# Patient Record
Sex: MALE | Race: BLACK OR AFRICAN AMERICAN | NOT HISPANIC OR LATINO | URBAN - METROPOLITAN AREA
[De-identification: names, ages, dates, MRNs, and addresses within clinical notes are randomized per-mention and may not be internally consistent; named-entity substitution may affect disease eponyms.]

---

## 2018-09-21 ENCOUNTER — INPATIENT (INPATIENT)
Facility: HOSPITAL | Age: 83
LOS: 3 days | Discharge: HOME | DRG: 309 | End: 2018-09-25
Attending: STUDENT IN AN ORGANIZED HEALTH CARE EDUCATION/TRAINING PROGRAM | Admitting: STUDENT IN AN ORGANIZED HEALTH CARE EDUCATION/TRAINING PROGRAM
Payer: MEDICARE

## 2018-09-21 VITALS
WEIGHT: 139.99 LBS | SYSTOLIC BLOOD PRESSURE: 123 MMHG | HEART RATE: 120 BPM | RESPIRATION RATE: 16 BRPM | HEIGHT: 73 IN | OXYGEN SATURATION: 99 % | DIASTOLIC BLOOD PRESSURE: 91 MMHG

## 2018-09-21 LAB
ALBUMIN SERPL ELPH-MCNC: 3.5 G/DL — SIGNIFICANT CHANGE UP (ref 3.5–5)
ALP SERPL-CCNC: 82 U/L — SIGNIFICANT CHANGE UP (ref 40–120)
ALT FLD-CCNC: 25 U/L DA — SIGNIFICANT CHANGE UP (ref 10–60)
ANION GAP SERPL CALC-SCNC: 5 MMOL/L — SIGNIFICANT CHANGE UP (ref 5–17)
APTT BLD: 28.4 SEC — SIGNIFICANT CHANGE UP (ref 27.5–37.4)
AST SERPL-CCNC: 21 U/L — SIGNIFICANT CHANGE UP (ref 10–40)
BASOPHILS # BLD AUTO: 0.1 K/UL — SIGNIFICANT CHANGE UP (ref 0–0.2)
BASOPHILS NFR BLD AUTO: 1.4 % — SIGNIFICANT CHANGE UP (ref 0–2)
BILIRUB SERPL-MCNC: 0.5 MG/DL — SIGNIFICANT CHANGE UP (ref 0.2–1.2)
BUN SERPL-MCNC: 27 MG/DL — HIGH (ref 7–18)
CALCIUM SERPL-MCNC: 9 MG/DL — SIGNIFICANT CHANGE UP (ref 8.4–10.5)
CHLORIDE SERPL-SCNC: 109 MMOL/L — HIGH (ref 96–108)
CO2 SERPL-SCNC: 24 MMOL/L — SIGNIFICANT CHANGE UP (ref 22–31)
CREAT SERPL-MCNC: 1.64 MG/DL — HIGH (ref 0.5–1.3)
EOSINOPHIL # BLD AUTO: 0.2 K/UL — SIGNIFICANT CHANGE UP (ref 0–0.5)
EOSINOPHIL NFR BLD AUTO: 4.8 % — SIGNIFICANT CHANGE UP (ref 0–6)
GLUCOSE SERPL-MCNC: 102 MG/DL — HIGH (ref 70–99)
HCT VFR BLD CALC: 34.8 % — LOW (ref 39–50)
HGB BLD-MCNC: 11.3 G/DL — LOW (ref 13–17)
INR BLD: 1.28 RATIO — HIGH (ref 0.88–1.16)
LACTATE SERPL-SCNC: 2 MMOL/L — SIGNIFICANT CHANGE UP (ref 0.7–2)
LYMPHOCYTES # BLD AUTO: 1.2 K/UL — SIGNIFICANT CHANGE UP (ref 1–3.3)
LYMPHOCYTES # BLD AUTO: 24.3 % — SIGNIFICANT CHANGE UP (ref 13–44)
MAGNESIUM SERPL-MCNC: 2 MG/DL — SIGNIFICANT CHANGE UP (ref 1.6–2.6)
MCHC RBC-ENTMCNC: 29.7 PG — SIGNIFICANT CHANGE UP (ref 27–34)
MCHC RBC-ENTMCNC: 32.5 GM/DL — SIGNIFICANT CHANGE UP (ref 32–36)
MCV RBC AUTO: 91.3 FL — SIGNIFICANT CHANGE UP (ref 80–100)
MONOCYTES # BLD AUTO: 0.4 K/UL — SIGNIFICANT CHANGE UP (ref 0–0.9)
MONOCYTES NFR BLD AUTO: 7.7 % — SIGNIFICANT CHANGE UP (ref 2–14)
NEUTROPHILS # BLD AUTO: 3.1 K/UL — SIGNIFICANT CHANGE UP (ref 1.8–7.4)
NEUTROPHILS NFR BLD AUTO: 61.7 % — SIGNIFICANT CHANGE UP (ref 43–77)
PLATELET # BLD AUTO: 121 K/UL — LOW (ref 150–400)
POTASSIUM SERPL-MCNC: 4.9 MMOL/L — SIGNIFICANT CHANGE UP (ref 3.5–5.3)
POTASSIUM SERPL-SCNC: 4.9 MMOL/L — SIGNIFICANT CHANGE UP (ref 3.5–5.3)
PROT SERPL-MCNC: 7.2 G/DL — SIGNIFICANT CHANGE UP (ref 6–8.3)
PROTHROM AB SERPL-ACNC: 14 SEC — HIGH (ref 9.8–12.7)
RBC # BLD: 3.82 M/UL — LOW (ref 4.2–5.8)
RBC # FLD: 14.5 % — SIGNIFICANT CHANGE UP (ref 10.3–14.5)
SODIUM SERPL-SCNC: 138 MMOL/L — SIGNIFICANT CHANGE UP (ref 135–145)
T4 AB SER-ACNC: 9.5 UG/DL — SIGNIFICANT CHANGE UP (ref 4.6–12)
TROPONIN I SERPL-MCNC: <0.015 NG/ML — SIGNIFICANT CHANGE UP (ref 0–0.04)
TSH SERPL-MCNC: 3.73 UU/ML — SIGNIFICANT CHANGE UP (ref 0.34–4.82)
WBC # BLD: 5 K/UL — SIGNIFICANT CHANGE UP (ref 3.8–10.5)
WBC # FLD AUTO: 5 K/UL — SIGNIFICANT CHANGE UP (ref 3.8–10.5)

## 2018-09-21 NOTE — ED PROVIDER NOTE - MEDICAL DECISION MAKING DETAILS
87 yo M with generalized weakness and fall in the setting of recently diagnosed afib. Will check FSBG, EKG, labs, UA, orthostatics, CT head, CXR and pelvic xray, observe and reassess.

## 2018-09-21 NOTE — ED PROVIDER NOTE - PHYSICAL EXAMINATION
GENERAL: wells appearing, no acute distress   HEAD: atraumatic   EYES: EOMI, pink conjunctiva   ENT: moist oral mucosa   CARDIAC: irregularly irregular, no edema, distal pulses present   RESPIRATORY: lungs CTAB, no increased work of breathing   GASTROINTESTINAL: no abdominal tenderness, no rebound or guarding, bowel sounds presents  GENITOURINARY: no CVA tenderness   MUSCULOSKELETAL: no deformity   NEUROLOGICAL: AAOx3, spontaneous movement of extremities   SKIN: intact   PSYCHIATRIC: cooperative  HEME LYMPH: no lymphadenopathy

## 2018-09-21 NOTE — ED PROVIDER NOTE - OBJECTIVE STATEMENT
89 yo M pmh of HTN, HLD, and recent diagnosed a-fib presents with generalized weakness x 1 day. Reports "feeling funny" today when standing up and had a fall. Denies head trauma, LOC, neck or back pain, chest pain, other acute complaints. Took 1st dose of elquis and metropolol today. Denies other acute complaints. 89 yo M pmh of HTN, HLD, and recent diagnosed a-fib presents with generalized weakness x 1 day. Reports "feeling funny" today when standing up and had a fall. Denies head trauma, LOC, neck or back pain, chest pain, other acute complaints. Took 1st dose of Eliquis and metoprolol today. Denies other acute complaints.

## 2018-09-21 NOTE — ED PROVIDER NOTE - PROGRESS NOTE DETAILS
labs - anemia, MORGAN vs CKD; UA - negative   EKG - afib, rate 104  CT head - no infarct or hemorrhage   Will admit for weakness, likely secondary to new onset afib w/o rate control. Endorsed to MAR.

## 2018-09-21 NOTE — ED ADULT TRIAGE NOTE - CHIEF COMPLAINT QUOTE
He feels weak, unable to ambulate Problem: Respiratory Impairment - Respiratory Therapy 253  Intervention: Inhaled medication delivery  Intervention Status  Done  Intervention: Inhaled gas administration  Intervention Status  Done

## 2018-09-22 DIAGNOSIS — C61 MALIGNANT NEOPLASM OF PROSTATE: ICD-10-CM

## 2018-09-22 DIAGNOSIS — E78.5 HYPERLIPIDEMIA, UNSPECIFIED: ICD-10-CM

## 2018-09-22 DIAGNOSIS — N17.9 ACUTE KIDNEY FAILURE, UNSPECIFIED: ICD-10-CM

## 2018-09-22 DIAGNOSIS — K21.9 GASTRO-ESOPHAGEAL REFLUX DISEASE WITHOUT ESOPHAGITIS: ICD-10-CM

## 2018-09-22 DIAGNOSIS — Z85.46 PERSONAL HISTORY OF MALIGNANT NEOPLASM OF PROSTATE: Chronic | ICD-10-CM

## 2018-09-22 DIAGNOSIS — I48.91 UNSPECIFIED ATRIAL FIBRILLATION: ICD-10-CM

## 2018-09-22 DIAGNOSIS — I10 ESSENTIAL (PRIMARY) HYPERTENSION: ICD-10-CM

## 2018-09-22 DIAGNOSIS — Z29.9 ENCOUNTER FOR PROPHYLACTIC MEASURES, UNSPECIFIED: ICD-10-CM

## 2018-09-22 LAB
ANION GAP SERPL CALC-SCNC: 8 MMOL/L — SIGNIFICANT CHANGE UP (ref 5–17)
BUN SERPL-MCNC: 24 MG/DL — HIGH (ref 7–18)
CALCIUM SERPL-MCNC: 9.5 MG/DL — SIGNIFICANT CHANGE UP (ref 8.4–10.5)
CHLORIDE SERPL-SCNC: 107 MMOL/L — SIGNIFICANT CHANGE UP (ref 96–108)
CK MB BLD-MCNC: 1 % — SIGNIFICANT CHANGE UP (ref 0–3.5)
CK MB CFR SERPL CALC: 1.9 NG/ML — SIGNIFICANT CHANGE UP (ref 0–3.6)
CK SERPL-CCNC: 188 U/L — SIGNIFICANT CHANGE UP (ref 35–232)
CO2 SERPL-SCNC: 23 MMOL/L — SIGNIFICANT CHANGE UP (ref 22–31)
CREAT SERPL-MCNC: 1.62 MG/DL — HIGH (ref 0.5–1.3)
GLUCOSE SERPL-MCNC: 85 MG/DL — SIGNIFICANT CHANGE UP (ref 70–99)
HCT VFR BLD CALC: 42 % — SIGNIFICANT CHANGE UP (ref 39–50)
HGB BLD-MCNC: 13.3 G/DL — SIGNIFICANT CHANGE UP (ref 13–17)
MAGNESIUM SERPL-MCNC: 2.1 MG/DL — SIGNIFICANT CHANGE UP (ref 1.6–2.6)
MCHC RBC-ENTMCNC: 29.6 PG — SIGNIFICANT CHANGE UP (ref 27–34)
MCHC RBC-ENTMCNC: 31.6 GM/DL — LOW (ref 32–36)
MCV RBC AUTO: 93.8 FL — SIGNIFICANT CHANGE UP (ref 80–100)
NT-PROBNP SERPL-SCNC: 4379 PG/ML — HIGH (ref 0–450)
OB PNL STL: NEGATIVE — SIGNIFICANT CHANGE UP
OSMOLALITY UR: 252 MOS/KG — SIGNIFICANT CHANGE UP (ref 50–1200)
PHOSPHATE SERPL-MCNC: 2.8 MG/DL — SIGNIFICANT CHANGE UP (ref 2.5–4.5)
PLATELET # BLD AUTO: 130 K/UL — LOW (ref 150–400)
POTASSIUM SERPL-MCNC: 4.5 MMOL/L — SIGNIFICANT CHANGE UP (ref 3.5–5.3)
POTASSIUM SERPL-SCNC: 4.5 MMOL/L — SIGNIFICANT CHANGE UP (ref 3.5–5.3)
RBC # BLD: 4.48 M/UL — SIGNIFICANT CHANGE UP (ref 4.2–5.8)
RBC # FLD: 14.9 % — HIGH (ref 10.3–14.5)
SODIUM SERPL-SCNC: 138 MMOL/L — SIGNIFICANT CHANGE UP (ref 135–145)
TROPONIN I SERPL-MCNC: <0.015 NG/ML — SIGNIFICANT CHANGE UP (ref 0–0.04)
WBC # BLD: 4.7 K/UL — SIGNIFICANT CHANGE UP (ref 3.8–10.5)
WBC # FLD AUTO: 4.7 K/UL — SIGNIFICANT CHANGE UP (ref 3.8–10.5)

## 2018-09-22 PROCEDURE — 99223 1ST HOSP IP/OBS HIGH 75: CPT | Mod: GC

## 2018-09-22 PROCEDURE — 72170 X-RAY EXAM OF PELVIS: CPT | Mod: 26

## 2018-09-22 PROCEDURE — 70450 CT HEAD/BRAIN W/O DYE: CPT | Mod: 26

## 2018-09-22 PROCEDURE — 99285 EMERGENCY DEPT VISIT HI MDM: CPT

## 2018-09-22 PROCEDURE — 71045 X-RAY EXAM CHEST 1 VIEW: CPT | Mod: 26

## 2018-09-22 RX ORDER — POLYETHYLENE GLYCOL 3350 17 G/17G
17 POWDER, FOR SOLUTION ORAL DAILY
Qty: 0 | Refills: 0 | Status: DISCONTINUED | OUTPATIENT
Start: 2018-09-22 | End: 2018-09-25

## 2018-09-22 RX ORDER — ATORVASTATIN CALCIUM 80 MG/1
40 TABLET, FILM COATED ORAL AT BEDTIME
Qty: 0 | Refills: 0 | Status: DISCONTINUED | OUTPATIENT
Start: 2018-09-22 | End: 2018-09-25

## 2018-09-22 RX ORDER — SODIUM CHLORIDE 0.65 %
1 AEROSOL, SPRAY (ML) NASAL DAILY
Qty: 0 | Refills: 0 | Status: DISCONTINUED | OUTPATIENT
Start: 2018-09-22 | End: 2018-09-25

## 2018-09-22 RX ORDER — DOCUSATE SODIUM 100 MG
100 CAPSULE ORAL THREE TIMES A DAY
Qty: 0 | Refills: 0 | Status: DISCONTINUED | OUTPATIENT
Start: 2018-09-22 | End: 2018-09-25

## 2018-09-22 RX ORDER — SENNA PLUS 8.6 MG/1
2 TABLET ORAL AT BEDTIME
Qty: 0 | Refills: 0 | Status: DISCONTINUED | OUTPATIENT
Start: 2018-09-22 | End: 2018-09-25

## 2018-09-22 RX ORDER — METOPROLOL TARTRATE 50 MG
25 TABLET ORAL EVERY 12 HOURS
Qty: 0 | Refills: 0 | Status: DISCONTINUED | OUTPATIENT
Start: 2018-09-22 | End: 2018-09-25

## 2018-09-22 RX ORDER — PANTOPRAZOLE SODIUM 20 MG/1
40 TABLET, DELAYED RELEASE ORAL
Qty: 0 | Refills: 0 | Status: DISCONTINUED | OUTPATIENT
Start: 2018-09-22 | End: 2018-09-25

## 2018-09-22 RX ORDER — FUROSEMIDE 40 MG
20 TABLET ORAL ONCE
Qty: 0 | Refills: 0 | Status: COMPLETED | OUTPATIENT
Start: 2018-09-22 | End: 2018-09-22

## 2018-09-22 RX ORDER — LACTULOSE 10 G/15ML
10 SOLUTION ORAL ONCE
Qty: 0 | Refills: 0 | Status: COMPLETED | OUTPATIENT
Start: 2018-09-22 | End: 2018-09-22

## 2018-09-22 RX ORDER — APIXABAN 2.5 MG/1
2.5 TABLET, FILM COATED ORAL EVERY 12 HOURS
Qty: 0 | Refills: 0 | Status: DISCONTINUED | OUTPATIENT
Start: 2018-09-22 | End: 2018-09-25

## 2018-09-22 RX ADMIN — APIXABAN 2.5 MILLIGRAM(S): 2.5 TABLET, FILM COATED ORAL at 18:35

## 2018-09-22 RX ADMIN — PANTOPRAZOLE SODIUM 40 MILLIGRAM(S): 20 TABLET, DELAYED RELEASE ORAL at 05:15

## 2018-09-22 RX ADMIN — Medication 20 MILLIGRAM(S): at 08:38

## 2018-09-22 RX ADMIN — APIXABAN 2.5 MILLIGRAM(S): 2.5 TABLET, FILM COATED ORAL at 08:38

## 2018-09-22 RX ADMIN — Medication 25 MILLIGRAM(S): at 18:35

## 2018-09-22 RX ADMIN — POLYETHYLENE GLYCOL 3350 17 GRAM(S): 17 POWDER, FOR SOLUTION ORAL at 12:35

## 2018-09-22 RX ADMIN — Medication 100 MILLIGRAM(S): at 18:34

## 2018-09-22 RX ADMIN — LACTULOSE 10 GRAM(S): 10 SOLUTION ORAL at 05:15

## 2018-09-22 RX ADMIN — ATORVASTATIN CALCIUM 40 MILLIGRAM(S): 80 TABLET, FILM COATED ORAL at 21:44

## 2018-09-22 RX ADMIN — Medication 100 MILLIGRAM(S): at 21:44

## 2018-09-22 RX ADMIN — Medication 100 MILLIGRAM(S): at 05:14

## 2018-09-22 RX ADMIN — Medication 25 MILLIGRAM(S): at 04:42

## 2018-09-22 NOTE — H&P ADULT - HISTORY OF PRESENT ILLNESS
89 yo M pmh of HTN, HLD, and recent diagnosed Atrial fibrillation presents with generalized weakness x 1 day. Reports "feeling funny" today when standing up and had a fall. Denies head trauma, LOC, neck or back pain, chest pain, other acute complaints. Patient states he took his first dose of eliquis and metoprolol today, and thats what precipitated this feeling of something being 'not right'. Patient states he also felt nausea, some dizziness, and a feeling of suffocation, however after being in the ED with several hours, patient states he is feeling much better now. Patient denies fever, headache, shortness of breath, chest pain, abdominal pain, vomiting, diarrhea. Patient admits to feeling of palpitations, which on further questioning he admits to having palpitations for 2 weeks until investigating it further on Monday 9/17, when he was found to have atrial fibrillation. Patient also states he sometimes wakes up in the middle of the night feeling as if he cannot breathe, and has to stay upright for several hours until he feels better. Patient has been consistently sleeping on two pillows every night. Patient also mentions he has chronic history of constipation, and often has to digitally disimpact for bowel movement. 89 yo M pmh of HTN, HLD, and recent diagnosed Atrial fibrillation PSH of bowel surgery, presents with generalized weakness x 1 day. Reports "feeling funny" today when standing up and had a fall. Denies head trauma, LOC, neck or back pain, chest pain, other acute complaints. Patient states he took his first dose of eliquis and metoprolol today, and thats what precipitated this feeling of something being 'not right'. Patient states he also felt nausea, some dizziness, and a feeling of suffocation, however after being in the ED with several hours, patient states he is feeling much better now. Patient denies fever, headache, shortness of breath, chest pain, abdominal pain, vomiting, diarrhea. Patient admits to feeling of palpitations, which on further questioning he admits to having palpitations for 2 weeks until investigating it further on Monday 9/17, when he was found to have atrial fibrillation. Patient also states he sometimes wakes up in the middle of the night feeling as if he cannot breathe, and has to stay upright for several hours until he feels better. Patient has been consistently sleeping on two pillows every night. Patient also mentions he has chronic history of constipation, and often has to digitally disimpact for bowel movement.

## 2018-09-22 NOTE — H&P ADULT - PROBLEM SELECTOR PLAN 1
started on eliquis today, however did not feel good  f/u FOBT  will start on heparin drip for now, consider switching to Xarelto or other agent pending creatinine started on eliquis today, however did not feel good  f/u FOBT  will start on heparin drip for now, consider switching to Xarelto or other agent pending creatinine improving started on eliquis and metoprolol today, however did not feel good  f/u FOBT  will start on heparin drip for now, consider switching to Xarelto or other agent pending creatinine improving  c/w metoprolol 25mg BID for now for rate control  continue with telemetry monitoring, titrate dose upward as necessary  f/u echocardiogram  T1 negative, f/u T2, T3  f/u cardiology consult c/w eliquis for anticoagulation  c/w metoprolol 25mg BID for now for rate control  continue with telemetry monitoring, titrate dose upward as necessary  f/u echocardiogram  T1 negative, f/u T2, T3  f/u cardiology consult - Dr. Salazar

## 2018-09-22 NOTE — H&P ADULT - FAMILY HISTORY
Mother  Still living? No  Family history of cancer, Age at diagnosis: Age Unknown     Sibling  Still living? No  Family history of cancer, Age at diagnosis: Age Unknown

## 2018-09-22 NOTE — ED ADULT NURSE NOTE - NSIMPLEMENTINTERV_GEN_ALL_ED
Implemented All Fall Risk Interventions:  [place] to call system. Call bell, personal items and telephone within reach. Instruct patient to call for assistance. Room bathroom lighting operational. Non-slip footwear when patient is off stretcher. Physically safe environment: no spills, clutter or unnecessary equipment. Stretcher in lowest position, wheels locked, appropriate side rails in place. Provide visual cue, wrist band, yellow gown, etc. Monitor gait and stability. Monitor for mental status changes and reorient to person, place, and time. Review medications for side effects contributing to fall risk. Reinforce activity limits and safety measures with patient and family.

## 2018-09-22 NOTE — H&P ADULT - PMH
Afib    GERD (gastroesophageal reflux disease)    HLD (hyperlipidemia)    HTN (hypertension)    Prostate CA

## 2018-09-22 NOTE — H&P ADULT - ATTENDING COMMENTS
Agree with all the above   Patient seen and examined, he is a 87 yo M pmh of HTN, HLD, and recent diagnosed Atrial fibrillation PSH of bowel surgery, presents with generalized weakness x 1 day. Reports "feeling funny" today when standing up and had a fall. Denies head trauma, LOC, neck or back pain, chest pain, other acute complaints. Patient states he took his first dose of eliquis and metoprolol yesterday, and thats what precipitated this feeling of something being 'not right'. Patient states he also felt nausea, some dizziness, and a feeling of suffocation, however after being in the ED with several hours, patient states he is feeling much better now. Patient denies fever, headache, shortness of breath, chest pain, abdominal pain, vomiting, diarrhea. Patient admits to feeling of palpitations, which on further questioning he admits to having palpitations for 2 weeks until investigating it further on Monday 9/17, when he was found to have atrial fibrillation. Patient also states he sometimes wakes up in the middle of the night feeling as if he cannot breathe, and has to stay upright for several hours until he feels better. Patient has been consistently sleeping on two pillows every night. Patient also mentions he has chronic history of constipation, and often has to digitally disimpact for bowel movement.    ROS as above  PE:   General; Pleasant elderly man, NAD, laying in bed   NEck: Supple   Cardio: irregular rate and rhythm   Pulm: mild crackles at bases   GI: abdomen is soft, non tender   Extr: no edema, no rash   Neuro: Aox3, no focal weakness     Vital Signs Last 24 Hrs  T(C): 36.8 (22 Sep 2018 12:13), Max: 36.8 (22 Sep 2018 03:39)  T(F): 98.2 (22 Sep 2018 12:13), Max: 98.2 (22 Sep 2018 03:39)  HR: 68 (22 Sep 2018 12:13) (68 - 120)  BP: 111/63 (22 Sep 2018 12:13) (105/59 - 123/91)  BP(mean): --  RR: 18 (22 Sep 2018 12:13) (16 - 20)  SpO2: 98% (22 Sep 2018 12:13) (98% - 100%)    1. Newly diagnosed Afib   2. HTN  3. MORGAN vs CKD ???  4. GERD    Plan:   1. Telemetry monitoring: restart Metoprolol 25 mg po twice daily for rate control. Goal HR <110.. Eliquis 2.5 mg po twice daily for anticoagulation  Follow TSH level and TTE   Cardiology consult   2. BP on the lower side. Do not restart Amlodipine   3. Send Urine lytes    4. Start Pantoprazole 40 mg po daily     Plan of care discussed with patient

## 2018-09-22 NOTE — H&P ADULT - PROBLEM SELECTOR PLAN 2
patient on protonix at home  primary team to confirm dosing with patient's pharmacy ([facility] on [address] - creatinine 1.64, unknown kidney dysfunction  f/u creatinine in AM  f/u urine lytes creatinine 1.64, unknown kidney dysfunction  will give 20 IV lasix as patient has bilateral pitting edema and mild pulm vasc congestion on CXR  f/u creatinine in AM  f/u urine lytes

## 2018-09-22 NOTE — H&P ADULT - PROBLEM SELECTOR PLAN 3
patient on lipitor at home patient on protonix at home  primary team to confirm dosing with patient's pharmacy ([facility] on [address] - patient on protonix at home, does not remember dose  ****primary team to contact wife or patient's pharmacy to confirm dosing [facility]: [phone]

## 2018-09-22 NOTE — ED ADULT NURSE NOTE - ED STAT RN HANDOFF DETAILS
pt.remained  stable  denies  pain.on tele box N with  NSR. transfer   to holding  area .report  given to lia [email or web address].not   in  distress

## 2018-09-22 NOTE — H&P ADULT - PROBLEM SELECTOR PLAN 7
IMPROVE VTE Individual Risk Assessment          RISK                                                          Points  [  ] Previous VTE                                                3  [  ] Thrombophilia                                             2  [  ] Lower limb paralysis                                   2        (unable to hold up >15 seconds)    [  ] Current Cancer                                             2         (within 6 months)  [  ] Immobilization > 24 hrs                              1  [  ] ICU/CCU stay > 24 hours                             1  [  ] Age > 60                                                         1    IMPROVE VTE Score: IMPROVE VTE Individual Risk Assessment          RISK                                                          Points  [  ] Previous VTE                                                3  [  ] Thrombophilia                                             2  [  ] Lower limb paralysis                                   2        (unable to hold up >15 seconds)    [  ] Current Cancer                                             2         (within 6 months)  [ x ] Immobilization > 24 hrs                              1  [  ] ICU/CCU stay > 24 hours                             1  [ x ] Age > 60                                                         1    IMPROVE VTE Score: 2    full anticoagulation for new-onset Afib

## 2018-09-22 NOTE — H&P ADULT - NSHPLABSRESULTS_GEN_ALL_CORE
LABS:      CBC Full  -  ( 21 Sep 2018 22:59 )  WBC Count : 5.0 K/uL  Hemoglobin : 11.3 g/dL  Hematocrit : 34.8 %  Platelet Count - Automated : 121 K/uL  Mean Cell Volume : 91.3 fl  Mean Cell Hemoglobin : 29.7 pg  Mean Cell Hemoglobin Concentration : 32.5 gm/dL  Auto Neutrophil # : 3.1 K/uL  Auto Lymphocyte # : 1.2 K/uL  Auto Monocyte # : 0.4 K/uL  Auto Eosinophil # : 0.2 K/uL  Auto Basophil # : 0.1 K/uL  Auto Neutrophil % : 61.7 %  Auto Lymphocyte % : 24.3 %  Auto Monocyte % : 7.7 %  Auto Eosinophil % : 4.8 %  Auto Basophil % : 1.4 %        138  |  109<H>  |  27<H>  ----------------------------<  102<H>  4.9   |  24  |  1.64<H>    Ca    9.0      21 Sep 2018 22:59  Mg     2.0         TPro  7.2  /  Alb  3.5  /  TBili  0.5  /  DBili  x   /  AST  21  /  ALT  25  /  AlkPhos  82      PT/INR - ( 21 Sep 2018 22:59 )   PT: 14.0 sec;   INR: 1.28 ratio         PTT - ( 21 Sep 2018 22:59 )  PTT:28.4 sec      Urinalysis Basic - ( 22 Sep 2018 00:01 )    Color: Yellow / Appearance: Clear / S.015 / pH: x  Gluc: x / Ketone: Negative  / Bili: Negative / Urobili: 1   Blood: x / Protein: Negative / Nitrite: Negative   Leuk Esterase: Negative / RBC: x / WBC x   Sq Epi: x / Non Sq Epi: x / Bacteria: x                RADIOLOGY & ADDITIONAL STUDIES (The following images were personally reviewed):    CXR: no focal consolidation, no evidence of pleural effusion    Abd X-ray: moderate stool in rectal vault LABS:      CBC Full  -  ( 21 Sep 2018 22:59 )  WBC Count : 5.0 K/uL  Hemoglobin : 11.3 g/dL  Hematocrit : 34.8 %  Platelet Count - Automated : 121 K/uL  Mean Cell Volume : 91.3 fl  Mean Cell Hemoglobin : 29.7 pg  Mean Cell Hemoglobin Concentration : 32.5 gm/dL  Auto Neutrophil # : 3.1 K/uL  Auto Lymphocyte # : 1.2 K/uL  Auto Monocyte # : 0.4 K/uL  Auto Eosinophil # : 0.2 K/uL  Auto Basophil # : 0.1 K/uL  Auto Neutrophil % : 61.7 %  Auto Lymphocyte % : 24.3 %  Auto Monocyte % : 7.7 %  Auto Eosinophil % : 4.8 %  Auto Basophil % : 1.4 %        138  |  109<H>  |  27<H>  ----------------------------<  102<H>  4.9   |  24  |  1.64<H>    Ca    9.0      21 Sep 2018 22:59  Mg     2.0         TPro  7.2  /  Alb  3.5  /  TBili  0.5  /  DBili  x   /  AST  21  /  ALT  25  /  AlkPhos  82      PT/INR - ( 21 Sep 2018 22:59 )   PT: 14.0 sec;   INR: 1.28 ratio         PTT - ( 21 Sep 2018 22:59 )  PTT:28.4 sec      Urinalysis Basic - ( 22 Sep 2018 00:01 )    Color: Yellow / Appearance: Clear / S.015 / pH: x  Gluc: x / Ketone: Negative  / Bili: Negative / Urobili: 1   Blood: x / Protein: Negative / Nitrite: Negative   Leuk Esterase: Negative / RBC: x / WBC x   Sq Epi: x / Non Sq Epi: x / Bacteria: x                RADIOLOGY & ADDITIONAL STUDIES (The following images were personally reviewed):    CXR: no focal consolidation, no evidence of pleural effusion, mild pulmonary vascular congestion    Abd X-ray: moderate stool in rectal vault

## 2018-09-22 NOTE — H&P ADULT - ASSESSMENT
89 yo M pmh of HTN, HLD, and recent diagnosed Atrial fibrillation presents with generalized weakness x 1 day.Patient states he took his first dose of eliquis and metoprolol today, and that's what precipitated this feeling of something being 'not right'. Patient states he also felt nausea, some dizziness, and a feeling of suffocation, however after being in the ED with several hours, patient states he is feeling much better now Patient admits to feeling of palpitations, which on further questioning he admits to having palpitations for 2 weeks until investigating it further on Monday 9/17, when he was found to have atrial fibrillation. Patient also states he sometimes wakes up in the middle of the night feeling as if he cannot breathe, and has to stay upright for several hours until he feels better. Patient has been consistently sleeping on two pillows every night. Patient also mentions he has chronic history of constipation, and often has to digitally disimpact for bowel movement.    Patient admitted for further management of new-onset atrial fibrillation with RVR. 89 yo M pmh of HTN, HLD, and recent diagnosed Atrial fibrillation presents with generalized weakness x 1 day.Patient states he took his first dose of eliquis and metoprolol today, and that's what precipitated this feeling of something being 'not right'. Patient states he also felt nausea, some dizziness, and a feeling of suffocation, however after being in the ED with several hours, patient states he is feeling much better now Patient admits to feeling of palpitations, which on further questioning he admits to having palpitations for 2 weeks until investigating it further on Monday 9/17, when he was found to have atrial fibrillation. Patient also states he sometimes wakes up in the middle of the night feeling as if he cannot breathe, and has to stay upright for several hours until he feels better. Patient has been consistently sleeping on two pillows every night. Patient also mentions he has chronic history of constipation, and often has to digitally disimpact for bowel movement.    Patient admitted for further management of new-onset atrial fibrillation with RVR up to 140s 87 yo M pmh of HTN, HLD, and recent diagnosed Atrial fibrillation, PSH bowel surgery, presents with generalized weakness x 1 day.Patient states he took his first dose of eliquis and metoprolol today, and that's what precipitated this feeling of something being 'not right'. Patient states he also felt nausea, some dizziness, and a feeling of suffocation, however after being in the ED with several hours, patient states he is feeling much better now Patient admits to feeling of palpitations, which on further questioning he admits to having palpitations for 2 weeks until investigating it further on Monday 9/17, when he was found to have atrial fibrillation. Patient also states he sometimes wakes up in the middle of the night feeling as if he cannot breathe, and has to stay upright for several hours until he feels better. Patient has been consistently sleeping on two pillows every night. Patient also mentions he has chronic history of constipation, and often has to digitally disimpact for bowel movement.    Patient admitted for further management of new-onset atrial fibrillation with RVR up to 140s

## 2018-09-22 NOTE — H&P ADULT - PROBLEM SELECTOR PLAN 4
Patient on norvasc at home, however does not know the dose  c/w metoprolol for now  continue to monitor BP patient on lipitor at home patient on lipitor at home, however does not know dose  left message for wife for call-back  ****primary team to contact wife or patient's pharmacy to confirm dosing [facility]: [phone] patient on lipitor at home, however does not know dose  continue with atorvastatin 40mg for now  f/u lipid panel, B12, Vit D, TSH, HbA1c  left message for wife for call-back  ****primary team to contact wife or patient's pharmacy to confirm dosing [facility]: [phone]

## 2018-09-22 NOTE — H&P ADULT - NSHPPHYSICALEXAM_GEN_ALL_CORE
Vital Signs Last 24 Hrs  T(C): 36.8 (22 Sep 2018 03:39), Max: 36.8 (22 Sep 2018 03:39)  T(F): 98.2 (22 Sep 2018 03:39), Max: 98.2 (22 Sep 2018 03:39)  HR: 79 (22 Sep 2018 03:39) (79 - 120)  BP: 114/80 (22 Sep 2018 03:39) (113/68 - 123/91)  RR: 20 (22 Sep 2018 03:39) (16 - 20)  SpO2: 98% (22 Sep 2018 03:39) (98% - 99%)    ________________________________________________  PHYSICAL EXAM:  GENERAL: NAD  HEENT: Normocephalic;  conjunctivae and sclerae clear; moist mucous membranes;   NECK : supple  CHEST/LUNG: Clear to auscultation bilaterally with good air entry   HEART: S1 S2  regular; no murmurs, gallops or rubs  ABDOMEN: Soft, Nontender, Nondistended; Bowel sounds present; 1 midline surgical scar from umbilicus to suprapubic region as well as one right paraumbilical surgical scar approx 3 cm in length  EXTREMITIES: no cyanosis; trace pitting edema on bilateral lower extremities up to shins; no calf tenderness  NERVOUS SYSTEM:  Awake and alert; Oriented  to place, person and time ; no new deficits

## 2018-09-22 NOTE — H&P ADULT - PROBLEM SELECTOR PLAN 5
s/p radiation and surgical removal  currently in remission Patient on norvasc at home, however does not know the dose  c/w metoprolol for now  continue to monitor BP Patient on norvasc at home, however does not know the dose  ****primary team to contact wife or patient's pharmacy to confirm dosing [facility]: [phone]  c/w metoprolol for now  continue to monitor BP

## 2018-09-22 NOTE — H&P ADULT - PROBLEM SELECTOR PLAN 6
IMPROVE VTE Individual Risk Assessment          RISK                                                          Points  [  ] Previous VTE                                                3  [  ] Thrombophilia                                             2  [  ] Lower limb paralysis                                   2        (unable to hold up >15 seconds)    [  ] Current Cancer                                             2         (within 6 months)  [  ] Immobilization > 24 hrs                              1  [  ] ICU/CCU stay > 24 hours                             1  [  ] Age > 60                                                         1    IMPROVE VTE Score: s/p radiation and surgical removal  currently in remission

## 2018-09-22 NOTE — CONSULT NOTE ADULT - SUBJECTIVE AND OBJECTIVE BOX
CARDIOLOGY ATTENDING      HISTORY OF PRESENT ILLNESS: He is a pleasant 89 y/o male PMH persistent AF of unknown duration now admitted with palpitations secondary to rapid atrial fibrillation. Echo shows mod-severe MR with a non-dilated LV and LVEF 45%. He denies angina nor syncope.     PAST MEDICAL & SURGICAL HISTORY:  Prostate CA  GERD (gastroesophageal reflux disease)  Afib  HLD (hyperlipidemia)  HTN (hypertension)  mod-severe MR  History of prostate cancer    PShx: bowel surgery    MEDICATIONS  (STANDING):  apixaban 2.5 milliGRAM(s) Oral every 12 hours  atorvastatin 40 milliGRAM(s) Oral at bedtime  docusate sodium 100 milliGRAM(s) Oral three times a day  metoprolol tartrate 25 milliGRAM(s) Oral every 12 hours  pantoprazole    Tablet 40 milliGRAM(s) Oral before breakfast  polyethylene glycol 3350 17 Gram(s) Oral daily  senna 2 Tablet(s) Oral at bedtime  sodium chloride 0.65% Nasal 1 Spray(s) Both Nostrils daily    Allergies  penicillin (Hives)  shellfish (Hives)    FAMILY HISTORY:  Family history of cancer (Mother, Sibling)  Non-contributary for premature coronary disease or sudden cardiac death    SOCIAL HISTORY:    [x ] Non-smoker  [ ] Smoker  [ ] Alcohol      REVIEW OF SYSTEMS:  [ ]chest pain  [  ]shortness of breath  [ x]palpitations  [  ]syncope  [ ]near syncope [ ]upper extremity weakness   [ ] lower extremity weakness  [  ]diplopia  [  ]altered mental status   [  ]fevers  [ ]chills [ ]nausea  [ ]vomitting  [  ]dysphagia    [ ]abdominal pain  [ ]melena  [ ]BRBPR    [  ]epistaxis  [  ]rash    [ ]lower extremity edema        [x ] All others negative	  [ ] Unable to obtain    PHYSICAL EXAM:  T(C): 36.8 (09-22-18 @ 12:13), Max: 36.8 (09-22-18 @ 03:39)  HR: 68 (09-22-18 @ 12:13) (68 - 120)  BP: 111/63 (09-22-18 @ 12:13) (105/59 - 123/91)  RR: 18 (09-22-18 @ 12:13) (16 - 20)  SpO2: 98% (09-22-18 @ 12:13) (98% - 100%)  Wt(kg): --    no JVD  IRR, 2/6 systolic murmur  CTAB  soft nt/nd  no c/c/e      TELEMETRY: 	  AF  ECG:  	AF    Echo: mod-severe MR with LVEF 45%  NST: n/a  Cath: n/a  	  	  LABS:	 	                          13.3   4.7   )-----------( 130      ( 22 Sep 2018 10:04 )             42.0     09-22    138  |  107  |  24<H>  ----------------------------<  85  4.5   |  23  |  1.62<H>    Ca    9.5      22 Sep 2018 10:04  Phos  2.8     09-22  Mg     2.1     09-22    TPro  7.2  /  Alb  3.5  /  TBili  0.5  /  DBili  x   /  AST  21  /  ALT  25  /  AlkPhos  82  09-21    proBNP: Serum Pro-Brain Natriuretic Peptide: 4379 pg/mL (09-22 @ 10:04)    Lipid Profile:   HgA1c:   TSH: Thyroid Stimulating Hormone, Serum: 3.73 uU/mL (09-21 @ 22:59)      A/P) He is a pleasant 89 y/o male PMH persistent AF of unknown duration now admitted with palpitations secondary to rapid atrial fibrillation. Echo shows mod-severe MR with a non-dilated LV and LVEF 45%. He denies angina nor syncope.     -continue eliquis and metoprolol  -lifelong a/c  -will review TTE images, but given mod-severe MR with LVEF already declining he may require MVR or minimally invasive michelle-clip  -final cardiology reccs pending review of TTE with possible YENNY to follow to evaluate mitral valve      Guzman Mckenzie M.D., [facility] [[email or web address]]  Cardiac Electrophysiology  [facility]  [address] [facility], [address] [place] [address]  [email or web address]    office [phone]  pager [phone]

## 2018-09-23 ENCOUNTER — TRANSCRIPTION ENCOUNTER (OUTPATIENT)
Age: 83
End: 2018-09-23

## 2018-09-23 LAB
ANION GAP SERPL CALC-SCNC: 7 MMOL/L — SIGNIFICANT CHANGE UP (ref 5–17)
BUN SERPL-MCNC: 26 MG/DL — HIGH (ref 7–18)
CALCIUM SERPL-MCNC: 9.2 MG/DL — SIGNIFICANT CHANGE UP (ref 8.4–10.5)
CHLORIDE SERPL-SCNC: 106 MMOL/L — SIGNIFICANT CHANGE UP (ref 96–108)
CO2 SERPL-SCNC: 23 MMOL/L — SIGNIFICANT CHANGE UP (ref 22–31)
CREAT SERPL-MCNC: 1.5 MG/DL — HIGH (ref 0.5–1.3)
CULTURE RESULTS: SIGNIFICANT CHANGE UP
GLUCOSE SERPL-MCNC: 92 MG/DL — SIGNIFICANT CHANGE UP (ref 70–99)
HCT VFR BLD CALC: 38.7 % — LOW (ref 39–50)
HGB BLD-MCNC: 12.4 G/DL — LOW (ref 13–17)
MAGNESIUM SERPL-MCNC: 2.1 MG/DL — SIGNIFICANT CHANGE UP (ref 1.6–2.6)
MCHC RBC-ENTMCNC: 29.6 PG — SIGNIFICANT CHANGE UP (ref 27–34)
MCHC RBC-ENTMCNC: 32.1 GM/DL — SIGNIFICANT CHANGE UP (ref 32–36)
MCV RBC AUTO: 92.1 FL — SIGNIFICANT CHANGE UP (ref 80–100)
PHOSPHATE SERPL-MCNC: 3.3 MG/DL — SIGNIFICANT CHANGE UP (ref 2.5–4.5)
PLATELET # BLD AUTO: 116 K/UL — LOW (ref 150–400)
POTASSIUM SERPL-MCNC: 4.3 MMOL/L — SIGNIFICANT CHANGE UP (ref 3.5–5.3)
POTASSIUM SERPL-SCNC: 4.3 MMOL/L — SIGNIFICANT CHANGE UP (ref 3.5–5.3)
RBC # BLD: 4.2 M/UL — SIGNIFICANT CHANGE UP (ref 4.2–5.8)
RBC # FLD: 14.2 % — SIGNIFICANT CHANGE UP (ref 10.3–14.5)
SODIUM SERPL-SCNC: 136 MMOL/L — SIGNIFICANT CHANGE UP (ref 135–145)
SPECIMEN SOURCE: SIGNIFICANT CHANGE UP
WBC # BLD: 4.2 K/UL — SIGNIFICANT CHANGE UP (ref 3.8–10.5)
WBC # FLD AUTO: 4.2 K/UL — SIGNIFICANT CHANGE UP (ref 3.8–10.5)

## 2018-09-23 PROCEDURE — 99233 SBSQ HOSP IP/OBS HIGH 50: CPT | Mod: GC

## 2018-09-23 RX ORDER — LISINOPRIL 2.5 MG/1
2.5 TABLET ORAL DAILY
Qty: 0 | Refills: 0 | Status: DISCONTINUED | OUTPATIENT
Start: 2018-09-23 | End: 2018-09-25

## 2018-09-23 RX ADMIN — ATORVASTATIN CALCIUM 40 MILLIGRAM(S): 80 TABLET, FILM COATED ORAL at 22:25

## 2018-09-23 RX ADMIN — PANTOPRAZOLE SODIUM 40 MILLIGRAM(S): 20 TABLET, DELAYED RELEASE ORAL at 06:49

## 2018-09-23 RX ADMIN — APIXABAN 2.5 MILLIGRAM(S): 2.5 TABLET, FILM COATED ORAL at 17:37

## 2018-09-23 RX ADMIN — Medication 100 MILLIGRAM(S): at 17:37

## 2018-09-23 RX ADMIN — Medication 25 MILLIGRAM(S): at 06:49

## 2018-09-23 RX ADMIN — POLYETHYLENE GLYCOL 3350 17 GRAM(S): 17 POWDER, FOR SOLUTION ORAL at 12:00

## 2018-09-23 RX ADMIN — Medication 100 MILLIGRAM(S): at 06:49

## 2018-09-23 RX ADMIN — Medication 25 MILLIGRAM(S): at 17:38

## 2018-09-23 RX ADMIN — LISINOPRIL 2.5 MILLIGRAM(S): 2.5 TABLET ORAL at 17:39

## 2018-09-23 RX ADMIN — APIXABAN 2.5 MILLIGRAM(S): 2.5 TABLET, FILM COATED ORAL at 06:49

## 2018-09-23 RX ADMIN — Medication 1 SPRAY(S): at 12:00

## 2018-09-23 NOTE — DISCHARGE NOTE ADULT - HOME CARE AGENCY
[facility] (formerly [facility]) [phone]    agency  will call you home for visits

## 2018-09-23 NOTE — PROGRESS NOTE ADULT - SUBJECTIVE AND OBJECTIVE BOX
Subjective: pt seen and examined, no complaints, ROS - .     no chest pain , sob on exam     apixaban 2.5 milliGRAM(s) Oral every 12 hours  atorvastatin 40 milliGRAM(s) Oral at bedtime  docusate sodium 100 milliGRAM(s) Oral three times a day  metoprolol tartrate 25 milliGRAM(s) Oral every 12 hours  pantoprazole    Tablet 40 milliGRAM(s) Oral before breakfast  polyethylene glycol 3350 17 Gram(s) Oral daily  senna 2 Tablet(s) Oral at bedtime  sodium chloride 0.65% Nasal 1 Spray(s) Both Nostrils daily                            13.3   4.7   )-----------( 130      ( 22 Sep 2018 10:04 )             42.0       Hemoglobin: 13.3 g/dL (09-22 @ 10:04)  Hemoglobin: 11.3 g/dL (09-21 @ 22:59)      09-22    138  |  107  |  24<H>  ----------------------------<  85  4.5   |  23  |  1.62<H>    Ca    9.5      22 Sep 2018 10:04  Phos  2.8     09-22  Mg     2.1     09-22    TPro  7.2  /  Alb  3.5  /  TBili  0.5  /  DBili  x   /  AST  21  /  ALT  25  /  AlkPhos  82  09-21    Creatinine Trend: 1.62<--, 1.64<--    COAGS:     CARDIAC MARKERS ( 22 Sep 2018 10:04 )  <0.015 ng/mL / x     / 188 U/L / x     / 1.9 ng/mL  CARDIAC MARKERS ( 21 Sep 2018 22:59 )  <0.015 ng/mL / x     / x     / x     / x            T(C): 36.6 (09-23-18 @ 00:10), Max: 36.8 (09-22-18 @ 12:13)  HR: 93 (09-23-18 @ 00:10) (68 - 106)  BP: 121/83 (09-23-18 @ 00:10) (100/78 - 121/83)  RR: 16 (09-23-18 @ 00:10) (16 - 18)  SpO2: 98% (09-23-18 @ 00:10) (98% - 100%)  Wt(kg): --    I&O's Summary    no JVD  IRR, 2/6 systolic murmur  CTAB  soft nt/nd  no c/c/e      TELEMETRY:  afib       DIAGNOSTIC TESTING:  [ ] Echocardiogram: < from: Transthoracic Echocardiogram (09.22.18 @ 06:49) >  CONCLUSIONS:  1. Moderate to severe mitral regurgitation.  2. Mild global left ventricular systolic dysfunction.  3. Grade II diastolic dysfunction.  4. RV systolic pressure is mildly increased at  35 mm Hg.    < end of copied text >    [ ]  Catheterization:  [ ] Stress Test:    OTHER: 	        ASSESSMENT/PLAN: 	88y Male PMH persistent AF of unknown duration now admitted with palpitations secondary to rapid atrial fibrillation. Echo shows mod-severe MR with a non-dilated LV and LVEF 45%. He denies angina nor syncope.     Rate control with BB   GI / DVT prophylaxis.   keep K>4, mag >2.0   A/C with eliquis   TTE with severe MR, pt will need YENNY for evaluation of MR ,and to see if pt is suitable for mitral Clip.  D/W Dr Mckenzie

## 2018-09-23 NOTE — PROGRESS NOTE ADULT - PROBLEM SELECTOR PLAN 4
patient on lipitor at home, however does not know dose  continue with atorvastatin 40mg for now  f/u lipid panel, B12, Vit D, TSH, HbA1c  left message for wife for call-back  ****primary team to contact wife or patient's pharmacy to confirm dosing [facility]: [phone] patient on lipitor at home 10 mg   continue with atorvastatin 40mg for now  f/u lipid panel, B12, Vit D, TSH, HbA1c

## 2018-09-23 NOTE — DISCHARGE NOTE ADULT - PLAN OF CARE
Managmenet and rate control - You have a history of AFib  - You need to continue on eliquis for anticoagulation  - You need to continue on metoprolol 25mg BID for now for rate control  - ECHO showed Ejection Fraction 45 % , Grade 2 Diastolic Dysfunction , severe Mitral Regurge   - cardiology consulted Dr. Salazar   - YENNY   - You are medically stable to be discharged from the hospital.  - You need to follow up with your Primary Care Physician in 1 week.  - You need to continue your medications regularly as prescribed. - creatinine 1.64, unknown kidney dysfunction  - bilateral pitting edema and mild pulm vasc congestion on chest x-ray  - creatinine 1.5 upon discharge  - You need to follow up with your Primary Care Physician in 1 week. - You need to continue on protonix 40 mg qd. maintain normal lipid level. (LDL < 100) patient on Lipitor at home 10 mg   - You need to continue on with atorvastatin 40mg for now  Please take your medication Lipitor 40mg regularly   Please maintain healthy lifestyle by eating healthy as mentioned above, exercise regularly and maintain weight.   Please Follow up with your doctor in 1 week. Blood Pressure Control , Please continue current medication regimen, and follow up with your PCP Patient at home on Norvasc 5 mg qd  c/w metoprolol for now    - You have a history of Hypertension.   - Your Blood Pressure was adequately controlled with   - You should continue on the current antihypertensive regimen regularly.  - You blood pressure should be within 140-120/80-90.  - You should follow-up with your PCP within 1 week of your discharge.   - You should maintain healthy lifestyle by eating healthy low salt diet, avoid fatty food, weight loss, exercise regularly as tolerated 30 mins X 3 time per week. - You have a history of Prostate Cancer  - You got radiation and surgical removal  - currently in remission.  - You need to follow up with your Primary Care Physician in 1 week. Management and rate control - You have a history of AFib  - You need to continue on eliquis for anticoagulation  - You need to continue on metoprolol 25mg BID for now for rate control  - ECHO showed Ejection Fraction 45 % , Grade 2 Diastolic Dysfunction , severe Mitral Regurge   - cardiology consulted Dr. Salazar   - YENNY showed severe mitral regurgitation, you underwent stress test that was negative for inducible ischemia, you are advised to please follow up with cardiologist, you need heart surgery but you refused to undergo surgery. You are advised to be complaint with your medications and follow up with your cardiologist.  - You need to follow up with your Primary Care Physician in 1 week.  - You need to continue your medications regularly as prescribed. YOu were found to have MORGAN on admission  - creatinine 1.44 upon discharge  - You need to follow up with your Primary Care Physician in 1 week.  You are advised to please avoid all nsaid and nephrotixic substances We change your blood pressure medication, you were seen by cardiologist and are advised to continue with toprol xl 25 mg daily. We held your losartan for your MORGAN and you are advised not to continue it

## 2018-09-23 NOTE — PROGRESS NOTE ADULT - PROBLEM SELECTOR PLAN 3
patient on protonix at home, does not remember dose  ****primary team to contact wife or patient's pharmacy to confirm dosing [facility]: [phone] c/w protonix 40 mg qd

## 2018-09-23 NOTE — PROGRESS NOTE ADULT - PROBLEM SELECTOR PLAN 5
Patient on norvasc at home, however does not know the dose  ****primary team to contact wife or patient's pharmacy to confirm dosing [facility]: [phone]  c/w metoprolol for now  continue to monitor BP Patient on norvasc at home  confirmed with the pharmacy Norvasc 5 mg qd  c/w metoprolol for now  continue to monitor BP

## 2018-09-23 NOTE — PROGRESS NOTE ADULT - SUBJECTIVE AND OBJECTIVE BOX
PGY1 Note discussed with Supervising Resident and Primary Attending.    Patient is a 88y old  Male who presents with a chief complaint of generalized weakness/malaise for one day (23 Sep 2018 04:36)      INTERVAL HPI/OVERNIGHT EVENTS :    MEDICATIONS  (STANDING):  apixaban 2.5 milliGRAM(s) Oral every 12 hours  atorvastatin 40 milliGRAM(s) Oral at bedtime  docusate sodium 100 milliGRAM(s) Oral three times a day  metoprolol tartrate 25 milliGRAM(s) Oral every 12 hours  pantoprazole    Tablet 40 milliGRAM(s) Oral before breakfast  polyethylene glycol 3350 17 Gram(s) Oral daily  senna 2 Tablet(s) Oral at bedtime  sodium chloride 0.65% Nasal 1 Spray(s) Both Nostrils daily    MEDICATIONS  (PRN):      Allergies    penicillin (Hives)  shellfish (Hives)    Intolerances        REVIEW OF SYSTEMS :  * CONSTITUTIONAL      : No Fever, Weight loss, or Fatigue  * EYES                             : No eye pain , Visual disturbances or Discharge  * RESPIRATORY             : No Cough, Wheezing, Chills or Hemoptysis; No shortness of breath  * CARDIOVASCULAR     : No Chest pain, Palpitations, Dizziness, or Leg swelling  * GASTROINTESTINAL  : No Abdominal or Epigastric pain. No Nausea, Vomiting or Hematemesis; No Diarrhea or Constipation. No Melena or Hematochezia.  * GENITOURINARY        : No Dysuria , Frequency , Haematuria   * NEUROLOGICAL          : No Headaches, Memory loss, Loss of trength, Numbness, or Tremors  * MUSCULOSKELETAL   : No Joint pain  * PSYCHIATRY                 : No Depression or Anxiety   * HEME/LYMPH              : No Easy Bruising or Bleeding gums  * SKIN                               : No Itching, Burning, Rashes, or Lesions     Vital Signs Last 24 Hrs  T(C): 36.5 (23 Sep 2018 05:35), Max: 36.8 (22 Sep 2018 12:13)  T(F): 97.7 (23 Sep 2018 05:35), Max: 98.2 (22 Sep 2018 12:13)  HR: 104 (23 Sep 2018 05:35) (68 - 104)  BP: 113/77 (23 Sep 2018 05:35) (100/78 - 121/83)  BP(mean): --  RR: 16 (23 Sep 2018 05:35) (16 - 18)  SpO2: 100% (23 Sep 2018 05:35) (98% - 100%)    PHYSICAL EXAM :  * GENERAL                 : NAD, Well-groomed, Well-developed  * HEAD                       :  Atraumatic, Normocephalic  * EYES                         : EOMI, PERRLA, Conjunctiva and Sclera clear  * ENT                           : Moist Mucous Membranes  * NECK                         : Supple, No JVD, Normal Thyroid  * CHEST/LUNG           : Clear to Auscultation bilaterally; No Rales, Rhonchi, Wheezing or Rubs  * HEART                       : Regular Rate and Rhythm; No murmurs, Rubs or gallops  * ABDOMEN                : Soft, Non-tender, Non-distended; Bowel Sounds present  * NERVOUS SYSTEM  :  Alert & Oriented X3, Good Concentration; Motor Strength 5/5 B/L UL LL ; DTRs 2+ Intact and Symmetric  * EXTREMITIES            :  2+ Peripheral Pulses, No clubbing, cyanosis, or edema  * SKIN                           : No Rashes or Lesions    LABS:                          13.3   4.7   )-----------( 130      ( 22 Sep 2018 10:04 )             42.0         138  |  107  |  24<H>  ----------------------------<  85  4.5   |  23  |  1.62<H>    Ca    9.5      22 Sep 2018 10:04  Phos  2.8       Mg     2.1         TPro  7.2  /  Alb  3.5  /  TBili  0.5  /  DBili  x   /  AST  21  /  ALT  25  /  AlkPhos  82  -    PT/INR - ( 21 Sep 2018 22:59 )   PT: 14.0 sec;   INR: 1.28 ratio         PTT - ( 21 Sep 2018 22:59 )  PTT:28.4 sec  Urinalysis Basic - ( 22 Sep 2018 00:01 )    Color: Yellow / Appearance: Clear / S.015 / pH: x  Gluc: x / Ketone: Negative  / Bili: Negative / Urobili: 1   Blood: x / Protein: Negative / Nitrite: Negative   Leuk Esterase: Negative / RBC: x / WBC x   Sq Epi: x / Non Sq Epi: x / Bacteria: x      CAPILLARY BLOOD GLUCOSE          RADIOLOGY & ADDITIONAL TESTS:   No radiological imaging was required    Imaging Personally Reviewed   :  [ ] YES  [ ] NO    Consultant(s) Notes Reviewed :  [ ] YES  [ ] NO PGY1 Note discussed with Supervising Resident and Primary Attending.    Patient is a 88y old  Male who presents with a chief complaint of generalized weakness/malaise for one day (23 Sep 2018 04:36)      INTERVAL HPI/OVERNIGHT EVENTS : No acute events reported overnight.    Pt is seen at the bedside. Pt reports cough , SOB .  Patient denies nausea, vomiting, diarrhea, chest pain, headache, dizziness.     MEDICATIONS  (STANDING):  apixaban 2.5 milliGRAM(s) Oral every 12 hours  atorvastatin 40 milliGRAM(s) Oral at bedtime  docusate sodium 100 milliGRAM(s) Oral three times a day  metoprolol tartrate 25 milliGRAM(s) Oral every 12 hours  pantoprazole    Tablet 40 milliGRAM(s) Oral before breakfast  polyethylene glycol 3350 17 Gram(s) Oral daily  senna 2 Tablet(s) Oral at bedtime  sodium chloride 0.65% Nasal 1 Spray(s) Both Nostrils daily    MEDICATIONS  (PRN):      Allergies    penicillin (Hives)  shellfish (Hives)    Intolerances        REVIEW OF SYSTEMS :  * CONSTITUTIONAL      : No Fever, Weight loss, or Fatigue  * EYES                             : No eye pain , Visual disturbances or Discharge  * RESPIRATORY             : Cough shortness of breath, No Wheezing, Chills or Hemoptysis;   * CARDIOVASCULAR     : No Chest pain, Palpitations, Dizziness, or Leg swelling  * GASTROINTESTINAL  : No Abdominal or Epigastric pain. No Nausea, Vomiting or Hematemesis; No Diarrhea or Constipation. No Melena or Hematochezia.  * GENITOURINARY        : No Dysuria , Frequency , Haematuria   * NEUROLOGICAL          : No Headaches, Memory loss, Loss of trength, Numbness, or Tremors  * MUSCULOSKELETAL   : No Joint pain  * PSYCHIATRY                 : No Depression or Anxiety   * HEME/LYMPH              : No Easy Bruising or Bleeding gums  * SKIN                               : No Itching, Burning, Rashes, or Lesions     Vital Signs Last 24 Hrs  T(C): 36.5 (23 Sep 2018 05:35), Max: 36.8 (22 Sep 2018 12:13)  T(F): 97.7 (23 Sep 2018 05:35), Max: 98.2 (22 Sep 2018 12:13)  HR: 104 (23 Sep 2018 05:35) (68 - 104)  BP: 113/77 (23 Sep 2018 05:35) (100/78 - 121/83)  BP(mean): --  RR: 16 (23 Sep 2018 05:35) (16 - 18)  SpO2: 100% (23 Sep 2018 05:35) (98% - 100%)    PHYSICAL EXAM :  * GENERAL                 : NAD, Well-groomed, Well-developed  * HEAD                       :  Atraumatic, Normocephalic  * EYES                         : EOMI, PERRLA, Conjunctiva and Sclera clear  * ENT                           : Moist Mucous Membranes  * NECK                         : Supple, No JVD, Normal Thyroid  * CHEST/LUNG           : Clear to Auscultation bilaterally; No Rales, Rhonchi, Wheezing or Rubs  * HEART                       : Regular Rate and Rhythm; No murmurs, Rubs or gallops  * ABDOMEN                : Soft, Non-tender, Non-distended; Bowel Sounds present  * NERVOUS SYSTEM  :  Alert & Oriented X3, Good Concentration; Motor Strength 5/5 B/L UL LL ; DTRs 2+ Intact and Symmetric  * EXTREMITIES            :  2+ Peripheral Pulses, No clubbing, cyanosis, or edema  * SKIN                           : No Rashes or Lesions    LABS:                          13.3   4.7   )-----------( 130      ( 22 Sep 2018 10:04 )             42.0     -    138  |  107  |  24<H>  ----------------------------<  85  4.5   |  23  |  1.62<H>    Ca    9.5      22 Sep 2018 10:04  Phos  2.8       Mg     2.1         TPro  7.2  /  Alb  3.5  /  TBili  0.5  /  DBili  x   /  AST  21  /  ALT  25  /  AlkPhos  82  -    PT/INR - ( 21 Sep 2018 22:59 )   PT: 14.0 sec;   INR: 1.28 ratio         PTT - ( 21 Sep 2018 22:59 )  PTT:28.4 sec  Urinalysis Basic - ( 22 Sep 2018 00:01 )    Color: Yellow / Appearance: Clear / S.015 / pH: x  Gluc: x / Ketone: Negative  / Bili: Negative / Urobili: 1   Blood: x / Protein: Negative / Nitrite: Negative   Leuk Esterase: Negative / RBC: x / WBC x   Sq Epi: x / Non Sq Epi: x / Bacteria: x      CAPILLARY BLOOD GLUCOSE          RADIOLOGY & ADDITIONAL TESTS:     Patient name: SHAREE SERNA  YOB: 1930   Age: 88 (M)   MR#: [id number]  Study Date: 2018  [facility]  Study quality: Technically good  Referring Physician:  VIKTORIYA ARELLANO MD  Blood Pressure: 105/59 mmHg  Height: 185 cm  Weight: 63 kg  BSA: 1.8 m2  ------------------------------------------------------------------------    PROCEDURE: Transthoracic echocardiogram with 2-D, M-Mode  and complete spectral and color flow Doppler.  INDICATION: Palpitations (R00.2)  HISTORY:  ------------------------------------------------------------------------  DIMENSIONS:  Dimensions:     Normal Values:  LA:     3.4 cm    2.0 - 4.0 cm  Ao:     3.3 cm    2.0 - 3.8 cm  SEPTUM: 1.2 cm    0.6 - 1.2 cm  PWT:    1.0 cm    0.6 - 1.1 cm  LVIDd:  4.1 cm    3.0 - 5.6 cm  LVIDs:  3.0 cm    1.8 - 4.0 cm      Derived Variables:  LVMI: 82 g/m2  RWT: 0.48  Ejection Fraction Visual Estimate: 45 %    ------------------------------------------------------------------------  OBSERVATIONS:  Mitral Valve: Mitral annular calcification, and calcified  mitral leaflets with normal diastolic opening. Moderate to  severe mitral regurgitation.  Aortic Root: Aortic Root: 3.3 cm.    Aortic Valve: Calcified trileaflet aortic valve with normal  opening.  Left Ventricle: Mild global left ventricular systolic  dysfunction. Normal left ventricular internal dimensions  and wall thicknesses. Grade II diastolic dysfunction.  Right Heart: Normal right atrium. Normal right ventricular  size and function. Normal tricuspid valve. Normal pulmonic  valve.  Pericardium/PleuraNormal pericardium with no pericardial  effusion.  Hemodynamic: RV systolic pressure is mildly increased at  35 mm Hg.  ------------------------------------------------------------------------  CONCLUSIONS:  1. Moderate to severe mitral regurgitation.  2. Mild global left ventricular systolic dysfunction.  3. Grade II diastolic dysfunction.  4. RV systolic pressure is mildly increased at  35 mm Hg.    Imaging Personally Reviewed   :  [ ] YES  [ ] NO    Consultant(s) Notes Reviewed :  [ ] YES  [ ] NO

## 2018-09-23 NOTE — DISCHARGE NOTE ADULT - PATIENT PORTAL LINK FT
You can access the [facility] Portal, offered by [facility], by registering with the following website: [email or web address]

## 2018-09-23 NOTE — DISCHARGE NOTE ADULT - NSTOBACCOHOTLINE_GEN_A_CS
[facility] Smokers Quitline ([phone]-BM-[phone]) [facility] Smokers Quitline (428-DW-KODKN) [facility] Smokers Quitline (549-VA-JQNAP)

## 2018-09-23 NOTE — DISCHARGE NOTE ADULT - HOSPITAL COURSE
87 yo M pmh of HTN, HLD, and recent diagnosed Atrial fibrillation, PSH bowel surgery, presents with generalized weakness x 1 day.Patient states he took his first dose of eliquis and metoprolol today, and that's what precipitated this feeling of something being 'not right'. Patient states he also felt nausea, some dizziness, and a feeling of suffocation, however after being in the ED with several hours, patient states he is feeling much better now Patient admits to feeling of palpitations, which on further questioning he admits to having palpitations for 2 weeks until investigating it further on Monday 9/17, when he was found to have atrial fibrillation. Patient also states he sometimes wakes up in the middle of the night feeling as if he cannot breathe, and has to stay upright for several hours until he feels better. Patient has been consistently sleeping on two pillows every night. Patient also mentions he has chronic history of constipation, and often has to digitally disimpact for bowel movement. Patient admitted for further management of new-onset atrial fibrillation with RVR up to 140s  . ECHO showed Ejection Fraction 45 % , Grade 2 Diastolic Dysfunction , severe Mitral Regurge . cardiology consulted Dr. Salazar   YENNY  Given patient's improved clinical status and current hemodynamic stability, decision was made to discharge the patient.  Patient is stable for discharge per attending and is advised to follow up with PCP as outpatient  Please refer to patient's complete medical chart with documents for a full hospital course, for this is only a brief summary. 87 yo M pmh of HTN, HLD, and recent diagnosed Atrial fibrillation, PSH bowel surgery, presents with generalized weakness x 1 day.Patient states he took his first dose of eliquis and metoprolol today, and that's what precipitated this feeling of something being 'not right'. Patient states he also felt nausea, some dizziness, and a feeling of suffocation, however after being in the ED with several hours, patient states he is feeling much better now Patient admits to feeling of palpitations, which on further questioning he admits to having palpitations for 2 weeks until investigating it further on Monday 9/17, when he was found to have atrial fibrillation. Patient also states he sometimes wakes up in the middle of the night feeling as if he cannot breathe, and has to stay upright for several hours until he feels better. Patient has been consistently sleeping on two pillows every night. Patient also mentions he has chronic history of constipation, and often has to digitally disimpact for bowel movement. Patient admitted for further management of new-onset atrial fibrillation with RVR up to 140s  . ECHO showed Ejection Fraction 45 % , Grade 2 Diastolic Dysfunction , severe Mitral Regurge . cardiology consulted Dr. Salazar   YENNY showed sever mitral regurgitation with multiple mitral jets. Pt was seen by cardiologist who readjusted his medications. Pt will need regular follow up with cardiologist as he refused to go for surgery at present and wants a trial of medical management  Given patient's improved clinical status and current hemodynamic stability, decision was made to discharge the patient.  Patient is stable for discharge per attending and is advised to follow up with PCP as outpatient  Please refer to patient's complete medical chart with documents for a full hospital course, for this is only a brief summary.

## 2018-09-23 NOTE — DISCHARGE NOTE ADULT - CARE PLAN
Principal Discharge DX:	Afib  Goal:	Managmenet and rate control  Assessment and plan of treatment:	- You have a history of AFib  - You need to continue on eliquis for anticoagulation  - You need to continue on metoprolol 25mg BID for now for rate control  - ECHO showed Ejection Fraction 45 % , Grade 2 Diastolic Dysfunction , severe Mitral Regurge   - cardiology consulted Dr. Salazar   - YENNY   - You are medically stable to be discharged from the hospital.  - You need to follow up with your Primary Care Physician in 1 week.  - You need to continue your medications regularly as prescribed.  Secondary Diagnosis:	MORGAN (acute kidney injury)  Assessment and plan of treatment:	- creatinine 1.64, unknown kidney dysfunction  - bilateral pitting edema and mild pulm vasc congestion on chest x-ray  - creatinine 1.5 upon discharge  - You need to follow up with your Primary Care Physician in 1 week.  Secondary Diagnosis:	GERD (gastroesophageal reflux disease)  Assessment and plan of treatment:	- You need to continue on protonix 40 mg qd.  Secondary Diagnosis:	HLD (hyperlipidemia)  Goal:	maintain normal lipid level. (LDL < 100)  Assessment and plan of treatment:	patient on Lipitor at home 10 mg   - You need to continue on with atorvastatin 40mg for now  Please take your medication Lipitor 40mg regularly   Please maintain healthy lifestyle by eating healthy as mentioned above, exercise regularly and maintain weight.   Please Follow up with your doctor in 1 week.  Secondary Diagnosis:	HTN (hypertension)  Goal:	Blood Pressure Control , Please continue current medication regimen, and follow up with your PCP  Assessment and plan of treatment:	Patient at home on Norvasc 5 mg qd  c/w metoprolol for now    - You have a history of Hypertension.   - Your Blood Pressure was adequately controlled with   - You should continue on the current antihypertensive regimen regularly.  - You blood pressure should be within 140-120/80-90.  - You should follow-up with your PCP within 1 week of your discharge.   - You should maintain healthy lifestyle by eating healthy low salt diet, avoid fatty food, weight loss, exercise regularly as tolerated 30 mins X 3 time per week.  Secondary Diagnosis:	Prostate CA  Assessment and plan of treatment:	- You have a history of Prostate Cancer  - You got radiation and surgical removal  - currently in remission.  - You need to follow up with your Primary Care Physician in 1 week. Principal Discharge DX:	Afib  Goal:	Management and rate control  Assessment and plan of treatment:	- You have a history of AFib  - You need to continue on eliquis for anticoagulation  - You need to continue on metoprolol 25mg BID for now for rate control  - ECHO showed Ejection Fraction 45 % , Grade 2 Diastolic Dysfunction , severe Mitral Regurge   - cardiology consulted Dr. Salazar   - YENNY showed severe mitral regurgitation, you underwent stress test that was negative for inducible ischemia, you are advised to please follow up with cardiologist, you need heart surgery but you refused to undergo surgery. You are advised to be complaint with your medications and follow up with your cardiologist.  - You need to follow up with your Primary Care Physician in 1 week.  - You need to continue your medications regularly as prescribed.  Secondary Diagnosis:	MORGAN (acute kidney injury)  Assessment and plan of treatment:	YOu were found to have MORGAN on admission  - creatinine 1.44 upon discharge  - You need to follow up with your Primary Care Physician in 1 week.  You are advised to please avoid all nsaid and nephrotixic substances  Secondary Diagnosis:	GERD (gastroesophageal reflux disease)  Assessment and plan of treatment:	- You need to continue on protonix 40 mg qd.  Secondary Diagnosis:	HLD (hyperlipidemia)  Goal:	maintain normal lipid level. (LDL < 100)  Assessment and plan of treatment:	patient on Lipitor at home 10 mg   - You need to continue on with atorvastatin 40mg for now  Please take your medication Lipitor 40mg regularly   Please maintain healthy lifestyle by eating healthy as mentioned above, exercise regularly and maintain weight.   Please Follow up with your doctor in 1 week.  Secondary Diagnosis:	HTN (hypertension)  Goal:	Blood Pressure Control , Please continue current medication regimen, and follow up with your PCP  Assessment and plan of treatment:	We change your blood pressure medication, you were seen by cardiologist and are advised to continue with toprol xl 25 mg daily. We held your losartan for your MORGAN and you are advised not to continue it  Secondary Diagnosis:	Prostate CA  Assessment and plan of treatment:	- You have a history of Prostate Cancer  - You got radiation and surgical removal  - currently in remission.  - You need to follow up with your Primary Care Physician in 1 week.

## 2018-09-23 NOTE — DISCHARGE NOTE ADULT - SECONDARY DIAGNOSIS.
MORGAN (acute kidney injury) GERD (gastroesophageal reflux disease) HLD (hyperlipidemia) HTN (hypertension) Prostate CA

## 2018-09-23 NOTE — DISCHARGE NOTE ADULT - CARE PROVIDER_API CALL
Nabor Salazar (MD), Cardiovascular Disease  [address] [address]  Phone: [phone]  Fax: [phone]

## 2018-09-23 NOTE — PROGRESS NOTE ADULT - PROBLEM SELECTOR PLAN 2
creatinine 1.64, unknown kidney dysfunction  will give 20 IV lasix as patient has bilateral pitting edema and mild pulm vasc congestion on CXR  f/u creatinine in AM  f/u urine lytes - creatinine 1.64, unknown kidney dysfunction  - bilateral pitting edema and mild pulm vasc congestion on CXR  - creatinine 1.5

## 2018-09-23 NOTE — PROGRESS NOTE ADULT - PROBLEM SELECTOR PLAN 1
c/w eliquis for anticoagulation  c/w metoprolol 25mg BID for now for rate control  continue with telemetry monitoring, titrate dose upward as necessary  f/u echocardiogram  T1 negative, f/u T2, T3  f/u cardiology consult - Dr. Salazar c/w eliquis for anticoagulation  c/w metoprolol 25mg BID for now for rate control  - ECHO showed Ejection Fraction 45 % , Grade 2 Diastolic Dysfunction , severe Mitral Regurge   - cardiology consult Dr. Salazar c/w eliquis for anticoagulation  c/w metoprolol 25mg BID for now for rate control  - ECHO showed Ejection Fraction 45 % , Grade 2 Diastolic Dysfunction , severe Mitral Regurge   - cardiology consult Dr. Marie ROBERTS after midnight  - YENNY AM as per Dr. Mckenzie

## 2018-09-23 NOTE — DISCHARGE NOTE ADULT - MEDICATION SUMMARY - MEDICATIONS TO TAKE
I will START or STAY ON the medications listed below when I get home from the hospital:    apixaban 2.5 mg oral tablet  -- 1 tab(s) by mouth every 12 hours  -- Indication: For Afib    atorvastatin 40 mg oral tablet  -- 1 tab(s) by mouth once a day (at bedtime)  -- Indication: For Hyperlipidemia    metoprolol succinate 25 mg oral tablet, extended release  -- 1 tab(s) by mouth once a day  -- Indication: For Hypertension    pantoprazole 40 mg oral delayed release tablet  -- 1 tab(s) by mouth once a day (before a meal)  -- Indication: For Prophylactic

## 2018-09-24 LAB
24R-OH-CALCIDIOL SERPL-MCNC: 29.7 NG/ML — LOW (ref 30–80)
ANION GAP SERPL CALC-SCNC: 8 MMOL/L — SIGNIFICANT CHANGE UP (ref 5–17)
BUN SERPL-MCNC: 23 MG/DL — HIGH (ref 7–18)
CALCIUM SERPL-MCNC: 9 MG/DL — SIGNIFICANT CHANGE UP (ref 8.4–10.5)
CHLORIDE SERPL-SCNC: 106 MMOL/L — SIGNIFICANT CHANGE UP (ref 96–108)
CHOLEST SERPL-MCNC: 138 MG/DL — SIGNIFICANT CHANGE UP (ref 10–199)
CO2 SERPL-SCNC: 24 MMOL/L — SIGNIFICANT CHANGE UP (ref 22–31)
CREAT SERPL-MCNC: 1.44 MG/DL — HIGH (ref 0.5–1.3)
GLUCOSE SERPL-MCNC: 79 MG/DL — SIGNIFICANT CHANGE UP (ref 70–99)
HBA1C BLD-MCNC: 5.8 % — HIGH (ref 4–5.6)
HCT VFR BLD CALC: 35 % — LOW (ref 39–50)
HDLC SERPL-MCNC: 52 MG/DL — SIGNIFICANT CHANGE UP
HGB BLD-MCNC: 11.2 G/DL — LOW (ref 13–17)
LIPID PNL WITH DIRECT LDL SERPL: 73 MG/DL — SIGNIFICANT CHANGE UP
MAGNESIUM SERPL-MCNC: 2 MG/DL — SIGNIFICANT CHANGE UP (ref 1.6–2.6)
MCHC RBC-ENTMCNC: 28.9 PG — SIGNIFICANT CHANGE UP (ref 27–34)
MCHC RBC-ENTMCNC: 31.9 GM/DL — LOW (ref 32–36)
MCV RBC AUTO: 90.7 FL — SIGNIFICANT CHANGE UP (ref 80–100)
PHOSPHATE SERPL-MCNC: 3.1 MG/DL — SIGNIFICANT CHANGE UP (ref 2.5–4.5)
PLATELET # BLD AUTO: 116 K/UL — LOW (ref 150–400)
POTASSIUM SERPL-MCNC: 4.3 MMOL/L — SIGNIFICANT CHANGE UP (ref 3.5–5.3)
POTASSIUM SERPL-SCNC: 4.3 MMOL/L — SIGNIFICANT CHANGE UP (ref 3.5–5.3)
RBC # BLD: 3.86 M/UL — LOW (ref 4.2–5.8)
RBC # FLD: 14.5 % — SIGNIFICANT CHANGE UP (ref 10.3–14.5)
SODIUM SERPL-SCNC: 138 MMOL/L — SIGNIFICANT CHANGE UP (ref 135–145)
TOTAL CHOLESTEROL/HDL RATIO MEASUREMENT: 2.7 RATIO — LOW (ref 3.4–9.6)
TRIGL SERPL-MCNC: 67 MG/DL — SIGNIFICANT CHANGE UP (ref 10–149)
VIT B12 SERPL-MCNC: 591 PG/ML — SIGNIFICANT CHANGE UP (ref 232–1245)
WBC # BLD: 4.1 K/UL — SIGNIFICANT CHANGE UP (ref 3.8–10.5)
WBC # FLD AUTO: 4.1 K/UL — SIGNIFICANT CHANGE UP (ref 3.8–10.5)

## 2018-09-24 PROCEDURE — 99233 SBSQ HOSP IP/OBS HIGH 50: CPT | Mod: GC

## 2018-09-24 RX ORDER — ACETAMINOPHEN 500 MG
650 TABLET ORAL ONCE
Qty: 0 | Refills: 0 | Status: COMPLETED | OUTPATIENT
Start: 2018-09-24 | End: 2018-09-24

## 2018-09-24 RX ADMIN — Medication 1 SPRAY(S): at 12:17

## 2018-09-24 RX ADMIN — Medication 100 MILLIGRAM(S): at 21:20

## 2018-09-24 RX ADMIN — APIXABAN 2.5 MILLIGRAM(S): 2.5 TABLET, FILM COATED ORAL at 17:27

## 2018-09-24 RX ADMIN — Medication 650 MILLIGRAM(S): at 17:29

## 2018-09-24 RX ADMIN — Medication 650 MILLIGRAM(S): at 22:19

## 2018-09-24 RX ADMIN — ATORVASTATIN CALCIUM 40 MILLIGRAM(S): 80 TABLET, FILM COATED ORAL at 21:20

## 2018-09-24 RX ADMIN — SENNA PLUS 2 TABLET(S): 8.6 TABLET ORAL at 21:20

## 2018-09-24 RX ADMIN — Medication 650 MILLIGRAM(S): at 16:14

## 2018-09-24 RX ADMIN — Medication 25 MILLIGRAM(S): at 06:18

## 2018-09-24 RX ADMIN — PANTOPRAZOLE SODIUM 40 MILLIGRAM(S): 20 TABLET, DELAYED RELEASE ORAL at 06:18

## 2018-09-24 RX ADMIN — POLYETHYLENE GLYCOL 3350 17 GRAM(S): 17 POWDER, FOR SOLUTION ORAL at 12:17

## 2018-09-24 RX ADMIN — LISINOPRIL 2.5 MILLIGRAM(S): 2.5 TABLET ORAL at 06:18

## 2018-09-24 RX ADMIN — APIXABAN 2.5 MILLIGRAM(S): 2.5 TABLET, FILM COATED ORAL at 06:18

## 2018-09-24 RX ADMIN — Medication 650 MILLIGRAM(S): at 21:41

## 2018-09-24 RX ADMIN — Medication 25 MILLIGRAM(S): at 17:27

## 2018-09-24 NOTE — PROGRESS NOTE ADULT - PROBLEM SELECTOR PLAN 5
Patient on norvasc at home  confirmed with the pharmacy Norvasc 5 mg qd  c/w metoprolol for now  continue to monitor BP

## 2018-09-24 NOTE — PROGRESS NOTE ADULT - PROBLEM SELECTOR PLAN 6
s/p radiation and surgical removal  currently in remission
s/p radiation and surgical removal  currently in remission

## 2018-09-24 NOTE — PROGRESS NOTE ADULT - SUBJECTIVE AND OBJECTIVE BOX
PGY 1 Note discussed with supervising resident and primary attending    Patient is a 88y old  Male who presents with a chief complaint of generalized weakness/malaise for one day (23 Sep 2018 06:45)      INTERVAL HPI/OVERNIGHT EVENTS: offers no new complaints; current symptoms resolving    MEDICATIONS  (STANDING):  apixaban 2.5 milliGRAM(s) Oral every 12 hours  atorvastatin 40 milliGRAM(s) Oral at bedtime  docusate sodium 100 milliGRAM(s) Oral three times a day  lisinopril 2.5 milliGRAM(s) Oral daily  metoprolol tartrate 25 milliGRAM(s) Oral every 12 hours  pantoprazole    Tablet 40 milliGRAM(s) Oral before breakfast  polyethylene glycol 3350 17 Gram(s) Oral daily  senna 2 Tablet(s) Oral at bedtime  sodium chloride 0.65% Nasal 1 Spray(s) Both Nostrils daily    MEDICATIONS  (PRN):  acetaminophen   Tablet .. 650 milliGRAM(s) Oral once PRN Mild Pain (1 - 3)      __________________________________________________  REVIEW OF SYSTEMS:    CONSTITUTIONAL: No fever,   EYES: no acute visual disturbances  NECK: No pain or stiffness  RESPIRATORY: No cough; No shortness of breath  CARDIOVASCULAR: No chest pain, no palpitations  GASTROINTESTINAL: No pain. No nausea or vomiting; No diarrhea   NEUROLOGICAL: No headache or numbness, no tremors  MUSCULOSKELETAL: No joint pain, no muscle pain  GENITOURINARY: no dysuria, no frequency, no hesitancy  PSYCHIATRY: no depression , no anxiety  ALL OTHER  ROS negative        Vital Signs Last 24 Hrs  T(C): 36.8 (24 Sep 2018 11:29), Max: 36.9 (23 Sep 2018 20:08)  T(F): 98.3 (24 Sep 2018 11:29), Max: 98.5 (23 Sep 2018 20:08)  HR: 90 (24 Sep 2018 11:29) (84 - 117)  BP: 108/68 (24 Sep 2018 11:29) (99/62 - 117/83)  BP(mean): --  RR: 16 (24 Sep 2018 11:29) (16 - 17)  SpO2: 99% (24 Sep 2018 11:29) (99% - 100%)    ________________________________________________  PHYSICAL EXAM:  GENERAL: NAD  HEENT: Normocephalic;  conjunctivae and sclerae clear; moist mucous membranes;   NECK : supple  CHEST/LUNG: Clear to auscultation bilaterally with good air entry   HEART: S1 S2  regular; no murmurs, gallops or rubs  ABDOMEN: Soft, Nontender, Nondistended; Bowel sounds present  EXTREMITIES: no cyanosis; no edema; no calf tenderness  SKIN: warm and dry; no rash  NERVOUS SYSTEM:  Awake and alert; Oriented  to place, person and time ; no new deficits    _________________________________________________  LABS:                        11.2   4.1   )-----------( 116      ( 24 Sep 2018 07:39 )             35.0     09-24    138  |  106  |  23<H>  ----------------------------<  79  4.3   |  24  |  1.44<H>    Ca    9.0      24 Sep 2018 07:39  Phos  3.1     09-24  Mg     2.0     09-24          CAPILLARY BLOOD GLUCOSE            RADIOLOGY & ADDITIONAL TESTS:    Imaging Personally Reviewed:  YES/NO    Consultant(s) Notes Reviewed:   YES/ No    Care Discussed with Consultants :     Plan of care was discussed with patient and /or primary care giver; all questions and concerns were addressed and care was aligned with patient's wishes.

## 2018-09-24 NOTE — PROGRESS NOTE ADULT - SUBJECTIVE AND OBJECTIVE BOX
Patient denies CP, SOB Review of systems otherwise (-)    acetaminophen   Tablet .. 650 milliGRAM(s) Oral once PRN  apixaban 2.5 milliGRAM(s) Oral every 12 hours  atorvastatin 40 milliGRAM(s) Oral at bedtime  docusate sodium 100 milliGRAM(s) Oral three times a day  lisinopril 2.5 milliGRAM(s) Oral daily  metoprolol tartrate 25 milliGRAM(s) Oral every 12 hours  pantoprazole    Tablet 40 milliGRAM(s) Oral before breakfast  polyethylene glycol 3350 17 Gram(s) Oral daily  senna 2 Tablet(s) Oral at bedtime  sodium chloride 0.65% Nasal 1 Spray(s) Both Nostrils daily                            11.2   4.1   )-----------( 116      ( 24 Sep 2018 07:39 )             35.0       Hemoglobin: 11.2 g/dL (09-24 @ 07:39)  Hemoglobin: 12.4 g/dL (09-23 @ 08:10)  Hemoglobin: 13.3 g/dL (09-22 @ 10:04)  Hemoglobin: 11.3 g/dL (09-21 @ 22:59)      09-24    138  |  106  |  23<H>  ----------------------------<  79  4.3   |  24  |  1.44<H>    Ca    9.0      24 Sep 2018 07:39  Phos  3.1     09-24  Mg     2.0     09-24      Creatinine Trend: 1.44<--, 1.50<--, 1.62<--, 1.64<--    COAGS:     CARDIAC MARKERS ( 22 Sep 2018 10:04 )  <0.015 ng/mL / x     / 188 U/L / x     / 1.9 ng/mL  CARDIAC MARKERS ( 21 Sep 2018 22:59 )  <0.015 ng/mL / x     / x     / x     / x            T(C): 36.8 (09-24-18 @ 11:29), Max: 36.9 (09-23-18 @ 20:08)  HR: 90 (09-24-18 @ 11:29) (84 - 96)  BP: 108/68 (09-24-18 @ 11:29) (99/62 - 108/72)  RR: 16 (09-24-18 @ 11:29) (16 - 17)  SpO2: 99% (09-24-18 @ 11:29) (99% - 100%)  Wt(kg): --    I&O's Summary    23 Sep 2018 07:01  -  24 Sep 2018 07:00  --------------------------------------------------------  IN: 200 mL / OUT: 700 mL / NET: -500 mL    Gen: Appears well in NAD  HEENT:  (-)icterus (-)pallor  CV: N S1 S2 1/6 CARL (+)2 Pulses B/l  Resp:  Clear to ausculatation B/L, normal effort  GI: (+) BS Soft, NT, ND  Lymph:  (-)Edema, (-)obvious lymphadenopathy  Skin: Warm to touch, Normal turgor  Psych: Appropriate mood and affect      TELEMETRY:  afib       DIAGNOSTIC TESTING:  [ ] Echocardiogram: < from: Transthoracic Echocardiogram (09.22.18 @ 06:49) >  CONCLUSIONS:  1. Moderate to severe mitral regurgitation.  2. Mild global left ventricular systolic dysfunction.  3. Grade II diastolic dysfunction.  4. RV systolic pressure is mildly increased at  35 mm Hg.    < end of copied text >    [ ]  Catheterization:  [ ] Stress Test:    OTHER: 	        ASSESSMENT/PLAN: 	88y Male PMH persistent AF of unknown duration now admitted with palpitations secondary to rapid atrial fibrillation. Echo shows mod-severe MR with a non-dilated LV and LVEF 45%. He denies angina nor syncope.     - YENNY reveals severe MR with multiple jets  - I have discussed with the patient options including a CST eval to see if he is an operative vs clip candidate.  The first step would require an angiogram  - He wishes to pursue a trial of medical management and BP control given his CKD and recent MORGAN  - Plan for Stress tomorrow if he would not benefit from revascularization will re-evaluate his MR and EF on optimal meds and BP control    Nabor Salazar MD, [facility]

## 2018-09-24 NOTE — PROGRESS NOTE ADULT - ATTENDING COMMENTS
CARDIOLOGY ATTENDING    Agree with above. He is a pleasant 89 y/o male PMH persistent AF of unknown duration now admitted with palpitations secondary to rapid atrial fibrillation. Echo shows mod-severe MR with a non-dilated LV and LVEF 45%. He denies angina nor syncope.     -continue eliquis and metoprolol  -lifelong a/c  -will review TTE images, but given mod-severe MR with LVEF already declining he may require MVR or minimally invasive michelle-clip  -final cardiology reccs pending review of TTE with possible YENNY to follow to evaluate mitral valve
Agree with all the above   Patient seen and examined at bedside. No overnight events. does not have any new complains. YENNY done this morning.     Spoke to patient in detail, discussed the YENNY findings and further plan, including possible surgery.   Given patients age he is not keen to have surgeries. Would like to be managed medically.   Spoke to Dr. Salazar: given patients new cardiomyopathy (new EF of 45%) will have stress test done in AM to rule out the ischemic cause.   The plan of care as above     Vitals and labs reviewed.     Vital Signs Last 24 Hrs  T(C): 36.8 (24 Sep 2018 11:29), Max: 36.9 (23 Sep 2018 20:08)  T(F): 98.3 (24 Sep 2018 11:29), Max: 98.5 (23 Sep 2018 20:08)  HR: 90 (24 Sep 2018 11:29) (84 - 96)  BP: 108/68 (24 Sep 2018 11:29) (99/62 - 108/72)  BP(mean): --  RR: 16 (24 Sep 2018 11:29) (16 - 17)  SpO2: 99% (24 Sep 2018 11:29) (99% - 100%)
Agree with above   Patient seen and examined at bedside. c/o mild SOB. No other events     ROS as above   PE:   General: laying in bed, slightly worried   Cardio: Irregular rate   Pulm: clear breath sounds   GI: abdomen is soft, non tender   Extr; no edema   Neuro: AOx3, no focal weakness     Vital Signs Last 24 Hrs  T(C): 36.7 (23 Sep 2018 11:45), Max: 36.8 (22 Sep 2018 19:46)  T(F): 98 (23 Sep 2018 11:45), Max: 98.2 (22 Sep 2018 19:46)  HR: 80 (23 Sep 2018 11:45) (80 - 106)  BP: 108/88 (23 Sep 2018 11:45) (100/78 - 121/83)  BP(mean): --  RR: 16 (23 Sep 2018 11:45) (16 - 17)  SpO2: 100% (23 Sep 2018 11:45) (98% - 100%)    1. New onset Afib   rate controlled on Metroprolol 25 mg PO twice daily   On Eliquis for anticoagulation    2. Moderate to severe mitral regurgitation.  Plan for YENNY in AM   Cardiology consult noted     3. 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 Explained to patient the TTE findings and the planned YENNY

## 2018-09-24 NOTE — PROGRESS NOTE ADULT - PROBLEM SELECTOR PLAN 2
- creatinine 1.64, unknown kidney dysfunction  - bilateral pitting edema and mild pulm vasc congestion on CXR  - creatinine trending down

## 2018-09-24 NOTE — PROGRESS NOTE ADULT - PROBLEM SELECTOR PLAN 1
c/w eliquis for anticoagulation  c/w metoprolol 25mg BID for now for rate control  - ECHO showed Ejection Fraction 45 % , Grade 2 Diastolic Dysfunction , severe Mitral Regurge   - cardiology consult Dr. Salazar   - YENNY showed severe MR needing caridac surgery but pt refused for surgery  -Stress test in am with out-patient cardiology follow up

## 2018-09-24 NOTE — PROGRESS NOTE ADULT - PROBLEM SELECTOR PLAN 7
IMPROVE VTE Individual Risk Assessment          RISK                                                          Points  [  ] Previous VTE                                                3  [  ] Thrombophilia                                             2  [  ] Lower limb paralysis                                   2        (unable to hold up >15 seconds)    [  ] Current Cancer                                             2         (within 6 months)  [ x ] Immobilization > 24 hrs                              1  [  ] ICU/CCU stay > 24 hours                             1  [ x ] Age > 60                                                         1    IMPROVE VTE Score: 2    full anticoagulation for new-onset Afib
IMPROVE VTE Individual Risk Assessment          RISK                                                          Points  [  ] Previous VTE                                                3  [  ] Thrombophilia                                             2  [  ] Lower limb paralysis                                   2        (unable to hold up >15 seconds)    [  ] Current Cancer                                             2         (within 6 months)  [ x ] Immobilization > 24 hrs                              1  [  ] ICU/CCU stay > 24 hours                             1  [ x ] Age > 60                                                         1    IMPROVE VTE Score: 2    full anticoagulation for new-onset Afib

## 2018-09-24 NOTE — PROGRESS NOTE ADULT - ASSESSMENT
89 yo M pmh of HTN, HLD, and recent diagnosed Atrial fibrillation, PSH bowel surgery, presents with generalized weakness x 1 day.Patient states he took his first dose of eliquis and metoprolol today, and that's what precipitated this feeling of something being 'not right'. Patient states he also felt nausea, some dizziness, and a feeling of suffocation, however after being in the ED with several hours, patient states he is feeling much better now Patient admits to feeling of palpitations, which on further questioning he admits to having palpitations for 2 weeks until investigating it further on Monday 9/17, when he was found to have atrial fibrillation. Patient also states he sometimes wakes up in the middle of the night feeling as if he cannot breathe, and has to stay upright for several hours until he feels better. Patient has been consistently sleeping on two pillows every night. Patient also mentions he has chronic history of constipation, and often has to digitally disimpact for bowel movement.    Patient admitted for further management of new-onset atrial fibrillation with RVR up to 140s
89 yo M pmh of HTN, HLD, and recent diagnosed Atrial fibrillation, PSH bowel surgery, presents with generalized weakness x 1 day.Patient states he took his first dose of eliquis and metoprolol today, and that's what precipitated this feeling of something being 'not right'. Patient states he also felt nausea, some dizziness, and a feeling of suffocation, however after being in the ED with several hours, patient states he is feeling much better now Patient admits to feeling of palpitations, which on further questioning he admits to having palpitations for 2 weeks until investigating it further on Monday 9/17, when he was found to have atrial fibrillation. Patient also states he sometimes wakes up in the middle of the night feeling as if he cannot breathe, and has to stay upright for several hours until he feels better. Patient has been consistently sleeping on two pillows every night. Patient also mentions he has chronic history of constipation, and often has to digitally disimpact for bowel movement.    Patient admitted for further management of new-onset atrial fibrillation with RVR up to 140s

## 2018-09-24 NOTE — PROGRESS NOTE ADULT - PROBLEM SELECTOR PLAN 4
patient on lipitor at home 10 mg   continue with atorvastatin 40mg for now  f/u lipid panel, B12, Vit D, TSH, HbA1c patient on lipitor at home 10 mg   continue with atorvastatin 40mg for now

## 2018-09-25 VITALS
RESPIRATION RATE: 17 BRPM | OXYGEN SATURATION: 100 % | HEART RATE: 94 BPM | DIASTOLIC BLOOD PRESSURE: 73 MMHG | TEMPERATURE: 98 F | SYSTOLIC BLOOD PRESSURE: 108 MMHG

## 2018-09-25 PROCEDURE — 99239 HOSP IP/OBS DSCHRG MGMT >30: CPT

## 2018-09-25 RX ORDER — APIXABAN 2.5 MG/1
1 TABLET, FILM COATED ORAL
Qty: 0 | Refills: 0 | COMMUNITY
Start: 2018-09-25

## 2018-09-25 RX ORDER — PANTOPRAZOLE SODIUM 20 MG/1
1 TABLET, DELAYED RELEASE ORAL
Qty: 0 | Refills: 0 | COMMUNITY
Start: 2018-09-25

## 2018-09-25 RX ORDER — METOPROLOL TARTRATE 50 MG
25 TABLET ORAL DAILY
Qty: 0 | Refills: 0 | Status: DISCONTINUED | OUTPATIENT
Start: 2018-09-25 | End: 2018-09-25

## 2018-09-25 RX ORDER — ATORVASTATIN CALCIUM 80 MG/1
1 TABLET, FILM COATED ORAL
Qty: 0 | Refills: 0 | COMMUNITY
Start: 2018-09-25

## 2018-09-25 RX ORDER — CHOLECALCIFEROL (VITAMIN D3) 125 MCG
1 CAPSULE ORAL
Qty: 30 | Refills: 0 | OUTPATIENT
Start: 2018-09-25 | End: 2018-10-24

## 2018-09-25 RX ORDER — METOPROLOL TARTRATE 50 MG
1 TABLET ORAL
Qty: 0 | Refills: 0 | COMMUNITY
Start: 2018-09-25

## 2018-09-25 RX ORDER — METOPROLOL TARTRATE 50 MG
1 TABLET ORAL
Qty: 30 | Refills: 0 | OUTPATIENT
Start: 2018-09-25 | End: 2018-10-24

## 2018-09-25 RX ORDER — PANTOPRAZOLE SODIUM 20 MG/1
1 TABLET, DELAYED RELEASE ORAL
Qty: 30 | Refills: 0 | OUTPATIENT
Start: 2018-09-25 | End: 2018-10-24

## 2018-09-25 RX ORDER — ATORVASTATIN CALCIUM 80 MG/1
1 TABLET, FILM COATED ORAL
Qty: 30 | Refills: 0 | OUTPATIENT
Start: 2018-09-25 | End: 2018-10-24

## 2018-09-25 RX ORDER — APIXABAN 2.5 MG/1
1 TABLET, FILM COATED ORAL
Qty: 60 | Refills: 0 | OUTPATIENT
Start: 2018-09-25 | End: 2018-10-24

## 2018-09-25 RX ADMIN — APIXABAN 2.5 MILLIGRAM(S): 2.5 TABLET, FILM COATED ORAL at 17:24

## 2018-09-25 RX ADMIN — Medication 100 MILLIGRAM(S): at 05:28

## 2018-09-25 RX ADMIN — APIXABAN 2.5 MILLIGRAM(S): 2.5 TABLET, FILM COATED ORAL at 05:28

## 2018-09-25 RX ADMIN — Medication 100 MILLIGRAM(S): at 13:23

## 2018-09-25 RX ADMIN — Medication 1 SPRAY(S): at 11:37

## 2018-09-25 RX ADMIN — Medication 25 MILLIGRAM(S): at 05:28

## 2018-09-25 RX ADMIN — PANTOPRAZOLE SODIUM 40 MILLIGRAM(S): 20 TABLET, DELAYED RELEASE ORAL at 05:28

## 2018-09-25 NOTE — CHART NOTE - NSCHARTNOTEFT_GEN_A_CORE
patient seen and examined at bedside. No new complains. Had stress test done earlier today.   Stress test: Negative ECG evidence of ischemia.  * Myocardial Perfusion SPECT results are normal. * No clear evidence of ischemia or infarct. * Post-stress resting myocardial perfusion gated SPECT  imaging was performed (LVEF = 38 %).  Left ventricular systolic function appears moderately reduced with no segmental wall motion abnormalities.    ROS negative  PE;   General: Thin, pleasant elderly men, sitting on the bed, NAD   Cardio: Irregular rate   Pulm: clear breath sounds   GI: abdomen is soft, non tender   Extr: no edema   Neuro: AOx3, no focal weakness     Vital Signs Last 24 Hrs  T(C): 36.8 (25 Sep 2018 12:38), Max: 37 (24 Sep 2018 16:05)  T(F): 98.2 (25 Sep 2018 12:38), Max: 98.6 (24 Sep 2018 16:05)  HR: 107 (25 Sep 2018 12:38) (46 - 107)  BP: 112/62 (25 Sep 2018 12:38) (83/52 - 112/62)  BP(mean): --  RR: 17 (25 Sep 2018 12:38) (16 - 17)  SpO2: 99% (25 Sep 2018 12:38) (98% - 100%)    1. New onset afib; rate controlled  Changed Metroprolol   Changed Metroprolol to Toprol XL 25 mg po daily   On Eliquis 2.5 mg po twice daily for anticoagulation    2. Severe mitral regurgitation   Patient will follow with cardiologist as outpatient, for now wanted to be managed medically only.        Medically stable for discharge. Detail explanation of all the test results and medications given to patient.   Wife will pick him up.

## 2018-09-25 NOTE — PROGRESS NOTE ADULT - REASON FOR ADMISSION
generalized weakness/malaise for one day

## 2018-09-25 NOTE — PROGRESS NOTE ADULT - SUBJECTIVE AND OBJECTIVE BOX
Patient denies CP, SOB Review of systems otherwise (-)    apixaban 2.5 milliGRAM(s) Oral every 12 hours  atorvastatin 40 milliGRAM(s) Oral at bedtime  docusate sodium 100 milliGRAM(s) Oral three times a day  metoprolol tartrate 25 milliGRAM(s) Oral every 12 hours  pantoprazole    Tablet 40 milliGRAM(s) Oral before breakfast  polyethylene glycol 3350 17 Gram(s) Oral daily  senna 2 Tablet(s) Oral at bedtime  sodium chloride 0.65% Nasal 1 Spray(s) Both Nostrils daily                            11.2   4.1   )-----------( 116      ( 24 Sep 2018 07:39 )             35.0       Hemoglobin: 11.2 g/dL (09-24 @ 07:39)  Hemoglobin: 12.4 g/dL (09-23 @ 08:10)  Hemoglobin: 13.3 g/dL (09-22 @ 10:04)  Hemoglobin: 11.3 g/dL (09-21 @ 22:59)      09-24    138  |  106  |  23<H>  ----------------------------<  79  4.3   |  24  |  1.44<H>    Ca    9.0      24 Sep 2018 07:39  Phos  3.1     09-24  Mg     2.0     09-24      Creatinine Trend: 1.44<--, 1.50<--, 1.62<--, 1.64<--    COAGS:           T(C): 36.9 (09-25-18 @ 08:33), Max: 37 (09-24-18 @ 16:05)  HR: 89 (09-25-18 @ 08:39) (46 - 106)  BP: 98/68 (09-25-18 @ 08:39) (83/52 - 105/62)  RR: 17 (09-25-18 @ 08:33) (16 - 17)  SpO2: 99% (09-25-18 @ 08:33) (98% - 100%)  Wt(kg): --    I&O's Summary    24 Sep 2018 07:01  -  25 Sep 2018 07:00  --------------------------------------------------------  IN: 200 mL / OUT: 800 mL / NET: -600 mL    25 Sep 2018 07:01  -  25 Sep 2018 12:08  --------------------------------------------------------  IN: 100 mL / OUT: 100 mL / NET: 0 mL      Gen: Appears well in NAD  HEENT:  (-)icterus (-)pallor  CV: N S1 S2 1/6 CARL (+)2 Pulses B/l  Resp:  Clear to ausculatation B/L, normal effort  GI: (+) BS Soft, NT, ND  Lymph:  (-)Edema, (-)obvious lymphadenopathy  Skin: Warm to touch, Normal turgor  Psych: Appropriate mood and affect      TELEMETRY:  afib       DIAGNOSTIC TESTING:  [ ] Echocardiogram: < from: Transthoracic Echocardiogram (09.22.18 @ 06:49) >  CONCLUSIONS:  1. Moderate to severe mitral regurgitation.  2. Mild global left ventricular systolic dysfunction.  3. Grade II diastolic dysfunction.  4. RV systolic pressure is mildly increased at  35 mm Hg.    < end of copied text >    [ ]  Catheterization:  [ ] Stress Test:    OTHER: 	        ASSESSMENT/PLAN: 	88y Male PMH persistent AF of unknown duration now admitted with palpitations secondary to rapid atrial fibrillation. Echo shows mod-severe MR with a non-dilated LV and LVEF 45%. He denies angina nor syncope.     - YENNY reveals severe MR with multiple jets  - I have discussed with the patient options including a CST eval to see if he is an operative vs clip candidate.    - Stress reveals a NICM, no need for urgent cath  - Patient wishes to optimize meds and re-evaluate MR in the office after meds  given admission with MORGAN  - D/C lopressor  - Start Toprol XL 25 mg PO daily  - Off ACE due to recent MORGAN and borderline BP    Nabor Salazar MD, FACC

## 2018-09-25 NOTE — DIETITIAN INITIAL EVALUATION ADULT. - OTHER INFO
nutrition assessment for low BMI ( 18.5 upon admission); lives home PTA; skin intact; no GI distress, chewing/swallowing problem reported; current vf=075.9 lb 9/22/18-->146.3 lb 9/25/18, revised BMI=19.3 nutrition assessment for low BMI ( 18.5 upon admission); lives home PTA; skin intact; appetite good, tolerating meals well, no GI distress, chewing/swallowing problem reported; current wv=524.9 lb 9/22/18-->146.3 lb 9/25/18, revised BMI=19.3

## 2018-09-26 RX ORDER — ATORVASTATIN CALCIUM 80 MG/1
1 TABLET, FILM COATED ORAL
Qty: 30 | Refills: 0 | OUTPATIENT
Start: 2018-09-26 | End: 2018-10-25

## 2018-09-26 RX ORDER — PANTOPRAZOLE SODIUM 20 MG/1
1 TABLET, DELAYED RELEASE ORAL
Qty: 30 | Refills: 0 | OUTPATIENT
Start: 2018-09-26 | End: 2018-10-25

## 2018-09-30 NOTE — DIETITIAN INITIAL EVALUATION ADULT. - SOURCE
chart review. Limited information from pt as declining RD interview at present, also noted pt forgetful at times/patient/other (specify) General (Pediatric)

## 2018-10-06 ENCOUNTER — INPATIENT (INPATIENT)
Facility: HOSPITAL | Age: 83
LOS: 4 days | Discharge: HOME | DRG: 291 | End: 2018-10-11
Attending: STUDENT IN AN ORGANIZED HEALTH CARE EDUCATION/TRAINING PROGRAM | Admitting: STUDENT IN AN ORGANIZED HEALTH CARE EDUCATION/TRAINING PROGRAM
Payer: MEDICARE

## 2018-10-06 VITALS
DIASTOLIC BLOOD PRESSURE: 93 MMHG | TEMPERATURE: 98 F | RESPIRATION RATE: 20 BRPM | HEART RATE: 132 BPM | OXYGEN SATURATION: 99 % | WEIGHT: 141.98 LBS | SYSTOLIC BLOOD PRESSURE: 149 MMHG

## 2018-10-06 DIAGNOSIS — I50.9 HEART FAILURE, UNSPECIFIED: ICD-10-CM

## 2018-10-06 DIAGNOSIS — Z85.46 PERSONAL HISTORY OF MALIGNANT NEOPLASM OF PROSTATE: Chronic | ICD-10-CM

## 2018-10-06 PROBLEM — K21.9 GASTRO-ESOPHAGEAL REFLUX DISEASE WITHOUT ESOPHAGITIS: Chronic | Status: ACTIVE | Noted: 2018-09-22

## 2018-10-06 LAB
ALBUMIN SERPL ELPH-MCNC: 3.6 G/DL — SIGNIFICANT CHANGE UP (ref 3.5–5)
ALP SERPL-CCNC: 95 U/L — SIGNIFICANT CHANGE UP (ref 40–120)
ALT FLD-CCNC: 50 U/L DA — SIGNIFICANT CHANGE UP (ref 10–60)
ANION GAP SERPL CALC-SCNC: 8 MMOL/L — SIGNIFICANT CHANGE UP (ref 5–17)
APTT BLD: 33.6 SEC — SIGNIFICANT CHANGE UP (ref 27.5–37.4)
AST SERPL-CCNC: 49 U/L — HIGH (ref 10–40)
BILIRUB SERPL-MCNC: 0.8 MG/DL — SIGNIFICANT CHANGE UP (ref 0.2–1.2)
BUN SERPL-MCNC: 19 MG/DL — HIGH (ref 7–18)
CALCIUM SERPL-MCNC: 9.1 MG/DL — SIGNIFICANT CHANGE UP (ref 8.4–10.5)
CHLORIDE SERPL-SCNC: 101 MMOL/L — SIGNIFICANT CHANGE UP (ref 96–108)
CO2 SERPL-SCNC: 22 MMOL/L — SIGNIFICANT CHANGE UP (ref 22–31)
CREAT SERPL-MCNC: 1.48 MG/DL — HIGH (ref 0.5–1.3)
GLUCOSE SERPL-MCNC: 102 MG/DL — HIGH (ref 70–99)
HCT VFR BLD CALC: 34.7 % — LOW (ref 39–50)
HGB BLD-MCNC: 11.5 G/DL — LOW (ref 13–17)
INR BLD: 1.46 RATIO — HIGH (ref 0.88–1.16)
MCHC RBC-ENTMCNC: 30.1 PG — SIGNIFICANT CHANGE UP (ref 27–34)
MCHC RBC-ENTMCNC: 33.2 GM/DL — SIGNIFICANT CHANGE UP (ref 32–36)
MCV RBC AUTO: 90.7 FL — SIGNIFICANT CHANGE UP (ref 80–100)
NT-PROBNP SERPL-SCNC: 6027 PG/ML — HIGH (ref 0–450)
PLATELET # BLD AUTO: 158 K/UL — SIGNIFICANT CHANGE UP (ref 150–400)
POTASSIUM SERPL-MCNC: 4.7 MMOL/L — SIGNIFICANT CHANGE UP (ref 3.5–5.3)
POTASSIUM SERPL-SCNC: 4.7 MMOL/L — SIGNIFICANT CHANGE UP (ref 3.5–5.3)
PROT SERPL-MCNC: 7.6 G/DL — SIGNIFICANT CHANGE UP (ref 6–8.3)
PROTHROM AB SERPL-ACNC: 16 SEC — HIGH (ref 9.8–12.7)
RBC # BLD: 3.83 M/UL — LOW (ref 4.2–5.8)
RBC # FLD: 14.5 % — SIGNIFICANT CHANGE UP (ref 10.3–14.5)
SODIUM SERPL-SCNC: 131 MMOL/L — LOW (ref 135–145)
TROPONIN I SERPL-MCNC: <0.015 NG/ML — SIGNIFICANT CHANGE UP (ref 0–0.04)
WBC # BLD: 6 K/UL — SIGNIFICANT CHANGE UP (ref 3.8–10.5)
WBC # FLD AUTO: 6 K/UL — SIGNIFICANT CHANGE UP (ref 3.8–10.5)

## 2018-10-06 PROCEDURE — 99285 EMERGENCY DEPT VISIT HI MDM: CPT

## 2018-10-06 PROCEDURE — 71045 X-RAY EXAM CHEST 1 VIEW: CPT | Mod: 26

## 2018-10-06 RX ORDER — METOPROLOL TARTRATE 50 MG
50 TABLET ORAL ONCE
Qty: 0 | Refills: 0 | Status: COMPLETED | OUTPATIENT
Start: 2018-10-06 | End: 2018-10-06

## 2018-10-06 RX ORDER — METOPROLOL TARTRATE 50 MG
25 TABLET ORAL DAILY
Qty: 0 | Refills: 0 | Status: DISCONTINUED | OUTPATIENT
Start: 2018-10-06 | End: 2018-10-07

## 2018-10-06 RX ORDER — PANTOPRAZOLE SODIUM 20 MG/1
40 TABLET, DELAYED RELEASE ORAL
Qty: 0 | Refills: 0 | Status: DISCONTINUED | OUTPATIENT
Start: 2018-10-06 | End: 2018-10-11

## 2018-10-06 RX ORDER — APIXABAN 2.5 MG/1
2.5 TABLET, FILM COATED ORAL EVERY 12 HOURS
Qty: 0 | Refills: 0 | Status: DISCONTINUED | OUTPATIENT
Start: 2018-10-06 | End: 2018-10-11

## 2018-10-06 RX ORDER — FUROSEMIDE 40 MG
40 TABLET ORAL DAILY
Qty: 0 | Refills: 0 | Status: DISCONTINUED | OUTPATIENT
Start: 2018-10-06 | End: 2018-10-07

## 2018-10-06 RX ORDER — METOPROLOL TARTRATE 50 MG
5 TABLET ORAL ONCE
Qty: 0 | Refills: 0 | Status: COMPLETED | OUTPATIENT
Start: 2018-10-06 | End: 2018-10-06

## 2018-10-06 RX ADMIN — Medication 5 MILLIGRAM(S): at 18:16

## 2018-10-06 RX ADMIN — Medication 50 MILLIGRAM(S): at 19:45

## 2018-10-06 NOTE — ED PROVIDER NOTE - OBJECTIVE STATEMENT
88 y.o w/ pmh of afib, chf, prostate ca in remission x 10 yr, hld presenting with sob x 5 hr associated with palpitation. denies cp, n, v, calf pain/swelling, recent travel.

## 2018-10-06 NOTE — ED ADULT NURSE NOTE - NSIMPLEMENTINTERV_GEN_ALL_ED
Implemented All Universal Safety Interventions:  [place] to call system. Call bell, personal items and telephone within reach. Instruct patient to call for assistance. Room bathroom lighting operational. Non-slip footwear when patient is off stretcher. Physically safe environment: no spills, clutter or unnecessary equipment. Stretcher in lowest position, wheels locked, appropriate side rails in place.

## 2018-10-06 NOTE — ED ADULT NURSE REASSESSMENT NOTE - NS ED NURSE REASSESS COMMENT FT1
Received pt from HOLLAND Jones, pt is observed laying in bed, breathing room air, in no distress at time of assessment. Pt is A&O x3, able to make needs known. Skin is intact, left AC #20Ga in place. Meds administered as ordered. Admitted to [facility], report given to HOLLAND Godwin for [address]. Awaiting transport, nursing monitoring continues.

## 2018-10-06 NOTE — ED ADULT NURSE NOTE - OBJECTIVE STATEMENT
Patient came to the ED a/o x 3 BIBA for shortness of breath since yesterday. patient lungs are clear, no respiratory distress noted.

## 2018-10-06 NOTE — ED PROVIDER NOTE - PROGRESS NOTE DETAILS
Patient on admission hr controlled with metoprolol. sob with elevated bnp likely chf. deferred ct scan given patient on eliquis, less likely pe

## 2018-10-06 NOTE — ED PROVIDER NOTE - MEDICAL DECISION MAKING DETAILS
88 y.o present with sob. ekg in rapid afib. will rate control. concern for acs vs chf vs infection. lab, trop, cxr.  will consider ct angio for r.o pe 88 y.o present with sob. ekg in rapid afib. will rate control. concern for acs vs chf vs infection. lab, trop, cxr.

## 2018-10-06 NOTE — ED PROVIDER NOTE - CARE PLAN
Principal Discharge DX:	Congestive heart failure, unspecified HF chronicity, unspecified heart failure type

## 2018-10-07 DIAGNOSIS — E78.5 HYPERLIPIDEMIA, UNSPECIFIED: ICD-10-CM

## 2018-10-07 DIAGNOSIS — Z29.9 ENCOUNTER FOR PROPHYLACTIC MEASURES, UNSPECIFIED: ICD-10-CM

## 2018-10-07 DIAGNOSIS — N18.9 CHRONIC KIDNEY DISEASE, UNSPECIFIED: ICD-10-CM

## 2018-10-07 DIAGNOSIS — Z90.49 ACQUIRED ABSENCE OF OTHER SPECIFIED PARTS OF DIGESTIVE TRACT: Chronic | ICD-10-CM

## 2018-10-07 DIAGNOSIS — I34.0 NONRHEUMATIC MITRAL (VALVE) INSUFFICIENCY: ICD-10-CM

## 2018-10-07 DIAGNOSIS — I48.91 UNSPECIFIED ATRIAL FIBRILLATION: ICD-10-CM

## 2018-10-07 DIAGNOSIS — I10 ESSENTIAL (PRIMARY) HYPERTENSION: ICD-10-CM

## 2018-10-07 DIAGNOSIS — I50.23 ACUTE ON CHRONIC SYSTOLIC (CONGESTIVE) HEART FAILURE: ICD-10-CM

## 2018-10-07 LAB
ANION GAP SERPL CALC-SCNC: 10 MMOL/L — SIGNIFICANT CHANGE UP (ref 5–17)
BUN SERPL-MCNC: 17 MG/DL — SIGNIFICANT CHANGE UP (ref 7–18)
CALCIUM SERPL-MCNC: 9.2 MG/DL — SIGNIFICANT CHANGE UP (ref 8.4–10.5)
CHLORIDE SERPL-SCNC: 101 MMOL/L — SIGNIFICANT CHANGE UP (ref 96–108)
CK MB BLD-MCNC: 1.7 % — SIGNIFICANT CHANGE UP (ref 0–3.5)
CK MB CFR SERPL CALC: 1.5 NG/ML — SIGNIFICANT CHANGE UP (ref 0–3.6)
CK SERPL-CCNC: 89 U/L — SIGNIFICANT CHANGE UP (ref 35–232)
CO2 SERPL-SCNC: 20 MMOL/L — LOW (ref 22–31)
CREAT SERPL-MCNC: 1.21 MG/DL — SIGNIFICANT CHANGE UP (ref 0.5–1.3)
GLUCOSE SERPL-MCNC: 85 MG/DL — SIGNIFICANT CHANGE UP (ref 70–99)
HCT VFR BLD CALC: 33.8 % — LOW (ref 39–50)
HGB BLD-MCNC: 11.1 G/DL — LOW (ref 13–17)
MAGNESIUM SERPL-MCNC: 2 MG/DL — SIGNIFICANT CHANGE UP (ref 1.6–2.6)
MCHC RBC-ENTMCNC: 29.5 PG — SIGNIFICANT CHANGE UP (ref 27–34)
MCHC RBC-ENTMCNC: 32.8 GM/DL — SIGNIFICANT CHANGE UP (ref 32–36)
MCV RBC AUTO: 90 FL — SIGNIFICANT CHANGE UP (ref 80–100)
PHOSPHATE SERPL-MCNC: 2.9 MG/DL — SIGNIFICANT CHANGE UP (ref 2.5–4.5)
PLATELET # BLD AUTO: 138 K/UL — LOW (ref 150–400)
POTASSIUM SERPL-MCNC: 4.3 MMOL/L — SIGNIFICANT CHANGE UP (ref 3.5–5.3)
POTASSIUM SERPL-SCNC: 4.3 MMOL/L — SIGNIFICANT CHANGE UP (ref 3.5–5.3)
RBC # BLD: 3.76 M/UL — LOW (ref 4.2–5.8)
RBC # FLD: 14 % — SIGNIFICANT CHANGE UP (ref 10.3–14.5)
SODIUM SERPL-SCNC: 131 MMOL/L — LOW (ref 135–145)
TROPONIN I SERPL-MCNC: <0.015 NG/ML — SIGNIFICANT CHANGE UP (ref 0–0.04)
TSH SERPL-MCNC: 3.55 UU/ML — SIGNIFICANT CHANGE UP (ref 0.34–4.82)
WBC # BLD: 5.5 K/UL — SIGNIFICANT CHANGE UP (ref 3.8–10.5)
WBC # FLD AUTO: 5.5 K/UL — SIGNIFICANT CHANGE UP (ref 3.8–10.5)

## 2018-10-07 PROCEDURE — 99223 1ST HOSP IP/OBS HIGH 75: CPT | Mod: GC

## 2018-10-07 RX ORDER — INFLUENZA VIRUS VACCINE 15; 15; 15; 15 UG/.5ML; UG/.5ML; UG/.5ML; UG/.5ML
0.5 SUSPENSION INTRAMUSCULAR ONCE
Qty: 0 | Refills: 0 | Status: COMPLETED | OUTPATIENT
Start: 2018-10-07 | End: 2018-10-07

## 2018-10-07 RX ORDER — FUROSEMIDE 40 MG
40 TABLET ORAL DAILY
Qty: 0 | Refills: 0 | Status: DISCONTINUED | OUTPATIENT
Start: 2018-10-07 | End: 2018-10-08

## 2018-10-07 RX ORDER — FUROSEMIDE 40 MG
40 TABLET ORAL DAILY
Qty: 0 | Refills: 0 | Status: DISCONTINUED | OUTPATIENT
Start: 2018-10-07 | End: 2018-10-07

## 2018-10-07 RX ORDER — METOPROLOL TARTRATE 50 MG
25 TABLET ORAL ONCE
Qty: 0 | Refills: 0 | Status: COMPLETED | OUTPATIENT
Start: 2018-10-07 | End: 2018-10-07

## 2018-10-07 RX ORDER — METOPROLOL TARTRATE 50 MG
50 TABLET ORAL DAILY
Qty: 0 | Refills: 0 | Status: DISCONTINUED | OUTPATIENT
Start: 2018-10-08 | End: 2018-10-08

## 2018-10-07 RX ORDER — ASPIRIN/CALCIUM CARB/MAGNESIUM 324 MG
81 TABLET ORAL DAILY
Qty: 0 | Refills: 0 | Status: DISCONTINUED | OUTPATIENT
Start: 2018-10-07 | End: 2018-10-11

## 2018-10-07 RX ORDER — ATORVASTATIN CALCIUM 80 MG/1
40 TABLET, FILM COATED ORAL AT BEDTIME
Qty: 0 | Refills: 0 | Status: DISCONTINUED | OUTPATIENT
Start: 2018-10-07 | End: 2018-10-11

## 2018-10-07 RX ADMIN — Medication 25 MILLIGRAM(S): at 12:40

## 2018-10-07 RX ADMIN — APIXABAN 2.5 MILLIGRAM(S): 2.5 TABLET, FILM COATED ORAL at 17:23

## 2018-10-07 RX ADMIN — Medication 25 MILLIGRAM(S): at 05:22

## 2018-10-07 RX ADMIN — Medication 40 MILLIGRAM(S): at 05:22

## 2018-10-07 RX ADMIN — PANTOPRAZOLE SODIUM 40 MILLIGRAM(S): 20 TABLET, DELAYED RELEASE ORAL at 05:22

## 2018-10-07 RX ADMIN — ATORVASTATIN CALCIUM 40 MILLIGRAM(S): 80 TABLET, FILM COATED ORAL at 21:53

## 2018-10-07 RX ADMIN — Medication 81 MILLIGRAM(S): at 12:41

## 2018-10-07 RX ADMIN — APIXABAN 2.5 MILLIGRAM(S): 2.5 TABLET, FILM COATED ORAL at 05:22

## 2018-10-07 NOTE — H&P ADULT - PROBLEM SELECTOR PLAN 2
H/o Afib   came with Hr 132   EKG showed Rapid afib with no st elevation   s/p metoprolol 50 mg and Lopressor IV push in ed   currently Rate controlled   f/u Troponin T2 and T3   continue with Toprol Xl  25 mg daily for rate control   Eliquis for AC

## 2018-10-07 NOTE — H&P ADULT - NEUROLOGICAL DETAILS
deep reflexes intact/cranial nerves intact/normal strength/sensation intact/superficial reflexes intact/alert and oriented x 3

## 2018-10-07 NOTE — H&P ADULT - ATTENDING COMMENTS
Patient seen and examined; Agree with PGY2 MAR A/P above with editing as needed. Discussed with ZHAO Rasheed      Patient is a 88 year old male form home with PHHx of Afib ( on Eliquis ), CKD, CHF  HFrEF 45%, HLD, Colon Ca S/p surgery and chemotherapy, Prostate Ca s/p radiation therapy ( in remission), presented to ED shortness of breath since 1 day. Patient complains SOB, exertional dyspnea, orthopnea, PND, B/l lower swelling, intermittent palpitation and dry cough. Pt reports  of SOB since 1 week, progressively worsening. pt is non complaint with diet, takes extra salt and drinks soda every day.  Patient was recently discharged from hospital for new onset Afib on eliquis. ECHO in sept 2018  shows mod-severe MR with a non-dilated LV and LVEF 45%. Nuclear stress test showed non ischemic cardiomyopathy.    ROS/FH/SH: As above    Vital Signs Last 24 Hrs  T(C): 36.7 (07 Oct 2018 07:54), Max: 36.7 (07 Oct 2018 07:54)  T(F): 98.1 (07 Oct 2018 07:54), Max: 98.1 (07 Oct 2018 07:54)  HR: 88 (07 Oct 2018 07:54) (88 - 132)  BP: 133/93 (07 Oct 2018 07:54) (126/89 - 149/93)  RR: 18 (07 Oct 2018 07:54) (17 - 20)  SpO2: 100% (07 Oct 2018 07:54) (99% - 100%)    P/E:    Labs:                        11.1   5.5   )-----------( 138      ( 07 Oct 2018 06:51 )             33.8   10-07    131<L>  |  101  |  17  ----------------------------<  85  4.3   |  20<L>  |  1.21    Ca    9.2      07 Oct 2018 06:51  Phos  2.9     10-07  Mg     2.0     10-07    TPro  7.6  /  Alb  3.6  /  TBili  0.8  /  DBili  x   /  AST  49<H>  /  ALT  50  /  AlkPhos  95  10-06    Xray Chest 1 View- PORTABLE-Urgent (09.22.18 @ 01:30)    The evaluation of the cardiomediastinal silhouette is limited on portable technique. There is arteriosclerotic calcification of the aorta.    The lungs are mildly hyperinflated. No lobar lung consolidation, significant pleural effusion or pneumothorax is noted.    Thereis a possible small nodular  opacity left midlung zone projecting between seventh and eighth ribs. Recommend PA and lateral view for further evaluation.    The evaluation for nondisplaced rib fractures is limited.    D/D;  Acute on Chronic combined heart failure due to likely Fluid overload due to severe Mitral regurgitation  A. Fib with RVR  MORGAN due to Fluid overload    Plan:  Tele; Serial cardiac enzymes; Had recent Echo  IV Lasix 40 mg daily; was not on diuretic at home  Continue Eliquis  Increase Toprol XL to 50 mg daily; 25 mg stat    Discussed with patient  Disussed with HOLLAND Padilla and ZHAO Rasheed and PGY1 floor Dr. Gasca Patient seen and examined; Agree with PGY2 MAR A/P above with editing as needed. Discussed with ZHAO Rasheed      Patient is a 88 year old male form home with PHHx of Afib ( on Eliquis ), CKD, CHF  HFrEF 45%, HLD, Colon Ca S/p surgery and chemotherapy, Prostate Ca s/p radiation therapy ( in remission), presented to ED shortness of breath since 1 day. Patient complains SOB, exertional dyspnea, orthopnea, PND, B/l lower swelling, intermittent palpitation and dry cough. Pt reports  of SOB since 1 week, progressively worsening. pt is non complaint with diet, takes extra salt and drinks soda every day.  Patient was recently discharged from hospital for new onset Afib on eliquis. ECHO in sept 2018  shows mod-severe MR with a non-dilated LV and LVEF 45%. Nuclear stress test showed non ischemic cardiomyopathy.    ROS/FH/SH: As above    Vital Signs Last 24 Hrs  T(C): 36.7 (07 Oct 2018 07:54), Max: 36.7 (07 Oct 2018 07:54)  T(F): 98.1 (07 Oct 2018 07:54), Max: 98.1 (07 Oct 2018 07:54)  HR: 88 (07 Oct 2018 07:54) (88 - 132)  BP: 133/93 (07 Oct 2018 07:54) (126/89 - 149/93)  RR: 18 (07 Oct 2018 07:54) (17 - 20)  SpO2: 100% (07 Oct 2018 07:54) (99% - 100%)    P/E:    Labs:                        11.1   5.5   )-----------( 138      ( 07 Oct 2018 06:51 )             33.8   10-07    131<L>  |  101  |  17  ----------------------------<  85  4.3   |  20<L>  |  1.21    Ca    9.2      07 Oct 2018 06:51  Phos  2.9     10-07  Mg     2.0     10-07    TPro  7.6  /  Alb  3.6  /  TBili  0.8  /  DBili  x   /  AST  49<H>  /  ALT  50  /  AlkPhos  95  10-06    Xray Chest 1 View- PORTABLE-Urgent (10.06.18 @ 20:01)     Radiographic examination shows the heart to be similar in size. The lungs show right lower lobe infiltrate. There is no evidence of pneumothorax or   pleural effusion.    IMPRESSION: Right lower lobe pneumonia.    D/D;  Acute on Chronic combined heart failure due to likely Fluid overload due to severe Mitral regurgitation  A. Fib with RVR  MORGAN due to Fluid overload    Plan:  Tele; Serial cardiac enzymes; Had recent Echo  IV Lasix 40 mg daily; was not on diuretic at home  Continue Eliquis  Increase Toprol XL to 50 mg daily; 25 mg stat    CXR showing RLL PNA seems to be likely atelectasis; clinically patient does not appear to have Pneumonia. Afebrile, no leucocytosis, no cough;  Will give Incentive spirometry and get a Xray Chest PA and lateral view tomorrow.     Discussed with patient  Disussed with HOLLAND Padilla and ZHAO Rasheed and PGY1 floor Dr. Gasca

## 2018-10-07 NOTE — H&P ADULT - PROBLEM SELECTOR PLAN 3
patient has mod- severe MR with EF 40- 45 %   Outpatient cardiology Dr Valentine    he refused any surgical in the past

## 2018-10-07 NOTE — H&P ADULT - NSHPPHYSICALEXAM_GEN_ALL_CORE
Vital Signs Last 24 Hrs  T(C): 36.3 (06 Oct 2018 23:09), Max: 36.6 (06 Oct 2018 16:59)  T(F): 97.4 (06 Oct 2018 23:09), Max: 97.8 (06 Oct 2018 16:59)  HR: 94 (06 Oct 2018 23:09) (94 - 132)  BP: 132/95 (06 Oct 2018 23:09) (132/95 - 149/93)  BP(mean): --  RR: 19 (06 Oct 2018 23:09) (19 - 20)  SpO2: 99% (06 Oct 2018 23:09) (99% - 99%)

## 2018-10-07 NOTE — H&P ADULT - PROBLEM SELECTOR PLAN 1
patient came with SOB, orthopnea, PND, B/l lower ext swelling   afebrile, HD stable,   EKG rapid afib, Trop t1 neg   BNP 6k <<<<3k   CXR shows congestion   patient was not on lasix or ACE/ARB at home   ECHO in Sept showed mod-severe MR with a non-dilated LV and LVEF 40-45%.  stress test NICM   Likely acute on chronic CHF likely due to rapid afib Vs non- compliance  with diet  f/u T2 and T3   s/p Lasix 40 mg IV in ED   monitor I/o's   continue with aspirin, statin, BB  c/w lasix 40 mg daily  f/u venous doppler to R/o DVT patient came with SOB, orthopnea, PND, B/l lower ext swelling   afebrile, HD stable,   EKG rapid afib, Trop t1 neg   BNP 6k <<<<3k   CXR shows congestion   patient was not on lasix or ACE/ARB at home   ECHO in Sept showed mod-severe MR with a non-dilated LV and LVEF 40-45%.  stress test NICM   Likely acute on chronic CHF likely due to rapid afib Vs non- compliance  with diet  f/u T2 and T3   s/p Lasix 40 mg IV in ED   monitor I/o's   continue with aspirin, statin, BB  c/w lasix 40 IV daily   f/u venous doppler to R/o DVT  cardiology consult Dr Salazar

## 2018-10-07 NOTE — H&P ADULT - ASSESSMENT
Patient is a 88 year old male form home with PHHx of Afib ( on Eliquis ), CKD, CHF  HFrEF 45%, HLD, Colon Ca S/p surgery and chemotherapy, Prostate Ca s/p radiation therapy ( in remission), presented to ED shortness of breath since 1 day. Patient complains SOB, exertional dyspnea, orthopnea, PND, B/l lower swelling, intermittent palpitation and dry cough. Pt reports  of SOB since 1 week, progressively worsening. pt is non complaint with diet, takes extra salt and drinks soda every day. Patient denies any fever, chills, nausea, vomiting, abdominal pain/ distension, dysuria or muscle/ Joint pains. Patient was recently discharged from hospital for new onset Afib on eliquis. pt said he is very complaint with medications. ECHO in sept 2018  shows mod-severe MR with a non-dilated LV and LVEF 45%. Nuclear stress test showed non ischemic cardiomyopathy.     On admission patient was afebrile HD stable, EKG showed rapid afib with no ST elevation, Troponin T1 neg, normal electrolytes, BNP 6027   CXR Shows congestion

## 2018-10-07 NOTE — H&P ADULT - NSHPLABSRESULTS_GEN_ALL_CORE
11.5   6.0   )-----------( 158      ( 06 Oct 2018 18:09 )             34.7       10-06    131<L>  |  101  |  19<H>  ----------------------------<  102<H>  4.7   |  22  |  1.48<H>    Ca    9.1      06 Oct 2018 18:09    TPro  7.6  /  Alb  3.6  /  TBili  0.8  /  DBili  x   /  AST  49<H>  /  ALT  50  /  AlkPhos  95  10-06

## 2018-10-07 NOTE — H&P ADULT - HISTORY OF PRESENT ILLNESS
Patient is a 88 year old male form home with PHHx of Afib ( on Eliquis ), CKD, CHF  HFrEF 45%, HLD, Colon Ca S/p surgery and chemotherapy, Prostate Ca s/p radiation therapy ( in remission), presented to ED shortness of breath since 1 day. Patient complains SOB, exertional dyspnea, orthopnea, PND, B/l lower swelling, intermittent palpitation and dry cough. Pt reports  of SOB since 1 week, progressively worsening. pt is non complaint with diet, takes extra salt and drinks soda every day. Patient denies any fever, chills, nausea, vomiting, abdominal pain/ distension, dysuria or muscle/ Joint pains. Patient was recently discharged from hospital for new onset Afib on eliquis. pt said he is very complaint with medications. ECHO in sept 2018  shows mod-severe MR with a non-dilated LV and LVEF 45%. Nuclear stress test showed non ischemic cardiomyopathy.     Allergy Penicillin and shell fish

## 2018-10-07 NOTE — H&P ADULT - PROBLEM SELECTOR PLAN 7
IMPROVE VTE Individual Risk Assessment          RISK                                                          Points  [  ] Previous VTE                                                3  [  ] Thrombophilia                                             2  [  ] Lower limb paralysis                                   2        (unable to hold up >15 seconds)    [  ] Current Cancer                                             2         (within 6 months)  [ x ] Immobilization > 24 hrs                              1  [  ] ICU/CCU stay > 24 hours                             1  [x  ] Age > 60                                                         1    IMPROVE VTE Score: VTE score 2   on Eliquis for DVT prophylaxis

## 2018-10-08 LAB
ANION GAP SERPL CALC-SCNC: 10 MMOL/L — SIGNIFICANT CHANGE UP (ref 5–17)
BUN SERPL-MCNC: 23 MG/DL — HIGH (ref 7–18)
CALCIUM SERPL-MCNC: 8.8 MG/DL — SIGNIFICANT CHANGE UP (ref 8.4–10.5)
CHLORIDE SERPL-SCNC: 97 MMOL/L — SIGNIFICANT CHANGE UP (ref 96–108)
CO2 SERPL-SCNC: 22 MMOL/L — SIGNIFICANT CHANGE UP (ref 22–31)
CREAT SERPL-MCNC: 1.64 MG/DL — HIGH (ref 0.5–1.3)
GLUCOSE SERPL-MCNC: 86 MG/DL — SIGNIFICANT CHANGE UP (ref 70–99)
POTASSIUM SERPL-MCNC: 4.7 MMOL/L — SIGNIFICANT CHANGE UP (ref 3.5–5.3)
POTASSIUM SERPL-SCNC: 4.7 MMOL/L — SIGNIFICANT CHANGE UP (ref 3.5–5.3)
SODIUM SERPL-SCNC: 129 MMOL/L — LOW (ref 135–145)

## 2018-10-08 PROCEDURE — 99233 SBSQ HOSP IP/OBS HIGH 50: CPT | Mod: GC

## 2018-10-08 PROCEDURE — 71046 X-RAY EXAM CHEST 2 VIEWS: CPT | Mod: 26

## 2018-10-08 PROCEDURE — 93970 EXTREMITY STUDY: CPT | Mod: 26

## 2018-10-08 RX ORDER — METOPROLOL TARTRATE 50 MG
50 TABLET ORAL
Qty: 0 | Refills: 0 | Status: DISCONTINUED | OUTPATIENT
Start: 2018-10-08 | End: 2018-10-11

## 2018-10-08 RX ORDER — FUROSEMIDE 40 MG
40 TABLET ORAL DAILY
Qty: 0 | Refills: 0 | Status: DISCONTINUED | OUTPATIENT
Start: 2018-10-09 | End: 2018-10-09

## 2018-10-08 RX ADMIN — ATORVASTATIN CALCIUM 40 MILLIGRAM(S): 80 TABLET, FILM COATED ORAL at 21:04

## 2018-10-08 RX ADMIN — Medication 81 MILLIGRAM(S): at 13:14

## 2018-10-08 RX ADMIN — APIXABAN 2.5 MILLIGRAM(S): 2.5 TABLET, FILM COATED ORAL at 06:12

## 2018-10-08 RX ADMIN — Medication 50 MILLIGRAM(S): at 17:48

## 2018-10-08 RX ADMIN — PANTOPRAZOLE SODIUM 40 MILLIGRAM(S): 20 TABLET, DELAYED RELEASE ORAL at 06:14

## 2018-10-08 RX ADMIN — Medication 50 MILLIGRAM(S): at 06:14

## 2018-10-08 RX ADMIN — APIXABAN 2.5 MILLIGRAM(S): 2.5 TABLET, FILM COATED ORAL at 17:48

## 2018-10-08 NOTE — PROGRESS NOTE ADULT - SUBJECTIVE AND OBJECTIVE BOX
Patient is a 88y old  Male who presents with a chief complaint of SOB (08 Oct 2018 11:29)      INTERVAL HPI/OVERNIGHT EVENTS: seen and examined at bedside. Wife was present during examination. SOb improved.   Slight increase in serum Cr.     MEDICATIONS  (STANDING):  apixaban 2.5 milliGRAM(s) Oral every 12 hours  aspirin  chewable 81 milliGRAM(s) Oral daily  atorvastatin 40 milliGRAM(s) Oral at bedtime  metoprolol succinate ER 50 milliGRAM(s) Oral two times a day  pantoprazole    Tablet 40 milliGRAM(s) Oral before breakfast    MEDICATIONS  (PRN):      Allergies    penicillin (Hives)  shellfish (Hives)    Intolerances        REVIEW OF SYSTEMS:  CONSTITUTIONAL: No fever, weight loss, or fatigue  RESPIRATORY: SOB, No cough, wheezing, chills or hemoptysis;  CARDIOVASCULAR: No chest pain, palpitations, dizziness, or leg swelling  GASTROINTESTINAL: No abdominal or epigastric pain. No nausea, vomiting, or hematemesis; No diarrhea or constipation. No melena or hematochezia.  NEUROLOGICAL: No headaches, memory loss, loss of strength, numbness, or tremors  MUSCULOSKELETAL: No joint pain or swelling; No muscle, back, or extremity pain      Vital Signs Last 24 Hrs  T(C): 36.6 (08 Oct 2018 11:50), Max: 37 (08 Oct 2018 05:35)  T(F): 97.8 (08 Oct 2018 11:50), Max: 98.6 (08 Oct 2018 05:35)  HR: 74 (08 Oct 2018 11:50) (51 - 105)  BP: 112/66 (08 Oct 2018 11:50) (103/67 - 128/99)  BP(mean): --  RR: 18 (08 Oct 2018 11:50) (18 - 19)  SpO2: 100% (08 Oct 2018 11:50) (97% - 100%)    PHYSICAL EXAM:  GENERAL: Pleasant elderly man, sitting on the bed, NAD, well-groomed, well-developed  HEAD:  Atraumatic, Normocephalic  EYES:  conjunctiva and sclera clear  NECK: Supple, No JVD,  NERVOUS SYSTEM:  Alert & Oriented X3, No gross focal deficits  CHEST/LUNG: mild crackles at bases,  Clear to percussion bilaterally; No rales, rhonchi, wheezing, or rubs  HEART: Irregular rate and rhythm; No murmurs, rubs, or gallops  ABDOMEN: Soft, Nontender, Nondistended; Bowel sounds present  EXTREMITIES:  No clubbing, cyanosis, or edema  SKIN: No rashes or lesions    LABS:                        11.1   5.5   )-----------( 138      ( 07 Oct 2018 06:51 )             33.8     10-08    129<L>  |  97  |  23<H>  ----------------------------<  86  4.7   |  22  |  1.64<H>    Ca    8.8      08 Oct 2018 07:41  Phos  2.9     10-07  Mg     2.0     10-07    TPro  7.6  /  Alb  3.6  /  TBili  0.8  /  DBili  x   /  AST  49<H>  /  ALT  50  /  AlkPhos  95  10-06    PT/INR - ( 06 Oct 2018 18:09 )   PT: 16.0 sec;   INR: 1.46 ratio         PTT - ( 06 Oct 2018 18:09 )  PTT:33.6 sec    CAPILLARY BLOOD GLUCOSE          RADIOLOGY & ADDITIONAL TESTS:    Imaging Personally Reviewed:  [ ] YES  [ ] NO    Consultant(s) Notes Reviewed:  [x] YES  [ ] NO    Care Discussed with Consultants/Other Providers [ ] YES  [ ] NO    Plan of Care discussed with Housestaff [x ]YES [ ] NO

## 2018-10-08 NOTE — PROGRESS NOTE ADULT - SUBJECTIVE AND OBJECTIVE BOX
PGY1 Note discussed with supervising resident and primary attending.    Patient is a 88y old  Male who presents with a chief complaint of SOB (08 Oct 2018 15:12)      INTERVAL HPI/OVERNIGHT EVENTS: patient assessed bedside. Reports significant improvement in symptoms.    MEDICATIONS  (STANDING):  apixaban 2.5 milliGRAM(s) Oral every 12 hours  aspirin  chewable 81 milliGRAM(s) Oral daily  atorvastatin 40 milliGRAM(s) Oral at bedtime  metoprolol succinate ER 50 milliGRAM(s) Oral two times a day  pantoprazole    Tablet 40 milliGRAM(s) Oral before breakfast    MEDICATIONS  (PRN):      Allergies    penicillin (Hives)  shellfish (Hives)    Intolerances        REVIEW OF SYSTEMS:  CONSTITUTIONAL: No fever, weight loss, or fatigue  RESPIRATORY: No cough, wheezing, chills or hemoptysis; mild shortness of breath  CARDIOVASCULAR: No chest pain, palpitations, dizziness, or leg swelling  GASTROINTESTINAL: No abdominal or epigastric pain. No nausea, vomiting, or hematemesis; No diarrhea or constipation. No melena or hematochezia.  NEUROLOGICAL: No headaches, memory loss, loss of strength, numbness, or tremors  SKIN: No itching, burning, rashes, or lesions     Vital Signs Last 24 Hrs  T(C): 36.6 (08 Oct 2018 16:08), Max: 37 (08 Oct 2018 05:35)  T(F): 97.9 (08 Oct 2018 16:08), Max: 98.6 (08 Oct 2018 05:35)  HR: 108 (08 Oct 2018 16:08) (51 - 108)  BP: 100/81 (08 Oct 2018 16:08) (100/81 - 128/99)  BP(mean): --  RR: 16 (08 Oct 2018 16:08) (16 - 19)  SpO2: 98% (08 Oct 2018 16:08) (98% - 100%)    PHYSICAL EXAM:  GENERAL: NAD, well-groomed, well-developed  HEAD:  Atraumatic, Normocephalic  EYES: EOMI, PERRLA,  CHEST/LUNG: Bibasilar crackles  HEART: Regular rate and rhythm; No murmurs, rubs, or gallops  ABDOMEN: Soft, Nontender, Nondistended; Bowel sounds present  NERVOUS SYSTEM:  Alert & Oriented X3, Motor Strength 5/5 B/L   EXTREMITIES:No clubbing, cyanosis, or edema    LABS:                        11.1   5.5   )-----------( 138      ( 07 Oct 2018 06:51 )             33.8     10-08    129<L>  |  97  |  23<H>  ----------------------------<  86  4.7   |  22  |  1.64<H>    Ca    8.8      08 Oct 2018 07:41  Phos  2.9     10-07  Mg     2.0     10-07          CAPILLARY BLOOD GLUCOSE          RADIOLOGY & ADDITIONAL TESTS:   EXAM:  US DPLX LWR EXT VEINS COMPL BI                            PROCEDURE DATE:  10/08/2018          INTERPRETATION:  HISTORY:  Bilateral lower extremity swelling.    COMPARISON:  None    FINDINGS: Real-time ultrasonography of the bilateral lower extremities   performed utilizing color Doppler technique. Multiple images are   obtained. There is no evidence for deep venous thrombosis within the   bilateral common femoral venous, superficial femoral venous or popliteal   venous segments. Normal compressibility is identified. Augmentation is   noted. The visualized segments of the tibioperoneal trunks appear patent.   No popliteal fluid collections are identified bilaterally.    Impression: No evidence for deep venous thrombosis within the bilateral   common femoral venous, superficial femoral venous or popliteal venous   segments.          Consultant(s) Notes Reviewed:  [ x ] YES  [ ] NO

## 2018-10-09 DIAGNOSIS — E87.1 HYPO-OSMOLALITY AND HYPONATREMIA: ICD-10-CM

## 2018-10-09 LAB
ANION GAP SERPL CALC-SCNC: 10 MMOL/L — SIGNIFICANT CHANGE UP (ref 5–17)
ANION GAP SERPL CALC-SCNC: 11 MMOL/L — SIGNIFICANT CHANGE UP (ref 5–17)
BUN SERPL-MCNC: 24 MG/DL — HIGH (ref 7–18)
BUN SERPL-MCNC: 25 MG/DL — HIGH (ref 7–18)
CALCIUM SERPL-MCNC: 8.7 MG/DL — SIGNIFICANT CHANGE UP (ref 8.4–10.5)
CALCIUM SERPL-MCNC: 8.8 MG/DL — SIGNIFICANT CHANGE UP (ref 8.4–10.5)
CHLORIDE SERPL-SCNC: 93 MMOL/L — LOW (ref 96–108)
CHLORIDE SERPL-SCNC: 98 MMOL/L — SIGNIFICANT CHANGE UP (ref 96–108)
CO2 SERPL-SCNC: 17 MMOL/L — LOW (ref 22–31)
CO2 SERPL-SCNC: 23 MMOL/L — SIGNIFICANT CHANGE UP (ref 22–31)
CREAT SERPL-MCNC: 1.34 MG/DL — HIGH (ref 0.5–1.3)
CREAT SERPL-MCNC: 1.68 MG/DL — HIGH (ref 0.5–1.3)
GLUCOSE SERPL-MCNC: 100 MG/DL — HIGH (ref 70–99)
GLUCOSE SERPL-MCNC: 97 MG/DL — SIGNIFICANT CHANGE UP (ref 70–99)
POTASSIUM SERPL-MCNC: 4.3 MMOL/L — SIGNIFICANT CHANGE UP (ref 3.5–5.3)
POTASSIUM SERPL-MCNC: 4.5 MMOL/L — SIGNIFICANT CHANGE UP (ref 3.5–5.3)
POTASSIUM SERPL-SCNC: 4.3 MMOL/L — SIGNIFICANT CHANGE UP (ref 3.5–5.3)
POTASSIUM SERPL-SCNC: 4.5 MMOL/L — SIGNIFICANT CHANGE UP (ref 3.5–5.3)
SODIUM SERPL-SCNC: 126 MMOL/L — LOW (ref 135–145)
SODIUM SERPL-SCNC: 126 MMOL/L — LOW (ref 135–145)

## 2018-10-09 PROCEDURE — 99233 SBSQ HOSP IP/OBS HIGH 50: CPT | Mod: GC

## 2018-10-09 RX ORDER — SODIUM CHLORIDE 9 MG/ML
500 INJECTION INTRAMUSCULAR; INTRAVENOUS; SUBCUTANEOUS ONCE
Qty: 0 | Refills: 0 | Status: COMPLETED | OUTPATIENT
Start: 2018-10-09 | End: 2018-10-09

## 2018-10-09 RX ORDER — FUROSEMIDE 40 MG
20 TABLET ORAL DAILY
Qty: 0 | Refills: 0 | Status: DISCONTINUED | OUTPATIENT
Start: 2018-10-10 | End: 2018-10-11

## 2018-10-09 RX ADMIN — APIXABAN 2.5 MILLIGRAM(S): 2.5 TABLET, FILM COATED ORAL at 06:13

## 2018-10-09 RX ADMIN — Medication 40 MILLIGRAM(S): at 06:13

## 2018-10-09 RX ADMIN — Medication 50 MILLIGRAM(S): at 18:06

## 2018-10-09 RX ADMIN — Medication 81 MILLIGRAM(S): at 12:17

## 2018-10-09 RX ADMIN — APIXABAN 2.5 MILLIGRAM(S): 2.5 TABLET, FILM COATED ORAL at 18:06

## 2018-10-09 RX ADMIN — ATORVASTATIN CALCIUM 40 MILLIGRAM(S): 80 TABLET, FILM COATED ORAL at 22:10

## 2018-10-09 RX ADMIN — PANTOPRAZOLE SODIUM 40 MILLIGRAM(S): 20 TABLET, DELAYED RELEASE ORAL at 06:13

## 2018-10-09 RX ADMIN — Medication 50 MILLIGRAM(S): at 06:13

## 2018-10-09 RX ADMIN — SODIUM CHLORIDE 1000 MILLILITER(S): 9 INJECTION INTRAMUSCULAR; INTRAVENOUS; SUBCUTANEOUS at 14:01

## 2018-10-09 NOTE — PROGRESS NOTE ADULT - SUBJECTIVE AND OBJECTIVE BOX
Patient is a 88y old  Male who presents with a chief complaint of SOB (09 Oct 2018 12:42)      INTERVAL HPI/OVERNIGHT EVENTS: seen and examined at bedside. No new complains. No overnight events. SOb improved.       MEDICATIONS  (STANDING):  apixaban 2.5 milliGRAM(s) Oral every 12 hours  aspirin  chewable 81 milliGRAM(s) Oral daily  atorvastatin 40 milliGRAM(s) Oral at bedtime  metoprolol succinate ER 50 milliGRAM(s) Oral two times a day  pantoprazole    Tablet 40 milliGRAM(s) Oral before breakfast    MEDICATIONS  (PRN):      Allergies    penicillin (Hives)  shellfish (Hives)    Intolerances        REVIEW OF SYSTEMS:  CONSTITUTIONAL: No fever, weight loss, or fatigue  RESPIRATORY: No cough, wheezing, chills or hemoptysis; No shortness of breath  CARDIOVASCULAR: No chest pain, palpitations, dizziness, or leg swelling  GASTROINTESTINAL: No abdominal or epigastric pain. No nausea, vomiting, or hematemesis; No diarrhea or constipation. No melena or hematochezia.  NEUROLOGICAL: No headaches, memory loss, loss of strength, numbness, or tremors  MUSCULOSKELETAL: No joint pain or swelling; No muscle, back, or extremity pain      Vital Signs Last 24 Hrs  T(C): 36.4 (09 Oct 2018 11:11), Max: 36.9 (08 Oct 2018 20:20)  T(F): 97.5 (09 Oct 2018 11:11), Max: 98.4 (08 Oct 2018 20:20)  HR: 76 (09 Oct 2018 11:11) (76 - 110)  BP: 109/83 (09 Oct 2018 11:11) (100/81 - 132/98)  BP(mean): --  RR: 18 (09 Oct 2018 11:11) (16 - 18)  SpO2: 100% (09 Oct 2018 11:11) (97% - 100%)    PHYSICAL EXAM:  GENERAL: NAD, well-groomed, well-developed  HEAD:  Atraumatic, Normocephalic, temporal wasting   EYES: conjunctiva and sclera clear  NECK: Supple, No JVD, Normal thyroid  NERVOUS SYSTEM:  Alert & Oriented X3, No gross focal deficits  CHEST/LUNG: Clear to percussion bilaterally; No rales, rhonchi, wheezing, or rubs  HEART: Irregular rate and rhythm; No murmurs, rubs, or gallops  ABDOMEN: Soft, Nontender, Nondistended; Bowel sounds present  EXTREMITIES:  No clubbing, cyanosis, or edema  SKIN: No rashes or lesions    LABS:    10-09    126<L>  |  98  |  24<H>  ----------------------------<  97  4.5   |  17<L>  |  1.34<H>    Ca    8.7      09 Oct 2018 06:43          CAPILLARY BLOOD GLUCOSE          RADIOLOGY & ADDITIONAL TESTS:    Imaging Personally Reviewed:  [ ] YES  [ ] NO    Consultant(s) Notes Reviewed:  [x ] YES  [ ] NO    Care Discussed with Consultants/Other Providers [ ] YES  [ ] NO    Plan of Care discussed with Housestaff [x ]YES [ ] NO

## 2018-10-09 NOTE — PROGRESS NOTE ADULT - SUBJECTIVE AND OBJECTIVE BOX
Patient denies chest pain or shortness of breath.   Review of systems otherwise (-)  	  MEDICATIONS:  MEDICATIONS  (STANDING):  apixaban 2.5 milliGRAM(s) Oral every 12 hours  aspirin  chewable 81 milliGRAM(s) Oral daily  atorvastatin 40 milliGRAM(s) Oral at bedtime  furosemide    Tablet 40 milliGRAM(s) Oral daily  metoprolol succinate ER 50 milliGRAM(s) Oral two times a day  pantoprazole    Tablet 40 milliGRAM(s) Oral before breakfast      LABS:	 	    CARDIAC MARKERS:  CARDIAC MARKERS ( 07 Oct 2018 06:51 )  <0.015 ng/mL / x     / 89 U/L / x     / 1.5 ng/mL  CARDIAC MARKERS ( 06 Oct 2018 18:09 )  <0.015 ng/mL / x     / x     / x     / x                Hemoglobin: 11.1 g/dL (10-07 @ 06:51)  Hemoglobin: 11.5 g/dL (10-06 @ 18:09)      10-09    126<L>  |  98  |  24<H>  ----------------------------<  97  4.5   |  17<L>  |  1.34<H>    Ca    8.7      09 Oct 2018 06:43      Creatinine Trend: 1.34<--, 1.64<--, 1.21<--, 1.48<--, 1.44<--, 1.50<--        PHYSICAL EXAM:  T(C): 36.4 (10-09-18 @ 11:11), Max: 36.9 (10-08-18 @ 20:20)  HR: 76 (10-09-18 @ 11:11) (76 - 110)  BP: 109/83 (10-09-18 @ 11:11) (100/81 - 132/98)  RR: 18 (10-09-18 @ 11:11) (16 - 18)  SpO2: 100% (10-09-18 @ 11:11) (97% - 100%)  Wt(kg): --  I&O's Summary    08 Oct 2018 07:01  -  09 Oct 2018 07:00  --------------------------------------------------------  IN: 450 mL / OUT: 450 mL / NET: 0 mL    09 Oct 2018 07:01  -  09 Oct 2018 12:42  --------------------------------------------------------  IN: 240 mL / OUT: 1625 mL / NET: -1385 mL          Gen: Appears well in NAD  HEENT:  (-)icterus (-)pallor  CV: N S1 S2 1/6 CARL (+)2 Pulses B/l  Resp:  Decreased at the bases  normal effort  GI: (+) BS Soft, NT, ND  Lymph:  (-)Edema, (-)obvious lymphadenopathy  Skin: Warm to touch, Normal turgor  Psych: Appropriate mood and affect      TELEMETRY: 	  afib 110's        ASSESSMENT/PLAN: 	88y  Male PHHx of Afib ( on Eliquis ), CKD, CHF  NICM HFrEF 45%, HLD, Colon Ca S/p surgery and chemotherapy, Prostate Ca s/p radiation therapy ( in remission), presented to ED shortness of breath since 1 day.      - CXR remains equivocal regarding presence of PNA low NA ? SIADH from PNA  - Change lasix to 40 IV BID given effusions on CXR  - Monitor HR on Toprol XL 50 BID    Nabor Salazar MD, FACC Patient denies chest pain or shortness of breath.   Review of systems otherwise (-)  	  MEDICATIONS:  MEDICATIONS  (STANDING):  apixaban 2.5 milliGRAM(s) Oral every 12 hours  aspirin  chewable 81 milliGRAM(s) Oral daily  atorvastatin 40 milliGRAM(s) Oral at bedtime  furosemide    Tablet 40 milliGRAM(s) Oral daily  metoprolol succinate ER 50 milliGRAM(s) Oral two times a day  pantoprazole    Tablet 40 milliGRAM(s) Oral before breakfast      LABS:	 	    CARDIAC MARKERS:  CARDIAC MARKERS ( 07 Oct 2018 06:51 )  <0.015 ng/mL / x     / 89 U/L / x     / 1.5 ng/mL  CARDIAC MARKERS ( 06 Oct 2018 18:09 )  <0.015 ng/mL / x     / x     / x     / x                Hemoglobin: 11.1 g/dL (10-07 @ 06:51)  Hemoglobin: 11.5 g/dL (10-06 @ 18:09)      10-09    126<L>  |  98  |  24<H>  ----------------------------<  97  4.5   |  17<L>  |  1.34<H>    Ca    8.7      09 Oct 2018 06:43      Creatinine Trend: 1.34<--, 1.64<--, 1.21<--, 1.48<--, 1.44<--, 1.50<--        PHYSICAL EXAM:  T(C): 36.4 (10-09-18 @ 11:11), Max: 36.9 (10-08-18 @ 20:20)  HR: 76 (10-09-18 @ 11:11) (76 - 110)  BP: 109/83 (10-09-18 @ 11:11) (100/81 - 132/98)  RR: 18 (10-09-18 @ 11:11) (16 - 18)  SpO2: 100% (10-09-18 @ 11:11) (97% - 100%)  Wt(kg): --  I&O's Summary    08 Oct 2018 07:01  -  09 Oct 2018 07:00  --------------------------------------------------------  IN: 450 mL / OUT: 450 mL / NET: 0 mL    09 Oct 2018 07:01  -  09 Oct 2018 12:42  --------------------------------------------------------  IN: 240 mL / OUT: 1625 mL / NET: -1385 mL          Gen: Appears well in NAD  HEENT:  (-)icterus (-)pallor  CV: N S1 S2 1/6 CARL (+)2 Pulses B/l  Resp:  Decreased at the bases  normal effort  GI: (+) BS Soft, NT, ND  Lymph:  (-)Edema, (-)obvious lymphadenopathy  Skin: Warm to touch, Normal turgor  Psych: Appropriate mood and affect      TELEMETRY: 	  afib 110's        ASSESSMENT/PLAN: 	88y  Male PHHx of Afib ( on Eliquis ), CKD, CHF  NICM HFrEF 45%, HLD, Colon Ca S/p surgery and chemotherapy, Prostate Ca s/p radiation therapy ( in remission), presented to ED shortness of breath since 1 day.      - CXR remains equivocal regarding presence of PNA low NA ? SIADH from PNA  -  Cont PO lasix for now  - Monitor HR on Toprol XL 50 BID    Nabor Salazar MD, FACC

## 2018-10-09 NOTE — PROGRESS NOTE ADULT - SUBJECTIVE AND OBJECTIVE BOX
PGY1 Note discussed with supervising resident and primary attending.    Patient is a 88y old  Male who presents with a chief complaint of SOB (09 Oct 2018 14:12)      INTERVAL HPI/OVERNIGHT EVENTS: no overnight events, patient reports significant improvement in shortness of breath     MEDICATIONS  (STANDING):  apixaban 2.5 milliGRAM(s) Oral every 12 hours  aspirin  chewable 81 milliGRAM(s) Oral daily  atorvastatin 40 milliGRAM(s) Oral at bedtime  metoprolol succinate ER 50 milliGRAM(s) Oral two times a day  pantoprazole    Tablet 40 milliGRAM(s) Oral before breakfast    MEDICATIONS  (PRN):      Allergies    penicillin (Hives)  shellfish (Hives)    Intolerances        REVIEW OF SYSTEMS:  CONSTITUTIONAL: No fever, weight loss, or fatigue  RESPIRATORY: No cough, wheezing, chills or hemoptysis; mild shortness of breath  CARDIOVASCULAR: No chest pain, palpitations, dizziness, or leg swelling  GASTROINTESTINAL: No abdominal or epigastric pain.  NEUROLOGICAL: No headaches, numbness, or tremors  SKIN: No itching, burning, rashes, or lesions     Vital Signs Last 24 Hrs  T(C): 36.3 (09 Oct 2018 15:34), Max: 36.9 (08 Oct 2018 20:20)  T(F): 97.4 (09 Oct 2018 15:34), Max: 98.4 (08 Oct 2018 20:20)  HR: 111 (09 Oct 2018 15:34) (76 - 111)  BP: 109/81 (09 Oct 2018 15:34) (103/70 - 132/98)  BP(mean): --  RR: 18 (09 Oct 2018 15:34) (17 - 18)  SpO2: 100% (09 Oct 2018 15:34) (97% - 100%)    PHYSICAL EXAM:  GENERAL: NAD, well-groomed, well-developed  HEAD:  Atraumatic, Normocephalic  EYES: EOMI, PERRLA, conjunctiva and sclera clear  NECK: Supple, No JVD, Normal thyroid  CHEST/LUNG: Clear to percussion bilaterally; No rales, rhonchi, wheezing, or rubs  HEART: Regular rate and rhythm; No murmurs, rubs, or gallops  ABDOMEN: Soft, Nontender, Nondistended; Bowel sounds present  NERVOUS SYSTEM:  Alert & Oriented X3; Motor Strength 5/5 B/L   EXTREMITIES:  2+ Peripheral Pulses, No clubbing, cyanosis, or edema      LABS:    10-09    126<L>  |  98  |  24<H>  ----------------------------<  97  4.5   |  17<L>  |  1.34<H>    Ca    8.7      09 Oct 2018 06:43          CAPILLARY BLOOD GLUCOSE

## 2018-10-10 LAB
ANION GAP SERPL CALC-SCNC: 10 MMOL/L — SIGNIFICANT CHANGE UP (ref 5–17)
APPEARANCE UR: CLEAR — SIGNIFICANT CHANGE UP
BILIRUB UR-MCNC: NEGATIVE — SIGNIFICANT CHANGE UP
BUN SERPL-MCNC: 24 MG/DL — HIGH (ref 7–18)
CALCIUM SERPL-MCNC: 8.8 MG/DL — SIGNIFICANT CHANGE UP (ref 8.4–10.5)
CHLORIDE SERPL-SCNC: 94 MMOL/L — LOW (ref 96–108)
CO2 SERPL-SCNC: 21 MMOL/L — LOW (ref 22–31)
COLOR SPEC: YELLOW — SIGNIFICANT CHANGE UP
CREAT SERPL-MCNC: 1.34 MG/DL — HIGH (ref 0.5–1.3)
DIFF PNL FLD: NEGATIVE — SIGNIFICANT CHANGE UP
GLUCOSE SERPL-MCNC: 88 MG/DL — SIGNIFICANT CHANGE UP (ref 70–99)
GLUCOSE UR QL: NEGATIVE — SIGNIFICANT CHANGE UP
KETONES UR-MCNC: NEGATIVE — SIGNIFICANT CHANGE UP
LEUKOCYTE ESTERASE UR-ACNC: NEGATIVE — SIGNIFICANT CHANGE UP
NITRITE UR-MCNC: NEGATIVE — SIGNIFICANT CHANGE UP
OSMOLALITY UR: 276 MOS/KG — SIGNIFICANT CHANGE UP (ref 50–1200)
PH UR: 6.5 — SIGNIFICANT CHANGE UP (ref 5–8)
POTASSIUM SERPL-MCNC: 4 MMOL/L — SIGNIFICANT CHANGE UP (ref 3.5–5.3)
POTASSIUM SERPL-SCNC: 4 MMOL/L — SIGNIFICANT CHANGE UP (ref 3.5–5.3)
PROT UR-MCNC: NEGATIVE — SIGNIFICANT CHANGE UP
SODIUM SERPL-SCNC: 125 MMOL/L — LOW (ref 135–145)
SODIUM UR-SCNC: 69 MMOL/L — SIGNIFICANT CHANGE UP (ref 40–220)
SP GR SPEC: 1 — LOW (ref 1.01–1.02)
UROBILINOGEN FLD QL: NEGATIVE — SIGNIFICANT CHANGE UP

## 2018-10-10 PROCEDURE — 71250 CT THORAX DX C-: CPT | Mod: 26

## 2018-10-10 PROCEDURE — 99233 SBSQ HOSP IP/OBS HIGH 50: CPT | Mod: GC

## 2018-10-10 RX ORDER — SODIUM CHLORIDE 9 MG/ML
1 INJECTION INTRAMUSCULAR; INTRAVENOUS; SUBCUTANEOUS
Qty: 0 | Refills: 0 | Status: COMPLETED | OUTPATIENT
Start: 2018-10-10 | End: 2018-10-10

## 2018-10-10 RX ADMIN — Medication 81 MILLIGRAM(S): at 11:35

## 2018-10-10 RX ADMIN — PANTOPRAZOLE SODIUM 40 MILLIGRAM(S): 20 TABLET, DELAYED RELEASE ORAL at 05:54

## 2018-10-10 RX ADMIN — SODIUM CHLORIDE 1 GRAM(S): 9 INJECTION INTRAMUSCULAR; INTRAVENOUS; SUBCUTANEOUS at 18:33

## 2018-10-10 RX ADMIN — ATORVASTATIN CALCIUM 40 MILLIGRAM(S): 80 TABLET, FILM COATED ORAL at 21:38

## 2018-10-10 RX ADMIN — APIXABAN 2.5 MILLIGRAM(S): 2.5 TABLET, FILM COATED ORAL at 05:54

## 2018-10-10 RX ADMIN — Medication 50 MILLIGRAM(S): at 18:33

## 2018-10-10 RX ADMIN — SODIUM CHLORIDE 1 GRAM(S): 9 INJECTION INTRAMUSCULAR; INTRAVENOUS; SUBCUTANEOUS at 13:28

## 2018-10-10 RX ADMIN — Medication 20 MILLIGRAM(S): at 05:54

## 2018-10-10 RX ADMIN — APIXABAN 2.5 MILLIGRAM(S): 2.5 TABLET, FILM COATED ORAL at 18:33

## 2018-10-10 RX ADMIN — Medication 50 MILLIGRAM(S): at 05:54

## 2018-10-10 NOTE — PROGRESS NOTE ADULT - PROBLEM SELECTOR PLAN 4
BP stable  c/w with Metroprolol Home dose
H/o CKD   Baseline cr 1. 4 , increase noted today  lasix held for one day  avoid nephrotoxic medications  monitor bmp
patient has mod- severe MR with EF 40- 45 %   Outpatient cardiology Dr Valentine    f/u with Dr Salazar, will see surgical cardiologist as outpatient
patient has mod- severe MR with EF 40- 45 %   Outpatient cardiology Dr Valentine    f/u with Dr Salazar, will see surgical cardiologist as outpatient
likely secondary to overdiuresis  Will give 500 cc of NS today   Furosemide decreased to 20 mg po daily

## 2018-10-10 NOTE — PROGRESS NOTE ADULT - PROBLEM SELECTOR PROBLEM 5
HTN (hypertension)
CKD (chronic kidney disease)
CKD (chronic kidney disease)
HLD (hyperlipidemia)
HTN (hypertension)

## 2018-10-10 NOTE — PROGRESS NOTE ADULT - PROBLEM SELECTOR PLAN 5
BP stable
BP stable  c/w with Metroprolol
H/o CKD   Baseline cr 1. 4   currently at baseline   avoid nephrotoxic medications
H/o CKD   Baseline cr 1. 4   currently at baseline   avoid nephrotoxic medications
c/w lipitor 40 mg daily

## 2018-10-10 NOTE — PROGRESS NOTE ADULT - PROBLEM SELECTOR PROBLEM 3
CKD (chronic kidney disease)
Hyponatremia
Hyponatremia
Mitral regurgitation
CKD (chronic kidney disease)

## 2018-10-10 NOTE — PROGRESS NOTE ADULT - PROBLEM SELECTOR PLAN 7
IMPROVE VTE Individual Risk Assessment          RISK                                                          Points  [  ] Previous VTE                                                3  [  ] Thrombophilia                                             2  [  ] Lower limb paralysis                                   2        (unable to hold up >15 seconds)    [  ] Current Cancer                                             2         (within 6 months)  [ x ] Immobilization > 24 hrs                              1  [  ] ICU/CCU stay > 24 hours                             1  [x  ] Age > 60                                                         1    IMPROVE VTE Score: VTE score 2   on Eliquis for DVT prophylaxis
IMPROVE VTE Individual Risk Assessment          RISK                                                          Points  [  ] Previous VTE                                                3  [  ] Thrombophilia                                             2  [  ] Lower limb paralysis                                   2        (unable to hold up >15 seconds)    [  ] Current Cancer                                             2         (within 6 months)  [ x ] Immobilization > 24 hrs                              1  [  ] ICU/CCU stay > 24 hours                             1  [x  ] Age > 60                                                         1    IMPROVE VTE Score: VTE score 2   on Eliquis for DVT prophylaxis
c/w lipitor 40 mg daily
c/w lipitor 40 mg daily

## 2018-10-10 NOTE — PROGRESS NOTE ADULT - SUBJECTIVE AND OBJECTIVE BOX
PGY1 Note discussed with supervising resident and primary attending.    Patient is a 88y old  Male who presents with a chief complaint of SOB (10 Oct 2018 13:05)      INTERVAL HPI/OVERNIGHT EVENTS: no overnight events    MEDICATIONS  (STANDING):  apixaban 2.5 milliGRAM(s) Oral every 12 hours  aspirin  chewable 81 milliGRAM(s) Oral daily  atorvastatin 40 milliGRAM(s) Oral at bedtime  furosemide    Tablet 20 milliGRAM(s) Oral daily  metoprolol succinate ER 50 milliGRAM(s) Oral two times a day  pantoprazole    Tablet 40 milliGRAM(s) Oral before breakfast  sodium chloride 1 Gram(s) Oral two times a day    MEDICATIONS  (PRN):      Allergies    penicillin (Hives)  shellfish (Hives)    Intolerances        REVIEW OF SYSTEMS:  CONSTITUTIONAL: No fever, weight loss, or fatigue  RESPIRATORY: shortness of breath improving, occasional cough; no wheezing, chills or hemoptysis  CARDIOVASCULAR: No chest pain, palpitations, dizziness, or leg swelling  GASTROINTESTINAL: No abdominal or epigastric pain. No nausea, vomiting, or hematemesis; No diarrhea or constipation. No melena or hematochezia.  NEUROLOGICAL: No headaches, memory loss, loss of strength, numbness, or tremors  SKIN: No itching, burning, rashes, or lesions     Vital Signs Last 24 Hrs  T(C): 36.3 (10 Oct 2018 15:28), Max: 36.5 (10 Oct 2018 07:47)  T(F): 97.3 (10 Oct 2018 15:28), Max: 97.7 (10 Oct 2018 07:47)  HR: 97 (10 Oct 2018 15:28) (62 - 100)  BP: 113/69 (10 Oct 2018 15:28) (105/78 - 117/73)  BP(mean): --  RR: 18 (10 Oct 2018 15:28) (17 - 18)  SpO2: 99% (10 Oct 2018 15:28) (98% - 100%)    PHYSICAL EXAM:  GENERAL: NAD, well-groomed, well-developed  HEAD:  Atraumatic, Normocephalic  EYES: EOMI, PERRLA, conjunctiva and sclera clear  NECK: Supple, No JVD, Normal thyroid  CHEST/LUNG: Clear to percussion bilaterally; No rales, rhonchi, wheezing, or rubs  HEART: Regular rate and rhythm; No murmurs, rubs, or gallops  ABDOMEN: Soft, Nontender, Nondistended; Bowel sounds present  NERVOUS SYSTEM:  Alert & Oriented X3, Good concentration; Motor Strength 5/5 B/L   EXTREMITIES:  2+ Peripheral Pulses, No clubbing, cyanosis, or edema  SKIN;    LABS:    10-10    125<L>  |  94<L>  |  24<H>  ----------------------------<  88  4.0   |  21<L>  |  1.34<H>    Ca    8.8      10 Oct 2018 08:25        Urinalysis Basic - ( 10 Oct 2018 11:41 )    Color: Yellow / Appearance: Clear / S.005 / pH: x  Gluc: x / Ketone: Negative  / Bili: Negative / Urobili: Negative   Blood: x / Protein: Negative / Nitrite: Negative   Leuk Esterase: Negative / RBC: x / WBC x   Sq Epi: x / Non Sq Epi: x / Bacteria: x      CAPILLARY BLOOD GLUCOSE          RADIOLOGY & ADDITIONAL TESTS:   EXAM:  CT CHEST                            PROCEDURE DATE:  10/10/2018          INTERPRETATION:  Clinical indications: Heart failure.    Axial CT images of the chest are obtained without intravenous   administration of contrast.    No prior chest CTs are available for comparison.    There are no pathologically enlarged bilateral axillary, mediastinal or   hilar lymph nodes. The heart size is enlarged. There is no pericardial   effusion. There is atherosclerotic disease of the aorta and the coronary   arteries. Aortic root calcifications are noted. There are small bilateral   pleural effusions, right greater than left. There is bilateral   gynecomastia. There is some subcutaneous edema.    Evaluation of the upper abdominal organs demonstrate a 3 mm hepatic   calcification likely related to a prior adenomatous infection.    Evaluation of the lungs demonstrate a 4 mm left upper lobe calcified   granuloma related to a prior granulomatous infection. There are bibasilar   areas of compressive atelectasis within the lower lobes. There is minimal   lower lung interlobular septal thickening suggestive of pulmonary edema   given the other findings. There is a 3 mm right apical nonspecific   pulmonary nodule. There is a 3 mm left upper lobe peripheral pulmonary   nodule on image 50 of series 2. There are 3 small patchy round glass   opacities within the upper lobes. There is suggestion of mild emphysema.   There are no central endobronchial lesions.    Evaluation of the bone demonstrate degenerative changes of the upper   lumbar spine and the shoulders.    IMPRESSION: Small bilateral pleural effusions, right greater than left   associated with bibasilar compressive atelectasis.     Nonspecific small patchy groundglass opacities may also be related to the   underlying fluid overload although differential includes infection. A   three-month follow-up chest CT is recommended to ensure resolution.    Tiny bilateral pulmonary nodules as above can be monitored on the   follow-up CT.

## 2018-10-10 NOTE — PROGRESS NOTE ADULT - ATTENDING COMMENTS
Agree with all the above   Patient seen and examined at bedside. Wife was present during examination. No new complains, except reports of periodic palpitations.   Serum Cr. this morning is unchanged 125   Patient was suppose to be discharged today, but because of low serum Na, the discharge postponed.   Nephrology consult noted and appreciated.   Will give sodium tablet x2 today. Recheck BMP in am. Fluid restriction to 1.2/day.   Plan of care discussed in detail with patient and his wife.     The rest as above Agree with all the above   Patient seen and examined at bedside. Wife was present during examination. No new complains, except reports of periodic palpitations.   Serum Cr. this morning is unchanged 125   CT chest done: Small bilateral pleural effusions, right greater than left associated with bibasilar compressive atelectasis.   Nonspecific small patchy groundglass opacities may also be related to the underlying fluid overload although differential includes infection. PNA ruled out.   Patient was suppose to be discharged today, but because of low serum Na, the discharge postponed.   Nephrology consult noted and appreciated.   Will give sodium tablet x2 today. Recheck BMP in am. Fluid restriction to 1.2/day.   Plan of care discussed in detail with patient and his wife.     The rest as above

## 2018-10-10 NOTE — PROGRESS NOTE ADULT - PROBLEM SELECTOR PLAN 3
Baseline cr 1. 4   Slight increase in serum Cr.   Hold Furosemide for today, will resume PO dose in AM
Nephro consulted. Recommend 1.2L fluid restriction at this time.   -NaCl 1g tabs x2 doses, and repeat BMP tomorrow.   -c/w low dose lasix 20mg  -f/u repeat BMP
patient has mod- severe MR with EF 40- 45 %   Outpatient cardiology Dr Valentine   He refused any surgical in the past, agreed to see surgical cardiologist as outpatient
sodium decreased to 129, likely from lasix  gave a bolus of 500ml NS  -f/u repeat BMP  -decrease lasix to 20mg QD
Baseline cr 1. 4   Serum Cr. back to baseline

## 2018-10-10 NOTE — PROGRESS NOTE ADULT - PROBLEM SELECTOR PROBLEM 6
HLD (hyperlipidemia)
HLD (hyperlipidemia)
HTN (hypertension)
HTN (hypertension)
Prophylactic measure

## 2018-10-10 NOTE — PROGRESS NOTE ADULT - PROBLEM SELECTOR PLAN 6
c/w lipitor 40 mg daily
BP stable  c/w with Metroprolol Home dose
BP stable  c/w with Metroprolol Home dose
IMPROVE VTE Individual Risk Assessment          RISK                                                          Points  [  ] Previous VTE                                                3  [  ] Thrombophilia                                             2  [  ] Lower limb paralysis                                   2        (unable to hold up >15 seconds)    [  ] Current Cancer                                             2         (within 6 months)  [ x ] Immobilization > 24 hrs                              1  [  ] ICU/CCU stay > 24 hours                             1  [x  ] Age > 60                                                         1    IMPROVE VTE Score: VTE score 2   on Eliquis for DVT prophylaxis
c/w lipitor 40 mg daily

## 2018-10-10 NOTE — CONSULT NOTE ADULT - ASSESSMENT
88y Male with Afib on AC, CKD III, CHF, h/o colon and prostate Ca, admitted for SOB on 10/6 2/2 decompensated heart failure, renal consult for hyponatremia.

## 2018-10-10 NOTE — PROGRESS NOTE ADULT - PROBLEM SELECTOR PROBLEM 4
CKD (chronic kidney disease)
HTN (hypertension)
Mitral regurgitation
Mitral regurgitation
Hyponatremia

## 2018-10-10 NOTE — PROGRESS NOTE ADULT - SUBJECTIVE AND OBJECTIVE BOX
Patient denies CP, SOB ,ros    apixaban 2.5 milliGRAM(s) Oral every 12 hours  aspirin  chewable 81 milliGRAM(s) Oral daily  atorvastatin 40 milliGRAM(s) Oral at bedtime  furosemide    Tablet 20 milliGRAM(s) Oral daily  metoprolol succinate ER 50 milliGRAM(s) Oral two times a day  pantoprazole    Tablet 40 milliGRAM(s) Oral before breakfast  sodium chloride 1 Gram(s) Oral two times a day          Hemoglobin: 11.1 g/dL (10-07 @ 06:51)  Hemoglobin: 11.5 g/dL (10-06 @ 18:09)      10-10    125<L>  |  94<L>  |  24<H>  ----------------------------<  88  4.0   |  21<L>  |  1.34<H>    Ca    8.8      10 Oct 2018 08:25      Creatinine Trend: 1.34<--, 1.68<--, 1.34<--, 1.64<--, 1.21<--, 1.48<--    COAGS:           T(C): 36.4 (10-10-18 @ 11:14), Max: 36.5 (10-10-18 @ 07:47)  HR: 89 (10-10-18 @ 11:14) (62 - 111)  BP: 117/73 (10-10-18 @ 11:14) (105/78 - 117/73)  RR: 18 (10-10-18 @ 11:14) (17 - 18)  SpO2: 98% (10-10-18 @ 11:14) (98% - 100%)  Wt(kg): --    I&O's Summary    09 Oct 2018 07:01  -  10 Oct 2018 07:00  --------------------------------------------------------  IN: 1078 mL / OUT: 2025 mL / NET: -947 mL    10 Oct 2018 07:01  -  10 Oct 2018 13:06  --------------------------------------------------------  IN: 0 mL / OUT: 325 mL / NET: -325 mL        Gen: Appears well in NAD  HEENT:  (-)icterus (-)pallor  CV: N S1 S2 1/6 CARL (+)2 Pulses B/l  Resp:  Decreased at the bases  normal effort  GI: (+) BS Soft, NT, ND  Lymph:  (-)Edema, (-)obvious lymphadenopathy  Skin: Warm to touch, Normal turgor  Psych: Appropriate mood and affect      TELEMETRY: 	  afib 110's        ASSESSMENT/PLAN: 	88y  Male PHHx of Afib ( on Eliquis ), CKD, CHF  NICM HFrEF 45%, HLD, Colon Ca S/p surgery and chemotherapy, Prostate Ca s/p radiation therapy ( in remission), presented to ED shortness of breath since 1 day.      - CT scan noted, small B/L pleural effusions   - Renal Eval noted, started on trial of Na  - I have provided the contact info for the structural heart team at [facility]    Nabor Salazar MD, FACC

## 2018-10-10 NOTE — CONSULT NOTE ADULT - PROBLEM SELECTOR RECOMMENDATION 9
Steadily downtrending Na.   Improved volume status, and not overtly overloaded at this time.  Urine studies noted, relatively dilute urine.   Recommend 1.2L fluid restriction at this time. Can give NaCl 1g tabs x2 doses, and repeat BMP tomorrow.   Stable renal function and other electrolytes within normal limits.   Encourage increase PO intake.

## 2018-10-10 NOTE — CONSULT NOTE ADULT - SUBJECTIVE AND OBJECTIVE BOX
HISTORY OF PRESENT ILLNESS: HPI:  Patient is a 88 year old male form home with PHHx of Afib ( on Eliquis ), CKD, CHF  HFrEF 45%, HLD, Colon Ca S/p surgery and chemotherapy, Prostate Ca s/p radiation therapy ( in remission), presented to ED shortness of breath since 1 day. Patient complains SOB, exertional dyspnea, orthopnea, PND, B/l lower swelling, intermittent palpitation and dry cough. Pt reports  of SOB since 1 week, progressively worsening. pt is non complaint with diet, takes extra salt and drinks soda every day. Patient denies any fever, chills, nausea, vomiting, abdominal pain/ distension, dysuria or muscle/ Joint pains. Patient was recently discharged from hospital for new onset Afib on eliquis. pt said he is very complaint with medications. ECHO in sept 2018  shows mod-severe MR with a non-dilated LV and LVEF 45%. Nuclear stress test showed non ischemic cardiomyopathy.   He denies CP    Allergy Penicillin and shell fish (07 Oct 2018 00:03)      PAST MEDICAL & SURGICAL HISTORY:  Prostate CA  GERD (gastroesophageal reflux disease)  Afib  HLD (hyperlipidemia)  HTN (hypertension)  History of appendectomy  H/O colectomy  History of prostate cancer          MEDICATIONS:  MEDICATIONS  (STANDING):  apixaban 2.5 milliGRAM(s) Oral every 12 hours  aspirin  chewable 81 milliGRAM(s) Oral daily  atorvastatin 40 milliGRAM(s) Oral at bedtime  influenza   Vaccine 0.5 milliLiter(s) IntraMuscular once  metoprolol succinate ER 50 milliGRAM(s) Oral daily  pantoprazole    Tablet 40 milliGRAM(s) Oral before breakfast      Allergies    penicillin (Hives)  shellfish (Hives)    Intolerances        FAMILY HISTORY:  Family history of cancer (Sibling)    Non-contributary for premature coronary disease or sudden cardiac death    SOCIAL HISTORY:    [X ] Non-smoker  [ ] Smoker  [ ] Alcohol      REVIEW OF SYSTEMS:  [ ]chest pain  [ X ]shortness of breath  [  ]palpitations  [  ]syncope  [ ]near syncope [ ]upper extremity weakness   [ ] lower extremity weakness  [  ]diplopia  [  ]altered mental status   [  ]fevers  [ ]chills [ ]nausea  [ ]vomitting  [  ]dysphagia    [ ]abdominal pain  [ ]melena  [ ]BRBPR    [  ]epistaxis  [  ]rash    [ ]lower extremity edema        [X] All others negative	  [ ] Unable to obtain      LABS:	 	    CARDIAC MARKERS:  CARDIAC MARKERS ( 07 Oct 2018 06:51 )  <0.015 ng/mL / x     / 89 U/L / x     / 1.5 ng/mL  CARDIAC MARKERS ( 06 Oct 2018 18:09 )  <0.015 ng/mL / x     / x     / x     / x                                  11.1   5.5   )-----------( 138      ( 07 Oct 2018 06:51 )             33.8     Hb Trend:     10-08    129<L>  |  97  |  23<H>  ----------------------------<  86  4.7   |  22  |  1.64<H>    Ca    8.8      08 Oct 2018 07:41  Phos  2.9     10-07  Mg     2.0     10-07    TPro  7.6  /  Alb  3.6  /  TBili  0.8  /  DBili  x   /  AST  49<H>  /  ALT  50  /  AlkPhos  95  10-06    Creatinine Trend: 1.64<--, 1.21<--, 1.48<--, 1.44<--, 1.50<--, 1.62<--        PHYSICAL EXAM:  T(C): 36.3 (10-08-18 @ 08:24), Max: 37 (10-08-18 @ 05:35)  HR: 105 (10-08-18 @ 08:24) (51 - 105)  BP: 128/99 (10-08-18 @ 08:24) (103/67 - 128/99)  RR: 19 (10-08-18 @ 08:24) (18 - 19)  SpO2: 100% (10-08-18 @ 08:24) (97% - 100%)  Wt(kg): --  I&O's Summary    07 Oct 2018 07:01  -  08 Oct 2018 07:00  --------------------------------------------------------  IN: 0 mL / OUT: 1600 mL / NET: -1600 mL        Gen: Appears well in NAD  HEENT:  (-)icterus (-)pallor  CV: N S1 S2 1/6 CARL (+)2 Pulses B/l  Resp:  Clear to ausculatation B/L, normal effort  GI: (+) BS Soft, NT, ND  Lymph:  (-)Edema, (-)obvious lymphadenopathy  Skin: Warm to touch, Normal turgor  Psych: Appropriate mood and affect        TELEMETRY: 	  afib 110-120    ECG:  	Afib 124 BPM, delayed R wave progression    RADIOLOGY:         CXR:  ?RLLL infiltrate     ASSESSMENT/PLAN: 	88y Male PHHx of Afib ( on Eliquis ), CKD, CHF  NICM HFrEF 45%, HLD, Colon Ca S/p surgery and chemotherapy, Prostate Ca s/p radiation therapy ( in remission), presented to ED shortness of breath since 1 day.      - Increase Toprol XL 50 mg PO to BID  - F/U PA LAteral  - D/W pt and wife preffer trial of medical management and will see structural heart eval as an outpatient to weigh options     Nabor Salazar MD, [facility] Consult Note Nephrology - CONSULTATION NOTE    Patient is a 88y Male with Afib on AC, CKD III, CHF, h/o colon and prostate Ca, admitted for SOB on 10/6. Dyspnea ongoing issue for approximately 1 week prior to admission. Pt was treated with IV lasix for presumed decompensated heart failure. Pts dyspnea overall improved, and now he is without complaints. CT chest showing small b/l effusion with atelectasis. Of note, pt recently had ECHO showing EF 45% with mod MR and nonischemic disease on stress test. Renal consult for hyponatremia. Cr ~1.4-1.6 during hospitalization. Serum cr steadily downtrending from normal levels to 125 today. Pt given trial of  mL yesterday, without improvement in serum Na.   Pt currently without complaints. Denies SOB. Appetite fair. No urinary complaints.        PAST MEDICAL & SURGICAL HISTORY:  Prostate CA  GERD (gastroesophageal reflux disease)  Afib  HLD (hyperlipidemia)  HTN (hypertension)  History of appendectomy  H/O colectomy  History of prostate cancer    Allergies  penicillin (Hives)  shellfish (Hives)    Home Medications Reviewed  Hospital Medications:   MEDICATIONS  (STANDING):  apixaban 2.5 milliGRAM(s) Oral every 12 hours  aspirin  chewable 81 milliGRAM(s) Oral daily  atorvastatin 40 milliGRAM(s) Oral at bedtime  furosemide    Tablet 20 milliGRAM(s) Oral daily  metoprolol succinate ER 50 milliGRAM(s) Oral two times a day  pantoprazole    Tablet 40 milliGRAM(s) Oral before breakfast    SOCIAL HISTORY:  Denies ETOh,Smoking,   FAMILY HISTORY:  Family history of cancer (Sibling)    REVIEW OF SYSTEMS:  CONSTITUTIONAL: No weakness, fevers or chills  EYES/ENT: No visual changes;  No vertigo or throat pain   NECK: No pain or stiffness  RESPIRATORY: No cough, wheezing, hemoptysis; No shortness of breath  CARDIOVASCULAR: No chest pain or palpitations.  GASTROINTESTINAL: No abdominal or epigastric pain. No nausea, vomiting, or hematemesis; No diarrhea or constipation. No melena or hematochezia.  GENITOURINARY: No dysuria, frequency, foamy urine, urinary urgency, incontinence or hematuria  NEUROLOGICAL: No numbness or weakness  SKIN: No itching, burning, rashes, or lesions   VASCULAR: No bilateral lower extremity edema.   All other review of systems is negative unless indicated above.    VITALS:  T(F): 97.6 (10-10-18 @ 11:14), Max: 97.7 (10-10-18 @ 07:47)  HR: 89 (10-10-18 @ 11:14)  BP: 117/73 (10-10-18 @ 11:14)  RR: 18 (10-10-18 @ 11:14)  SpO2: 98% (10-10-18 @ 11:14)  Wt(kg): --    10-09 @ 07:01  -  10-10 @ 07:00  --------------------------------------------------------  IN: 1078 mL / OUT: 2025 mL / NET: -947 mL    10-10 @ 07:01  -  10-10 @ 12:25  --------------------------------------------------------  IN: 0 mL / OUT: 325 mL / NET: -325 mL      PHYSICAL EXAM:  Constitutional: NAD  HEENT: anicteric sclera, oropharynx clear, MMM  Neck: No JVD  Respiratory: CTAB, no wheezes, rales or rhonchi  Cardiovascular: S1, S2, RRR  Gastrointestinal: BS+, soft, NT/ND  Extremities: No cyanosis or clubbing. No peripheral edema  Neurological: A/O x 3, no focal deficits  Psychiatric: Normal mood, normal affect  : No CVA tenderness. No flores.   Skin: No rashes    LABS:  10-10    125<L>  |  94<L>  |  24<H>  ----------------------------<  88  4.0   |  21<L>  |  1.34<H>    Ca    8.8      10 Oct 2018 08:25      Creatinine Trend: 1.34 <--, 1.68 <--, 1.34 <--, 1.64 <--, 1.21 <--, 1.48 <--    Urine Studies:  Urinalysis Basic - ( 10 Oct 2018 11:41 )    Color: Yellow / Appearance: Clear / S.005 / pH:   Gluc:  / Ketone: Negative  / Bili: Negative / Urobili: Negative   Blood:  / Protein: Negative / Nitrite: Negative   Leuk Esterase: Negative / RBC:  / WBC    Sq Epi:  / Non Sq Epi:  / Bacteria:       Osmolality, Random Urine: 276 mos/kg (10-10 @ 11:41)  Sodium, Random Urine: 69 mmol/L (10-10 @ 11:41)    RADIOLOGY & ADDITIONAL STUDIES:  < from: CT Chest No Cont (10.10.18 @ 10:19) >  IMPRESSION: Small bilateral pleural effusions, right greater than left   associated with bibasilar compressive atelectasis.     Nonspecific small patchy groundglass opacities may also be related to the   underlying fluid overload although differential includes infection. A   three-month follow-up chest CT is recommended to ensure resolution.    Tiny bilateral pulmonary nodules as above can be monitored on the   follow-up CT.    < end of copied text >    < from: Xray Chest 2 Views PA/Lat (10.08.18 @ 14:25) >  IMPRESSION: Bilateral pleural effusions are suggested and underlying   parenchymal infiltrate in the region of effusion cannot be excluded.     < end of copied text >

## 2018-10-11 VITALS
SYSTOLIC BLOOD PRESSURE: 102 MMHG | HEART RATE: 84 BPM | OXYGEN SATURATION: 100 % | TEMPERATURE: 97 F | DIASTOLIC BLOOD PRESSURE: 66 MMHG | RESPIRATION RATE: 18 BRPM

## 2018-10-11 LAB
ANION GAP SERPL CALC-SCNC: 8 MMOL/L — SIGNIFICANT CHANGE UP (ref 5–17)
BUN SERPL-MCNC: 25 MG/DL — HIGH (ref 7–18)
CALCIUM SERPL-MCNC: 8.9 MG/DL — SIGNIFICANT CHANGE UP (ref 8.4–10.5)
CHLORIDE SERPL-SCNC: 95 MMOL/L — LOW (ref 96–108)
CO2 SERPL-SCNC: 23 MMOL/L — SIGNIFICANT CHANGE UP (ref 22–31)
CREAT SERPL-MCNC: 1.41 MG/DL — HIGH (ref 0.5–1.3)
GLUCOSE SERPL-MCNC: 77 MG/DL — SIGNIFICANT CHANGE UP (ref 70–99)
POTASSIUM SERPL-MCNC: 4.5 MMOL/L — SIGNIFICANT CHANGE UP (ref 3.5–5.3)
POTASSIUM SERPL-SCNC: 4.5 MMOL/L — SIGNIFICANT CHANGE UP (ref 3.5–5.3)
SODIUM SERPL-SCNC: 126 MMOL/L — LOW (ref 135–145)

## 2018-10-11 PROCEDURE — 99239 HOSP IP/OBS DSCHRG MGMT >30: CPT

## 2018-10-11 RX ORDER — METOPROLOL TARTRATE 50 MG
1 TABLET ORAL
Qty: 60 | Refills: 0 | OUTPATIENT
Start: 2018-10-11 | End: 2018-11-09

## 2018-10-11 RX ORDER — ASPIRIN/CALCIUM CARB/MAGNESIUM 324 MG
1 TABLET ORAL
Qty: 30 | Refills: 0 | OUTPATIENT
Start: 2018-10-11 | End: 2018-11-09

## 2018-10-11 RX ORDER — FUROSEMIDE 40 MG
20 TABLET ORAL ONCE
Qty: 0 | Refills: 0 | Status: COMPLETED | OUTPATIENT
Start: 2018-10-11 | End: 2018-10-11

## 2018-10-11 RX ORDER — SODIUM CHLORIDE 9 MG/ML
1 INJECTION INTRAMUSCULAR; INTRAVENOUS; SUBCUTANEOUS
Qty: 14 | Refills: 0 | OUTPATIENT
Start: 2018-10-11 | End: 2018-10-17

## 2018-10-11 RX ORDER — FUROSEMIDE 40 MG
1 TABLET ORAL
Qty: 30 | Refills: 0 | OUTPATIENT
Start: 2018-10-11 | End: 2018-11-09

## 2018-10-11 RX ADMIN — APIXABAN 2.5 MILLIGRAM(S): 2.5 TABLET, FILM COATED ORAL at 05:49

## 2018-10-11 RX ADMIN — PANTOPRAZOLE SODIUM 40 MILLIGRAM(S): 20 TABLET, DELAYED RELEASE ORAL at 05:49

## 2018-10-11 RX ADMIN — Medication 50 MILLIGRAM(S): at 05:49

## 2018-10-11 RX ADMIN — Medication 20 MILLIGRAM(S): at 05:49

## 2018-10-11 RX ADMIN — Medication 20 MILLIGRAM(S): at 12:24

## 2018-10-11 RX ADMIN — Medication 81 MILLIGRAM(S): at 11:43

## 2018-10-11 NOTE — PROGRESS NOTE ADULT - PROBLEM SELECTOR PLAN 1
Acute on chronic CHF likely due to rapid afib vs MR  CT chest with small b/l pleural effusions, small opacitis s/o fluid overload  c/w lasix, decrease dose to 20mg daily due to hyponatremia  -D/c planning in AM
ECHO in Sept showed mod-severe MR with a non-dilated LV and LVEF 40-45%.  stress test NICM   Likely acute on chronic CHF likely due to rapid afib vs MR  c/w lasix, decrease dose to 20mg daily due to hyponatremia  -f/u chest ct as cxr s/o lung infiltrate
Improved volume status, and not overtly overloaded at this time.  Urine studies noted, relatively dilute urine.   Stable renal function and other electrolytes within normal limits.   Encourage increase PO intake, with fluid restriction.  Planning for dispo to home today, can discharge with NaCl tabs 1 g BID, and lasix PO.  He can f/u with me next week 10/17/18 for repeat BMP.  [id number] [facility] [id number] [phone]
Trops x2 negative  s/p iv lasix, held today for increase in sr. creatinine  c/w lasix po from tomorrow  ECHO in Sept showed mod-severe MR with a non-dilated LV and LVEF 40-45%.  stress test NICM   Likely acute on chronic CHF likely due to rapid afib Vs non- compliance  with diet  monitor I/o's   continue with aspirin, statin, BB  Venous doppler negative for DVT  cardiology consult Dr Salazar, increase metoprolol to 50mg bid,   f/u cxr PA and lateral views
secondary to severe mitral regurgitation   SOB improved s/p IV furosemide   Hold off Furosemide for now, will resume tomorrow with PO dose.  Spoke to patient's wife about  further plan of management; they will follow with surgical cardiologist as outpatient for consultation first.
secondary to severe mitral regurgitation   SOB improved   Will decrease Furosemide to 20 mg po daily starting tomorrow, received 40 mg po today

## 2018-10-11 NOTE — DISCHARGE NOTE ADULT - CARE PROVIDERS DIRECT ADDRESSES
,[email or web address]_Unknown,[email or web address]

## 2018-10-11 NOTE — PROGRESS NOTE ADULT - PROBLEM SELECTOR PROBLEM 1
Acute on chronic systolic congestive heart failure
Hyponatremia
Acute on chronic systolic congestive heart failure

## 2018-10-11 NOTE — DISCHARGE NOTE ADULT - HOSPITAL COURSE
Patient is a 88 year old male form home with PHHx of Afib ( on Eliquis ), CKD, CHF  HFrEF 45%, HLD, Colon Ca S/p surgery and chemotherapy, Prostate Ca s/p radiation therapy ( in remission), presented to ED shortness of breath since 1 day. Patient complains SOB, exertional dyspnea, orthopnea, PND, B/l lower swelling, intermittent palpitation and dry cough. Admitted for acute on chronic chf likely 2/2 MR. He wa recently discharged from hospital for new onset Afib on eliquis.  ECHO in sept 2018  shows mod-severe MR with a non-dilated LV and LVEF 45%. Nuclear stress test showed non ischemic cardiomyopathy.  Pt was started on iv lasix with significant symptomatic improvement, as well as decrease in pedal edema. Cardio dr Salazar consulted, increased metoprolol to 50mg bid.  CT chest significant for b/l pleura effusions and multiple groundglass opacities suggestive of fluid overload. Pt also developed hyponatremia to 125 for which he was started on salt tablets. To be sent home on salt tabs for 1 week. pt to see structural heart team at [facility] after d/c. Patient clinically stable for d/c as per attending.

## 2018-10-11 NOTE — PROGRESS NOTE ADULT - SUBJECTIVE AND OBJECTIVE BOX
pt seen and examined, no complaints, ROS - .     apixaban 2.5 milliGRAM(s) Oral every 12 hours  aspirin  chewable 81 milliGRAM(s) Oral daily  atorvastatin 40 milliGRAM(s) Oral at bedtime  furosemide    Tablet 20 milliGRAM(s) Oral daily  metoprolol succinate ER 50 milliGRAM(s) Oral two times a day  pantoprazole    Tablet 40 milliGRAM(s) Oral before breakfast          Hemoglobin: 11.1 g/dL (10-07 @ 06:51)  Hemoglobin: 11.5 g/dL (10-06 @ 18:09)      10-10    125<L>  |  94<L>  |  24<H>  ----------------------------<  88  4.0   |  21<L>  |  1.34<H>    Ca    8.8      10 Oct 2018 08:25      Creatinine Trend: 1.34<--, 1.68<--, 1.34<--, 1.64<--, 1.21<--, 1.48<--    COAGS:           T(C): 36.8 (10-11-18 @ 04:50), Max: 36.8 (10-11-18 @ 04:50)  HR: 95 (10-11-18 @ 04:50) (57 - 97)  BP: 113/71 (10-11-18 @ 04:50) (103/71 - 117/73)  RR: 18 (10-11-18 @ 04:50) (18 - 18)  SpO2: 98% (10-11-18 @ 04:50) (97% - 100%)  Wt(kg): --    I&O's Summary    09 Oct 2018 07:01  -  10 Oct 2018 07:00  --------------------------------------------------------  IN: 1078 mL / OUT: 2025 mL / NET: -947 mL    10 Oct 2018 07:01  -  11 Oct 2018 05:54  --------------------------------------------------------  IN: 620 mL / OUT: 1225 mL / NET: -605 mL          Gen: Appears well in NAD  HEENT:  (-)icterus (-)pallor  CV: N S1 S2 1/6 CARL (+)2 Pulses B/l  Resp:  Decreased at the bases  normal effort  GI: (+) BS Soft, NT, ND  Lymph:  (-)Edema, (-)obvious lymphadenopathy  Skin: Warm to touch, Normal turgor  Psych: Appropriate mood and affect      TELEMETRY: 	  afib 110's    echo: < from: YENNY w/Doppler (09.24.18 @ 13:09) >  ------------------------------------------------------------------------  CONCLUSIONS:  1. Mild posterior mitral annular calcification. Severe,  predominantly centrally-directed mitral regurgitation with  multiple regurgitant jets. PISA could not be performed for  quantification of MR due to multiple regurgitant jets.  2. Mildly calcified trileaflet aortic valve with decreased  opening. Mild aortic stenosis. Trace aortic regurgitation.  Linear strands on the left ventricular side of the aortic  leaflets consistent with Lambl's excresences.  3. Normal aortic root. Simple atheroma noted in aortic  arch/descending aorta.  4. No left atrial or left atrial appendagethrombus.  5. Mild to moderate global left ventricular systolic  dysfunction.  6. No clot seen in right atrium.  7. Normal right ventricular size and function.  8. Normal tricuspid valve. Trace tricuspid regurgitation.  9. Normal pulmonic valve. Trace pulmonic insufficiency is  noted.  10. Atrial septum appears intact on 2D echo and color  Doppler.  11. No pericardial effusion.    *** Compared with echocardiogram of 9/22/2018, a YENNY was  performed and additional information provided.    < end of copied text >      ASSESSMENT/PLAN: 	88y  Male PHHx of Afib ( on Eliquis ), CKD, CHF  NICM HFrEF 45%, HLD, Colon Ca S/p surgery and chemotherapy, Prostate Ca s/p radiation therapy ( in remission), presented to ED shortness of breath.    - Renal follow up   - ASA, statin   - cont Rate control with BB, increase as BP can tolerate   - cont Daily lasix ,  -  GI / DVT prophylaxis,  keep K>4, mag >2.0   - supp o2   - structural heart team at [facility] contact info given.   - NA improving at present   D/W Dr Salazar

## 2018-10-11 NOTE — PROGRESS NOTE ADULT - ATTENDING COMMENTS
Patient seen and examined, agree with above assessment and plan as transcribed above.    - well compensated from CHF prospective  - F/U with Dr. Valentine  - outpt structural heart eval    Nabor Salazar MD, FACC

## 2018-10-11 NOTE — PROGRESS NOTE ADULT - PROVIDER SPECIALTY LIST ADULT
Cardiology
Hospitalist
Internal Medicine
Nephrology
Hospitalist

## 2018-10-11 NOTE — PROGRESS NOTE ADULT - PROBLEM SELECTOR PROBLEM 2
CKD (chronic kidney disease)
Rapid atrial fibrillation

## 2018-10-11 NOTE — DISCHARGE NOTE ADULT - HOME CARE AGENCY
[facility] (formerly [facility]) [phone]

## 2018-10-11 NOTE — PROGRESS NOTE ADULT - PROBLEM SELECTOR PLAN 2
Rate controlled now: Metroprolol XL increased to 50 mg po twice daily   On Eliquis for anticoagulation
Stable renal function.  Near euvolemia, agree with continue small dose PO diuresis.
continue with metoprolol 50 mg bid for rate control   Eliquis for AC
continue with metoprolol 50 mg bid for rate control   Eliquis for AC
toprol increased to 50 mg po bid as per cardio   Eliquis for AC
Rate controlled now: Metroprolol XL increased to 50 mg po twice daily   On Eliquis for anticoagulation

## 2018-10-11 NOTE — DISCHARGE NOTE ADULT - CARE PROVIDER_API CALL
Jose D Hart  Phone: [phone]  Fax: (   )    -    Judy Valentine (MD), Cardiovascular Disease  [address] [place] [address]  Phone: [phone]  Fax: [phone]

## 2018-10-11 NOTE — DISCHARGE NOTE ADULT - PLAN OF CARE
Prevention of fluid overload, control of symptoms You were admitted for difficulty breathing from worsening of your congestive heart failure. You were started on lasix with significant improvement in symptoms. Continue with 20mg lasix daily. Restrict you fluid instruct to 1.2 litre/day. Followup with your primary medical doctor in 1 week. Sodium level between 135 and 145 Your sodium levels dropped since you were started on lasix for your CHF. You were evaluated by nephrologist who advised salt tablets. Continue with salt tablets 1 tab every 12 hour for 1 week. Followup with your primary medical doctor in 1 week to repeat BMP and monitor your sodium levels. Prevention of worsening of heart function You were seen to have severe mitral regurgitation on echocardiogram, which means backflow of blood from lower chamber of your heart to upper chamber on the left side. You were seen by cardiologist Dr Salazar who advised you to follow up as outpatient with Dr Valentine for structural heart specialist. Prevention of complications Continue with metoprolol which was increased to 50mg twice a day for control of your heart rate. Also continue with eliquis. Follow up with your primary doctor in 1 week. Blood pressure <130/80 mmHg Continue with metoprolol 50mg twice a day. Monitor your blood pressure on a regular basis. Followup with your PMD in 1 week. Your sodium levels dropped since you were started on lasix for your CHF. You were evaluated by nephrologist Dr Castano who advised salt tablets. Continue with salt tablets 1 tab every 12 hour for 1 week. Followup with Dr Castano in 1 week to monitor your kidney function.

## 2018-10-11 NOTE — PROGRESS NOTE ADULT - ASSESSMENT
88y Male with Afib on AC, CKD III, CHF, h/o colon and prostate Ca, admitted for SOB on 10/6 2/2 decompensated heart failure, renal consult for hyponatremia.
Patient is a 88 year old male form home with PHHx of Afib ( on Eliquis ), CKD, CHF  HFrEF 45%, HLD, Colon Ca S/p surgery and chemotherapy, Prostate Ca s/p radiation therapy ( in remission), presented to ED shortness of breath since 1 day. Patient complains SOB, exertional dyspnea, orthopnea, PND, B/l lower swelling, intermittent palpitation and dry cough. Pt reports  of SOB since 1 week, progressively worsening. pt is non complaint with diet, takes extra salt and drinks soda every day. Patient denies any fever, chills, nausea, vomiting, abdominal pain/ distension, dysuria or muscle/ Joint pains. Patient was recently discharged from hospital for new onset Afib on eliquis. pt said he is very complaint with medications. ECHO in sept 2018  shows mod-severe MR with a non-dilated LV and LVEF 45%. Nuclear stress test showed non ischemic cardiomyopathy.
Patient is a 88 year old male form home with PHHx of Afib ( on Eliquis ), CKD, CHF  HFrEF 45%, HLD, Colon Ca S/p surgery and chemotherapy, Prostate Ca s/p radiation therapy ( in remission), presented to ED shortness of breath since 1 day. Patient complains SOB, exertional dyspnea, orthopnea, PND, B/l lower swelling, intermittent palpitation and dry cough. Pt reports  of SOB since 1 week, progressively worsening. pt is non complaint with diet, takes extra salt and drinks soda every day. Patient denies any fever, chills, nausea, vomiting, abdominal pain/ distension, dysuria or muscle/ Joint pains. Patient was recently discharged from hospital for new onset Afib on eliquis. pt said he is very complaint with medications. ECHO in sept 2018  shows mod-severe MR with a non-dilated LV and LVEF 45%. Nuclear stress test showed non ischemic cardiomyopathy.     On admission patient was afebrile HD stable, EKG showed rapid afib with no ST elevation, Troponin T1 neg, normal electrolytes, BNP 6027   CXR Shows congestion
Patient is a 88 year old male form home with PHHx of Afib ( on Eliquis ), CKD, CHF  HFrEF 45%, HLD, Colon Ca S/p surgery and chemotherapy, Prostate Ca s/p radiation therapy ( in remission), presented to ED shortness of breath since 1 day. Patient complains SOB, exertional dyspnea, orthopnea, PND, B/l lower swelling, intermittent palpitation and dry cough. Pt reports  of SOB since 1 week, progressively worsening. pt is non complaint with diet, takes extra salt and drinks soda every day. Patient denies any fever, chills, nausea, vomiting, abdominal pain/ distension, dysuria or muscle/ Joint pains. Patient was recently discharged from hospital for new onset Afib on eliquis. pt said he is very complaint with medications. ECHO in sept 2018  shows mod-severe MR with a non-dilated LV and LVEF 45%. Nuclear stress test showed non ischemic cardiomyopathy.

## 2018-10-11 NOTE — DISCHARGE NOTE ADULT - MEDICATION SUMMARY - MEDICATIONS TO TAKE
I will START or STAY ON the medications listed below when I get home from the hospital:    aspirin 81 mg oral tablet, chewable  -- 1 tab(s) by mouth once a day   -- Indication: For Prophylactic measure    apixaban 2.5 mg oral tablet  -- 1 tab(s) by mouth every 12 hours  -- Indication: For Atrial fibrillation    Lipitor 40 mg oral tablet  -- 1 tab(s) by mouth once a day   -- Avoid grapefruit and grapefruit juice while taking this medication.  Do not take this drug if you are pregnant.  It is very important that you take or use this exactly as directed.  Do not skip doses or discontinue unless directed by your doctor.  Obtain medical advice before taking any non-prescription drugs as some may affect the action of this medication.  Take with food or milk.    -- Indication: For HLD (hyperlipidemia)    metoprolol succinate 50 mg oral tablet, extended release  -- 1 tab(s) by mouth 2 times a day  -- Indication: For HTN (hypertension)    furosemide 20 mg oral tablet  -- 1 tab(s) by mouth once a day  -- Indication: For HTN (hypertension)    Sodium Chloride 1 g oral tablet  -- 1 tab(s) by mouth 2 times a day   -- Indication: For Hyponatremia    cholecalciferol 1000 intl units oral tablet  -- 1 tab(s) by mouth once a day   -- Indication: For Prophylactic measure

## 2018-10-11 NOTE — DISCHARGE NOTE ADULT - CARE PLAN
Principal Discharge DX:	Acute on chronic systolic congestive heart failure  Goal:	Prevention of fluid overload, control of symptoms  Assessment and plan of treatment:	You were admitted for difficulty breathing from worsening of your congestive heart failure. You were started on lasix with significant improvement in symptoms. Continue with 20mg lasix daily. Restrict you fluid instruct to 1.2 litre/day. Followup with your primary medical doctor in 1 week.  Secondary Diagnosis:	Hyponatremia  Goal:	Sodium level between 135 and 145  Assessment and plan of treatment:	Your sodium levels dropped since you were started on lasix for your CHF. You were evaluated by nephrologist who advised salt tablets. Continue with salt tablets 1 tab every 12 hour for 1 week. Followup with your primary medical doctor in 1 week to repeat BMP and monitor your sodium levels.  Secondary Diagnosis:	Mitral regurgitation  Goal:	Prevention of worsening of heart function  Assessment and plan of treatment:	You were seen to have severe mitral regurgitation on echocardiogram, which means backflow of blood from lower chamber of your heart to upper chamber on the left side. You were seen by cardiologist Dr Salazar who advised you to follow up as outpatient with Dr Valentine for structural heart specialist.  Secondary Diagnosis:	Afib  Goal:	Prevention of complications  Assessment and plan of treatment:	Continue with metoprolol which was increased to 50mg twice a day for control of your heart rate. Also continue with eliquis. Follow up with your primary doctor in 1 week.  Secondary Diagnosis:	HTN (hypertension)  Goal:	Blood pressure <130/80 mmHg  Assessment and plan of treatment:	Continue with metoprolol 50mg twice a day. Monitor your blood pressure on a regular basis. Followup with your PMD in 1 week. Principal Discharge DX:	Acute on chronic systolic congestive heart failure  Goal:	Prevention of fluid overload, control of symptoms  Assessment and plan of treatment:	You were admitted for difficulty breathing from worsening of your congestive heart failure. You were started on lasix with significant improvement in symptoms. Continue with 20mg lasix daily. Restrict you fluid instruct to 1.2 litre/day. Followup with your primary medical doctor in 1 week.  Secondary Diagnosis:	Hyponatremia  Goal:	Sodium level between 135 and 145  Assessment and plan of treatment:	Your sodium levels dropped since you were started on lasix for your CHF. You were evaluated by nephrologist Dr Castano who advised salt tablets. Continue with salt tablets 1 tab every 12 hour for 1 week. Followup with Dr Castano in 1 week to monitor your kidney function.  Secondary Diagnosis:	Mitral regurgitation  Goal:	Prevention of worsening of heart function  Assessment and plan of treatment:	You were seen to have severe mitral regurgitation on echocardiogram, which means backflow of blood from lower chamber of your heart to upper chamber on the left side. You were seen by cardiologist Dr Salazar who advised you to follow up as outpatient with Dr Valentine for structural heart specialist.  Secondary Diagnosis:	Afib  Goal:	Prevention of complications  Assessment and plan of treatment:	Continue with metoprolol which was increased to 50mg twice a day for control of your heart rate. Also continue with eliquis. Follow up with your primary doctor in 1 week.  Secondary Diagnosis:	HTN (hypertension)  Goal:	Blood pressure <130/80 mmHg  Assessment and plan of treatment:	Continue with metoprolol 50mg twice a day. Monitor your blood pressure on a regular basis. Followup with your PMD in 1 week.

## 2018-10-11 NOTE — PROGRESS NOTE ADULT - SUBJECTIVE AND OBJECTIVE BOX
Pt seen and examined at bedside  No complaints. feeling well.     Allergies:  penicillin (Hives)  shellfish (Hives)    Hospital Medications:   MEDICATIONS  (STANDING):  apixaban 2.5 milliGRAM(s) Oral every 12 hours  aspirin  chewable 81 milliGRAM(s) Oral daily  atorvastatin 40 milliGRAM(s) Oral at bedtime  furosemide    Tablet 20 milliGRAM(s) Oral daily  metoprolol succinate ER 50 milliGRAM(s) Oral two times a day  pantoprazole    Tablet 40 milliGRAM(s) Oral before breakfast      VITALS:  T(F): 97.3 (10-11-18 @ 11:30), Max: 98.2 (10-11-18 @ 04:50)  HR: 84 (10-11-18 @ 11:30)  BP: 102/66 (10-11-18 @ 11:30)  RR: 18 (10-11-18 @ 11:30)  SpO2: 100% (10-11-18 @ 11:30)  Wt(kg): --    10-10 @ 07:01  -  10-11 @ 07:00  --------------------------------------------------------  IN: 620 mL / OUT: 1225 mL / NET: -605 mL    10-11 @ 07:01  -  10-11 @ 12:59  --------------------------------------------------------  IN: 180 mL / OUT: 450 mL / NET: -270 mL    PHYSICAL EXAM:  Constitutional: NAD  HEENT: anicteric sclera, oropharynx clear, MMM  Neck: No JVD  Respiratory: CTAB, no wheezes, rales or rhonchi  Cardiovascular: S1, S2, RRR  Gastrointestinal: BS+, soft, NT/ND  Extremities: No cyanosis or clubbing. No peripheral edema  Neurological: A/O x 2, no focal deficits  Psychiatric: Normal mood, normal affect  : No CVA tenderness. No flores.   Skin: No rashes    LABS:  10-11    126<L>  |  95<L>  |  25<H>  ----------------------------<  77  4.5   |  23  |  1.41<H>    Ca    8.9      11 Oct 2018 06:13      Creatinine Trend: 1.41 <--, 1.34 <--, 1.68 <--, 1.34 <--, 1.64 <--, 1.21 <--, 1.48 <--    Urine Studies:  Urinalysis Basic - ( 10 Oct 2018 11:41 )    Color: Yellow / Appearance: Clear / S.005 / pH:   Gluc:  / Ketone: Negative  / Bili: Negative / Urobili: Negative   Blood:  / Protein: Negative / Nitrite: Negative   Leuk Esterase: Negative / RBC:  / WBC    Sq Epi:  / Non Sq Epi:  / Bacteria:       Osmolality, Random Urine: 276 mos/kg (10-10 @ 11:41)  Sodium, Random Urine: 69 mmol/L (10-10 @ 11:41)    RADIOLOGY & ADDITIONAL STUDIES:

## 2018-10-11 NOTE — DISCHARGE NOTE ADULT - PATIENT PORTAL LINK FT
You can access the [facility] Patient Portal, offered by [facility], by registering with the following website: [email or web address]

## 2018-10-11 NOTE — DISCHARGE NOTE ADULT - NSTOBACCOHOTLINE_GEN_A_CS
[facility] Smokers Quitline ([phone]) [facility] Smokers Quitline ([phone])

## 2018-10-15 PROBLEM — Z86.79 HISTORY OF CONGESTIVE HEART FAILURE: Status: RESOLVED | Noted: 2018-10-15 | Resolved: 2018-10-15

## 2018-10-15 PROBLEM — I50.20 HFREF (HEART FAILURE WITH REDUCED EJECTION FRACTION): Status: RESOLVED | Noted: 2018-10-15 | Resolved: 2018-10-15

## 2018-10-15 PROBLEM — Z85.038 HISTORY OF MALIGNANT NEOPLASM OF COLON: Status: RESOLVED | Noted: 2018-10-15 | Resolved: 2018-10-15

## 2018-10-15 PROBLEM — Z87.891 FORMER SMOKER: Status: ACTIVE | Noted: 2018-10-15

## 2018-10-15 RX ORDER — ATORVASTATIN CALCIUM 40 MG/1
40 TABLET, FILM COATED ORAL
Refills: 0 | Status: ACTIVE | COMMUNITY

## 2018-10-16 ENCOUNTER — NON-APPOINTMENT (OUTPATIENT)
Age: 83
End: 2018-10-16

## 2018-10-16 ENCOUNTER — APPOINTMENT (OUTPATIENT)
Dept: CARDIOTHORACIC SURGERY | Facility: CLINIC | Age: 83
End: 2018-10-16
Payer: MEDICARE

## 2018-10-16 VITALS
SYSTOLIC BLOOD PRESSURE: 116 MMHG | BODY MASS INDEX: 18.55 KG/M2 | DIASTOLIC BLOOD PRESSURE: 76 MMHG | WEIGHT: 140 LBS | RESPIRATION RATE: 14 BRPM | OXYGEN SATURATION: 100 % | HEIGHT: 73 IN | HEART RATE: 85 BPM | TEMPERATURE: 98.4 F

## 2018-10-16 VITALS — SYSTOLIC BLOOD PRESSURE: 117 MMHG | DIASTOLIC BLOOD PRESSURE: 75 MMHG

## 2018-10-16 DIAGNOSIS — Z86.79 PERSONAL HISTORY OF OTHER DISEASES OF THE CIRCULATORY SYSTEM: ICD-10-CM

## 2018-10-16 DIAGNOSIS — Z85.038 PERSONAL HISTORY OF OTHER MALIGNANT NEOPLASM OF LARGE INTESTINE: ICD-10-CM

## 2018-10-16 DIAGNOSIS — Z87.891 PERSONAL HISTORY OF NICOTINE DEPENDENCE: ICD-10-CM

## 2018-10-16 DIAGNOSIS — I50.20 UNSPECIFIED SYSTOLIC (CONGESTIVE) HEART FAILURE: ICD-10-CM

## 2018-10-16 LAB
ALBUMIN SERPL ELPH-MCNC: 3.7 G/DL
ALP BLD-CCNC: 92 U/L
ALT SERPL-CCNC: 28 U/L
ANION GAP SERPL CALC-SCNC: 13 MMOL/L
AST SERPL-CCNC: 33 U/L
BASOPHILS # BLD AUTO: 0.07 K/UL
BASOPHILS NFR BLD AUTO: 1.8 %
BILIRUB SERPL-MCNC: 0.7 MG/DL
BUN SERPL-MCNC: 26 MG/DL
CALCIUM SERPL-MCNC: 9.4 MG/DL
CHLORIDE SERPL-SCNC: 104 MMOL/L
CO2 SERPL-SCNC: 17 MMOL/L
CREAT SERPL-MCNC: 1.46 MG/DL
EOSINOPHIL # BLD AUTO: 0.32 K/UL
EOSINOPHIL NFR BLD AUTO: 8.3 %
GLUCOSE SERPL-MCNC: 109 MG/DL
HCT VFR BLD CALC: 33.7 %
HGB BLD-MCNC: 11.8 G/DL
IMM GRANULOCYTES NFR BLD AUTO: 0.3 %
LYMPHOCYTES # BLD AUTO: 1.09 K/UL
LYMPHOCYTES NFR BLD AUTO: 28.3 %
MAN DIFF?: NORMAL
MCHC RBC-ENTMCNC: 30.1 PG
MCHC RBC-ENTMCNC: 35 GM/DL
MCV RBC AUTO: 86 FL
MONOCYTES # BLD AUTO: 0.31 K/UL
MONOCYTES NFR BLD AUTO: 8.1 %
NEUTROPHILS # BLD AUTO: 2.05 K/UL
NEUTROPHILS NFR BLD AUTO: 53.2 %
NT-PROBNP SERPL-MCNC: 5151 PG/ML
PLATELET # BLD AUTO: 159 K/UL
POTASSIUM SERPL-SCNC: 4.5 MMOL/L
PROT SERPL-MCNC: 7.2 G/DL
RBC # BLD: 3.92 M/UL
RBC # FLD: 16.6 %
SODIUM SERPL-SCNC: 134 MMOL/L
WBC # FLD AUTO: 3.85 K/UL

## 2018-10-16 PROCEDURE — 99205 OFFICE O/P NEW HI 60 MIN: CPT

## 2018-10-16 PROCEDURE — 99202 OFFICE O/P NEW SF 15 MIN: CPT

## 2018-10-16 PROCEDURE — 93000 ELECTROCARDIOGRAM COMPLETE: CPT

## 2018-10-16 PROCEDURE — 94010 BREATHING CAPACITY TEST: CPT

## 2018-10-25 PROCEDURE — 81003 URINALYSIS AUTO W/O SCOPE: CPT

## 2018-10-25 PROCEDURE — 86900 BLOOD TYPING SEROLOGIC ABO: CPT

## 2018-10-25 PROCEDURE — 83935 ASSAY OF URINE OSMOLALITY: CPT

## 2018-10-25 PROCEDURE — 80061 LIPID PANEL: CPT

## 2018-10-25 PROCEDURE — 85610 PROTHROMBIN TIME: CPT

## 2018-10-25 PROCEDURE — 82553 CREATINE MB FRACTION: CPT

## 2018-10-25 PROCEDURE — 93312 ECHO TRANSESOPHAGEAL: CPT

## 2018-10-25 PROCEDURE — 93017 CV STRESS TEST TRACING ONLY: CPT

## 2018-10-25 PROCEDURE — 83036 HEMOGLOBIN GLYCOSYLATED A1C: CPT

## 2018-10-25 PROCEDURE — 71045 X-RAY EXAM CHEST 1 VIEW: CPT

## 2018-10-25 PROCEDURE — 86901 BLOOD TYPING SEROLOGIC RH(D): CPT

## 2018-10-25 PROCEDURE — 80048 BASIC METABOLIC PNL TOTAL CA: CPT

## 2018-10-25 PROCEDURE — 93005 ELECTROCARDIOGRAM TRACING: CPT

## 2018-10-25 PROCEDURE — 84436 ASSAY OF TOTAL THYROXINE: CPT

## 2018-10-25 PROCEDURE — 78452 HT MUSCLE IMAGE SPECT MULT: CPT

## 2018-10-25 PROCEDURE — 93325 DOPPLER ECHO COLOR FLOW MAPG: CPT

## 2018-10-25 PROCEDURE — 82550 ASSAY OF CK (CPK): CPT

## 2018-10-25 PROCEDURE — 85730 THROMBOPLASTIN TIME PARTIAL: CPT

## 2018-10-25 PROCEDURE — 93320 DOPPLER ECHO COMPLETE: CPT

## 2018-10-25 PROCEDURE — 84100 ASSAY OF PHOSPHORUS: CPT

## 2018-10-25 PROCEDURE — 83605 ASSAY OF LACTIC ACID: CPT

## 2018-10-25 PROCEDURE — 70450 CT HEAD/BRAIN W/O DYE: CPT

## 2018-10-25 PROCEDURE — 93306 TTE W/DOPPLER COMPLETE: CPT

## 2018-10-25 PROCEDURE — A9502: CPT

## 2018-10-25 PROCEDURE — 84484 ASSAY OF TROPONIN QUANT: CPT

## 2018-10-25 PROCEDURE — 80053 COMPREHEN METABOLIC PANEL: CPT

## 2018-10-25 PROCEDURE — 99285 EMERGENCY DEPT VISIT HI MDM: CPT | Mod: 25

## 2018-10-25 PROCEDURE — 83880 ASSAY OF NATRIURETIC PEPTIDE: CPT

## 2018-10-25 PROCEDURE — 82306 VITAMIN D 25 HYDROXY: CPT

## 2018-10-25 PROCEDURE — 72170 X-RAY EXAM OF PELVIS: CPT

## 2018-10-25 PROCEDURE — 82607 VITAMIN B-12: CPT

## 2018-10-25 PROCEDURE — 85027 COMPLETE CBC AUTOMATED: CPT

## 2018-10-25 PROCEDURE — 87086 URINE CULTURE/COLONY COUNT: CPT

## 2018-10-25 PROCEDURE — 86850 RBC ANTIBODY SCREEN: CPT

## 2018-10-25 PROCEDURE — 82272 OCCULT BLD FECES 1-3 TESTS: CPT

## 2018-10-25 PROCEDURE — 84443 ASSAY THYROID STIM HORMONE: CPT

## 2018-10-25 PROCEDURE — 83735 ASSAY OF MAGNESIUM: CPT

## 2018-11-11 PROCEDURE — 85730 THROMBOPLASTIN TIME PARTIAL: CPT

## 2018-11-11 PROCEDURE — 84300 ASSAY OF URINE SODIUM: CPT

## 2018-11-11 PROCEDURE — 84100 ASSAY OF PHOSPHORUS: CPT

## 2018-11-11 PROCEDURE — 82553 CREATINE MB FRACTION: CPT

## 2018-11-11 PROCEDURE — 71250 CT THORAX DX C-: CPT

## 2018-11-11 PROCEDURE — 83880 ASSAY OF NATRIURETIC PEPTIDE: CPT

## 2018-11-11 PROCEDURE — 81003 URINALYSIS AUTO W/O SCOPE: CPT

## 2018-11-11 PROCEDURE — 96374 THER/PROPH/DIAG INJ IV PUSH: CPT

## 2018-11-11 PROCEDURE — 84484 ASSAY OF TROPONIN QUANT: CPT

## 2018-11-11 PROCEDURE — 80053 COMPREHEN METABOLIC PANEL: CPT

## 2018-11-11 PROCEDURE — 71045 X-RAY EXAM CHEST 1 VIEW: CPT

## 2018-11-11 PROCEDURE — 83935 ASSAY OF URINE OSMOLALITY: CPT

## 2018-11-11 PROCEDURE — 93970 EXTREMITY STUDY: CPT

## 2018-11-11 PROCEDURE — 71046 X-RAY EXAM CHEST 2 VIEWS: CPT

## 2018-11-11 PROCEDURE — 99285 EMERGENCY DEPT VISIT HI MDM: CPT | Mod: 25

## 2018-11-11 PROCEDURE — 85610 PROTHROMBIN TIME: CPT

## 2018-11-11 PROCEDURE — 84443 ASSAY THYROID STIM HORMONE: CPT

## 2018-11-11 PROCEDURE — 80048 BASIC METABOLIC PNL TOTAL CA: CPT

## 2018-11-11 PROCEDURE — 83735 ASSAY OF MAGNESIUM: CPT

## 2018-11-11 PROCEDURE — 85027 COMPLETE CBC AUTOMATED: CPT

## 2018-11-11 PROCEDURE — 93005 ELECTROCARDIOGRAM TRACING: CPT

## 2018-11-11 PROCEDURE — 82550 ASSAY OF CK (CPK): CPT

## 2018-11-12 ENCOUNTER — OUTPATIENT (OUTPATIENT)
Dept: OUTPATIENT SERVICES | Facility: HOSPITAL | Age: 83
LOS: 1 days | End: 2018-11-12
Payer: MEDICARE

## 2018-11-12 VITALS
HEIGHT: 73 IN | TEMPERATURE: 98 F | DIASTOLIC BLOOD PRESSURE: 88 MMHG | WEIGHT: 134.92 LBS | OXYGEN SATURATION: 97 % | SYSTOLIC BLOOD PRESSURE: 142 MMHG | HEART RATE: 102 BPM | RESPIRATION RATE: 14 BRPM

## 2018-11-12 DIAGNOSIS — Z90.49 ACQUIRED ABSENCE OF OTHER SPECIFIED PARTS OF DIGESTIVE TRACT: Chronic | ICD-10-CM

## 2018-11-12 DIAGNOSIS — Z85.46 PERSONAL HISTORY OF MALIGNANT NEOPLASM OF PROSTATE: Chronic | ICD-10-CM

## 2018-11-12 DIAGNOSIS — I25.10 ATHEROSCLEROTIC HEART DISEASE OF NATIVE CORONARY ARTERY WITHOUT ANGINA PECTORIS: ICD-10-CM

## 2018-11-12 LAB
ALBUMIN SERPL ELPH-MCNC: 4 G/DL — SIGNIFICANT CHANGE UP (ref 3.3–5)
ALP SERPL-CCNC: 82 U/L — SIGNIFICANT CHANGE UP (ref 40–120)
ALT FLD-CCNC: 29 U/L — SIGNIFICANT CHANGE UP (ref 10–45)
ANION GAP SERPL CALC-SCNC: 13 MMOL/L — SIGNIFICANT CHANGE UP (ref 5–17)
AST SERPL-CCNC: 27 U/L — SIGNIFICANT CHANGE UP (ref 10–40)
BILIRUB SERPL-MCNC: 0.7 MG/DL — SIGNIFICANT CHANGE UP (ref 0.2–1.2)
BUN SERPL-MCNC: 26 MG/DL — HIGH (ref 7–23)
CALCIUM SERPL-MCNC: 9.9 MG/DL — SIGNIFICANT CHANGE UP (ref 8.4–10.5)
CHLORIDE SERPL-SCNC: 103 MMOL/L — SIGNIFICANT CHANGE UP (ref 96–108)
CO2 SERPL-SCNC: 24 MMOL/L — SIGNIFICANT CHANGE UP (ref 22–31)
CREAT SERPL-MCNC: 1.57 MG/DL — HIGH (ref 0.5–1.3)
GLUCOSE SERPL-MCNC: 106 MG/DL — HIGH (ref 70–99)
HCT VFR BLD CALC: 39.7 % — SIGNIFICANT CHANGE UP (ref 39–50)
HGB BLD-MCNC: 12.9 G/DL — LOW (ref 13–17)
MCHC RBC-ENTMCNC: 29.8 PG — SIGNIFICANT CHANGE UP (ref 27–34)
MCHC RBC-ENTMCNC: 32.4 GM/DL — SIGNIFICANT CHANGE UP (ref 32–36)
MCV RBC AUTO: 91.9 FL — SIGNIFICANT CHANGE UP (ref 80–100)
PLATELET # BLD AUTO: 111 K/UL — LOW (ref 150–400)
POTASSIUM SERPL-MCNC: 4.5 MMOL/L — SIGNIFICANT CHANGE UP (ref 3.5–5.3)
POTASSIUM SERPL-SCNC: 4.5 MMOL/L — SIGNIFICANT CHANGE UP (ref 3.5–5.3)
PROT SERPL-MCNC: 7.3 G/DL — SIGNIFICANT CHANGE UP (ref 6–8.3)
RBC # BLD: 4.32 M/UL — SIGNIFICANT CHANGE UP (ref 4.2–5.8)
RBC # FLD: 15.7 % — HIGH (ref 10.3–14.5)
SODIUM SERPL-SCNC: 140 MMOL/L — SIGNIFICANT CHANGE UP (ref 135–145)
WBC # BLD: 4.2 K/UL — SIGNIFICANT CHANGE UP (ref 3.8–10.5)
WBC # FLD AUTO: 4.2 K/UL — SIGNIFICANT CHANGE UP (ref 3.8–10.5)

## 2018-11-12 PROCEDURE — 85027 COMPLETE CBC AUTOMATED: CPT

## 2018-11-12 PROCEDURE — 76937 US GUIDE VASCULAR ACCESS: CPT

## 2018-11-12 PROCEDURE — 93010 ELECTROCARDIOGRAM REPORT: CPT

## 2018-11-12 PROCEDURE — C1887: CPT

## 2018-11-12 PROCEDURE — 93456 R HRT CORONARY ARTERY ANGIO: CPT

## 2018-11-12 PROCEDURE — 93005 ELECTROCARDIOGRAM TRACING: CPT

## 2018-11-12 PROCEDURE — 80053 COMPREHEN METABOLIC PANEL: CPT

## 2018-11-12 PROCEDURE — 99203 OFFICE O/P NEW LOW 30 MIN: CPT

## 2018-11-12 PROCEDURE — C1769: CPT

## 2018-11-12 PROCEDURE — C1894: CPT

## 2018-11-12 RX ORDER — FUROSEMIDE 40 MG
1 TABLET ORAL
Qty: 30 | Refills: 1 | OUTPATIENT
Start: 2018-11-12 | End: 2019-01-10

## 2018-11-12 RX ORDER — SODIUM CHLORIDE 9 MG/ML
3 INJECTION INTRAMUSCULAR; INTRAVENOUS; SUBCUTANEOUS EVERY 8 HOURS
Qty: 0 | Refills: 0 | Status: DISCONTINUED | OUTPATIENT
Start: 2018-11-12 | End: 2018-11-27

## 2018-11-12 NOTE — H&P CARDIOLOGY - FAMILY HISTORY
No pertinent family history in first degree relatives Mother  Still living? Unknown  Family history of ovarian cancer, Age at diagnosis: Age Unknown     Sibling  Still living? Unknown  Family history of colon cancer, Age at diagnosis: Age Unknown

## 2018-11-12 NOTE — H&P CARDIOLOGY - HISTORY OF PRESENT ILLNESS
87 yo AA male PMH, HTN, HLD, hyponatremia and Afib (on Eliquis last 11/10) presents today for cardiac cath. Patient recently admitted to [facility] on 10/2018 for CHF, dx with severe MR. Seen and evaluated by Dr. Ortega & Dr. Floyd for possible mitral clip. 89 yo AA male PMH, HTN, HLD, hyponatremia and Afib (on Eliquis last 11/10) presents today for cardiac cath. Patient recently admitted to [facility] on 10/2018 for CHF, dx with severe MR. Seen and evaluated by Dr. Ortega & Dr. Floyd for possible mitral clip. Since discharge patient states SOB has improved with diuresis. Here today for cath to assess for CAD prior to MR clip 89 yo AA male PMH, HTN, HLD, hyponatremia and Afib (on Eliquis last 11/10), colon CA (2001 s/p chemo), prostate Ca (2006 tx with radiation), CKD presents today for cardiac cath. Patient recently admitted to [facility] on 10/2018 for CHF, dx with severe MR. Seen and evaluated by Dr. Ortega & Dr. Floyd for possible mitral clip. Since discharge patient states SOB has improved with diuresis. Here today for cath to assess for CAD prior to MR clip 89 yo AA male PMH, HTN, HLD, hyponatremia and Afib (on Eliquis last 11/10), colon CA (2001 s/p chemo), prostate Ca (2006 tx with radiation), CKD presents today for cardiac cath. Patient recently admitted to [facility] on 10/2018 for CHF, dx with severe MR. Seen and evaluated by Dr. Ortega & Dr. Floyd for possible mitral clip. Since discharge patient states SOB has improved with diuresis. Here today for cath to assess for CAD prior to MR clip    TTE 9/2018 EF 40-45% mod to sev MR with mild AS  baseline Cr 1.14 GFR 44

## 2018-11-12 NOTE — H&P CARDIOLOGY - PMH
Atrial fibrillation    HLD (hyperlipidemia)    HTN (hypertension)    Hyponatremia Atrial fibrillation    HLD (hyperlipidemia)    HTN (hypertension)    Hyponatremia    Mitral regurgitation Atrial fibrillation    CKD (chronic kidney disease)    Colon cancer    HLD (hyperlipidemia)    HTN (hypertension)    Hyponatremia    Mitral regurgitation    Prostate cancer

## 2018-11-13 RX ORDER — FUROSEMIDE 40 MG
1 TABLET ORAL
Qty: 0 | Refills: 0 | COMMUNITY

## 2018-11-14 PROBLEM — N18.9 CHRONIC KIDNEY DISEASE, UNSPECIFIED: Chronic | Status: ACTIVE | Noted: 2018-11-12

## 2018-11-14 PROBLEM — I34.0 NONRHEUMATIC MITRAL (VALVE) INSUFFICIENCY: Chronic | Status: ACTIVE | Noted: 2018-11-12

## 2018-11-14 PROBLEM — I10 ESSENTIAL (PRIMARY) HYPERTENSION: Chronic | Status: ACTIVE | Noted: 2018-11-12

## 2018-11-14 PROBLEM — E87.1 HYPO-OSMOLALITY AND HYPONATREMIA: Chronic | Status: ACTIVE | Noted: 2018-11-12

## 2018-11-14 PROBLEM — E78.5 HYPERLIPIDEMIA, UNSPECIFIED: Chronic | Status: ACTIVE | Noted: 2018-11-12

## 2018-11-19 ENCOUNTER — APPOINTMENT (OUTPATIENT)
Dept: ULTRASOUND IMAGING | Facility: HOSPITAL | Age: 83
End: 2018-11-19

## 2018-11-19 ENCOUNTER — OUTPATIENT (OUTPATIENT)
Dept: OUTPATIENT SERVICES | Facility: HOSPITAL | Age: 83
LOS: 1 days | End: 2018-11-19
Payer: MEDICARE

## 2018-11-19 VITALS
RESPIRATION RATE: 15 BRPM | WEIGHT: 136.03 LBS | DIASTOLIC BLOOD PRESSURE: 82 MMHG | SYSTOLIC BLOOD PRESSURE: 124 MMHG | OXYGEN SATURATION: 100 % | TEMPERATURE: 97 F | HEART RATE: 90 BPM | HEIGHT: 73 IN

## 2018-11-19 DIAGNOSIS — Z01.818 ENCOUNTER FOR OTHER PREPROCEDURAL EXAMINATION: ICD-10-CM

## 2018-11-19 DIAGNOSIS — Z90.49 ACQUIRED ABSENCE OF OTHER SPECIFIED PARTS OF DIGESTIVE TRACT: Chronic | ICD-10-CM

## 2018-11-19 DIAGNOSIS — I34.0 NONRHEUMATIC MITRAL (VALVE) INSUFFICIENCY: ICD-10-CM

## 2018-11-19 DIAGNOSIS — Z98.890 OTHER SPECIFIED POSTPROCEDURAL STATES: Chronic | ICD-10-CM

## 2018-11-19 DIAGNOSIS — Z85.46 PERSONAL HISTORY OF MALIGNANT NEOPLASM OF PROSTATE: Chronic | ICD-10-CM

## 2018-11-19 LAB
ANION GAP SERPL CALC-SCNC: 14 MMOL/L — SIGNIFICANT CHANGE UP (ref 5–17)
BLD GP AB SCN SERPL QL: NEGATIVE — SIGNIFICANT CHANGE UP
BUN SERPL-MCNC: 25 MG/DL — HIGH (ref 7–23)
CALCIUM SERPL-MCNC: 9.6 MG/DL — SIGNIFICANT CHANGE UP (ref 8.4–10.5)
CHLORIDE SERPL-SCNC: 103 MMOL/L — SIGNIFICANT CHANGE UP (ref 96–108)
CO2 SERPL-SCNC: 22 MMOL/L — SIGNIFICANT CHANGE UP (ref 22–31)
CREAT SERPL-MCNC: 1.5 MG/DL — HIGH (ref 0.5–1.3)
GLUCOSE SERPL-MCNC: 109 MG/DL — HIGH (ref 70–99)
HBA1C BLD-MCNC: 5.8 % — HIGH (ref 4–5.6)
HCT VFR BLD CALC: 35.8 % — LOW (ref 39–50)
HGB BLD-MCNC: 11.8 G/DL — LOW (ref 13–17)
MCHC RBC-ENTMCNC: 29.9 PG — SIGNIFICANT CHANGE UP (ref 27–34)
MCHC RBC-ENTMCNC: 33 GM/DL — SIGNIFICANT CHANGE UP (ref 32–36)
MCV RBC AUTO: 90.6 FL — SIGNIFICANT CHANGE UP (ref 80–100)
MRSA PCR RESULT.: SIGNIFICANT CHANGE UP
PLATELET # BLD AUTO: 100 K/UL — LOW (ref 150–400)
POTASSIUM SERPL-MCNC: 4.5 MMOL/L — SIGNIFICANT CHANGE UP (ref 3.5–5.3)
POTASSIUM SERPL-SCNC: 4.5 MMOL/L — SIGNIFICANT CHANGE UP (ref 3.5–5.3)
RBC # BLD: 3.95 M/UL — LOW (ref 4.2–5.8)
RBC # FLD: 17.5 % — HIGH (ref 10.3–14.5)
RH IG SCN BLD-IMP: POSITIVE — SIGNIFICANT CHANGE UP
S AUREUS DNA NOSE QL NAA+PROBE: SIGNIFICANT CHANGE UP
SODIUM SERPL-SCNC: 139 MMOL/L — SIGNIFICANT CHANGE UP (ref 135–145)
WBC # BLD: 3.91 K/UL — SIGNIFICANT CHANGE UP (ref 3.8–10.5)
WBC # FLD AUTO: 3.91 K/UL — SIGNIFICANT CHANGE UP (ref 3.8–10.5)

## 2018-11-19 PROCEDURE — 86900 BLOOD TYPING SEROLOGIC ABO: CPT

## 2018-11-19 PROCEDURE — 80048 BASIC METABOLIC PNL TOTAL CA: CPT

## 2018-11-19 PROCEDURE — 86850 RBC ANTIBODY SCREEN: CPT

## 2018-11-19 PROCEDURE — 87640 STAPH A DNA AMP PROBE: CPT

## 2018-11-19 PROCEDURE — 85027 COMPLETE CBC AUTOMATED: CPT

## 2018-11-19 PROCEDURE — 93880 EXTRACRANIAL BILAT STUDY: CPT

## 2018-11-19 PROCEDURE — 71046 X-RAY EXAM CHEST 2 VIEWS: CPT | Mod: 26

## 2018-11-19 PROCEDURE — G0463: CPT

## 2018-11-19 PROCEDURE — 93880 EXTRACRANIAL BILAT STUDY: CPT | Mod: 26

## 2018-11-19 PROCEDURE — 87641 MR-STAPH DNA AMP PROBE: CPT

## 2018-11-19 PROCEDURE — 71046 X-RAY EXAM CHEST 2 VIEWS: CPT

## 2018-11-19 PROCEDURE — 86901 BLOOD TYPING SEROLOGIC RH(D): CPT

## 2018-11-19 PROCEDURE — 83036 HEMOGLOBIN GLYCOSYLATED A1C: CPT

## 2018-11-19 RX ORDER — METOPROLOL TARTRATE 50 MG
1 TABLET ORAL
Qty: 0 | Refills: 0 | COMMUNITY

## 2018-11-19 RX ORDER — CHOLECALCIFEROL (VITAMIN D3) 125 MCG
1 CAPSULE ORAL
Qty: 0 | Refills: 0 | COMMUNITY

## 2018-11-19 RX ORDER — SODIUM CHLORIDE 9 MG/ML
0 INJECTION INTRAMUSCULAR; INTRAVENOUS; SUBCUTANEOUS
Qty: 0 | Refills: 0 | COMMUNITY

## 2018-11-19 RX ORDER — ATORVASTATIN CALCIUM 80 MG/1
1 TABLET, FILM COATED ORAL
Qty: 0 | Refills: 0 | COMMUNITY

## 2018-11-19 NOTE — H&P PST ADULT - MALLAMPATI CLASS
Class I (easy) - visualization of the soft palate, fauces, uvula, and both anterior and posterior pillars/Visualization of soft palate, fauces and uvula

## 2018-11-19 NOTE — H&P PST ADULT - PSH
H/O colectomy    History of appendectomy    History of prostate cancer    S/P coronary angiogram  11/12/18

## 2018-11-19 NOTE — H&P PST ADULT - PMH
Afib    Atrial fibrillation    CKD (chronic kidney disease)    Colon cancer    GERD (gastroesophageal reflux disease)    HLD (hyperlipidemia)    HLD (hyperlipidemia)    HTN (hypertension)    HTN (hypertension)    Hyponatremia    Mitral regurgitation    Pleural effusion, bilateral  as of latest chest xray  s/p latest hospitalization for CHF  Prostate CA    Prostate cancer

## 2018-11-19 NOTE — H&P PST ADULT - ASSESSMENT
CAPRINI SCORE [CLOT]    AGE RELATED RISK FACTORS                                                       MOBILITY RELATED FACTORS  [ ] Age 41-60 years                                            (1 Point)                  [ ] Bed rest                                                        (1 Point)  [ ] Age: 61-74 years                                           (2 Points)                 [ ] Plaster cast                                                   (2 Points)  [ x] Age= 75 years                                              (3 Points)                 [ ] Bed bound for more than 72 hours                 (2 Points)    DISEASE RELATED RISK FACTORS                                               GENDER SPECIFIC FACTORS  [ x] Edema in the lower extremities                       (1 Point)                  [ ] Pregnancy                                                     (1 Point)  [ ] Varicose veins                                               (1 Point)                  [ ] Post-partum < 6 weeks                                   (1 Point)             [ ] BMI > 25 Kg/m2                                            (1 Point)                  [ ] Hormonal therapy  or oral contraception          (1 Point)                 [ ] Sepsis (in the previous month)                        (1 Point)                  [ ] History of pregnancy complications                 (1 point)  [ ] Pneumonia or serious lung disease                                               [ ] Unexplained or recurrent                     (1 Point)           (in the previous month)                               (1 Point)  [ ] Abnormal pulmonary function test                     (1 Point)                 SURGERY RELATED RISK FACTORS  [ ] Acute myocardial infarction                              (1 Point)                 [ ]  Section                                             (1 Point)  [x ] Congestive heart failure (in the previous month)  (1 Point)               [ ] Minor surgery                                                  (1 Point)   [ ] Inflammatory bowel disease                             (1 Point)                 [ ] Arthroscopic surgery                                        (2 Points)  [ ] Central venous access                                      (2 Points)                [ x] General surgery lasting more than 45 minutes   (2 Points)       [ ] Stroke (in the previous month)                          (5 Points)               [ ] Elective arthroplasty                                         (5 Points)                                                                                                                                               HEMATOLOGY RELATED FACTORS                                                 TRAUMA RELATED RISK FACTORS  [ ] Prior episodes of VTE                                     (3 Points)                 [ ] Fracture of the hip, pelvis, or leg                       (5 Points)  [ ] Positive family history for VTE                         (3 Points)                 [ ] Acute spinal cord injury (in the previous month)  (5 Points)  [ ] Prothrombin 21389 A                                     (3 Points)                 [ ] Paralysis  (less than 1 month)                             (5 Points)  [ ] Factor V Leiden                                             (3 Points)                  [ ] Multiple Trauma within 1 month                        (5 Points)  [ ] Lupus anticoagulants                                     (3 Points)                                                           [ ] Anticardiolipin antibodies                               (3 Points)                                                       [ ] High homocysteine in the blood                      (3 Points)                                             [ ] Other congenital or acquired thrombophilia      (3 Points)                                                [ ] Heparin induced thrombocytopenia                  (3 Points)                                          Total Score [    7      ]

## 2018-11-19 NOTE — H&P PST ADULT - PROBLEM SELECTOR PLAN 1
Mitral clip  last dose of eliquis today AM(informed surgeon off(Ms Jamilah) Mitral clip  last dose of Eliquis today AM(informed surgeon off(Ms Askew)  radha ap # [phone]

## 2018-11-19 NOTE — H&P PST ADULT - MUSCULOSKELETAL
details… detailed exam no joint warmth/in legs/diminished strength/no joint swelling/no joint erythema/no calf tenderness

## 2018-11-19 NOTE — H&P PST ADULT - HISTORY OF PRESENT ILLNESS
87 yo AA male with progressive  shortness of breath & orthopnea "for few months its getting worse "PMH, HTN, HLD, hyponatremia and Afib (on Eliquis last 11/19 am), colon CA (2001 s/p chemo), prostate Ca (2006 tx with radiation), CKD, Patient recently admitted to [facility] on 10/2018 for CHF, cough dx with severe MR. Seen and evaluated by Dr. Ortega & Dr. Floyd, s/p  cardiac cath 11/12/18 reveal severe MVR , scheduled  for Mitral MR clip on 11/21/18.    TTE 9/2018 EF 40-45% mod to sev MR with mild AS  baseline Cr 1.14 GFR 44

## 2018-11-20 ENCOUNTER — MESSAGE (OUTPATIENT)
Age: 83
End: 2018-11-20

## 2018-11-20 PROBLEM — J98.11 ATELECTASIS: Chronic | Status: INACTIVE | Noted: 2018-11-19 | Resolved: 2018-11-19

## 2018-11-20 PROBLEM — J90 PLEURAL EFFUSION, NOT ELSEWHERE CLASSIFIED: Chronic | Status: ACTIVE | Noted: 2018-11-19

## 2018-11-21 ENCOUNTER — INPATIENT (INPATIENT)
Facility: HOSPITAL | Age: 83
LOS: 8 days | Discharge: HOME | DRG: 228 | End: 2018-11-30
Attending: THORACIC SURGERY (CARDIOTHORACIC VASCULAR SURGERY) | Admitting: THORACIC SURGERY (CARDIOTHORACIC VASCULAR SURGERY)
Payer: MEDICARE

## 2018-11-21 ENCOUNTER — APPOINTMENT (OUTPATIENT)
Dept: CARDIOTHORACIC SURGERY | Facility: HOSPITAL | Age: 83
End: 2018-11-21
Payer: MEDICARE

## 2018-11-21 VITALS
OXYGEN SATURATION: 97 % | HEART RATE: 68 BPM | DIASTOLIC BLOOD PRESSURE: 88 MMHG | HEIGHT: 72.5 IN | RESPIRATION RATE: 18 BRPM | TEMPERATURE: 98 F | WEIGHT: 138.45 LBS | SYSTOLIC BLOOD PRESSURE: 126 MMHG

## 2018-11-21 DIAGNOSIS — Z90.49 ACQUIRED ABSENCE OF OTHER SPECIFIED PARTS OF DIGESTIVE TRACT: Chronic | ICD-10-CM

## 2018-11-21 DIAGNOSIS — I34.0 NONRHEUMATIC MITRAL (VALVE) INSUFFICIENCY: ICD-10-CM

## 2018-11-21 DIAGNOSIS — Z98.890 OTHER SPECIFIED POSTPROCEDURAL STATES: Chronic | ICD-10-CM

## 2018-11-21 DIAGNOSIS — Z85.46 PERSONAL HISTORY OF MALIGNANT NEOPLASM OF PROSTATE: Chronic | ICD-10-CM

## 2018-11-21 LAB
ALBUMIN SERPL ELPH-MCNC: 3.4 G/DL — SIGNIFICANT CHANGE UP (ref 3.3–5)
ALP SERPL-CCNC: 65 U/L — SIGNIFICANT CHANGE UP (ref 40–120)
ALT FLD-CCNC: 19 U/L — SIGNIFICANT CHANGE UP (ref 10–45)
ANION GAP SERPL CALC-SCNC: 14 MMOL/L — SIGNIFICANT CHANGE UP (ref 5–17)
APTT BLD: 151.7 SEC — CRITICAL HIGH (ref 27.5–36.3)
APTT BLD: 30.3 SEC — SIGNIFICANT CHANGE UP (ref 27.5–36.3)
AST SERPL-CCNC: 16 U/L — SIGNIFICANT CHANGE UP (ref 10–40)
BASOPHILS # BLD AUTO: 0 K/UL — SIGNIFICANT CHANGE UP (ref 0–0.2)
BASOPHILS NFR BLD AUTO: 0.6 % — SIGNIFICANT CHANGE UP (ref 0–2)
BILIRUB SERPL-MCNC: 0.7 MG/DL — SIGNIFICANT CHANGE UP (ref 0.2–1.2)
BUN SERPL-MCNC: 22 MG/DL — SIGNIFICANT CHANGE UP (ref 7–23)
CALCIUM SERPL-MCNC: 9.4 MG/DL — SIGNIFICANT CHANGE UP (ref 8.4–10.5)
CHLORIDE SERPL-SCNC: 105 MMOL/L — SIGNIFICANT CHANGE UP (ref 96–108)
CK MB CFR SERPL CALC: 1.4 NG/ML — SIGNIFICANT CHANGE UP (ref 0–6.7)
CK SERPL-CCNC: 57 U/L — SIGNIFICANT CHANGE UP (ref 30–200)
CO2 SERPL-SCNC: 21 MMOL/L — LOW (ref 22–31)
CREAT SERPL-MCNC: 1.39 MG/DL — HIGH (ref 0.5–1.3)
EOSINOPHIL # BLD AUTO: 0.2 K/UL — SIGNIFICANT CHANGE UP (ref 0–0.5)
EOSINOPHIL NFR BLD AUTO: 5.5 % — SIGNIFICANT CHANGE UP (ref 0–6)
GAS PNL BLDA: SIGNIFICANT CHANGE UP
GLUCOSE BLDC GLUCOMTR-MCNC: 66 MG/DL — LOW (ref 70–99)
GLUCOSE SERPL-MCNC: 99 MG/DL — SIGNIFICANT CHANGE UP (ref 70–99)
HCT VFR BLD CALC: 33.7 % — LOW (ref 39–50)
HGB BLD-MCNC: 11.5 G/DL — LOW (ref 13–17)
INR BLD: 1.42 RATIO — HIGH (ref 0.88–1.16)
LYMPHOCYTES # BLD AUTO: 1.5 K/UL — SIGNIFICANT CHANGE UP (ref 1–3.3)
LYMPHOCYTES # BLD AUTO: 35.4 % — SIGNIFICANT CHANGE UP (ref 13–44)
MCHC RBC-ENTMCNC: 31.4 PG — SIGNIFICANT CHANGE UP (ref 27–34)
MCHC RBC-ENTMCNC: 34.1 GM/DL — SIGNIFICANT CHANGE UP (ref 32–36)
MCV RBC AUTO: 91.9 FL — SIGNIFICANT CHANGE UP (ref 80–100)
MONOCYTES # BLD AUTO: 0.2 K/UL — SIGNIFICANT CHANGE UP (ref 0–0.9)
MONOCYTES NFR BLD AUTO: 5.7 % — SIGNIFICANT CHANGE UP (ref 2–14)
NEUTROPHILS # BLD AUTO: 2.2 K/UL — SIGNIFICANT CHANGE UP (ref 1.8–7.4)
NEUTROPHILS NFR BLD AUTO: 52.8 % — SIGNIFICANT CHANGE UP (ref 43–77)
PLATELET # BLD AUTO: 90 K/UL — LOW (ref 150–400)
POTASSIUM SERPL-MCNC: 4.3 MMOL/L — SIGNIFICANT CHANGE UP (ref 3.5–5.3)
POTASSIUM SERPL-SCNC: 4.3 MMOL/L — SIGNIFICANT CHANGE UP (ref 3.5–5.3)
PROT SERPL-MCNC: 6.2 G/DL — SIGNIFICANT CHANGE UP (ref 6–8.3)
PROTHROM AB SERPL-ACNC: 16.4 SEC — HIGH (ref 10–12.9)
RBC # BLD: 3.67 M/UL — LOW (ref 4.2–5.8)
RBC # FLD: 15.5 % — HIGH (ref 10.3–14.5)
RH IG SCN BLD-IMP: POSITIVE — SIGNIFICANT CHANGE UP
SODIUM SERPL-SCNC: 140 MMOL/L — SIGNIFICANT CHANGE UP (ref 135–145)
TROPONIN T, HIGH SENSITIVITY RESULT: 23 NG/L — SIGNIFICANT CHANGE UP (ref 0–51)
WBC # BLD: 4.2 K/UL — SIGNIFICANT CHANGE UP (ref 3.8–10.5)
WBC # FLD AUTO: 4.2 K/UL — SIGNIFICANT CHANGE UP (ref 3.8–10.5)

## 2018-11-21 PROCEDURE — 99223 1ST HOSP IP/OBS HIGH 75: CPT

## 2018-11-21 PROCEDURE — 93355 ECHO TRANSESOPHAGEAL (TEE): CPT

## 2018-11-21 PROCEDURE — 33418 REPAIR TCAT MITRAL VALVE: CPT | Mod: 53

## 2018-11-21 PROCEDURE — 93970 EXTREMITY STUDY: CPT | Mod: 26

## 2018-11-21 RX ORDER — SODIUM CHLORIDE 9 MG/ML
3 INJECTION INTRAMUSCULAR; INTRAVENOUS; SUBCUTANEOUS EVERY 8 HOURS
Qty: 0 | Refills: 0 | Status: DISCONTINUED | OUTPATIENT
Start: 2018-11-21 | End: 2018-11-21

## 2018-11-21 RX ORDER — ONDANSETRON 8 MG/1
4 TABLET, FILM COATED ORAL ONCE
Qty: 0 | Refills: 0 | Status: DISCONTINUED | OUTPATIENT
Start: 2018-11-21 | End: 2018-11-21

## 2018-11-21 RX ORDER — ASPIRIN/CALCIUM CARB/MAGNESIUM 324 MG
81 TABLET ORAL DAILY
Qty: 0 | Refills: 0 | Status: DISCONTINUED | OUTPATIENT
Start: 2018-11-21 | End: 2018-11-30

## 2018-11-21 RX ORDER — PANTOPRAZOLE SODIUM 20 MG/1
40 TABLET, DELAYED RELEASE ORAL DAILY
Qty: 0 | Refills: 0 | Status: DISCONTINUED | OUTPATIENT
Start: 2018-11-21 | End: 2018-11-30

## 2018-11-21 RX ORDER — FUROSEMIDE 40 MG
20 TABLET ORAL
Qty: 0 | Refills: 0 | Status: DISCONTINUED | OUTPATIENT
Start: 2018-11-21 | End: 2018-11-23

## 2018-11-21 RX ORDER — METOPROLOL TARTRATE 50 MG
50 TABLET ORAL DAILY
Qty: 0 | Refills: 0 | Status: DISCONTINUED | OUTPATIENT
Start: 2018-11-21 | End: 2018-11-22

## 2018-11-21 RX ORDER — HYDROMORPHONE HYDROCHLORIDE 2 MG/ML
0.5 INJECTION INTRAMUSCULAR; INTRAVENOUS; SUBCUTANEOUS
Qty: 0 | Refills: 0 | Status: DISCONTINUED | OUTPATIENT
Start: 2018-11-21 | End: 2018-11-21

## 2018-11-21 RX ORDER — ATORVASTATIN CALCIUM 80 MG/1
40 TABLET, FILM COATED ORAL AT BEDTIME
Qty: 0 | Refills: 0 | Status: DISCONTINUED | OUTPATIENT
Start: 2018-11-21 | End: 2018-11-30

## 2018-11-21 RX ORDER — LIDOCAINE HCL 20 MG/ML
0.2 VIAL (ML) INJECTION ONCE
Qty: 0 | Refills: 0 | Status: DISCONTINUED | OUTPATIENT
Start: 2018-11-21 | End: 2018-11-21

## 2018-11-21 RX ORDER — DOCUSATE SODIUM 100 MG
100 CAPSULE ORAL THREE TIMES A DAY
Qty: 0 | Refills: 0 | Status: DISCONTINUED | OUTPATIENT
Start: 2018-11-21 | End: 2018-11-30

## 2018-11-21 RX ORDER — SODIUM CHLORIDE 9 MG/ML
1000 INJECTION INTRAMUSCULAR; INTRAVENOUS; SUBCUTANEOUS
Qty: 0 | Refills: 0 | Status: DISCONTINUED | OUTPATIENT
Start: 2018-11-21 | End: 2018-11-30

## 2018-11-21 RX ORDER — VANCOMYCIN HCL 1 G
1000 VIAL (EA) INTRAVENOUS ONCE
Qty: 0 | Refills: 0 | Status: COMPLETED | OUTPATIENT
Start: 2018-11-21 | End: 2018-11-21

## 2018-11-21 RX ORDER — HEPARIN SODIUM 5000 [USP'U]/ML
600 INJECTION INTRAVENOUS; SUBCUTANEOUS
Qty: 25000 | Refills: 0 | Status: DISCONTINUED | OUTPATIENT
Start: 2018-11-21 | End: 2018-11-22

## 2018-11-21 RX ORDER — METOPROLOL TARTRATE 50 MG
50 TABLET ORAL
Qty: 0 | Refills: 0 | Status: DISCONTINUED | OUTPATIENT
Start: 2018-11-21 | End: 2018-11-21

## 2018-11-21 RX ADMIN — Medication 50 MILLIGRAM(S): at 19:20

## 2018-11-21 RX ADMIN — HEPARIN SODIUM 6 UNIT(S)/HR: 5000 INJECTION INTRAVENOUS; SUBCUTANEOUS at 18:57

## 2018-11-21 RX ADMIN — ATORVASTATIN CALCIUM 40 MILLIGRAM(S): 80 TABLET, FILM COATED ORAL at 21:29

## 2018-11-21 RX ADMIN — Medication 100 MILLIGRAM(S): at 21:29

## 2018-11-21 NOTE — PROVIDER CONTACT NOTE (OTHER) - SITUATION
Unable to assess left radial pulse via doppler, and B/L posterior tibialis pulses via doppler. Pt denies numbness or tingling in all extremities. All 4 extremities cool to touch with capillary refil

## 2018-11-21 NOTE — PATIENT PROFILE ADULT - FALL HARM RISK
surgery/coagulation(Bleeding disorder R/T clinical cond/anti-coags)/bones(Osteoporosis,prev fx,steroid use,metastatic bone ca)/age(85 years old or older)

## 2018-11-21 NOTE — PROVIDER CONTACT NOTE (OTHER) - BACKGROUND
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 B/L sheaths in place at this time.

## 2018-11-21 NOTE — CONSULT NOTE ADULT - SUBJECTIVE AND OBJECTIVE BOX
Vascular Cardiology  Consult Nnote     SPECTRA [id number]              EMAIL [email or web address]   OFFICE [phone]    CC: RA thrombus     HPI:    88M with history of HFrEF, severe MR, afib on eliquis, CKD, prostate CA, colon CA who originally presented with SOB and orthopnea. PAtient was found to have severe MR and EF 40-45% on TTE. Cath was done on. Plan was for mitraclip today and during intraop YENNY, a large RA thrombus was found. It was aspirated and MitraClip was aborted. No evidence of RA clot was found after aspiration on YENNY. Patient denies CP or SOB currently. Denies missing eliquis doses however it was held on 11/19 prior to procedure. Denies LE edema or pain. Denies history of clots.         Allergies    penicillin (Hives)  shellfish (Hives)    Intolerances    	    MEDICATIONS:        HYDROmorphone  Injectable 0.5 milliGRAM(s) IV Push every 10 minutes PRN  ondansetron Injectable 4 milliGRAM(s) IV Push once PRN    docusate sodium 100 milliGRAM(s) Oral three times a day  pantoprazole  Injectable 40 milliGRAM(s) IV Push daily      sodium chloride 0.9%. 1000 milliLiter(s) IV Continuous <Continuous>      PAST MEDICAL & SURGICAL HISTORY:  Pleural effusion, bilateral: as of latest chest xray  s/p latest hospitalization for CHF  CKD (chronic kidney disease)  Colon cancer  Prostate cancer  Mitral regurgitation  Hyponatremia  HLD (hyperlipidemia)  HTN (hypertension)  Atrial fibrillation  Prostate CA  GERD (gastroesophageal reflux disease)  Afib  HLD (hyperlipidemia)  HTN (hypertension)  S/P coronary angiogram: 11/12/18  History of appendectomy  H/O colectomy  History of prostate cancer      FAMILY HISTORY:  Family history of colon cancer (Sibling)  Family history of ovarian cancer (Mother)  Family history of cancer (Sibling)      SOCIAL HISTORY:  unchanged    REVIEW OF SYSTEMS:  CONSTITUTIONAL: No fever, weight loss, or fatigue  EYES: No eye pain, visual disturbances, or discharge  ENMT:  No difficulty hearing, tinnitus, vertigo; No sinus or throat pain  NECK: No pain or stiffness  RESPIRATORY: No cough, wheezing, chills or hemoptysis; No Shortness of Breath    CARDIOVASCULAR: No chest pain, palpitations, passing out, dizziness, or leg swelling    GASTROINTESTINAL: No abdominal or epigastric pain. No nausea, vomiting, or hematemesis; No diarrhea or constipation. No melena or hematochezia.  GENITOURINARY: No dysuria, frequency, hematuria, or incontinence  NEUROLOGICAL: No headaches, memory loss, loss of strength, numbness, or tremors  SKIN: No itching, burning, rashes, or lesions   LYMPH Nodes: No enlarged glands  ENDOCRINE: No heat or cold intolerance; No hair loss  MUSCULOSKELETAL: No joint pain or swelling; No muscle, back, or extremity pain  PSYCHIATRIC: No depression, anxiety, mood swings, or difficulty sleeping  HEME/LYMPH: No easy bruising, or bleeding gums  ALLERY AND IMMUNOLOGIC: No hives or eczema	    [ x] All others negative	  [ ] Unable to obtain    PHYSICAL EXAM:  T(C): 36.1 (11-21-18 @ 14:15), Max: 36.6 (11-21-18 @ 09:19)  HR: 108 (11-21-18 @ 15:45) (68 - 108)  BP: 131/81 (11-21-18 @ 15:00) (126/88 - 131/81)  RR: 16 (11-21-18 @ 15:45) (16 - 18)  SpO2: 100% (11-21-18 @ 15:45) (96% - 100%)  Wt(kg): --  I&O's Summary      Appearance: Normal, NC in place	  HEENT:   Normal oral mucosa, PERRL, EOMI	  Carotid:   Right:  No Bruit      Left:  No bruit  Cardiovascular: Irregularly irregular, Normal S1 S2, No JVD, II/VI holosytolic murmur at apex  Respiratory: Lungs clear to auscultation	  Psychiatry: A & O x 3, Mood & affect appropriate  Gastrointestinal:  Soft, Non-tender, + BS	  Skin: No rashes, No ecchymoses, No cyanosis	  Neurologic: Non-focal  Extremities: Normal range of motion.    Vascular Pulse Exam:  Right Femoral:  Palpable       Left Femoral:  Palpable    Right DP:  Palpable                 Left DP:  Palpable  Right PT:  Palpable                 Left DP:  Palpable    Foot Exam:  Warm, no ulceration.        LABS:	 	    CBC Full  -  ( 21 Nov 2018 15:01 )  WBC Count : 4.2 K/uL  Hemoglobin : 11.5 g/dL  Hematocrit : 33.7 %  Platelet Count - Automated : 90 K/uL  Mean Cell Volume : 91.9 fl  Mean Cell Hemoglobin : 31.4 pg  Mean Cell Hemoglobin Concentration : 34.1 gm/dL  Auto Neutrophil # : 2.2 K/uL  Auto Lymphocyte # : 1.5 K/uL  Auto Monocyte # : 0.2 K/uL  Auto Eosinophil # : 0.2 K/uL  Auto Basophil # : 0.0 K/uL  Auto Neutrophil % : 52.8 %  Auto Lymphocyte % : 35.4 %  Auto Monocyte % : 5.7 %  Auto Eosinophil % : 5.5 %  Auto Basophil % : 0.6 %    11-21    140  |  105  |  22  ----------------------------<  99  4.3   |  21<L>  |  1.39<H>    Ca    9.4      21 Nov 2018 15:01    TPro  6.2  /  Alb  3.4  /  TBili  0.7  /  DBili  x   /  AST  16  /  ALT  19  /  AlkPhos  65  11-21          Assessment:  1. RA thrombus- found on intraop YENNY, unclear etiology, patient was on eliquis 2.5mg BID for atrial fibrillation and has not missed any doses except it was held on 11/19 prior to MitraClip procedure   2. Severe mitral regurgitation- MitraClip procedure aborted today  3. Atrial fibrillation- in afib currently, on eliquis at home  4. CKD  5. Colon CA (2001 s/p chemo)  6. Prostate Ca (2006 tx with radiation)      Plan:  1. Please start heparin gtt without bolus 2 hours after venous sheaths pulled  2. Likely transition to therapeutic lovenox (renally dosed) tomorrow  3. Check LE venous dopplers      Thank you    Rory Burrell MD  Cardiology Fellow PGY-5  [phone] after 5pm, otherwise see below      Vascular Cardiology Service    Please call with any questions:   SPECTRA - [phone]  Office [phone]  email:  [email or web address]  Consult Nnote     SPECTRA [id number]              EMAIL [email or web address]   OFFICE [phone]    CC: RA thrombus     HPI:    88M with history of HFrEF, severe MR, afib on eliquis, CKD, prostate CA, colon CA who originally presented with SOB and orthopnea. PAtient was found to have severe MR and EF 40-45% on TTE. Cath was done on. Plan was for mitraclip today and during intraop YENNY, a large RA thrombus was found. It was aspirated and MitraClip was aborted. No evidence of RA clot was found after aspiration on YENNY. Patient denies CP or SOB currently. Denies missing eliquis doses however it was held on 11/19 prior to procedure. Denies LE edema or pain. Denies history of clots.         Allergies    penicillin (Hives)  shellfish (Hives)    Intolerances    	    MEDICATIONS:        HYDROmorphone  Injectable 0.5 milliGRAM(s) IV Push every 10 minutes PRN  ondansetron Injectable 4 milliGRAM(s) IV Push once PRN    docusate sodium 100 milliGRAM(s) Oral three times a day  pantoprazole  Injectable 40 milliGRAM(s) IV Push daily      sodium chloride 0.9%. 1000 milliLiter(s) IV Continuous <Continuous>      PAST MEDICAL & SURGICAL HISTORY:  Pleural effusion, bilateral: as of latest chest xray  s/p latest hospitalization for CHF  CKD (chronic kidney disease)  Colon cancer  Prostate cancer  Mitral regurgitation  Hyponatremia  HLD (hyperlipidemia)  HTN (hypertension)  Atrial fibrillation  Prostate CA  GERD (gastroesophageal reflux disease)  Afib  HLD (hyperlipidemia)  HTN (hypertension)  S/P coronary angiogram: 11/12/18  History of appendectomy  H/O colectomy  History of prostate cancer      FAMILY HISTORY:  Family history of colon cancer (Sibling)  Family history of ovarian cancer (Mother)  Family history of cancer (Sibling)      SOCIAL HISTORY:  unchanged    REVIEW OF SYSTEMS:  CONSTITUTIONAL: No fever, weight loss, or fatigue  EYES: No eye pain, visual disturbances, or discharge  ENMT:  No difficulty hearing, tinnitus, vertigo; No sinus or throat pain  NECK: No pain or stiffness  RESPIRATORY: No cough, wheezing, chills or hemoptysis; No Shortness of Breath    CARDIOVASCULAR: No chest pain, palpitations, passing out, dizziness, or leg swelling    GASTROINTESTINAL: No abdominal or epigastric pain. No nausea, vomiting, or hematemesis; No diarrhea or constipation. No melena or hematochezia.  GENITOURINARY: No dysuria, frequency, hematuria, or incontinence  NEUROLOGICAL: No headaches, memory loss, loss of strength, numbness, or tremors  SKIN: No itching, burning, rashes, or lesions   LYMPH Nodes: No enlarged glands  ENDOCRINE: No heat or cold intolerance; No hair loss  MUSCULOSKELETAL: No joint pain or swelling; No muscle, back, or extremity pain  PSYCHIATRIC: No depression, anxiety, mood swings, or difficulty sleeping  HEME/LYMPH: No easy bruising, or bleeding gums  ALLERY AND IMMUNOLOGIC: No hives or eczema	    [ x] All others negative	  [ ] Unable to obtain    PHYSICAL EXAM:  T(C): 36.1 (11-21-18 @ 14:15), Max: 36.6 (11-21-18 @ 09:19)  HR: 108 (11-21-18 @ 15:45) (68 - 108)  BP: 131/81 (11-21-18 @ 15:00) (126/88 - 131/81)  RR: 16 (11-21-18 @ 15:45) (16 - 18)  SpO2: 100% (11-21-18 @ 15:45) (96% - 100%)  Wt(kg): --  I&O's Summary      Appearance: Normal, NC in place	  HEENT:   Normal oral mucosa, PERRL, EOMI	  Carotid:   Right:  No Bruit      Left:  No bruit  Cardiovascular: Irregularly irregular, Normal S1 S2, No JVD, II/VI holosytolic murmur at apex  Respiratory: Lungs clear to auscultation	  Psychiatry: A & O x 3, Mood & affect appropriate  Gastrointestinal:  Soft, Non-tender, + BS	  Skin: No rashes, No ecchymoses, No cyanosis	  Neurologic: Non-focal  Extremities: Normal range of motion.    Vascular Pulse Exam:  Right Femoral:  Palpable       Left Femoral:  Palpable    Right DP:  Palpable                 Left DP:  Palpable  Right PT:  Palpable                 Left DP:  Palpable    Foot Exam:  Warm, no ulceration.        LABS:	 	    CBC Full  -  ( 21 Nov 2018 15:01 )  WBC Count : 4.2 K/uL  Hemoglobin : 11.5 g/dL  Hematocrit : 33.7 %  Platelet Count - Automated : 90 K/uL  Mean Cell Volume : 91.9 fl  Mean Cell Hemoglobin : 31.4 pg  Mean Cell Hemoglobin Concentration : 34.1 gm/dL  Auto Neutrophil # : 2.2 K/uL  Auto Lymphocyte # : 1.5 K/uL  Auto Monocyte # : 0.2 K/uL  Auto Eosinophil # : 0.2 K/uL  Auto Basophil # : 0.0 K/uL  Auto Neutrophil % : 52.8 %  Auto Lymphocyte % : 35.4 %  Auto Monocyte % : 5.7 %  Auto Eosinophil % : 5.5 %  Auto Basophil % : 0.6 %    11-21    140  |  105  |  22  ----------------------------<  99  4.3   |  21<L>  |  1.39<H>    Ca    9.4      21 Nov 2018 15:01    TPro  6.2  /  Alb  3.4  /  TBili  0.7  /  DBili  x   /  AST  16  /  ALT  19  /  AlkPhos  65  11-21          Assessment:  1. RA thrombus- found on intraop YENNY, unclear etiology, patient was on eliquis 2.5mg BID for atrial fibrillation and has not missed any doses except it was held on 11/19 prior to MitraClip procedure   2. Severe mitral regurgitation- MitraClip procedure aborted today  3. Atrial fibrillation- in afib currently, on eliquis at home  4. CKD  5. Colon CA (2001 s/p chemo)  6. Prostate Ca (2006 tx with radiation)      Plan:  1. Please start heparin gtt without bolus 2 hours after venous sheaths pulled  2. Likely transition to therapeutic lovenox (renally dosed) tomorrow  3. Check LE venous dopplers  4.  2d echo in am      Thank you    Rory Burrell MD  Cardiology Fellow PGY-5  [phone] after 5pm, otherwise see below      Vascular Cardiology Service    Please call with any questions:   SPECTRA - [phone]  Office [phone]  email:  [email or web address]

## 2018-11-21 NOTE — PROVIDER CONTACT NOTE (OTHER) - ASSESSMENT
Pt denies numbness or tingling in all extremities. All 4 extremities cool to touch with capillary refill < 2 seconds. Pt denies any pain or discomfort.

## 2018-11-22 DIAGNOSIS — I48.91 UNSPECIFIED ATRIAL FIBRILLATION: ICD-10-CM

## 2018-11-22 DIAGNOSIS — I82.90 ACUTE EMBOLISM AND THROMBOSIS OF UNSPECIFIED VEIN: ICD-10-CM

## 2018-11-22 DIAGNOSIS — I34.0 NONRHEUMATIC MITRAL (VALVE) INSUFFICIENCY: ICD-10-CM

## 2018-11-22 DIAGNOSIS — Z29.9 ENCOUNTER FOR PROPHYLACTIC MEASURES, UNSPECIFIED: ICD-10-CM

## 2018-11-22 DIAGNOSIS — N18.9 CHRONIC KIDNEY DISEASE, UNSPECIFIED: ICD-10-CM

## 2018-11-22 DIAGNOSIS — D69.6 THROMBOCYTOPENIA, UNSPECIFIED: ICD-10-CM

## 2018-11-22 LAB
ANION GAP SERPL CALC-SCNC: 14 MMOL/L — SIGNIFICANT CHANGE UP (ref 5–17)
APTT BLD: 40.5 SEC — HIGH (ref 27.5–36.3)
APTT BLD: 47 SEC — HIGH (ref 27.5–36.3)
APTT BLD: 85.5 SEC — HIGH (ref 27.5–36.3)
BUN SERPL-MCNC: 24 MG/DL — HIGH (ref 7–23)
CALCIUM SERPL-MCNC: 9.2 MG/DL — SIGNIFICANT CHANGE UP (ref 8.4–10.5)
CHLORIDE SERPL-SCNC: 107 MMOL/L — SIGNIFICANT CHANGE UP (ref 96–108)
CO2 SERPL-SCNC: 20 MMOL/L — LOW (ref 22–31)
CREAT SERPL-MCNC: 1.5 MG/DL — HIGH (ref 0.5–1.3)
GLUCOSE SERPL-MCNC: 90 MG/DL — SIGNIFICANT CHANGE UP (ref 70–99)
HCT VFR BLD CALC: 35.5 % — LOW (ref 39–50)
HCT VFR BLD CALC: 37.7 % — LOW (ref 39–50)
HGB BLD-MCNC: 12 G/DL — LOW (ref 13–17)
HGB BLD-MCNC: 12.7 G/DL — LOW (ref 13–17)
INR BLD: 1.29 RATIO — HIGH (ref 0.88–1.16)
MCHC RBC-ENTMCNC: 31.1 PG — SIGNIFICANT CHANGE UP (ref 27–34)
MCHC RBC-ENTMCNC: 31.2 PG — SIGNIFICANT CHANGE UP (ref 27–34)
MCHC RBC-ENTMCNC: 33.6 GM/DL — SIGNIFICANT CHANGE UP (ref 32–36)
MCHC RBC-ENTMCNC: 33.7 GM/DL — SIGNIFICANT CHANGE UP (ref 32–36)
MCV RBC AUTO: 92.6 FL — SIGNIFICANT CHANGE UP (ref 80–100)
MCV RBC AUTO: 92.7 FL — SIGNIFICANT CHANGE UP (ref 80–100)
PLATELET # BLD AUTO: 89 K/UL — LOW (ref 150–400)
PLATELET # BLD AUTO: 96 K/UL — LOW (ref 150–400)
POTASSIUM SERPL-MCNC: 4.4 MMOL/L — SIGNIFICANT CHANGE UP (ref 3.5–5.3)
POTASSIUM SERPL-SCNC: 4.4 MMOL/L — SIGNIFICANT CHANGE UP (ref 3.5–5.3)
PROTHROM AB SERPL-ACNC: 14.9 SEC — HIGH (ref 10–12.9)
RBC # BLD: 3.83 M/UL — LOW (ref 4.2–5.8)
RBC # BLD: 4.07 M/UL — LOW (ref 4.2–5.8)
RBC # FLD: 15.6 % — HIGH (ref 10.3–14.5)
RBC # FLD: 16.1 % — HIGH (ref 10.3–14.5)
SODIUM SERPL-SCNC: 141 MMOL/L — SIGNIFICANT CHANGE UP (ref 135–145)
WBC # BLD: 3.8 K/UL — SIGNIFICANT CHANGE UP (ref 3.8–10.5)
WBC # BLD: 5.2 K/UL — SIGNIFICANT CHANGE UP (ref 3.8–10.5)
WBC # FLD AUTO: 3.8 K/UL — SIGNIFICANT CHANGE UP (ref 3.8–10.5)
WBC # FLD AUTO: 5.2 K/UL — SIGNIFICANT CHANGE UP (ref 3.8–10.5)

## 2018-11-22 PROCEDURE — 99231 SBSQ HOSP IP/OBS SF/LOW 25: CPT

## 2018-11-22 PROCEDURE — 99233 SBSQ HOSP IP/OBS HIGH 50: CPT

## 2018-11-22 PROCEDURE — 93306 TTE W/DOPPLER COMPLETE: CPT | Mod: 26

## 2018-11-22 PROCEDURE — 71045 X-RAY EXAM CHEST 1 VIEW: CPT | Mod: 26

## 2018-11-22 RX ORDER — HEPARIN SODIUM 5000 [USP'U]/ML
5000 INJECTION INTRAVENOUS; SUBCUTANEOUS EVERY 6 HOURS
Qty: 0 | Refills: 0 | Status: DISCONTINUED | OUTPATIENT
Start: 2018-11-22 | End: 2018-11-22

## 2018-11-22 RX ORDER — ENOXAPARIN SODIUM 100 MG/ML
60 INJECTION SUBCUTANEOUS DAILY
Qty: 0 | Refills: 0 | Status: DISCONTINUED | OUTPATIENT
Start: 2018-11-22 | End: 2018-11-25

## 2018-11-22 RX ORDER — ENOXAPARIN SODIUM 100 MG/ML
60 INJECTION SUBCUTANEOUS
Qty: 0 | Refills: 0 | Status: DISCONTINUED | OUTPATIENT
Start: 2018-11-22 | End: 2018-11-22

## 2018-11-22 RX ORDER — HEPARIN SODIUM 5000 [USP'U]/ML
800 INJECTION INTRAVENOUS; SUBCUTANEOUS
Qty: 25000 | Refills: 0 | Status: DISCONTINUED | OUTPATIENT
Start: 2018-11-22 | End: 2018-11-22

## 2018-11-22 RX ORDER — METOPROLOL TARTRATE 50 MG
50 TABLET ORAL
Qty: 0 | Refills: 0 | Status: DISCONTINUED | OUTPATIENT
Start: 2018-11-22 | End: 2018-11-25

## 2018-11-22 RX ORDER — BENZOCAINE AND MENTHOL 5; 1 G/100ML; G/100ML
1 LIQUID ORAL EVERY 4 HOURS
Qty: 0 | Refills: 0 | Status: DISCONTINUED | OUTPATIENT
Start: 2018-11-22 | End: 2018-11-30

## 2018-11-22 RX ORDER — HEPARIN SODIUM 5000 [USP'U]/ML
2500 INJECTION INTRAVENOUS; SUBCUTANEOUS EVERY 6 HOURS
Qty: 0 | Refills: 0 | Status: DISCONTINUED | OUTPATIENT
Start: 2018-11-22 | End: 2018-11-22

## 2018-11-22 RX ADMIN — HEPARIN SODIUM 800 UNIT(S)/HR: 5000 INJECTION INTRAVENOUS; SUBCUTANEOUS at 02:08

## 2018-11-22 RX ADMIN — ATORVASTATIN CALCIUM 40 MILLIGRAM(S): 80 TABLET, FILM COATED ORAL at 21:40

## 2018-11-22 RX ADMIN — BENZOCAINE AND MENTHOL 1 LOZENGE: 5; 1 LIQUID ORAL at 11:09

## 2018-11-22 RX ADMIN — Medication 81 MILLIGRAM(S): at 11:02

## 2018-11-22 RX ADMIN — Medication 50 MILLIGRAM(S): at 05:57

## 2018-11-22 RX ADMIN — ENOXAPARIN SODIUM 60 MILLIGRAM(S): 100 INJECTION SUBCUTANEOUS at 11:12

## 2018-11-22 RX ADMIN — Medication 100 MILLIGRAM(S): at 12:56

## 2018-11-22 RX ADMIN — HEPARIN SODIUM 800 UNIT(S)/HR: 5000 INJECTION INTRAVENOUS; SUBCUTANEOUS at 09:32

## 2018-11-22 RX ADMIN — Medication 100 MILLIGRAM(S): at 21:40

## 2018-11-22 RX ADMIN — PANTOPRAZOLE SODIUM 40 MILLIGRAM(S): 20 TABLET, DELAYED RELEASE ORAL at 11:02

## 2018-11-22 RX ADMIN — Medication 100 MILLIGRAM(S): at 05:57

## 2018-11-22 RX ADMIN — Medication 20 MILLIGRAM(S): at 17:53

## 2018-11-22 RX ADMIN — Medication 50 MILLIGRAM(S): at 17:53

## 2018-11-22 RX ADMIN — Medication 20 MILLIGRAM(S): at 05:57

## 2018-11-22 NOTE — PROGRESS NOTE ADULT - SUBJECTIVE AND OBJECTIVE BOX
Subjective: Pt states "I'm doing ok" denies any CP, SOB, N/V and palpitations. No acute events overnight.     Telemetry:  Afib 90 - 110  Vital Signs Last 24 Hrs  T(C): 36.4 (18 @ 07:10), Max: 36.7 (18 @ 17:49)  T(F): 97.6 (18 @ 07:10), Max: 98.1 (18 @ 17:49)  HR: 103 (18 @ 07:10) (97 - 108)  BP: 132/83 (18 @ 07:10) (95/71 - 143/93)  RR: 17 (18 @ 07:10) (16 - 18)  SpO2: 94% (18 @ 07:10) (94% - 100%)              @ 07:01  -   @ 10:07  --------------------------------------------------------  IN: 384 mL / OUT: 150 mL / NET: 234 mL         Daily Weight in k.9 (2018 06:19)                        12.0   3.8   )-----------( 89       ( 2018 01:19 )             35.5     141  |  107  |  24<H>  ----------------------------<  90  4.4   |  20<L>  |  1.50<H>      PHYSICAL EXAM  Neurology: A&Ox3, nonfocal, no gross deficits  CV : RRR+S1S2  Lungs: Respirations non-labored, B/L BS CTA  Abdomen: Soft, NT/ND, +BSx4Q  : Voiding without difficulty  Extremities: B/L LE no edema, negative calf tenderness, +PP                     B/L groins CDI, no bleeding, no hematoma    MEDICATIONS  aspirin enteric coated 81 milliGRAM(s) Oral daily  atorvastatin 40 milliGRAM(s) Oral at bedtime  benzocaine 15 mG/menthol 3.6 mG (Sugar-Free) Lozenge 1 Lozenge Oral every 4 hours PRN  docusate sodium 100 milliGRAM(s) Oral three times a day  enoxaparin Injectable 60 milliGRAM(s) SubCutaneous two times a day  furosemide    Tablet 20 milliGRAM(s) Oral two times a day  metoprolol succinate ER 50 milliGRAM(s) Oral two times a day  pantoprazole  Injectable 40 milliGRAM(s) IV Push daily  sodium chloride 0.9%. 1000 milliLiter(s) IV Continuous <Continuous>      Discussed with Cardiothoracic Team at AM rounds.

## 2018-11-22 NOTE — PROGRESS NOTE ADULT - ASSESSMENT
89 yo AA M with PMH, HTN, HLD, hyponatremia and Afib (on Eliquis last 11/19 am), colon CA (2001 s/p chemo), prostate Ca (2006 tx with radiation), CKD, Patient recently admitted to [facility] on 10/2018 for CHF, cough with work up revealing  severe MR now transferred to step down secondary to aborted procedure due to RA thrombus.   11/22 Toprol increased to 50 BID for rate control. Transition from Heparin drip to therapeutic Lovenox as per Vascular. Platelets 89.

## 2018-11-22 NOTE — PROGRESS NOTE ADULT - PROBLEM SELECTOR PLAN 1
s/p R Atrial thrombus aspiration  Vascular consult noted  Plan to transition to therapeutic SQ Lovenox which pt will be d/c home on.  Repeat TTE today to evaluate thrombus

## 2018-11-22 NOTE — PROGRESS NOTE ADULT - ASSESSMENT
89 yo AA M with PMH, HTN, HLD, hyponatremia and Afib (on Eliquis last 11/19 am), colon CA (2001 s/p chemo), prostate Ca (2006 tx with radiation), CKD, Patient recently admitted to [facility] on 10/2018 for CHF, cough with work up revealing  severe MR now transferred to step down secondary to aborted procedure due to RA thrombus.

## 2018-11-22 NOTE — CONSULT NOTE ADULT - SUBJECTIVE AND OBJECTIVE BOX
CARDIOLOGY       HISTORY OF PRESENT ILLNESS: He is a pleasant 89 y/o male PMH persistent AF and severe MR who was supposed to have a Lucy Clip yesterday. The procedure was cancelled because he had a large RA thrombus on intra-op YENNY, which was then aspirated. He denies palpitations, syncope, nor angina.    PAST MEDICAL & SURGICAL HISTORY:  CKD (chronic kidney disease)  Colon cancer (2001) s/p colectomy and chemo  Prostate cancer (2006)  severe Mitral regurgitation  HLD (hyperlipidemia)  HTN (hypertension)  persistent Atrial fibrillation    S/P coronary angiogram: 11/12/18 (unremarkable cors)  History of appendectomy  H/O colectomy  History of prostate cancer        MEDICATIONS  (STANDING):  aspirin enteric coated 81 milliGRAM(s) Oral daily  atorvastatin 40 milliGRAM(s) Oral at bedtime  docusate sodium 100 milliGRAM(s) Oral three times a day  enoxaparin Injectable 60 milliGRAM(s) SubCutaneous daily  furosemide    Tablet 20 milliGRAM(s) Oral two times a day  metoprolol succinate ER 50 milliGRAM(s) Oral two times a day  pantoprazole  Injectable 40 milliGRAM(s) IV Push daily  sodium chloride 0.9%. 1000 milliLiter(s) (10 mL/Hr) IV Continuous <Continuous>      Allergies    penicillin (Hives)  shellfish (Hives)    Intolerances        FAMILY HISTORY:  Family history of colon cancer (Sibling)  Family history of ovarian cancer (Mother)  Family history of cancer (Sibling)    Non-contributary for premature coronary disease or sudden cardiac death    SOCIAL HISTORY:    [ x] Non-smoker  [ ] Smoker  [ ] Alcohol      REVIEW OF SYSTEMS:  [ ]chest pain  [ x ]shortness of breath  [  ]palpitations  [  ]syncope  [ ]near syncope [ ]upper extremity weakness   [ ] lower extremity weakness  [  ]diplopia  [  ]altered mental status   [  ]fevers  [ ]chills [ ]nausea  [ ]vomitting  [  ]dysphagia    [ ]abdominal pain  [ ]melena  [ ]BRBPR    [  ]epistaxis  [  ]rash    [ ]lower extremity edema        [ ] All others negative	  [ ] Unable to obtain    PHYSICAL EXAM:  T(C): 36.4 (11-22-18 @ 07:10), Max: 36.7 (11-21-18 @ 17:49)  HR: 103 (11-22-18 @ 07:10) (97 - 108)  BP: 132/83 (11-22-18 @ 07:10) (95/71 - 143/93)  RR: 17 (11-22-18 @ 07:10) (16 - 18)  SpO2: 94% (11-22-18 @ 07:10) (94% - 100%)  Wt(kg): --    no JVD  IRR, 2/6 systolic murmur  CTAB  soft nt/nd  no c/c/e      TELEMETRY: 	    ECG:  	    Echo:  NST:  Cath:  	  	  LABS:	 	                          12.0   3.8   )-----------( 89       ( 22 Nov 2018 01:19 )             35.5     11-22    141  |  107  |  24<H>  ----------------------------<  90  4.4   |  20<L>  |  1.50<H>    Ca    9.2      22 Nov 2018 01:19    TPro  6.2  /  Alb  3.4  /  TBili  0.7  /  DBili  x   /  AST  16  /  ALT  19  /  AlkPhos  65  11-21    proBNP:   Lipid Profile:   HgA1c:   TSH:     A/P) He is a pleasant 89 y/o male PMH persistent AF and severe MR who was supposed to have a Lucy Clip yesterday. The procedure was cancelled because he had a large RA thrombus on intra-op YENNY, which was then aspirated. He denies palpitations, syncope, nor angina.    -management of severe MR as per CT surgery  -anticoagulation as per vascular cardiology  -recommend lifelong a/c for persistent AF with elevated chads-vasc  -f/u with Dhama after discharge      Guzman Mckenzie M.D., RS  Cardiac Electrophysiology  [facility]  [address] [place] [address], [address] [place] [address]  [email or web address]    office [phone]  pager [phone]

## 2018-11-22 NOTE — PROGRESS NOTE ADULT - SUBJECTIVE AND OBJECTIVE BOX
2C accept note from PACU  CC: it's hard for them to draw blood    VITAL SIGNS  T(F): 98.1  HR: 99  TELE: atrial fibrillation  BP: 105/72  RR: 18  SpO2: 96%      LAB                        11.5   4.2   )-----------( 90       ( 21 Nov 2018 15:01 )             33.7   140  |  105  |  22  ----------------------------<  99  4.3   |  21<L>  |  1.39<H>    PT/INR - ( 21 Nov 2018 15:01 )   PT: 16.4 sec;   INR: 1.42 ratio    PTT - ( 21 Nov 2018 20:31 )  PTT:30.3 sec    DIAGNOSTICS   Intra-Operative Transesophageal Echo (11.21.18 @ 13:40)   Pre-Bypass Observations:  Mitral Valve: Mitral annular calcification. Tethered mitral  valve leaflets with normal opening. Abnormal posterior  mitral valve leaflet, possible cleft leaflet. Severe mitral  regurgitation. Large lateral jet with other small jets  along the commissural line. Mean transmitral valve gradient  equals 1 mm Hg. HR 83bpm.  Aortic Valve/Aorta: Calcified trileaflet aortic valve with  normal opening.  Normal aortic root, aortic arch and descending thoracic  aorta.  Left Atrium: Enlarged left atrium. No left atrial or left  atrial appendage thrombus. Decreased left atrial appendage  velocities noted.  Left Ventricle: Low normal left ventricular systolic  function.  Right Heart: Normal right atrium. Large approx 4-5cm long,  highly mobile thrombus is seen swirling around in the right  atrium.  Normal right ventricular size with decreased right  ventricular systolic function. Normal tricuspid valve. Mild  tricuspid regurgitation. Normal pulmonic valve.  Pericardium/Pleura: Normal pericardium with no pericardial  effusion.  Hemodynamic: Color Doppler demonstrates no evidence of a  patent foramen ovale.  ------------------------------------------------------------------------  Post-Bypass Observations:    MitraClip procedure was aborted due to RA thrombus.  Aspiration of the RA thrombus was successfully performed  (refer to op notes). No thrombus seen in RA post  aspiration.  ------------------------------------------------------------------------  Conclusions:  1. Mitral annular calcification. Tethered mitral valve  regurgitation. Large lateral jet with other small jets  along the commissural line. Mean transmitral valve gradient  equals 1 mm Hg. HR 83bpm.  2. Normal right atrium. Large approx 4-5cm long, highly  mobile thrombus is seen swirling around in the right  atrium.  3. Normal right ventricular size with decreased right  ventricular systolic function.    MEDICATIONS  aspirin enteric coated 81 milliGRAM(s) Oral daily  atorvastatin 40 milliGRAM(s) Oral at bedtime  docusate sodium 100 milliGRAM(s) Oral three times a day  furosemide    Tablet 20 milliGRAM(s) Oral two times a day  heparin  Infusion 600 Unit(s)/Hr IV Continuous <Continuous>  metoprolol succinate ER 50 milliGRAM(s) Oral daily  pantoprazole  Injectable 40 milliGRAM(s) IV Push daily    PHYSICAL EXAM  GEN: No apparent distress, examined in bed  PSYCH: Appropriate, communicative, A+Ox3  NEURO: Non-focal  CV: S1 S2 without rub or murmur  PULMONARY:  CTA, Decreased left base   INCENTIVE SPIROMETRY:  ABDOMEN:  Soft, non-tender, non-distended, bowel sounds active x 4  : voiding   VASCULAR: +pp +radial  SKIN: Warm, dry, intact   leg incision:  ACE [  ]  MUSCULOSKELETAL:  Moves all extremities equally

## 2018-11-22 NOTE — PROGRESS NOTE ADULT - SUBJECTIVE AND OBJECTIVE BOX
Vascular Cardiology  Consult Nnote     S[facility]RA [id number]              EMAIL [email or web address]   OFFICE [phone]    CC: RA thrombus    No events.  Feels well.  Sitting in chair comfortably.  No CP or SOB>      Allergies    penicillin (Hives)  shellfish (Hives)    Intolerances    	    MEDICATIONS:     MEDICATIONS  (STANDING):  aspirin enteric coated 81 milliGRAM(s) Oral daily  atorvastatin 40 milliGRAM(s) Oral at bedtime  docusate sodium 100 milliGRAM(s) Oral three times a day  enoxaparin Injectable 60 milliGRAM(s) SubCutaneous daily  furosemide    Tablet 20 milliGRAM(s) Oral two times a day  metoprolol succinate ER 50 milliGRAM(s) Oral two times a day  pantoprazole  Injectable 40 milliGRAM(s) IV Push daily  sodium chloride 0.9%. 1000 milliLiter(s) (10 mL/Hr) IV Continuous <Continuous>    MEDICATIONS  (PRN):  benzocaine 15 mG/menthol 3.6 mG (Sugar-Free) Lozenge 1 Lozenge Oral every 4 hours PRN Sore Throat      PAST MEDICAL & SURGICAL HISTORY:  Pleural effusion, bilateral: as of latest chest xray  s/p latest hospitalization for CHF  CKD (chronic kidney disease)  Colon cancer  Prostate cancer  Mitral regurgitation  Hyponatremia  HLD (hyperlipidemia)  HTN (hypertension)  Atrial fibrillation  Prostate CA  GERD (gastroesophageal reflux disease)  Afib  HLD (hyperlipidemia)  HTN (hypertension)  S/P coronary angiogram: 11/12/18  History of appendectomy  H/O colectomy  History of prostate cancer      FAMILY HISTORY:  Family history of colon cancer (Sibling)  Family history of ovarian cancer (Mother)  Family history of cancer (Sibling)      SOCIAL HISTORY:  unchanged    REVIEW OF SYSTEMS:  CONSTITUTIONAL: No fever, weight loss, or fatigue  EYES: No eye pain, visual disturbances, or discharge  ENMT:  No difficulty hearing, tinnitus, vertigo; No sinus or throat pain  NECK: No pain or stiffness  RESPIRATORY: No cough, wheezing, chills or hemoptysis; No Shortness of Breath    CARDIOVASCULAR: No chest pain, palpitations, passing out, dizziness, or leg swelling    GASTROINTESTINAL: No abdominal or epigastric pain. No nausea, vomiting, or hematemesis; No diarrhea or constipation. No melena or hematochezia.  GENITOURINARY: No dysuria, frequency, hematuria, or incontinence  NEUROLOGICAL: No headaches, memory loss, loss of strength, numbness, or tremors  SKIN: No itching, burning, rashes, or lesions   LYMPH Nodes: No enlarged glands  ENDOCRINE: No heat or cold intolerance; No hair loss  MUSCULOSKELETAL: No joint pain or swelling; No muscle, back, or extremity pain  PSYCHIATRIC: No depression, anxiety, mood swings, or difficulty sleeping  HEME/LYMPH: No easy bruising, or bleeding gums  ALLERY AND IMMUNOLOGIC: No hives or eczema	    [ x] All others negative	  [ ] Unable to obtain     ICU Vital Signs Last 24 Hrs  T(C): 36.4 (22 Nov 2018 07:10), Max: 36.7 (21 Nov 2018 17:49)  T(F): 97.6 (22 Nov 2018 07:10), Max: 98.1 (21 Nov 2018 17:49)  HR: 105 (22 Nov 2018 11:01) (97 - 108)  BP: 115/81 (22 Nov 2018 11:01) (95/71 - 143/93)  BP(mean): 96 (22 Nov 2018 11:01) (78 - 99)  ABP: 165/98 (21 Nov 2018 17:00) (126/74 - 165/98)  ABP(mean): 119 (21 Nov 2018 17:00) (94 - 119)  RR: 17 (22 Nov 2018 07:10) (16 - 18)  SpO2: 100% (22 Nov 2018 11:01) (94% - 100%)        Appearance: Normal, NC in place	  HEENT:   Normal oral mucosa, PERRL, EOMI	  Carotid:   Right:  No Bruit      Left:  No bruit  Cardiovascular: Irregularly irregular, Normal S1 S2, No JVD, II/VI holosytolic murmur at apex  Respiratory: Lungs clear to auscultation	  Psychiatry: A & O x 3, Mood & affect appropriate  Gastrointestinal:  Soft, Non-tender, + BS	  Skin: No rashes, No ecchymoses, No cyanosis	  Neurologic: Non-focal  Extremities: Normal range of motion.    Vascular Pulse Exam:  Right Femoral:  Palpable       Left Femoral:  Palpable    Right DP:  Palpable                 Left DP:  Palpable  Right PT:  Palpable                 Left DP:  Palpable    Foot Exam:  Warm, no ulceration.                             12.0   3.8   )-----------( 89       ( 22 Nov 2018 01:19 )             35.5   11-22    141  |  107  |  24<H>  ----------------------------<  90  4.4   |  20<L>  |  1.50<H>    Ca    9.2      22 Nov 2018 01:19    TPro  6.2  /  Alb  3.4  /  TBili  0.7  /  DBili  x   /  AST  16  /  ALT  19  /  AlkPhos  65  11-21          Assessment:  1. RA thrombus- found on intraop YENNY, unclear etiology, patient was on eliquis 2.5mg BID for atrial fibrillation and has not missed any doses except it was held on 11/19 prior to MitraClip procedure   2. Severe mitral regurgitation- MitraClip procedure aborted today  3. Atrial fibrillation- in afib currently, on eliquis at home  4. CKD  5. Colon CA (2001 s/p chemo)  6. Prostate Ca (2006 tx with radiation)      Plan:  1.  Weight based, renally dosed therapeutic lovenox :  1mg/kg daily  2.  Please check platelets again later today   3.  If lower, would ask hematology to evaluate for HIT  4.  2d echo    Lj Rodney   Vascular Cardiology Service    Please call with any questions:   SPECTRA - [phone]  Office [phone]  email:  [email or web address]

## 2018-11-22 NOTE — PROGRESS NOTE ADULT - PROBLEM SELECTOR PLAN 1
Admit to 2C step down  s/p R Atrial thrombus aspiration  Vascular consult noted  Sheaths discontinued at 1730   Hep gtt without bolus as per vascular 1930 with transition to lovenox if tolerates

## 2018-11-23 ENCOUNTER — TRANSCRIPTION ENCOUNTER (OUTPATIENT)
Age: 83
End: 2018-11-23

## 2018-11-23 LAB
ANION GAP SERPL CALC-SCNC: 12 MMOL/L — SIGNIFICANT CHANGE UP (ref 5–17)
APTT BLD: 29.2 SEC — SIGNIFICANT CHANGE UP (ref 27.5–36.3)
BUN SERPL-MCNC: 20 MG/DL — SIGNIFICANT CHANGE UP (ref 7–23)
CALCIUM SERPL-MCNC: 9.3 MG/DL — SIGNIFICANT CHANGE UP (ref 8.4–10.5)
CHLORIDE SERPL-SCNC: 103 MMOL/L — SIGNIFICANT CHANGE UP (ref 96–108)
CO2 SERPL-SCNC: 23 MMOL/L — SIGNIFICANT CHANGE UP (ref 22–31)
CREAT SERPL-MCNC: 1.58 MG/DL — HIGH (ref 0.5–1.3)
GLUCOSE SERPL-MCNC: 98 MG/DL — SIGNIFICANT CHANGE UP (ref 70–99)
HCT VFR BLD CALC: 35.2 % — LOW (ref 39–50)
HGB BLD-MCNC: 11.9 G/DL — LOW (ref 13–17)
MCHC RBC-ENTMCNC: 30.9 PG — SIGNIFICANT CHANGE UP (ref 27–34)
MCHC RBC-ENTMCNC: 33.6 GM/DL — SIGNIFICANT CHANGE UP (ref 32–36)
MCV RBC AUTO: 92 FL — SIGNIFICANT CHANGE UP (ref 80–100)
PLATELET # BLD AUTO: 88 K/UL — LOW (ref 150–400)
POTASSIUM SERPL-MCNC: 4.6 MMOL/L — SIGNIFICANT CHANGE UP (ref 3.5–5.3)
POTASSIUM SERPL-SCNC: 4.6 MMOL/L — SIGNIFICANT CHANGE UP (ref 3.5–5.3)
RBC # BLD: 3.83 M/UL — LOW (ref 4.2–5.8)
RBC # FLD: 16.1 % — HIGH (ref 10.3–14.5)
SODIUM SERPL-SCNC: 138 MMOL/L — SIGNIFICANT CHANGE UP (ref 135–145)
WBC # BLD: 4.9 K/UL — SIGNIFICANT CHANGE UP (ref 3.8–10.5)
WBC # FLD AUTO: 4.9 K/UL — SIGNIFICANT CHANGE UP (ref 3.8–10.5)

## 2018-11-23 PROCEDURE — 71045 X-RAY EXAM CHEST 1 VIEW: CPT | Mod: 26

## 2018-11-23 PROCEDURE — 99232 SBSQ HOSP IP/OBS MODERATE 35: CPT

## 2018-11-23 RX ORDER — DOCUSATE SODIUM 100 MG
1 CAPSULE ORAL
Qty: 90 | Refills: 0 | OUTPATIENT
Start: 2018-11-23 | End: 2018-12-22

## 2018-11-23 RX ORDER — FUROSEMIDE 40 MG
40 TABLET ORAL EVERY 12 HOURS
Qty: 0 | Refills: 0 | Status: DISCONTINUED | OUTPATIENT
Start: 2018-11-23 | End: 2018-11-30

## 2018-11-23 RX ORDER — PANTOPRAZOLE SODIUM 20 MG/1
1 TABLET, DELAYED RELEASE ORAL
Qty: 30 | Refills: 0 | OUTPATIENT
Start: 2018-11-23 | End: 2018-12-22

## 2018-11-23 RX ORDER — ENOXAPARIN SODIUM 100 MG/ML
60 INJECTION SUBCUTANEOUS
Qty: 30 | Refills: 0 | OUTPATIENT
Start: 2018-11-23 | End: 2018-12-22

## 2018-11-23 RX ORDER — APIXABAN 2.5 MG/1
1 TABLET, FILM COATED ORAL
Qty: 0 | Refills: 0 | COMMUNITY

## 2018-11-23 RX ORDER — FUROSEMIDE 40 MG
1 TABLET ORAL
Qty: 60 | Refills: 0 | OUTPATIENT
Start: 2018-11-23 | End: 2018-12-22

## 2018-11-23 RX ORDER — METOPROLOL TARTRATE 50 MG
1 TABLET ORAL
Qty: 60 | Refills: 0 | OUTPATIENT
Start: 2018-11-23 | End: 2018-12-22

## 2018-11-23 RX ADMIN — ENOXAPARIN SODIUM 60 MILLIGRAM(S): 100 INJECTION SUBCUTANEOUS at 12:04

## 2018-11-23 RX ADMIN — Medication 100 MILLIGRAM(S): at 05:56

## 2018-11-23 RX ADMIN — Medication 100 MILLIGRAM(S): at 22:33

## 2018-11-23 RX ADMIN — Medication 50 MILLIGRAM(S): at 05:56

## 2018-11-23 RX ADMIN — Medication 40 MILLIGRAM(S): at 18:09

## 2018-11-23 RX ADMIN — Medication 20 MILLIGRAM(S): at 05:57

## 2018-11-23 RX ADMIN — ATORVASTATIN CALCIUM 40 MILLIGRAM(S): 80 TABLET, FILM COATED ORAL at 22:33

## 2018-11-23 RX ADMIN — PANTOPRAZOLE SODIUM 40 MILLIGRAM(S): 20 TABLET, DELAYED RELEASE ORAL at 12:04

## 2018-11-23 RX ADMIN — Medication 50 MILLIGRAM(S): at 18:06

## 2018-11-23 RX ADMIN — Medication 81 MILLIGRAM(S): at 12:04

## 2018-11-23 RX ADMIN — BENZOCAINE AND MENTHOL 1 LOZENGE: 5; 1 LIQUID ORAL at 14:13

## 2018-11-23 RX ADMIN — Medication 100 MILLIGRAM(S): at 12:04

## 2018-11-23 NOTE — DISCHARGE NOTE ADULT - CARE PROVIDER_API CALL
Wendy Floyd), Surgery; Thoracic and Cardiac Surgery  [address] [place] [address]  Phone: [phone]  Fax: [phone]    Galdino Ortega), Cardiovascular Disease  [address] [place] [address]  Phone: [phone]  Fax: [phone] Galdino Ortega), Cardiovascular Disease  [address] [place] [address]  Phone: [phone]  Fax: [phone]    Lj Rodney (), Internal Medicine; Nuclear Cardiology  [address] [place] [address]  Phone: [phone]  Fax: [phone]    Judy Vasquez  [address] [place], [place], [address]  Phone: [phone]  Fax: (       -

## 2018-11-23 NOTE — DISCHARGE NOTE ADULT - CARE PROVIDERS DIRECT ADDRESSES
,[email or web address]_Unknown

## 2018-11-23 NOTE — DISCHARGE NOTE ADULT - OTHER SIGNIFICANT FINDINGS
VITAL SIGNS    Telemetry:    Vital Signs Last 24 Hrs  T(C): 36.8 (18 @ 06:50), Max: 37.3 (18 @ 19:55)  T(F): 98.2 (18 @ 06:50), Max: 99.1 (18 @ 19:55)  HR: 105 (18 @ 06:50) (102 - 117)  BP: 109/84 (18 @ 06:50) (100/63 - 126/85)  RR: 18 (18 @ 06:50) (18 - 18)  SpO2: 98% (18 @ 06:50) (95% - 98%)             @ 07:01  -   @ 07:00  --------------------------------------------------------  IN: 964 mL / OUT: 1175 mL / NET: -211 mL     @ 07:01  -   @ 13:27  --------------------------------------------------------  IN: 120 mL / OUT: 200 mL / NET: -80 mL       Daily     Daily Weight in k.3 (2018 12:32)  Admit Wt: Drug Dosing Weight  Height (cm): 184.15 (2018 09:19)  Weight (kg): 62.8 (2018 09:19)  BMI (kg/m2): 18.5 (2018 09:19)  BSA (m2): 1.83 (2018 09:19)      CAPILLARY BLOOD GLUCOSE              MEDICATIONS  aspirin enteric coated 81 milliGRAM(s) Oral daily  atorvastatin 40 milliGRAM(s) Oral at bedtime  benzocaine 15 mG/menthol 3.6 mG (Sugar-Free) Lozenge 1 Lozenge Oral every 4 hours PRN  docusate sodium 100 milliGRAM(s) Oral three times a day  enoxaparin Injectable 60 milliGRAM(s) SubCutaneous daily  furosemide    Tablet 20 milliGRAM(s) Oral two times a day  metoprolol succinate ER 50 milliGRAM(s) Oral two times a day  pantoprazole  Injectable 40 milliGRAM(s) IV Push daily  sodium chloride 0.9%. 1000 milliLiter(s) IV Continuous <Continuous>      PHYSICAL EXAM  Subjective: NAD  Neurology: alert and oriented x 3, nonfocal, no gross deficits  CV : S1S2  Lungs: CTA b/l  Abdomen: soft, NT,ND, (+ )BM  :  voiding  Extremities: -c/c/e      LABS      138  |  103  |  20  ----------------------------<  98  4.6   |  23  |  1.58<H>    Ca    9.3      2018 06:52    TPro  6.2  /  Alb  3.4  /  TBili  0.7  /  DBili  x   /  AST  16  /  ALT  19  /  AlkPhos  65  -                                 11.9   4.9   )-----------( 88       ( 2018 06:52 )             35.2          PT/INR - ( 2018 09:01 )   PT: 14.9 sec;   INR: 1.29 ratio         PTT - ( 2018 06:52 )  PTT:29.2 sec      < from: Transthoracic Echocardiogram (18 @ 09:42) >  1. Mitral annular calcification,tethered mitral valve  leaflets. Severe mitral regurgitation.  2. Mildly dilated left atrium.  LA volume index = 38 cc/m2.  3. Low normal left ventricular systolic function. No  segmental wall motion abnormalities.  4. A linear structure compatible with a Chiari-network is  identified in the right atrium (normal variant). No  thrombus seen.  5. Normal right ventricular size and function.  6. Normal tricuspid valve. Moderate tricuspid  regurgitation.  7. Estimated pulmonary artery systolic pressure equals 35  mm Hg, assuming right atrial pressure equals 8 mm Hg,  consistent with borderline pulmonary pressures.  8. Left pleural effusion.    < end of copied text > VITAL SIGNS 110/76  Afib   RA O2 sat 100    Telemetry: afib   Vital Signs Last 24 Hrs  T(C): 36.8 (18 @ 06:50), Max: 37.3 (18 @ 19:55)  T(F): 98.2 (18 @ 06:50), Max: 99.1 (18 @ 19:55)  HR: 105 (18 @ 06:50) (102 - 117)  BP: 109/84 (18 @ 06:50) (100/63 - 126/85)  RR: 18 (18 @ 06:50) (18 - 18)  SpO2: 98% (18 @ 06:50) (95% - 98%)             @ 07:01  -   @ 07:00  --------------------------------------------------------  IN: 964 mL / OUT: 1175 mL / NET: -211 mL     @ 07:01  -   @ 13:27  --------------------------------------------------------  IN: 120 mL / OUT: 200 mL / NET: -80 mL       Daily     Daily Weight in k.3 (2018 12:32)  Admit Wt: Drug Dosing Weight  Height (cm): 184.15 (2018 09:19)  Weight (kg): 62.8 (2018 09:19)  BMI (kg/m2): 18.5 (2018 09:19)  BSA (m2): 1.83 (2018 09:19)      CAPILLARY BLOOD GLUCOSE              MEDICATIONS  aspirin enteric coated 81 milliGRAM(s) Oral daily  atorvastatin 40 milliGRAM(s) Oral at bedtime  benzocaine 15 mG/menthol 3.6 mG (Sugar-Free) Lozenge 1 Lozenge Oral every 4 hours PRN  docusate sodium 100 milliGRAM(s) Oral three times a day  enoxaparin Injectable 60 milliGRAM(s) SubCutaneous daily  furosemide    Tablet 20 milliGRAM(s) Oral two times a day  metoprolol succinate ER 50 milliGRAM(s) Oral two times a day  pantoprazole  Injectable 40 milliGRAM(s) IV Push daily  sodium chloride 0.9%. 1000 milliLiter(s) IV Continuous <Continuous>      PHYSICAL EXAM  Subjective: NAD  Neurology: alert and oriented x 3, nonfocal, no gross deficits  CV : S1S2  Lungs: CTA b/l  Abdomen: soft, NT,ND, (+ )BM  :  voiding  Extremities: -c/c/e      LABS      138  |  103  |  20  ----------------------------<  98  4.6   |  23  |  1.58<H>    Ca    9.3      2018 06:52    TPro  6.2  /  Alb  3.4  /  TBili  0.7  /  DBili  x   /  AST  16  /  ALT  19  /  AlkPhos  65  -                                 11.9   4.9   )-----------( 88       ( 2018 06:52 )             35.2          PT/INR - ( 2018 09:01 )   PT: 14.9 sec;   INR: 1.29 ratio         PTT - ( 2018 06:52 )  PTT:29.2 sec      < from: Transthoracic Echocardiogram (18 @ 09:42) >  1. Mitral annular calcification,tethered mitral valve  leaflets. Severe mitral regurgitation.  2. Mildly dilated left atrium.  LA volume index = 38 cc/m2.  3. Low normal left ventricular systolic function. No  segmental wall motion abnormalities.  4. A linear structure compatible with a Chiari-network is  identified in the right atrium (normal variant). No  thrombus seen.  5. Normal right ventricular size and function.  6. Normal tricuspid valve. Moderate tricuspid  regurgitation.  7. Estimated pulmonary artery systolic pressure equals 35  mm Hg, assuming right atrial pressure equals 8 mm Hg,  consistent with borderline pulmonary pressures.  8. Left pleural effusion.    < end of copied text >

## 2018-11-23 NOTE — DISCHARGE NOTE ADULT - MEDICATION SUMMARY - MEDICATIONS TO TAKE
I will START or STAY ON the medications listed below when I get home from the hospital:    aspirin 81 mg oral tablet  -- 1 tab(s) by mouth once a day  -- Indication: For Antiplatelet    enoxaparin 60 mg/0.6 mL injectable solution  -- 60 milligram(s) subcutaneously once a day   -- It is very important that you take or use this exactly as directed.  Do not skip doses or discontinue unless directed by your doctor.    -- Indication: For Anticoagulant    Lipitor 40 mg oral tablet  -- 1 tab(s) by mouth once a day   -- Avoid grapefruit and grapefruit juice while taking this medication.  Do not take this drug if you are pregnant.  It is very important that you take or use this exactly as directed.  Do not skip doses or discontinue unless directed by your doctor.  Obtain medical advice before taking any non-prescription drugs as some may affect the action of this medication.  Take with food or milk.    -- Indication: For Antihyperlipidemic    metoprolol succinate 50 mg oral tablet, extended release  -- 1 tab(s) by mouth 2 times a day  -- Indication: For Cardiovascular agent    furosemide 20 mg oral tablet  -- 1 tab(s) by mouth 2 times a day -for allergy symptoms   -- Indication: For diuretic    docusate sodium 100 mg oral capsule  -- 1 cap(s) by mouth 3 times a day, As Needed   -- Indication: For Stool softener as needed    Senna  -- orally once a day (at bedtime)  -- Indication: For laxative as needed    pantoprazole 20 mg oral delayed release tablet  -- 1 tab(s) by mouth once a day   -- It is very important that you take or use this exactly as directed.  Do not skip doses or discontinue unless directed by your doctor.  Obtain medical advice before taking any non-prescription drugs as some may affect the action of this medication.  Swallow whole.  Do not crush.    -- Indication: For dyspepsia I will START or STAY ON the medications listed below when I get home from the hospital:    aspirin 81 mg oral tablet  -- 1 tab(s) by mouth once a day  -- Indication: For Antiplatelet    enoxaparin 60 mg/0.6 mL injectable solution  -- 60 milligram(s) subcutaneously once a day   -- It is very important that you take or use this exactly as directed.  Do not skip doses or discontinue unless directed by your doctor.    -- Indication: For Anticoagulant    Lipitor 40 mg oral tablet  -- 1 tab(s) by mouth once a day   -- Avoid grapefruit and grapefruit juice while taking this medication.  Do not take this drug if you are pregnant.  It is very important that you take or use this exactly as directed.  Do not skip doses or discontinue unless directed by your doctor.  Obtain medical advice before taking any non-prescription drugs as some may affect the action of this medication.  Take with food or milk.    -- Indication: For Antihyperlipidemic    Metoprolol Succinate ER 25 mg oral tablet, extended release  -- 3 tab(s) by mouth every 12 hours  -- Indication: For Cardiovascular agent    furosemide 20 mg oral tablet  -- 1 tab(s) by mouth 2 times a day -for allergy symptoms   -- Indication: For diuretic    docusate sodium 100 mg oral capsule  -- 1 cap(s) by mouth 3 times a day, As Needed   -- Indication: For Stool softener as needed    Senna  -- orally once a day (at bedtime)  -- Indication: For laxative as needed    pantoprazole 20 mg oral delayed release tablet  -- 1 tab(s) by mouth once a day   -- It is very important that you take or use this exactly as directed.  Do not skip doses or discontinue unless directed by your doctor.  Obtain medical advice before taking any non-prescription drugs as some may affect the action of this medication.  Swallow whole.  Do not crush.    -- Indication: For dyspepsia I will START or STAY ON the medications listed below when I get home from the hospital:    aspirin 81 mg oral tablet  -- 1 tab(s) by mouth once a day  -- Indication: For Antiplatelet    enoxaparin 60 mg/0.6 mL injectable solution  -- 60 milligram(s) subcutaneously once a day   -- It is very important that you take or use this exactly as directed.  Do not skip doses or discontinue unless directed by your doctor.    -- Indication: For Anticoagulant    Lipitor 40 mg oral tablet  -- 1 tab(s) by mouth once a day   -- Avoid grapefruit and grapefruit juice while taking this medication.  Do not take this drug if you are pregnant.  It is very important that you take or use this exactly as directed.  Do not skip doses or discontinue unless directed by your doctor.  Obtain medical advice before taking any non-prescription drugs as some may affect the action of this medication.  Take with food or milk.    -- Indication: For Antihyperlipidemic    Toprol- mg oral tablet, extended release  -- 1 tab(s) by mouth every 12 hours  -- Indication: For Heart rate and blood pressure    furosemide 20 mg oral tablet  -- 1 tab(s) by mouth 2 times a day -for allergy symptoms   -- Indication: For diuretic    docusate sodium 100 mg oral capsule  -- 1 cap(s) by mouth 3 times a day, As Needed   -- Indication: For Stool softener as needed    Senna  -- orally once a day (at bedtime)  -- Indication: For laxative as needed    pantoprazole 20 mg oral delayed release tablet  -- 1 tab(s) by mouth once a day   -- It is very important that you take or use this exactly as directed.  Do not skip doses or discontinue unless directed by your doctor.  Obtain medical advice before taking any non-prescription drugs as some may affect the action of this medication.  Swallow whole.  Do not crush.    -- Indication: For dyspepsia

## 2018-11-23 NOTE — DISCHARGE NOTE ADULT - PATIENT PORTAL LINK FT
You can access the [facility] Portal, offered by [facility], by registering with the following website: [email or web address]

## 2018-11-23 NOTE — DISCHARGE NOTE ADULT - HOSPITAL COURSE
87 yo AA M with PMH, HTN, HLD, hyponatremia and Afib (on Eliquis last 11/19 am), colon CA (2001 s/p chemo), prostate Ca (2006 tx with radiation), CKD, Patient recently admitted to [facility] on 10/2018 for CHF, cough with work up revealing  severe MR now transferred to step down secondary to aborted procedure due to RA thrombus.   11/22 Toprol increased to 50 BID for rate control. Transition from Heparin drip to therapeutic Lovenox as per Vascular. Platelets 89.  1.  Weight based, renally dosed therapeutic lovenox :  1mg/kg daily  2.  Echo without RA thrombus  3.  No further vascular testing 89 yo AA M with PMH, HTN, HLD, hyponatremia and Afib (on Eliquis last 11/19 am), colon CA (2001 s/p chemo), prostate Ca (2006 tx with radiation), CKD, Patient recently admitted to [facility] on 10/2018 for CHF, cough with work up revealing  severe MR now transferred to step down secondary to aborted procedure due to RA thrombus.   11/22 Toprol increased to 50 BID for rate control. Transition from Heparin drip to therapeutic Lovenox as per Vascular. Platelets 89.  1.  Weight based, renally dosed therapeutic lovenox :  1mg/kg daily  2.  Echo without RA thrombus  3.  No further vascular testing   11/30 HD stable.  AVIVA negative Platelets 118.  DC home on lovenox

## 2018-11-23 NOTE — DISCHARGE NOTE ADULT - MEDICATION SUMMARY - MEDICATIONS TO STOP TAKING
I will STOP taking the medications listed below when I get home from the hospital:    apixaban 2.5 mg oral tablet  -- 1 tab(s) by mouth 2 times a day, informed bernabe's office(Ms Askew)

## 2018-11-23 NOTE — DISCHARGE NOTE ADULT - PLAN OF CARE
Resolution Resume activity as tolerated  Resume low cholesterol low sodium diet  Shower daily and wash all incisions with soap and water  Ambulate at least four times daily  Use incentive spirometer every hour during the day  Record daily weight and report changes greater than 3lbs  Please follow up with your cardiologist in 7-10 days Resume activity as tolerated  Resume low cholesterol low sodium diet  Shower daily and wash all incisions with soap and water  Ambulate at least four times daily  Use incentive spirometer every hour during the day  Record daily weight and report changes greater than 3lbs  Please follow up with your cardiologist in 7-10 days  Please follow up with Dr Rodney in 1 week  Please follow up with Dr Ortega in 1 month

## 2018-11-23 NOTE — PROGRESS NOTE ADULT - SUBJECTIVE AND OBJECTIVE BOX
Vascular Cardiology  Consult Nnote     [facility] [id number]              EMAIL [email or web address]   OFFICE [phone]    CC: RA thrombus    No events.  Feels well.  Sitting in chair comfortably.  No CP or SOB>      Allergies    penicillin (Hives)  shellfish (Hives)    Intolerances     MEDICATIONS  (STANDING):  aspirin enteric coated 81 milliGRAM(s) Oral daily  atorvastatin 40 milliGRAM(s) Oral at bedtime  docusate sodium 100 milliGRAM(s) Oral three times a day  enoxaparin Injectable 60 milliGRAM(s) SubCutaneous daily  furosemide    Tablet 20 milliGRAM(s) Oral two times a day  metoprolol succinate ER 50 milliGRAM(s) Oral two times a day  pantoprazole  Injectable 40 milliGRAM(s) IV Push daily  sodium chloride 0.9%. 1000 milliLiter(s) (10 mL/Hr) IV Continuous <Continuous>    MEDICATIONS  (PRN):  benzocaine 15 mG/menthol 3.6 mG (Sugar-Free) Lozenge 1 Lozenge Oral every 4 hours PRN Sore Throat      PAST MEDICAL & SURGICAL HISTORY:  Pleural effusion, bilateral: as of latest chest xray  s/p latest hospitalization for CHF  CKD (chronic kidney disease)  Colon cancer  Prostate cancer  Mitral regurgitation  Hyponatremia  HLD (hyperlipidemia)  HTN (hypertension)  Atrial fibrillation  Prostate CA  GERD (gastroesophageal reflux disease)  Afib  HLD (hyperlipidemia)  HTN (hypertension)  S/P coronary angiogram: 11/12/18  History of appendectomy  H/O colectomy  History of prostate cancer      FAMILY HISTORY:  Family history of colon cancer (Sibling)  Family history of ovarian cancer (Mother)  Family history of cancer (Sibling)      SOCIAL HISTORY:  unchanged    REVIEW OF SYSTEMS:  CONSTITUTIONAL: No fever, weight loss, or fatigue  EYES: No eye pain, visual disturbances, or discharge  ENMT:  No difficulty hearing, tinnitus, vertigo; No sinus or throat pain  NECK: No pain or stiffness  RESPIRATORY: No cough, wheezing, chills or hemoptysis; No Shortness of Breath    CARDIOVASCULAR: No chest pain, palpitations, passing out, dizziness, or leg swelling    GASTROINTESTINAL: No abdominal or epigastric pain. No nausea, vomiting, or hematemesis; No diarrhea or constipation. No melena or hematochezia.  GENITOURINARY: No dysuria, frequency, hematuria, or incontinence  NEUROLOGICAL: No headaches, memory loss, loss of strength, numbness, or tremors  SKIN: No itching, burning, rashes, or lesions   LYMPH Nodes: No enlarged glands  ENDOCRINE: No heat or cold intolerance; No hair loss  MUSCULOSKELETAL: No joint pain or swelling; No muscle, back, or extremity pain  PSYCHIATRIC: No depression, anxiety, mood swings, or difficulty sleeping  HEME/LYMPH: No easy bruising, or bleeding gums  ALLERY AND IMMUNOLOGIC: No hives or eczema	    [ x] All others negative	  [ ] Unable to obtain     1ICU Vital Signs Last 24 Hrs  T(C): 36.8 (23 Nov 2018 06:50), Max: 37.3 (22 Nov 2018 19:55)  T(F): 98.2 (23 Nov 2018 06:50), Max: 99.1 (22 Nov 2018 19:55)  HR: 105 (23 Nov 2018 06:50) (102 - 117)  BP: 109/84 (23 Nov 2018 06:50) (100/63 - 126/85)  BP(mean): 96 (22 Nov 2018 11:01) (96 - 96)  ABP: --  ABP(mean): --  RR: 18 (23 Nov 2018 06:50) (18 - 18)  SpO2: 98% (23 Nov 2018 06:50) (95% - 100%)      Appearance: Normal, NC in place	  HEENT:   Normal oral mucosa, PERRL, EOMI	  Carotid:   Right:  No Bruit      Left:  No bruit  Cardiovascular: Irregularly irregular, Normal S1 S2, No JVD, II/VI holosytolic murmur at apex  Respiratory: Lungs clear to auscultation	  Psychiatry: A & O x 3, Mood & affect appropriate  Gastrointestinal:  Soft, Non-tender, + BS	  Skin: No rashes, No ecchymoses, No cyanosis	  Neurologic: Non-focal  Extremities: Normal range of motion.       Foot Exam:  Warm, no ulceration.                                         11.9   4.9   )-----------( 88       ( 23 Nov 2018 06:52 )             35.2   11-23    138  |  103  |  20  ----------------------------<  98  4.6   |  23  |  1.58<H>    Ca    9.3      23 Nov 2018 06:52    TPro  6.2  /  Alb  3.4  /  TBili  0.7  /  DBili  x   /  AST  16  /  ALT  19  /  AlkPhos  65  11-21        Assessment:  1. RA thrombus- found on intraop YENNY, unclear etiology, patient was on eliquis 2.5mg BID for atrial fibrillation and has not missed any doses except it was held on 11/19 prior to MitraClip procedure   - repeat echo without RA thrombus  2. Severe mitral regurgitation- MitraClip procedure aborted   3. Atrial fibrillation- in afib currently, on eliquis at home  4. CKD  5. Colon CA (2001 s/p chemo)  6. Prostate Ca (2006 tx with radiation)      Plan:  1.  Weight based, renally dosed therapeutic lovenox :  1mg/kg daily  2.  Echo without RA thrombus  3.  No further vascular testing or intervention.  D/c planning     Lj Rodney   Vascular Cardiology Service    Please call with any questions:   [facility] - [phone]  Office [phone]  email:  [email or web address]

## 2018-11-23 NOTE — DISCHARGE NOTE ADULT - PROVIDER TOKENS
TOKEN:'[id number]',TOKEN:'[id number]' TOKEN:'[id number]',TOKEN:'[id number]',FREE:[LAST:[Christina],FIRST:[Judy],PHONE:[phone]],FAX:[(   )    -],ADDRESS:[[address] [place] [address], [address]]]

## 2018-11-23 NOTE — DISCHARGE NOTE ADULT - CARE PLAN
Principal Discharge DX:	Thrombus  Goal:	Resolution  Assessment and plan of treatment:	Resume activity as tolerated  Resume low cholesterol low sodium diet  Shower daily and wash all incisions with soap and water  Ambulate at least four times daily  Use incentive spirometer every hour during the day  Record daily weight and report changes greater than 3lbs  Please follow up with your cardiologist in 7-10 days  Secondary Diagnosis:	Mitral regurgitation Principal Discharge DX:	Thrombus  Goal:	Resolution  Assessment and plan of treatment:	Resume activity as tolerated  Resume low cholesterol low sodium diet  Shower daily and wash all incisions with soap and water  Ambulate at least four times daily  Use incentive spirometer every hour during the day  Record daily weight and report changes greater than 3lbs  Please follow up with your cardiologist in 7-10 days  Please follow up with Dr Rodney in 1 week  Please follow up with Dr Ortega in 1 month  Secondary Diagnosis:	Mitral regurgitation

## 2018-11-23 NOTE — DISCHARGE NOTE ADULT - MEDICATION SUMMARY - MEDICATIONS TO CHANGE
I will SWITCH the dose or number of times a day I take the medications listed below when I get home from the hospital:  None I will SWITCH the dose or number of times a day I take the medications listed below when I get home from the hospital:    metoprolol succinate 50 mg oral tablet, extended release  -- 1 tab(s) by mouth 2 times a day

## 2018-11-23 NOTE — PROGRESS NOTE ADULT - SUBJECTIVE AND OBJECTIVE BOX
SUBJECTIVE: Patient with no anginal chest pain or shortness of breath or palpitations or dizziness  OOB in chair eating breakfast   ROS otherwise negative       MEDICATIONS  (STANDING):  aspirin enteric coated 81 milliGRAM(s) Oral daily  atorvastatin 40 milliGRAM(s) Oral at bedtime  docusate sodium 100 milliGRAM(s) Oral three times a day  enoxaparin Injectable 60 milliGRAM(s) SubCutaneous daily  furosemide    Tablet 20 milliGRAM(s) Oral two times a day  metoprolol succinate ER 50 milliGRAM(s) Oral two times a day  pantoprazole  Injectable 40 milliGRAM(s) IV Push daily  sodium chloride 0.9%. 1000 milliLiter(s) (10 mL/Hr) IV Continuous <Continuous>    MEDICATIONS  (PRN):  benzocaine 15 mG/menthol 3.6 mG (Sugar-Free) Lozenge 1 Lozenge Oral every 4 hours PRN Sore Throat      LABS:                        11.9   4.9   )-----------( 88       ( 23 Nov 2018 06:52 )             35.2       11-23    138  |  103  |  20  ----------------------------<  98  4.6   |  23  |  1.58<H>    Ca    9.3      23 Nov 2018 06:52    TPro  6.2  /  Alb  3.4  /  TBili  0.7  /  DBili  x   /  AST  16  /  ALT  19  /  AlkPhos  65  11-21      PTT - ( 23 Nov 2018 06:52 )  PTT:29.2 sec  CARDIAC MARKERS ( 21 Nov 2018 15:01 )  x     / x     / 57 U/L / x     / 1.4 ng/mL          PHYSICAL EXAM:  Vital Signs Last 24 Hrs  T(C): 36.8 (23 Nov 2018 06:50), Max: 37.3 (22 Nov 2018 19:55)  T(F): 98.2 (23 Nov 2018 06:50), Max: 99.1 (22 Nov 2018 19:55)  HR: 105 (23 Nov 2018 06:50) (102 - 117)  BP: 109/84 (23 Nov 2018 06:50) (100/63 - 126/85)  BP(mean): 96 (22 Nov 2018 11:01) (96 - 96)  RR: 18 (23 Nov 2018 06:50) (18 - 18)  SpO2: 98% (23 Nov 2018 06:50) (95% - 100%)    HEENT: Normal Oral mucosa, PERRL, EOMI  Lymphatic: No obvious lymphadenopathy, No edema  Cardiovascular: Normal S1S2, No JVD, 1/6 CARL, Peripheral pulses palpable 2+ B/L  Respiratory: Lungs clear to auscultation, normal effort  Gastrointestinal: Abdomen soft, ND, NT, +BS  Skin: Warm, dry, intact. No cyanosis, No rash.  Musculoskeletal: Normal ROM, normal strength  Psychiatric: Appropriate Mood and Affect      DIAGNOSTIC DATA  TELEMETRY: -130     RADIOLOGY:   < from: Xray Chest 1 View- PORTABLE-Routine (11.22.18 @ 05:59) >  Impression:    The heart is normal in size. Bilateral small pleural effusion. No acute   consolidation.        ASSESSMENT AND PLAN:   He is a pleasant 87 y/o male PMH persistent AF and severe MR who was supposed to have a Lucy Clip yesterday. The procedure was cancelled because he had a large RA thrombus on intra-op YENNY, which was then aspirated. He denies palpitations, syncope, nor angina.    -management of severe MR as per CT surgery  -anticoagulation as per vascular cardiology - currently on therapeutic Lovenox  -recommend lifelong a/c for persistent AF with elevated chads-vasc  -thrombocytopenia - >50,000 with no evidence of active bleeding   -f/u with Dr Scott after discharge

## 2018-11-24 LAB
ANION GAP SERPL CALC-SCNC: 14 MMOL/L — SIGNIFICANT CHANGE UP (ref 5–17)
BUN SERPL-MCNC: 23 MG/DL — SIGNIFICANT CHANGE UP (ref 7–23)
CALCIUM SERPL-MCNC: 9.9 MG/DL — SIGNIFICANT CHANGE UP (ref 8.4–10.5)
CHLORIDE SERPL-SCNC: 99 MMOL/L — SIGNIFICANT CHANGE UP (ref 96–108)
CO2 SERPL-SCNC: 26 MMOL/L — SIGNIFICANT CHANGE UP (ref 22–31)
CREAT SERPL-MCNC: 1.37 MG/DL — HIGH (ref 0.5–1.3)
GLUCOSE SERPL-MCNC: 124 MG/DL — HIGH (ref 70–99)
HCT VFR BLD CALC: 38.3 % — LOW (ref 39–50)
HGB BLD-MCNC: 12.6 G/DL — LOW (ref 13–17)
MCHC RBC-ENTMCNC: 29.9 PG — SIGNIFICANT CHANGE UP (ref 27–34)
MCHC RBC-ENTMCNC: 32.9 GM/DL — SIGNIFICANT CHANGE UP (ref 32–36)
MCV RBC AUTO: 90.8 FL — SIGNIFICANT CHANGE UP (ref 80–100)
PLATELET # BLD AUTO: 91 K/UL — LOW (ref 150–400)
POTASSIUM SERPL-MCNC: 4.2 MMOL/L — SIGNIFICANT CHANGE UP (ref 3.5–5.3)
POTASSIUM SERPL-SCNC: 4.2 MMOL/L — SIGNIFICANT CHANGE UP (ref 3.5–5.3)
RAPID RVP RESULT: SIGNIFICANT CHANGE UP
RBC # BLD: 4.22 M/UL — SIGNIFICANT CHANGE UP (ref 4.2–5.8)
RBC # FLD: 17.5 % — HIGH (ref 10.3–14.5)
SODIUM SERPL-SCNC: 139 MMOL/L — SIGNIFICANT CHANGE UP (ref 135–145)
WBC # BLD: 5.34 K/UL — SIGNIFICANT CHANGE UP (ref 3.8–10.5)
WBC # FLD AUTO: 5.34 K/UL — SIGNIFICANT CHANGE UP (ref 3.8–10.5)

## 2018-11-24 PROCEDURE — 99232 SBSQ HOSP IP/OBS MODERATE 35: CPT

## 2018-11-24 PROCEDURE — 71045 X-RAY EXAM CHEST 1 VIEW: CPT | Mod: 26

## 2018-11-24 RX ADMIN — Medication 50 MILLIGRAM(S): at 17:44

## 2018-11-24 RX ADMIN — Medication 100 MILLIGRAM(S): at 21:08

## 2018-11-24 RX ADMIN — Medication 100 MILLIGRAM(S): at 11:32

## 2018-11-24 RX ADMIN — Medication 40 MILLIGRAM(S): at 17:44

## 2018-11-24 RX ADMIN — PANTOPRAZOLE SODIUM 40 MILLIGRAM(S): 20 TABLET, DELAYED RELEASE ORAL at 11:31

## 2018-11-24 RX ADMIN — ENOXAPARIN SODIUM 60 MILLIGRAM(S): 100 INJECTION SUBCUTANEOUS at 11:32

## 2018-11-24 RX ADMIN — Medication 100 MILLIGRAM(S): at 06:29

## 2018-11-24 RX ADMIN — Medication 81 MILLIGRAM(S): at 11:32

## 2018-11-24 RX ADMIN — ATORVASTATIN CALCIUM 40 MILLIGRAM(S): 80 TABLET, FILM COATED ORAL at 21:08

## 2018-11-24 RX ADMIN — Medication 50 MILLIGRAM(S): at 06:29

## 2018-11-24 RX ADMIN — Medication 40 MILLIGRAM(S): at 06:28

## 2018-11-24 NOTE — PROGRESS NOTE ADULT - ASSESSMENT
87 yo AA M with PMH, HTN, HLD, hyponatremia and Afib (on Eliquis last 11/19 am), colon CA (2001 s/p chemo), prostate Ca (2006 tx with radiation), CKD, [facility], cough with work up revealing  severe MR now transferred to step down secondary to aborted procedure due to RA thrombus.   11/22 Toprol increased to 50 BID for rate control. Transition from Heparin drip to therapeutic Lovenox as per Vascular. Platelets 89.  11/23HIT sent PLTS 96 RVP sent   11/24 Hit pending RVP negative 87 yo AA M with PMH, HTN, HLD, hyponatremia and Afib (on Eliquis last 11/19 am), colon CA (2001 s/p chemo), prostate Ca (2006 tx with radiation), CKD, Patient recently admitted to [facility] on 10/2018 for CHF, cough with work up revealing  severe MR now transferred to step down secondary to aborted procedure due to RA thrombus.   11/22 Toprol increased to 50 BID for rate control. Transition from Heparin drip to therapeutic Lovenox as per Vascular. Platelets 89.  11/23 PLTS 88  11/24  PLTS 91 today  RVP negative pending discharge home in am

## 2018-11-24 NOTE — PROGRESS NOTE ADULT - SUBJECTIVE AND OBJECTIVE BOX
S: no chest pain or sob       aspirin enteric coated 81 milliGRAM(s) Oral daily  atorvastatin 40 milliGRAM(s) Oral at bedtime  benzocaine 15 mG/menthol 3.6 mG (Sugar-Free) Lozenge 1 Lozenge Oral every 4 hours PRN  docusate sodium 100 milliGRAM(s) Oral three times a day  enoxaparin Injectable 60 milliGRAM(s) SubCutaneous daily  furosemide   Injectable 40 milliGRAM(s) IV Push every 12 hours  metoprolol succinate ER 50 milliGRAM(s) Oral two times a day  pantoprazole  Injectable 40 milliGRAM(s) IV Push daily  sodium chloride 0.9%. 1000 milliLiter(s) IV Continuous <Continuous>                            12.6   5.34  )-----------( 91       ( 24 Nov 2018 10:55 )             38.3       11-24    139  |  99  |  23  ----------------------------<  124<H>  4.2   |  26  |  1.37<H>    Ca    9.9      24 Nov 2018 09:57              T(C): 36.6 (11-24-18 @ 13:00), Max: 36.7 (11-23-18 @ 20:34)  HR: 105 (11-24-18 @ 13:00) (105 - 125)  BP: 108/70 (11-24-18 @ 17:41) (105/68 - 128/89)  RR: 17 (11-24-18 @ 13:00) (17 - 18)  SpO2: 95% (11-24-18 @ 13:00) (95% - 98%)  Wt(kg): --    I&O's Summary    23 Nov 2018 07:01  -  24 Nov 2018 07:00  --------------------------------------------------------  IN: 220 mL / OUT: 2075 mL / NET: -1855 mL    24 Nov 2018 07:01  -  24 Nov 2018 17:51  --------------------------------------------------------  IN: 600 mL / OUT: 250 mL / NET: 350 mL      HEENT: Normal Oral mucosa, PERRL, EOMI  Lymphatic: No obvious lymphadenopathy, No edema  Cardiovascular: Normal S1S2, No JVD, 1/6 CARL, Peripheral pulses palpable 2+ B/L  Respiratory: Lungs clear to auscultation, normal effort  Gastrointestinal: Abdomen soft, ND, NT, +BS  Skin: Warm, dry, intact. No cyanosis, No rash.  Musculoskeletal: Normal ROM, normal strength  Psychiatric: Appropriate Mood and Affect      DIAGNOSTIC DATA  TELEMETRY: -140    RADIOLOGY:   < from: Xray Chest 1 View- PORTABLE-Routine (11.22.18 @ 05:59) >  Impression:    The heart is normal in size. Bilateral small pleural effusion. No acute   consolidation.    YENNY: < from: YENNY w/Doppler (09.24.18 @ 13:09) >  CONCLUSIONS:  1. Mild posterior mitral annular calcification. Severe,  predominantly centrally-directed mitral regurgitation with  multiple regurgitant jets. PISA could not be performed for  quantification of MR due to multiple regurgitant jets.  2. Mildly calcified trileaflet aortic valve with decreased  opening. Mild aortic stenosis. Trace aortic regurgitation.  Linear strands on the left ventricular side of the aortic  leaflets consistent with Lambl's excresences.  3. Normal aortic root. Simple atheroma noted in aortic  arch/descending aorta.  4. No left atrial or left atrial appendagethrombus.  5. Mild to moderate global left ventricular systolic  dysfunction.  6. No clot seen in right atrium.  7. Normal right ventricular size and function.  8. Normal tricuspid valve. Trace tricuspid regurgitation.  9. Normal pulmonic valve. Trace pulmonic insufficiency is  noted.  10. Atrial septum appears intact on 2D echo and color  Doppler.  11. No pericardial effusion.    *** Compared with echocardiogram of 9/22/2018, a YENNY was  performed and additional information provided.    < end of copied text >      TTE: < from: Intra-Operative Transesophageal Echo (11.21.18 @ 13:40) >  Conclusions:  1. Mitral annular calcification. Tethered mitral valve  leaflets with normal opening. Abnormal posterior mitral  valve leaflet, possible cleft leaflet. Severe mitral  regurgitation. Large lateral jet with other small jets  along the commissural line. Mean transmitral valve gradient  equals 1 mm Hg. HR 83bpm.  2. Normal right atrium. Large approx 4-5cm long, highly  mobile thrombus is seen swirling around in the right  atrium.  3. Normal right ventricular size with decreased right  ventricular systolic function.  Confirmedon  11/21/2018 - 14:37:52 by Manuel De León M.D.    < end of copied text >          ASSESSMENT AND PLAN:   He is a pleasant 87 y/o male PMH persistent AF and severe MR who was supposed to have a Lucy Clip yesterday. The procedure was cancelled because he had a large RA thrombus on intra-op YENNY, which was then aspirated. He denies palpitations, syncope, nor angina.    -management of severe MR as per CT surgery  -anticoagulation as per vascular cardiology   -recommend lifelong ac for cva prevention   -follow up repeat TTE  -follow up with Dr. Scott after discharge     Ethan Solorzano MD

## 2018-11-24 NOTE — PROGRESS NOTE ADULT - SUBJECTIVE AND OBJECTIVE BOX
Subjective Hello OOB in chair    VITAL SIGNS    Telemetry:  afib     Vital Signs Last 24 Hrs  T(C): 36.3 (18 @ 04:58), Max: 36.9 (18 @ 13:51)  T(F): 97.4 (18 @ 04:58), Max: 98.4 (18 @ 13:51)  HR: 105 (18 @ 04:58) (105 - 125)  BP: 128/89 (18 @ 04:58) (111/81 - 128/89)  RR: 18 (18 @ 04:58) (18 - 18)  SpO2: 95% (18 @ 04:58) (95% - 99%)            @ 07:01  -   @ 07:00  --------------------------------------------------------  IN: 220 mL / OUT: 2075 mL / NET: -1855 mL      Daily Weight in k.3 (2018 09:00)      PHYSICAL EXAM  Neurology: alert and oriented x 3, nonfocal, no gross deficits  CV :S1 S2 IRR 2/6b murmur  Lungs: B/l CTA  Abdomen: soft, nontender, nondistended, positive bowel sounds, l  :      Voids       Extremities:   B/ L lE warm well perfused + DP              Physical Therapy Rec:   Home  [x]    Discussed with Cardiothoracic Team at AM rounds.

## 2018-11-25 LAB
ANION GAP SERPL CALC-SCNC: 15 MMOL/L — SIGNIFICANT CHANGE UP (ref 5–17)
APTT BLD: 29.9 SEC — SIGNIFICANT CHANGE UP (ref 27.5–36.3)
BUN SERPL-MCNC: 28 MG/DL — HIGH (ref 7–23)
CALCIUM SERPL-MCNC: 9.8 MG/DL — SIGNIFICANT CHANGE UP (ref 8.4–10.5)
CHLORIDE SERPL-SCNC: 99 MMOL/L — SIGNIFICANT CHANGE UP (ref 96–108)
CO2 SERPL-SCNC: 23 MMOL/L — SIGNIFICANT CHANGE UP (ref 22–31)
CREAT SERPL-MCNC: 1.37 MG/DL — HIGH (ref 0.5–1.3)
GLUCOSE BLDC GLUCOMTR-MCNC: 83 MG/DL — SIGNIFICANT CHANGE UP (ref 70–99)
GLUCOSE SERPL-MCNC: 164 MG/DL — HIGH (ref 70–99)
HCT VFR BLD CALC: 38.8 % — LOW (ref 39–50)
HEPARIN INHIBITION TEG PNL BLD: 91 % — HIGH (ref 0–0)
HGB BLD-MCNC: 13.2 G/DL — SIGNIFICANT CHANGE UP (ref 13–17)
INR BLD: 1.23 RATIO — HIGH (ref 0.88–1.16)
MCHC RBC-ENTMCNC: 31 PG — SIGNIFICANT CHANGE UP (ref 27–34)
MCHC RBC-ENTMCNC: 33.9 GM/DL — SIGNIFICANT CHANGE UP (ref 32–36)
MCV RBC AUTO: 91.4 FL — SIGNIFICANT CHANGE UP (ref 80–100)
PF4 HEPARIN CMPLX AB SER-ACNC: POSITIVE
PF4 HEPARIN CMPLX AB SER-ACNC: SIGNIFICANT CHANGE UP
PF4 HEPARIN CMPLX AB SERPL QL IA: 0.78 ABS — HIGH
PLATELET # BLD AUTO: 104 K/UL — LOW (ref 150–400)
POTASSIUM SERPL-MCNC: 3.9 MMOL/L — SIGNIFICANT CHANGE UP (ref 3.5–5.3)
POTASSIUM SERPL-SCNC: 3.9 MMOL/L — SIGNIFICANT CHANGE UP (ref 3.5–5.3)
PROTHROM AB SERPL-ACNC: 14.1 SEC — HIGH (ref 10–12.9)
RBC # BLD: 4.25 M/UL — SIGNIFICANT CHANGE UP (ref 4.2–5.8)
RBC # FLD: 16.2 % — HIGH (ref 10.3–14.5)
SODIUM SERPL-SCNC: 137 MMOL/L — SIGNIFICANT CHANGE UP (ref 135–145)
WBC # BLD: 4.7 K/UL — SIGNIFICANT CHANGE UP (ref 3.8–10.5)
WBC # FLD AUTO: 4.7 K/UL — SIGNIFICANT CHANGE UP (ref 3.8–10.5)

## 2018-11-25 PROCEDURE — 99221 1ST HOSP IP/OBS SF/LOW 40: CPT | Mod: GC

## 2018-11-25 RX ORDER — METOPROLOL TARTRATE 50 MG
2.5 TABLET ORAL ONCE
Qty: 0 | Refills: 0 | Status: COMPLETED | OUTPATIENT
Start: 2018-11-25 | End: 2018-11-25

## 2018-11-25 RX ORDER — METOPROLOL TARTRATE 50 MG
1 TABLET ORAL
Qty: 60 | Refills: 0 | OUTPATIENT
Start: 2018-11-25 | End: 2018-12-24

## 2018-11-25 RX ORDER — METOPROLOL TARTRATE 50 MG
25 TABLET ORAL ONCE
Qty: 0 | Refills: 0 | Status: COMPLETED | OUTPATIENT
Start: 2018-11-25 | End: 2018-11-25

## 2018-11-25 RX ORDER — ARGATROBAN 50 MG/50ML
2 INJECTION, SOLUTION INTRAVENOUS
Qty: 250 | Refills: 0 | Status: DISCONTINUED | OUTPATIENT
Start: 2018-11-25 | End: 2018-11-30

## 2018-11-25 RX ORDER — METOPROLOL TARTRATE 50 MG
75 TABLET ORAL EVERY 12 HOURS
Qty: 0 | Refills: 0 | Status: DISCONTINUED | OUTPATIENT
Start: 2018-11-25 | End: 2018-11-27

## 2018-11-25 RX ORDER — METOPROLOL TARTRATE 50 MG
3 TABLET ORAL
Qty: 0 | Refills: 0 | COMMUNITY
Start: 2018-11-25

## 2018-11-25 RX ADMIN — Medication 40 MILLIGRAM(S): at 06:27

## 2018-11-25 RX ADMIN — ENOXAPARIN SODIUM 60 MILLIGRAM(S): 100 INJECTION SUBCUTANEOUS at 12:20

## 2018-11-25 RX ADMIN — Medication 50 MILLIGRAM(S): at 06:27

## 2018-11-25 RX ADMIN — Medication 25 MILLIGRAM(S): at 09:03

## 2018-11-25 RX ADMIN — Medication 2.5 MILLIGRAM(S): at 09:03

## 2018-11-25 RX ADMIN — ARGATROBAN 7.54 MICROGRAM(S)/KG/MIN: 50 INJECTION, SOLUTION INTRAVENOUS at 18:59

## 2018-11-25 RX ADMIN — Medication 81 MILLIGRAM(S): at 12:21

## 2018-11-25 RX ADMIN — Medication 100 MILLIGRAM(S): at 14:54

## 2018-11-25 RX ADMIN — Medication 40 MILLIGRAM(S): at 17:52

## 2018-11-25 RX ADMIN — ATORVASTATIN CALCIUM 40 MILLIGRAM(S): 80 TABLET, FILM COATED ORAL at 21:04

## 2018-11-25 RX ADMIN — Medication 100 MILLIGRAM(S): at 06:28

## 2018-11-25 RX ADMIN — Medication 2.5 MILLIGRAM(S): at 11:00

## 2018-11-25 RX ADMIN — Medication 75 MILLIGRAM(S): at 17:53

## 2018-11-25 RX ADMIN — PANTOPRAZOLE SODIUM 40 MILLIGRAM(S): 20 TABLET, DELAYED RELEASE ORAL at 12:21

## 2018-11-25 NOTE — PROGRESS NOTE ADULT - ASSESSMENT
87 yo AA M with PMH, HTN, HLD, hyponatremia and Afib (on Eliquis last 11/19 am), colon CA (2001 s/p chemo), prostate Ca (2006 tx with radiation), CKD, Patient recently admitted to [facility] on 10/2018 for CHF, cough with work up revealing  severe MR now transferred to step down secondary to aborted procedure due to RA thrombus.   11/22 Toprol increased to 50 BID for rate control. Transition from Heparin drip to therapeutic Lovenox as per Vascular. Platelets 89.  11/23 PLTS 88  11/24  PLTS 91 today  RVP negative pending discharge home in am 11/25 BBlocker increased for rate control. Pt cleared for discharge. d/w   Dr Floyd 87 yo AA M with PMH, HTN, HLD, hyponatremia and Afib (on Eliquis last 11/19 am), colon CA (2001 s/p chemo), prostate Ca (2006 tx with radiation), CKD, Patient recently admitted to [facility] on 10/2018 for CHF, cough with work up revealing  severe MR now transferred to step down secondary to aborted procedure due to RA thrombus.   11/22 Toprol increased to 50 BID for rate control. Transition from Heparin drip to therapeutic Lovenox as per Vascular. Platelets 89.  11/23 PLTS 88  11/24  PLTS 91 today  RVP negative pending discharge home in am 11/25 BBlocker increased for rate control. discharge cancelled, HIT+ d/w   Dr Floyd

## 2018-11-25 NOTE — CONSULT NOTE ADULT - ATTENDING COMMENTS
Agree with above  Heparin gtt without a bolus today  If he tolerates this, then transition to therapuetic lovenox tomorrow  LE venous duplex, 2d echo    Ronel [id number]
Patient interviewed/examined.  Agree with history, PE, A/P as above.  Patient has chronic thrombocytopenia with counts roughly in same range going back to at least Sept.    Anticoagulate with argatroban while awaiting confirmatory AVIVA.    Rivera Colorado MD (Weekend Coverage)  [phone]

## 2018-11-25 NOTE — CONSULT NOTE ADULT - ASSESSMENT
89 yo AA male with PMH, HTN, HLD, hyponatremia and Afib (on Eliquis ) colon CA (2001 s/p chemo), prostate Ca (2006 tx with radiation), CKD severe MR admitted for scheduled  Mitral MR clip on 11/21/18. 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    Thrombocytopenia- Heparin PF4 antibodies positive, check AVIVA. place on argatroban  found to have RA thrombus as well. appreciate cardiology input  peripheral smear reviewed: has numerous cindy and spur cells.  does have schistocytes  check haptoglobin, LDH  Hgb stable at 13.2,  not dropping    RA thrombus- given Heparin PF4 antibodies positive, place on argatroban drip. check AVIVA 89 yo AA male with PMH, HTN, HLD, hyponatremia and Afib (on Eliquis ) colon CA (2001 s/p chemo), prostate Ca (2006 tx with radiation), CKD severe MR admitted for scheduled  Mitral MR clip on 11/21/18. 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    Thrombocytopenia- Heparin PF4 antibodies positive, check AVIVA. place on argatroban  found to have RA thrombus as well. appreciate cardiology input  peripheral smear reviewed: has numerous cindy and spur cells.  he has rare schistocytes  check haptoglobin, LDH  Hgb stable at 13.2,  not dropping  platelets not dropping, they have stablized, which goes against HIT, but would keep on argatroban until AVIVA resulted    RA thrombus- given Heparin PF4 antibodies positive, place on argatroban drip. check AVIVA

## 2018-11-25 NOTE — CONSULT NOTE ADULT - SUBJECTIVE AND OBJECTIVE BOX
HPI:  89 yo AA male with PMH, HTN, HLD, hyponatremia and Afib (on Eliquis ) colon CA (2001 s/p chemo), prostate Ca (2006 tx with radiation), CKD severe MR admitted for scheduled  Mitral MR clip on 11/21/18. 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    Patient said he feels well. he was never told he had low platelets before.    Per documentation, "  during intraop YENNY, a large RA thrombus was found. It was aspirated and MitraClip was aborted. No evidence of RA clot was found after aspiration on YENNY. "    TTE 9/2018 EF 40-45% mod to sev MR with mild AS  baseline Cr 1.14 GFR 44 (19 Nov 2018 12:10)      PAST MEDICAL & SURGICAL HISTORY:  Pleural effusion, bilateral: as of latest chest xray  s/p latest hospitalization for CHF  CKD (chronic kidney disease)  Colon cancer  Prostate cancer  Mitral regurgitation  Hyponatremia  HLD (hyperlipidemia)  HTN (hypertension)  Atrial fibrillation  Prostate CA  GERD (gastroesophageal reflux disease)  Afib  HLD (hyperlipidemia)  HTN (hypertension)  S/P coronary angiogram: 11/12/18  History of appendectomy  H/O colectomy  History of prostate cancer      Allergies    penicillin (Hives)  shellfish (Hives)    Intolerances        MEDICATIONS  (STANDING):  argatroban Infusion 2 MICROgram(s)/kG/Min (7.536 mL/Hr) IV Continuous <Continuous>  aspirin enteric coated 81 milliGRAM(s) Oral daily  atorvastatin 40 milliGRAM(s) Oral at bedtime  docusate sodium 100 milliGRAM(s) Oral three times a day  furosemide   Injectable 40 milliGRAM(s) IV Push every 12 hours  metoprolol succinate ER 75 milliGRAM(s) Oral every 12 hours  pantoprazole  Injectable 40 milliGRAM(s) IV Push daily  sodium chloride 0.9%. 1000 milliLiter(s) (10 mL/Hr) IV Continuous <Continuous>    MEDICATIONS  (PRN):  benzocaine 15 mG/menthol 3.6 mG (Sugar-Free) Lozenge 1 Lozenge Oral every 4 hours PRN Sore Throat      FAMILY HISTORY:  Family history of colon cancer (Sibling)  Family history of ovarian cancer (Mother)  Family history of cancer (Sibling)      SOCIAL HISTORY: No EtOH, no tobacco    REVIEW OF SYSTEMS:    CONSTITUTIONAL: No weakness, fevers or chills  EYES/ENT: No visual changes;  No vertigo or throat pain   NECK: No pain or stiffness  RESPIRATORY: No cough, wheezing, hemoptysis; No shortness of breath  CARDIOVASCULAR: No chest pain or palpitations  GASTROINTESTINAL: No abdominal or epigastric pain. No nausea, vomiting, or hematemesis; No diarrhea or constipation. No melena or hematochezia.  GENITOURINARY: No dysuria, frequency or hematuria  NEUROLOGICAL: No numbness or weakness  SKIN: No itching, burning, rashes, or lesions   All other review of systems is negative unless indicated above.        T(F): 97.7 (11-25-18 @ 13:20), Max: 98.1 (11-24-18 @ 20:00)  HR: 105 (11-25-18 @ 17:48)  BP: 111/74 (11-25-18 @ 17:48)  RR: 19 (11-25-18 @ 13:20)  SpO2: 94% (11-25-18 @ 13:20)  Wt(kg): --    GENERAL: NAD, well-developed  HEAD:  Atraumatic, Normocephalic  EYES: EOMI, PERRLA, conjunctiva and sclera clear  NECK: Supple, No JVD  CHEST/LUNG: Clear to auscultation bilaterally; No wheeze  HEART: Regular rate and rhythm; No murmurs, rubs, or gallops  ABDOMEN: Soft, Nontender, Nondistended; Bowel sounds present  EXTREMITIES:  2+ Peripheral Pulses, No clubbing, cyanosis, or edema  NEUROLOGY: non-focal  SKIN: No rashes or lesions                          13.2   4.7   )-----------( 104      ( 25 Nov 2018 09:50 )             38.8       11-25    137  |  99  |  28<H>  ----------------------------<  164<H>  3.9   |  23  |  1.37<H>    Ca    9.8      25 Nov 2018 09:50            PT/INR - ( 25 Nov 2018 09:50 )   PT: 14.1 sec;   INR: 1.23 ratio         PTT - ( 25 Nov 2018 09:50 )  PTT:29.9 sec    .Urine Clean Catch (Midstream)  09-22 @ 09:23   Culture grew 3 or more types of organisms which indicate  collection contamination; consider recollection only if clinically  indicated.  --  --

## 2018-11-25 NOTE — PROGRESS NOTE ADULT - PROBLEM SELECTOR PLAN 1
s/p R Atrial thrombus aspiration  Vascular consult noted  Plan to transition to therapeutic SQ Lovenox which pt will be d/c home on.  Repeat TTE today to evaluate thrombus s/p R Atrial thrombus aspiration  Vascular consult noted    Repeat TTE today to evaluate thrombus

## 2018-11-25 NOTE — PROGRESS NOTE ADULT - SUBJECTIVE AND OBJECTIVE BOX
S: no chest pain or sob         aspirin enteric coated 81 milliGRAM(s) Oral daily  atorvastatin 40 milliGRAM(s) Oral at bedtime  benzocaine 15 mG/menthol 3.6 mG (Sugar-Free) Lozenge 1 Lozenge Oral every 4 hours PRN  docusate sodium 100 milliGRAM(s) Oral three times a day  enoxaparin Injectable 60 milliGRAM(s) SubCutaneous daily  furosemide   Injectable 40 milliGRAM(s) IV Push every 12 hours  metoprolol succinate ER 75 milliGRAM(s) Oral every 12 hours  pantoprazole  Injectable 40 milliGRAM(s) IV Push daily  sodium chloride 0.9%. 1000 milliLiter(s) IV Continuous <Continuous>                            13.2   4.7   )-----------( 104      ( 25 Nov 2018 09:50 )             38.8       11-25    137  |  99  |  28<H>  ----------------------------<  164<H>  3.9   |  23  |  1.37<H>    Ca    9.8      25 Nov 2018 09:50              T(C): 36.5 (11-25-18 @ 13:20), Max: 36.7 (11-24-18 @ 20:00)  HR: 110 (11-25-18 @ 13:20) (102 - 147)  BP: 104/73 (11-25-18 @ 13:20) (103/69 - 124/79)  RR: 19 (11-25-18 @ 13:20) (18 - 19)  SpO2: 94% (11-25-18 @ 13:20) (94% - 94%)  Wt(kg): --    I&O's Summary    24 Nov 2018 07:01  -  25 Nov 2018 07:00  --------------------------------------------------------  IN: 960 mL / OUT: 1700 mL / NET: -740 mL    25 Nov 2018 07:01  -  25 Nov 2018 15:04  --------------------------------------------------------  IN: 360 mL / OUT: 0 mL / NET: 360 mL          HEENT: Normal Oral mucosa, PERRL, EOMI  Lymphatic: No obvious lymphadenopathy, No edema  Cardiovascular: Normal S1S2, IRRR, 1/6 CARL, Peripheral pulses palpable 2+ B/L  Respiratory: Lungs clear to auscultation, normal effort  Gastrointestinal: Abdomen soft, ND, NT, +BS  Skin: Warm, dry, intact. No cyanosis, No rash.  Musculoskeletal: Normal ROM, normal strength  Psychiatric: Appropriate Mood and Affect      DIAGNOSTIC DATA  TELEMETRY: -160    RADIOLOGY:   < from: Xray Chest 1 View- PORTABLE-Routine (11.22.18 @ 05:59) >  Impression:    The heart is normal in size. Bilateral small pleural effusion. No acute   consolidation.    YENNY: < from: YENNY w/Doppler (09.24.18 @ 13:09) >  CONCLUSIONS:  1. Mild posterior mitral annular calcification. Severe,  predominantly centrally-directed mitral regurgitation with  multiple regurgitant jets. PISA could not be performed for  quantification of MR due to multiple regurgitant jets.  2. Mildly calcified trileaflet aortic valve with decreased  opening. Mild aortic stenosis. Trace aortic regurgitation.  Linear strands on the left ventricular side of the aortic  leaflets consistent with Lambl's excresences.  3. Normal aortic root. Simple atheroma noted in aortic  arch/descending aorta.  4. No left atrial or left atrial appendagethrombus.  5. Mild to moderate global left ventricular systolic  dysfunction.  6. No clot seen in right atrium.  7. Normal right ventricular size and function.  8. Normal tricuspid valve. Trace tricuspid regurgitation.  9. Normal pulmonic valve. Trace pulmonic insufficiency is  noted.  10. Atrial septum appears intact on 2D echo and color  Doppler.  11. No pericardial effusion.    *** Compared with echocardiogram of 9/22/2018, a YENNY was  performed and additional information provided.    < end of copied text >      TTE: < from: Intra-Operative Transesophageal Echo (11.21.18 @ 13:40) >  Conclusions:  1. Mitral annular calcification. Tethered mitral valve  leaflets with normal opening. Abnormal posterior mitral  valve leaflet, possible cleft leaflet. Severe mitral  regurgitation. Large lateral jet with other small jets  along the commissural line. Mean transmitral valve gradient  equals 1 mm Hg. HR 83bpm.  2. Normal right atrium. Large approx 4-5cm long, highly  mobile thrombus is seen swirling around in the right  atrium.  3. Normal right ventricular size with decreased right  ventricular systolic function.  Confirmedon  11/21/2018 - 14:37:52 by Manuel De León M.D.    < end of copied text >          ASSESSMENT AND PLAN:   He is a pleasant 89 y/o male PMH persistent AF and severe MR who was supposed to have a Lucy Clip yesterday. The procedure was cancelled because he had a large RA thrombus on intra-op YENNY, which was then aspirated. He denies palpitations, syncope, nor angina.    -management of severe MR as per CT surgery  -anticoagulation as per vascular cardiology   -recommend lifelong ac for cva prevention   -continue with rate control with beta blockers  -if being given twice daily, consider changing to metoprolol tartrate   -follow up CTS    Ethan Solorzano MD

## 2018-11-25 NOTE — PROGRESS NOTE ADULT - SUBJECTIVE AND OBJECTIVE BOX
VITAL SIGNS    Telemetry:  Afib 100-120  Vital Signs Last 24 Hrs  T(C): 36.7 (18 @ 04:46), Max: 36.7 (18 @ 20:00)  T(F): 98 (18 @ 04:46), Max: 98.1 (18 @ 20:00)  HR: 109 (18 @ 11:20) (102 - 147)  BP: 106/74 (18 @ 11:20) (103/69 - 124/79)  RR: 18 (18 @ 04:46) (17 - 18)  SpO2: 94% (18 @ 04:46) (94% - 95%)             @ 07:01  -   @ 07:00  --------------------------------------------------------  IN: 960 mL / OUT: 1700 mL / NET: -740 mL       Daily     Daily Weight in k (2018 07:12)  Admit Wt: Drug Dosing Weight  Height (cm): 184.15 (2018 09:19)  Weight (kg): 62.8 (2018 09:19)  BMI (kg/m2): 18.5 (2018 09:19)  BSA (m2): 1.83 (2018 09:19)      CAPILLARY BLOOD GLUCOSE      POCT Blood Glucose.: 83 mg/dL (2018 07:42)          MEDICATIONS  aspirin enteric coated 81 milliGRAM(s) Oral daily  atorvastatin 40 milliGRAM(s) Oral at bedtime  benzocaine 15 mG/menthol 3.6 mG (Sugar-Free) Lozenge 1 Lozenge Oral every 4 hours PRN  docusate sodium 100 milliGRAM(s) Oral three times a day  enoxaparin Injectable 60 milliGRAM(s) SubCutaneous daily  furosemide   Injectable 40 milliGRAM(s) IV Push every 12 hours  metoprolol succinate ER 75 milliGRAM(s) Oral every 12 hours  pantoprazole  Injectable 40 milliGRAM(s) IV Push daily  sodium chloride 0.9%. 1000 milliLiter(s) IV Continuous <Continuous>      PHYSICAL EXAM  Subjective: NAD  Neurology: alert and oriented x 3, nonfocal, no gross deficits  CV : S1S2  Lungs: CTA b/l  Abdomen: soft, NT,ND, (+ )BM  :  voiding  Extremities: -c/c/e       LABS      137  |  99  |  28<H>  ----------------------------<  164<H>  3.9   |  23  |  1.37<H>    Ca    9.8      2018 09:50                                   13.2   4.7   )-----------( 104      ( 2018 09:50 )             38.8          PT/INR - ( 2018 09:50 )   PT: 14.1 sec;   INR: 1.23 ratio         PTT - ( 2018 09:50 )  PTT:29.9 sec       PAST MEDICAL & SURGICAL HISTORY:  Pleural effusion, bilateral: as of latest chest xray  s/p latest hospitalization for CHF  CKD (chronic kidney disease)  Colon cancer  Prostate cancer  Mitral regurgitation  Hyponatremia  HLD (hyperlipidemia)  HTN (hypertension)  Atrial fibrillation  Prostate CA  GERD (gastroesophageal reflux disease)  Afib  HLD (hyperlipidemia)  HTN (hypertension)  S/P coronary angiogram: 18  History of appendectomy  H/O colectomy  History of prostate cancer

## 2018-11-26 LAB
ANION GAP SERPL CALC-SCNC: 14 MMOL/L — SIGNIFICANT CHANGE UP (ref 5–17)
ANION GAP SERPL CALC-SCNC: 18 MMOL/L — HIGH (ref 5–17)
APTT BLD: 63.4 SEC — HIGH (ref 27.5–36.3)
APTT BLD: 75.1 SEC — HIGH (ref 27.5–36.3)
BUN SERPL-MCNC: 32 MG/DL — HIGH (ref 7–23)
BUN SERPL-MCNC: 35 MG/DL — HIGH (ref 7–23)
CALCIUM SERPL-MCNC: 9.3 MG/DL — SIGNIFICANT CHANGE UP (ref 8.4–10.5)
CALCIUM SERPL-MCNC: 9.4 MG/DL — SIGNIFICANT CHANGE UP (ref 8.4–10.5)
CHLORIDE SERPL-SCNC: 97 MMOL/L — SIGNIFICANT CHANGE UP (ref 96–108)
CHLORIDE SERPL-SCNC: 99 MMOL/L — SIGNIFICANT CHANGE UP (ref 96–108)
CO2 SERPL-SCNC: 18 MMOL/L — LOW (ref 22–31)
CO2 SERPL-SCNC: 22 MMOL/L — SIGNIFICANT CHANGE UP (ref 22–31)
CREAT SERPL-MCNC: 1.36 MG/DL — HIGH (ref 0.5–1.3)
CREAT SERPL-MCNC: 1.42 MG/DL — HIGH (ref 0.5–1.3)
GLUCOSE SERPL-MCNC: 106 MG/DL — HIGH (ref 70–99)
GLUCOSE SERPL-MCNC: 175 MG/DL — HIGH (ref 70–99)
HCT VFR BLD CALC: 34.6 % — LOW (ref 39–50)
HGB BLD-MCNC: 12.2 G/DL — LOW (ref 13–17)
INR BLD: 2.03 RATIO — HIGH (ref 0.88–1.16)
MCHC RBC-ENTMCNC: 32.1 PG — SIGNIFICANT CHANGE UP (ref 27–34)
MCHC RBC-ENTMCNC: 35.2 GM/DL — SIGNIFICANT CHANGE UP (ref 32–36)
MCV RBC AUTO: 91 FL — SIGNIFICANT CHANGE UP (ref 80–100)
PLATELET # BLD AUTO: 102 K/UL — LOW (ref 150–400)
POTASSIUM SERPL-MCNC: 3.7 MMOL/L — SIGNIFICANT CHANGE UP (ref 3.5–5.3)
POTASSIUM SERPL-MCNC: 3.8 MMOL/L — SIGNIFICANT CHANGE UP (ref 3.5–5.3)
POTASSIUM SERPL-SCNC: 3.7 MMOL/L — SIGNIFICANT CHANGE UP (ref 3.5–5.3)
POTASSIUM SERPL-SCNC: 3.8 MMOL/L — SIGNIFICANT CHANGE UP (ref 3.5–5.3)
PROTHROM AB SERPL-ACNC: 23.7 SEC — HIGH (ref 10–12.9)
RBC # BLD: 3.81 M/UL — LOW (ref 4.2–5.8)
RBC # FLD: 15.5 % — HIGH (ref 10.3–14.5)
SODIUM SERPL-SCNC: 133 MMOL/L — LOW (ref 135–145)
SODIUM SERPL-SCNC: 135 MMOL/L — SIGNIFICANT CHANGE UP (ref 135–145)
WBC # BLD: 5.1 K/UL — SIGNIFICANT CHANGE UP (ref 3.8–10.5)
WBC # FLD AUTO: 5.1 K/UL — SIGNIFICANT CHANGE UP (ref 3.8–10.5)

## 2018-11-26 PROCEDURE — 99232 SBSQ HOSP IP/OBS MODERATE 35: CPT | Mod: GC

## 2018-11-26 RX ORDER — POTASSIUM BICARBONATE 978 MG/1
25 TABLET, EFFERVESCENT ORAL
Qty: 0 | Refills: 0 | Status: COMPLETED | OUTPATIENT
Start: 2018-11-26 | End: 2018-11-26

## 2018-11-26 RX ADMIN — Medication 75 MILLIGRAM(S): at 17:00

## 2018-11-26 RX ADMIN — POTASSIUM BICARBONATE 25 MILLIEQUIVALENT(S): 978 TABLET, EFFERVESCENT ORAL at 22:25

## 2018-11-26 RX ADMIN — PANTOPRAZOLE SODIUM 40 MILLIGRAM(S): 20 TABLET, DELAYED RELEASE ORAL at 12:21

## 2018-11-26 RX ADMIN — ATORVASTATIN CALCIUM 40 MILLIGRAM(S): 80 TABLET, FILM COATED ORAL at 22:04

## 2018-11-26 RX ADMIN — Medication 75 MILLIGRAM(S): at 06:47

## 2018-11-26 RX ADMIN — Medication 100 MILLIGRAM(S): at 22:04

## 2018-11-26 RX ADMIN — Medication 81 MILLIGRAM(S): at 12:22

## 2018-11-26 RX ADMIN — Medication 40 MILLIGRAM(S): at 17:00

## 2018-11-26 RX ADMIN — ARGATROBAN 7.54 MICROGRAM(S)/KG/MIN: 50 INJECTION, SOLUTION INTRAVENOUS at 01:45

## 2018-11-26 RX ADMIN — ARGATROBAN 7.54 MICROGRAM(S)/KG/MIN: 50 INJECTION, SOLUTION INTRAVENOUS at 10:23

## 2018-11-26 RX ADMIN — Medication 40 MILLIGRAM(S): at 06:46

## 2018-11-26 NOTE — PROVIDER CONTACT NOTE (OTHER) - ACTION/TREATMENT ORDERED:
Pt was given his Toprol XL 75mg and a stat BMP was ordered. NP Martin aware, Pt was given his Toprol XL 75mg  PO and a stat BMP was ordered.

## 2018-11-26 NOTE — PROGRESS NOTE ADULT - PROBLEM SELECTOR PLAN 1
s/p R Atrial thrombus aspiration  Vascular consult noted  argatroban gtt    Repeat TTE prn to evaluate thrombus

## 2018-11-26 NOTE — PROGRESS NOTE ADULT - SUBJECTIVE AND OBJECTIVE BOX
INTERVAL HPI/OVERNIGHT EVENTS:    Patient reports stable SOB and CAMACHO. He is adamant about leaving on Wednesday.    VITAL SIGNS:  T(F): 97.5 (11-26-18 @ 20:08)  HR: 104 (11-26-18 @ 20:08)  BP: 100/65 (11-26-18 @ 20:08)  RR: 18 (11-26-18 @ 20:08)  SpO2: 95% (11-26-18 @ 20:08)  Wt(kg): --    PHYSICAL EXAM:    Constitutional: NAD, chronically ill, lean  Eyes: EOMI, sclera non-icteric  Neck: supple, no masses, no JVD  Respiratory: CTA b/l, good air entry b/l  Cardiovascular: holosystolic murmur  Gastrointestinal: soft, NTND, no masses palpable, + BS, no hepatosplenomegaly  Extremities: no c/c/e  Neurological: AAOx2      MEDICATIONS  (STANDING):  argatroban Infusion 2 MICROgram(s)/kG/Min (7.536 mL/Hr) IV Continuous <Continuous>  aspirin enteric coated 81 milliGRAM(s) Oral daily  atorvastatin 40 milliGRAM(s) Oral at bedtime  docusate sodium 100 milliGRAM(s) Oral three times a day  furosemide   Injectable 40 milliGRAM(s) IV Push every 12 hours  metoprolol succinate ER 75 milliGRAM(s) Oral every 12 hours  pantoprazole  Injectable 40 milliGRAM(s) IV Push daily  sodium chloride 0.9%. 1000 milliLiter(s) (10 mL/Hr) IV Continuous <Continuous>    MEDICATIONS  (PRN):  benzocaine 15 mG/menthol 3.6 mG (Sugar-Free) Lozenge 1 Lozenge Oral every 4 hours PRN Sore Throat      Allergies    penicillin (Hives)  shellfish (Hives)    Intolerances        LABS:                        12.2   5.1   )-----------( 102      ( 26 Nov 2018 01:29 )             34.6     11-26    133<L>  |  97  |  35<H>  ----------------------------<  175<H>  3.7   |  18<L>  |  1.36<H>    Ca    9.3      26 Nov 2018 18:23      PT/INR - ( 26 Nov 2018 01:29 )   PT: 23.7 sec;   INR: 2.03 ratio         PTT - ( 26 Nov 2018 09:01 )  PTT:63.4 sec      RADIOLOGY & ADDITIONAL TESTS:  Studies reviewed.    ASSESSMENT & PLAN:

## 2018-11-26 NOTE — PROGRESS NOTE ADULT - ASSESSMENT
89 yo AA M with PMH, HTN, HLD, hyponatremia and Afib (on Eliquis last 11/19 am), colon CA (2001 s/p chemo), prostate Ca (2006 tx with radiation), CKD, Patient recently admitted to [facility] on 10/2018 for CHF, cough with work up revealing  severe MR now transferred to step down secondary to aborted procedure due to RA thrombus.   11/22 Toprol increased to 50 BID for rate control. Transition from Heparin drip to therapeutic Lovenox as per Vascular. Platelets 89.  11/23 PLTS 88  11/24  PLTS 91 today  RVP negative pending discharge home in am 11/25 BBlocker increased for rate control. 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 On toprol 75 BID currently, awaiting cards input for today for rate control. 87 yo AA M with PMH, HTN, HLD, hyponatremia and Afib (on Eliquis last 11/19 am), colon CA (2001 s/p chemo), prostate Ca (2006 tx with radiation), CKD, Patient recently admitted to [facility] on 10/2018 for CHF, cough with work up revealing  severe MR now transferred to step down secondary to aborted procedure due to RA thrombus.   11/22 Toprol increased to 50 BID for rate control. Transition from Heparin drip to therapeutic Lovenox as per Vascular. Platelets 89.  11/23 PLTS 88  11/24  PLTS 91 today  RVP negative pending discharge home in am 11/25 BBlocker increased for rate control. 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 On toprol 75 BID currently, awaiting cards input for today for rate control. Heme-onc for hit+, PLT stable, awaiting AVIVA.

## 2018-11-26 NOTE — PROGRESS NOTE ADULT - SUBJECTIVE AND OBJECTIVE BOX
Patient denies CP, SOB Review of systems otherwise (-)    argatroban Infusion 2 MICROgram(s)/kG/Min IV Continuous <Continuous>  aspirin enteric coated 81 milliGRAM(s) Oral daily  atorvastatin 40 milliGRAM(s) Oral at bedtime  benzocaine 15 mG/menthol 3.6 mG (Sugar-Free) Lozenge 1 Lozenge Oral every 4 hours PRN  docusate sodium 100 milliGRAM(s) Oral three times a day  furosemide   Injectable 40 milliGRAM(s) IV Push every 12 hours  metoprolol succinate ER 75 milliGRAM(s) Oral every 12 hours  pantoprazole  Injectable 40 milliGRAM(s) IV Push daily  sodium chloride 0.9%. 1000 milliLiter(s) IV Continuous <Continuous>                            12.2   5.1   )-----------( 102      ( 26 Nov 2018 01:29 )             34.6       Hemoglobin: 12.2 g/dL (11-26 @ 01:29)  Hemoglobin: 13.2 g/dL (11-25 @ 09:50)  Hemoglobin: 12.6 g/dL (11-24 @ 10:55)  Hemoglobin: 11.9 g/dL (11-23 @ 06:52)  Hemoglobin: 12.7 g/dL (11-22 @ 16:19)      11-26    135  |  99  |  32<H>  ----------------------------<  106<H>  3.8   |  22  |  1.42<H>    Ca    9.4      26 Nov 2018 01:29      Creatinine Trend: 1.42<--, 1.37<--, 1.37<--, 1.58<--, 1.50<--, 1.39<--    COAGS: PT/INR - ( 26 Nov 2018 01:29 )   PT: 23.7 sec;   INR: 2.03 ratio         PTT - ( 26 Nov 2018 09:01 )  PTT:63.4 sec          T(C): 36.3 (11-26-18 @ 14:07), Max: 36.7 (11-25-18 @ 20:23)  HR: 77 (11-26-18 @ 14:07) (77 - 118)  BP: 101/65 (11-26-18 @ 14:07) (101/65 - 117/80)  RR: 100 (11-26-18 @ 14:07) (18 - 100)  SpO2: 98% (11-26-18 @ 04:42) (96% - 98%)  Wt(kg): --    I&O's Summary    25 Nov 2018 07:01  -  26 Nov 2018 07:00  --------------------------------------------------------  IN: 427.5 mL / OUT: 0 mL / NET: 427.5 mL    26 Nov 2018 07:01  -  26 Nov 2018 15:46  --------------------------------------------------------  IN: 357.5 mL / OUT: 0 mL / NET: 357.5 mL        HEENT: Normal Oral mucosa, PERRL, EOMI  Lymphatic: No obvious lymphadenopathy, No edema  Cardiovascular: Normal S1S2, IRRR, 1/6 CARL, Peripheral pulses palpable 2+ B/L  Respiratory: Lungs clear to auscultation, normal effort  Gastrointestinal: Abdomen soft, ND, NT, +BS  Skin: Warm, dry, intact. No cyanosis, No rash.  Musculoskeletal: Normal ROM, normal strength  Psychiatric: Appropriate Mood and Affect      DIAGNOSTIC DATA  TELEMETRY: -160    RADIOLOGY:   < from: Xray Chest 1 View- PORTABLE-Routine (11.22.18 @ 05:59) >  Impression:    The heart is normal in size. Bilateral small pleural effusion. No acute   consolidation.    YENNY: < from: YENNY w/Doppler (09.24.18 @ 13:09) >  CONCLUSIONS:  1. Mild posterior mitral annular calcification. Severe,  predominantly centrally-directed mitral regurgitation with  multiple regurgitant jets. PISA could not be performed for  quantification of MR due to multiple regurgitant jets.  2. Mildly calcified trileaflet aortic valve with decreased  opening. Mild aortic stenosis. Trace aortic regurgitation.  Linear strands on the left ventricular side of the aortic  leaflets consistent with Lambl's excresences.  3. Normal aortic root. Simple atheroma noted in aortic  arch/descending aorta.  4. No left atrial or left atrial appendagethrombus.  5. Mild to moderate global left ventricular systolic  dysfunction.  6. No clot seen in right atrium.  7. Normal right ventricular size and function.  8. Normal tricuspid valve. Trace tricuspid regurgitation.  9. Normal pulmonic valve. Trace pulmonic insufficiency is  noted.  10. Atrial septum appears intact on 2D echo and color  Doppler.  11. No pericardial effusion.    *** Compared with echocardiogram of 9/22/2018, a YENNY was  performed and additional information provided.    < end of copied text >      TTE: < from: Intra-Operative Transesophageal Echo (11.21.18 @ 13:40) >  Conclusions:  1. Mitral annular calcification. Tethered mitral valve  leaflets with normal opening. Abnormal posterior mitral  valve leaflet, possible cleft leaflet. Severe mitral  regurgitation. Large lateral jet with other small jets  along the commissural line. Mean transmitral valve gradient  equals 1 mm Hg. HR 83bpm.  2. Normal right atrium. Large approx 4-5cm long, highly  mobile thrombus is seen swirling around in the right  atrium.  3. Normal right ventricular size with decreased right  ventricular systolic function.  Confirmedon  11/21/2018 - 14:37:52 by Manuel De León M.D.    < end of copied text >          ASSESSMENT AND PLAN:   He is a pleasant 87 y/o male PMH persistent AF and severe MR who was supposed to have a Lucy Clip yesterday. The procedure was cancelled because he had a large RA thrombus on intra-op YENNY, which was then aspirated. He denies palpitations, syncope, nor angina.    -management of severe MR as per CT surgery  -anticoagulation as per vascular cardiology   -recommend lifelong ac for cva prevention   -continue with rate control with beta blockers  - Monitor HE on Toprol  -follow up CTS    Nabor Salazar MD, FACC

## 2018-11-26 NOTE — PROGRESS NOTE ADULT - SUBJECTIVE AND OBJECTIVE BOX
Interval Hx; Events Overnight:  no events overnight, VSS, remains on argatroban gtt.     LABS:                12.2                 135  | 22   | 32           5.1   >-----------< 102     ------------------------< 106                   34.6                 3.8  | 99   | 1.42                                         Ca 9.4   Mg x     Ph x          PT/INR - ( 2018 01:29 )   PT: 23.7 sec;   INR: 2.03 ratio         PTT - ( 2018 09:01 )  PTT:63.4 sec    VITAL SIGNS    Telemetry:  afib 100-110s    Vital Signs Last 24 Hrs  T(C): 36.6 (18 @ 04:42), Max: 36.7 (18 @ 20:23)  T(F): 97.8 (18 @ 04:42), Max: 98 (18 @ 20:23)  HR: 106 (18 @ 07:15) (105 - 118)  BP: 117/80 (18 @ 04:42) (103/72 - 117/80)  RR: 18 (18 @ 04:42) (18 - 19)  SpO2: 98% (18 @ 04:42) (94% - 98%)                    @ 07:  -   @ 07:00  --------------------------------------------------------  IN: 427.5 mL / OUT: 0 mL / NET: 427.5 mL     @ 07:01  -   @ 11:30  --------------------------------------------------------  IN: 120 mL / OUT: 0 mL / NET: 120 mL          Daily     Daily Weight in k.6 (2018 08:15)            CAPILLARY BLOOD GLUCOSE                Drains:     MS         [  ] Drainage:                 L Pleural  [  ]  Drainage:                R Pleural  [  ]  Drainage:    Pacing Wires        [  ]   Settings:                                  Isolated  [  ]    Coumadin    [ ] YES          [  ]      NO                                   PHYSICAL EXAM      General: NAD, well appearing, in no distress  Neurology: A&O x3, non focal, no neuro deficits. Moves all extremities.  CV : s1 s2 RRR, no murmurs, gallops, clicks.   Sternal Wound :  Midsternal opsite, clean, dry and intact  Lungs: clear to auscultation  Abdomen: soft, nontender, nondistended, positive bowel sounds, ?BM  :    voiding / flores          Extremities:    no  edema. + pedal pulses      Incision: L leg  /  R leg  incisions cdi  Skin:           argatroban Infusion 2 MICROgram(s)/kG/Min IV Continuous <Continuous>  aspirin enteric coated 81 milliGRAM(s) Oral daily  atorvastatin 40 milliGRAM(s) Oral at bedtime  benzocaine 15 mG/menthol 3.6 mG (Sugar-Free) Lozenge 1 Lozenge Oral every 4 hours PRN  docusate sodium 100 milliGRAM(s) Oral three times a day  furosemide   Injectable 40 milliGRAM(s) IV Push every 12 hours  metoprolol succinate ER 75 milliGRAM(s) Oral every 12 hours  pantoprazole  Injectable 40 milliGRAM(s) IV Push daily  sodium chloride 0.9%. 1000 milliLiter(s) IV Continuous <Continuous> Interval Hx; Events Overnight:  no events overnight, VSS, remains on argatroban gtt.     LABS:                12.2                 135  | 22   | 32           5.1   >-----------< 102     ------------------------< 106                   34.6                 3.8  | 99   | 1.42                                         Ca 9.4   Mg x     Ph x          PT/INR - ( 2018 01:29 )   PT: 23.7 sec;   INR: 2.03 ratio         PTT - ( 2018 09:01 )  PTT:63.4 sec    VITAL SIGNS    Telemetry:  afib 100-110s    Vital Signs Last 24 Hrs  T(C): 36.6 (18 @ 04:42), Max: 36.7 (18 @ 20:23)  T(F): 97.8 (18 @ 04:42), Max: 98 (18 @ 20:23)  HR: 106 (18 @ 07:15) (105 - 118)  BP: 117/80 (18 @ 04:42) (103/72 - 117/80)  RR: 18 (18 @ 04:42) (18 - 19)  SpO2: 98% (18 @ 04:42) (94% - 98%)                    @ 07:  -   @ 07:00  --------------------------------------------------------  IN: 427.5 mL / OUT: 0 mL / NET: 427.5 mL     @ 07:01  -   @ 11:30  --------------------------------------------------------  IN: 120 mL / OUT: 0 mL / NET: 120 mL          Daily     Daily Weight in k.6 (2018 08:15)            CAPILLARY BLOOD GLUCOSE                Drains:     MS         [  ] Drainage:                 L Pleural  [  ]  Drainage:                R Pleural  [  ]  Drainage:    Pacing Wires        [  ]   Settings:                                  Isolated  [  ]    Coumadin    [ ] YES          [  ]      NO                                   PHYSICAL EXAM      General: NAD, well appearing, in no distress  Neurology: A&O x3, non focal, no neuro deficits. Moves all extremities.  CV : s1 s2 irregular  Sternal Wound :  none  Lungs: clear to auscultation  Abdomen: soft, nontender, nondistended, positive bowel sounds  :    voiding           Extremities:    no  edema. + pedal pulses      Incision: none  Skin: groin sites, cdi, no hematoma, stable          argatroban Infusion 2 MICROgram(s)/kG/Min IV Continuous <Continuous>  aspirin enteric coated 81 milliGRAM(s) Oral daily  atorvastatin 40 milliGRAM(s) Oral at bedtime  benzocaine 15 mG/menthol 3.6 mG (Sugar-Free) Lozenge 1 Lozenge Oral every 4 hours PRN  docusate sodium 100 milliGRAM(s) Oral three times a day  furosemide   Injectable 40 milliGRAM(s) IV Push every 12 hours  metoprolol succinate ER 75 milliGRAM(s) Oral every 12 hours  pantoprazole  Injectable 40 milliGRAM(s) IV Push daily  sodium chloride 0.9%. 1000 milliLiter(s) IV Continuous <Continuous>

## 2018-11-26 NOTE — PROGRESS NOTE ADULT - ASSESSMENT
87 yo AA male with PMH, HTN, HLD, hyponatremia and Afib (on Eliquis ) colon CA (2001 s/p chemo), prostate Ca (2006 tx with radiation), CKD severe MR admitted for scheduled  Mitral MR clip on 11/21/18. 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    Thrombocytopenia, appears to be longstanding  - present on labs as far back as 2013, slightly worse on this admission  - check HIV, hepatitis, b12, folate  - CT as below will evaluate for spleen size  - unlikely HIT, but since PF4 antibodies mildly positive, c/w argatroban at present while awaiting AVIVA  - additional workup can happen as outpatient    Right atrial thrombus  - removed intra-operatively  - possible eliquis failure - will need collateral history regarding whether he was compliant  - consider long-term lovenox for AC  - check anticardiolipin antibodies, B2 glycoprotein screen    History of prostate cancer  - check PSA  - check CT A/P, had recent CT chest    IgA MGUS  - unclear if patient had workup completed at the time  - check SPEP, serum immunofixation, quantitative immunoglobulins    History of colon cancer  - CT A/P as above, had CT chest recently    Plan discussed with attending.    Alexander Magallon, PGY5  Hematology/Oncology Fellow  Pager: [phone]

## 2018-11-27 LAB
ANION GAP SERPL CALC-SCNC: 14 MMOL/L — SIGNIFICANT CHANGE UP (ref 5–17)
APTT BLD: 61.6 SEC — HIGH (ref 27.5–36.3)
BUN SERPL-MCNC: 37 MG/DL — HIGH (ref 7–23)
CALCIUM SERPL-MCNC: 9.6 MG/DL — SIGNIFICANT CHANGE UP (ref 8.4–10.5)
CHLORIDE SERPL-SCNC: 97 MMOL/L — SIGNIFICANT CHANGE UP (ref 96–108)
CO2 SERPL-SCNC: 24 MMOL/L — SIGNIFICANT CHANGE UP (ref 22–31)
CREAT SERPL-MCNC: 1.41 MG/DL — HIGH (ref 0.5–1.3)
GLUCOSE SERPL-MCNC: 148 MG/DL — HIGH (ref 70–99)
HCT VFR BLD CALC: 35.8 % — LOW (ref 39–50)
HGB BLD-MCNC: 12.2 G/DL — LOW (ref 13–17)
IGA FLD-MCNC: 327 MG/DL — SIGNIFICANT CHANGE UP (ref 84–499)
IGG FLD-MCNC: 1315 MG/DL — SIGNIFICANT CHANGE UP (ref 610–1660)
IGM SERPL-MCNC: 97 MG/DL — SIGNIFICANT CHANGE UP (ref 35–242)
INR BLD: 2.08 RATIO — HIGH (ref 0.88–1.16)
KAPPA LC SER QL IFE: 5.55 MG/DL — HIGH (ref 0.33–1.94)
KAPPA/LAMBDA FREE LIGHT CHAIN RATIO, SERUM: 1.44 RATIO — SIGNIFICANT CHANGE UP (ref 0.26–1.65)
LAMBDA LC SER QL IFE: 3.86 MG/DL — HIGH (ref 0.57–2.63)
MCHC RBC-ENTMCNC: 30.8 PG — SIGNIFICANT CHANGE UP (ref 27–34)
MCHC RBC-ENTMCNC: 34.1 GM/DL — SIGNIFICANT CHANGE UP (ref 32–36)
MCV RBC AUTO: 90.2 FL — SIGNIFICANT CHANGE UP (ref 80–100)
PLATELET # BLD AUTO: 123 K/UL — LOW (ref 150–400)
POTASSIUM SERPL-MCNC: 3.8 MMOL/L — SIGNIFICANT CHANGE UP (ref 3.5–5.3)
POTASSIUM SERPL-SCNC: 3.8 MMOL/L — SIGNIFICANT CHANGE UP (ref 3.5–5.3)
PROTHROM AB SERPL-ACNC: 24.2 SEC — HIGH (ref 10–12.9)
PSA FLD-MCNC: 1.13 NG/ML — SIGNIFICANT CHANGE UP (ref 0–4)
RBC # BLD: 3.97 M/UL — LOW (ref 4.2–5.8)
RBC # FLD: 15.9 % — HIGH (ref 10.3–14.5)
SODIUM SERPL-SCNC: 135 MMOL/L — SIGNIFICANT CHANGE UP (ref 135–145)
WBC # BLD: 4.7 K/UL — SIGNIFICANT CHANGE UP (ref 3.8–10.5)
WBC # FLD AUTO: 4.7 K/UL — SIGNIFICANT CHANGE UP (ref 3.8–10.5)

## 2018-11-27 PROCEDURE — 99232 SBSQ HOSP IP/OBS MODERATE 35: CPT

## 2018-11-27 PROCEDURE — 74176 CT ABD & PELVIS W/O CONTRAST: CPT | Mod: 26

## 2018-11-27 RX ORDER — METOPROLOL TARTRATE 50 MG
100 TABLET ORAL EVERY 12 HOURS
Qty: 0 | Refills: 0 | Status: DISCONTINUED | OUTPATIENT
Start: 2018-11-27 | End: 2018-11-30

## 2018-11-27 RX ORDER — METOPROLOL TARTRATE 50 MG
25 TABLET ORAL ONCE
Qty: 0 | Refills: 0 | Status: COMPLETED | OUTPATIENT
Start: 2018-11-27 | End: 2018-11-27

## 2018-11-27 RX ADMIN — Medication 40 MILLIGRAM(S): at 06:08

## 2018-11-27 RX ADMIN — Medication 25 MILLIGRAM(S): at 10:32

## 2018-11-27 RX ADMIN — Medication 100 MILLIGRAM(S): at 18:45

## 2018-11-27 RX ADMIN — PANTOPRAZOLE SODIUM 40 MILLIGRAM(S): 20 TABLET, DELAYED RELEASE ORAL at 10:33

## 2018-11-27 RX ADMIN — Medication 75 MILLIGRAM(S): at 06:08

## 2018-11-27 RX ADMIN — Medication 81 MILLIGRAM(S): at 10:33

## 2018-11-27 RX ADMIN — Medication 40 MILLIGRAM(S): at 18:48

## 2018-11-27 RX ADMIN — Medication 100 MILLIGRAM(S): at 06:08

## 2018-11-27 RX ADMIN — ATORVASTATIN CALCIUM 40 MILLIGRAM(S): 80 TABLET, FILM COATED ORAL at 21:45

## 2018-11-27 NOTE — PROGRESS NOTE ADULT - SUBJECTIVE AND OBJECTIVE BOX
VITAL SIGNS    Telemetry:  afib  110    Vital Signs Last 24 Hrs  T(C): 36.3 (18 @ 05:15), Max: 36.4 (18 @ 20:08)  T(F): 97.3 (18 @ 05:15), Max: 97.5 (18 @ 20:08)  HR: 110 (18 @ 05:15) (77 - 118)  BP: 106/75 (18 @ 05:15) (100/65 - 108/75)  RR: 16 (18 @ 05:15) (16 - 100)  SpO2: 98% (18 @ 05:15) (95% - 98%)           Daily Weight in k.1 (2018 08:22)        CAPILLARY BLOOD GLUCOSE                            PHYSICAL EXAM    Neurology: alert and oriented x 3, moves all extremities with no defecits  CV :  RRR  Lungs:   CTA B/L  Abdomen: soft, nontender, nondistended, positive bowel sounds,  Extremities:     trace  le edema    no calf tenderness VITAL SIGNS    Telemetry:  afib  110    Vital Signs Last 24 Hrs  T(C): 36.3 (18 @ 05:15), Max: 36.4 (18 @ 20:08)  T(F): 97.3 (18 @ 05:15), Max: 97.5 (18 @ 20:08)  HR: 110 (18 @ 05:15) (77 - 118)  BP: 106/75 (18 @ 05:15) (100/65 - 108/75)  RR: 16 (18 @ 05:15) (16 - 100)  SpO2: 98% (18 @ 05:15) (95% - 98%)           Daily Weight in k.1 (2018 08:22)        CAPILLARY BLOOD GLUCOSE                            PHYSICAL EXAM  s   i have no SOB  no palpitations"  Neurology: alert and oriented x 3, moves all extremities with no defecits  CV :  RRR  Lungs:   CTA B/L  Abdomen: soft, nontender, nondistended, positive bowel sounds,  Extremities:     trace  le edema    no calf tenderness

## 2018-11-27 NOTE — PROGRESS NOTE ADULT - SUBJECTIVE AND OBJECTIVE BOX
Patient denies CP, SOB Review of systems otherwise (-)      MEDICATIONS  (STANDING):  argatroban Infusion 2 MICROgram(s)/kG/Min (7.536 mL/Hr) IV Continuous <Continuous>  aspirin enteric coated 81 milliGRAM(s) Oral daily  atorvastatin 40 milliGRAM(s) Oral at bedtime  docusate sodium 100 milliGRAM(s) Oral three times a day  furosemide   Injectable 40 milliGRAM(s) IV Push every 12 hours  metoprolol succinate ER 75 milliGRAM(s) Oral every 12 hours  metoprolol succinate  milliGRAM(s) Oral every 12 hours  pantoprazole  Injectable 40 milliGRAM(s) IV Push daily  sodium chloride 0.9%. 1000 milliLiter(s) (10 mL/Hr) IV Continuous <Continuous>    MEDICATIONS  (PRN):  benzocaine 15 mG/menthol 3.6 mG (Sugar-Free) Lozenge 1 Lozenge Oral every 4 hours PRN Sore Throat    LABS:                        12.2   4.7   )-----------( 123      ( 27 Nov 2018 10:18 )             35.8     135  |  97  |  37<H>  ----------------------------<  148<H>  3.8   |  24  |  1.41<H>    Ca    9.6      27 Nov 2018 10:18    Creatinine Trend: 1.41<--, 1.36<--, 1.42<--, 1.37<--, 1.37<--, 1.58<--   PT/INR - ( 27 Nov 2018 10:18 )   PT: 24.2 sec;   INR: 2.08 ratio    PTT - ( 27 Nov 2018 10:18 )  PTT:61.6 sec    PHYSICAL EXAM  Vital Signs Last 24 Hrs  T(C): 36.7 (27 Nov 2018 13:42), Max: 36.7 (27 Nov 2018 13:42)  T(F): 98.1 (27 Nov 2018 13:42), Max: 98.1 (27 Nov 2018 13:42)  HR: 70 (27 Nov 2018 13:42) (70 - 118)  BP: 92/66 (27 Nov 2018 13:42) (92/66 - 108/75)  BP(mean): 77 (26 Nov 2018 20:08) (77 - 77)  RR: 18 (27 Nov 2018 13:42) (16 - 18)  SpO2: 100% (27 Nov 2018 13:42) (95% - 100%)    HEENT: Normal Oral mucosa, PERRL, EOMI  Lymphatic: No obvious lymphadenopathy, No edema  Cardiovascular: Normal S1S2, IRRR, 1/6 CARL, Peripheral pulses palpable 2+ B/L  Respiratory: Lungs clear to auscultation, normal effort  Gastrointestinal: Abdomen soft, ND, NT, +BS  Skin: Warm, dry, intact. No cyanosis, No rash.  Musculoskeletal: Normal ROM, normal strength  Psychiatric: Appropriate Mood and Affect    DIAGNOSTIC DATA  TELEMETRY: -160    < from: YENNY w/Doppler (09.24.18 @ 13:09) >  CONCLUSIONS:  1. Mild posterior mitral annular calcification. Severe,  predominantly centrally-directed mitral regurgitation with  multiple regurgitant jets. PISA could not be performed for  quantification of MR due to multiple regurgitant jets.  2. Mildly calcified trileaflet aortic valve with decreased  opening. Mild aortic stenosis. Trace aortic regurgitation.  Linear strands on the left ventricular side of the aortic  leaflets consistent with Lambl's excresences.  3. Normal aortic root. Simple atheroma noted in aortic  arch/descending aorta.  4. No left atrial or left atrial appendagethrombus.  5. Mild to moderate global left ventricular systolic  dysfunction.  6. No clot seen in right atrium.  7. Normal right ventricular size and function.  8. Normal tricuspid valve. Trace tricuspid regurgitation.  9. Normal pulmonic valve. Trace pulmonic insufficiency is  noted.  10. Atrial septum appears intact on 2D echo and color  Doppler.  11. No pericardial effusion.    *** Compared with echocardiogram of 9/22/2018, a YENNY was  performed and additional information provided.  < end of copied text >     < from: Intra-Operative Transesophageal Echo (11.21.18 @ 13:40) >  Conclusions:  1. Mitral annular calcification. Tethered mitral valve  leaflets with normal opening. Abnormal posterior mitral  valve leaflet, possible cleft leaflet. Severe mitral  regurgitation. Large lateral jet with other small jets  along the commissural line. Mean transmitral valve gradient  equals 1 mm Hg. HR 83bpm.  2. Normal right atrium. Large approx 4-5cm long, highly  mobile thrombus is seen swirling around in the right  atrium.  3. Normal right ventricular size with decreased right  ventricular systolic function.  Confirmedon  11/21/2018 - 14:37:52 by Manuel De León M.D.    < end of copied text >      ASSESSMENT AND PLAN:     He is a pleasant 87 y/o male PMH persistent AF and severe MR who was scheduled to have a Lucy Clip, but the procedure was cancelled because he had a large RA thrombus on intra-op YENNY, which was then aspirated. He denies palpitations, syncope, nor angina.    -management of severe MR as per CT surgery  -anticoagulation as per vascular cardiology   -recommend lifelong ac for cva prevention   -continue with rate control with beta blockers  -follow up CTS  -repeat TTE pending

## 2018-11-27 NOTE — PROGRESS NOTE ADULT - ASSESSMENT
89 yo AA M with PMH, HTN, HLD, hyponatremia and Afib (on Eliquis last 11/19 am), colon CA (2001 s/p chemo), prostate Ca (2006 tx with radiation), CKD, Patient recently admitted to [facility] on 10/2018 for CHF, cough with work up revealing  severe MR now transferred to step down secondary to aborted procedure due to RA thrombus.   11/22 Toprol increased to 50 BID for rate control. Transition from Heparin drip to therapeutic Lovenox as per Vascular. Platelets 89.  11/23 PLTS 88  11/24  PLTS 91 today  RVP negative pending discharge home in am 11/25 BBlocker increased for rate control. 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 On toprol 75 BID currently, awaiting cards input for today for rate control. Heme-onc for hit+, PLT stable, awaiting AVIVA.   11/27   afib  110  inc toprol to 100 BID,   awaiting aviva results and BW today, phlebotomy

## 2018-11-28 DIAGNOSIS — K76.9 LIVER DISEASE, UNSPECIFIED: ICD-10-CM

## 2018-11-28 LAB
ANION GAP SERPL CALC-SCNC: 15 MMOL/L — SIGNIFICANT CHANGE UP (ref 5–17)
APTT BLD: 61.9 SEC — HIGH (ref 27.5–36.3)
BUN SERPL-MCNC: 39 MG/DL — HIGH (ref 7–23)
CALCIUM SERPL-MCNC: 9.6 MG/DL — SIGNIFICANT CHANGE UP (ref 8.4–10.5)
CHLORIDE SERPL-SCNC: 95 MMOL/L — LOW (ref 96–108)
CO2 SERPL-SCNC: 22 MMOL/L — SIGNIFICANT CHANGE UP (ref 22–31)
CREAT SERPL-MCNC: 1.47 MG/DL — HIGH (ref 0.5–1.3)
GLUCOSE SERPL-MCNC: 99 MG/DL — SIGNIFICANT CHANGE UP (ref 70–99)
HCT VFR BLD CALC: 33.8 % — LOW (ref 39–50)
HGB BLD-MCNC: 11.6 G/DL — LOW (ref 13–17)
INR BLD: 2.09 RATIO — HIGH (ref 0.88–1.16)
MCHC RBC-ENTMCNC: 30.7 PG — SIGNIFICANT CHANGE UP (ref 27–34)
MCHC RBC-ENTMCNC: 34.3 GM/DL — SIGNIFICANT CHANGE UP (ref 32–36)
MCV RBC AUTO: 89.5 FL — SIGNIFICANT CHANGE UP (ref 80–100)
PLATELET # BLD AUTO: 124 K/UL — LOW (ref 150–400)
POTASSIUM SERPL-MCNC: 4.2 MMOL/L — SIGNIFICANT CHANGE UP (ref 3.5–5.3)
POTASSIUM SERPL-SCNC: 4.2 MMOL/L — SIGNIFICANT CHANGE UP (ref 3.5–5.3)
PROT SERPL-MCNC: 6.7 G/DL — SIGNIFICANT CHANGE UP (ref 6–8.3)
PROT SERPL-MCNC: 6.7 G/DL — SIGNIFICANT CHANGE UP (ref 6–8.3)
PROTHROM AB SERPL-ACNC: 24.6 SEC — HIGH (ref 10–12.9)
RBC # BLD: 3.78 M/UL — LOW (ref 4.2–5.8)
RBC # FLD: 15.8 % — HIGH (ref 10.3–14.5)
SODIUM SERPL-SCNC: 132 MMOL/L — LOW (ref 135–145)
SRA INTERP SER-IMP: SIGNIFICANT CHANGE UP
WBC # BLD: 4.1 K/UL — SIGNIFICANT CHANGE UP (ref 3.8–10.5)
WBC # FLD AUTO: 4.1 K/UL — SIGNIFICANT CHANGE UP (ref 3.8–10.5)

## 2018-11-28 PROCEDURE — 99231 SBSQ HOSP IP/OBS SF/LOW 25: CPT | Mod: 24

## 2018-11-28 RX ADMIN — Medication 40 MILLIGRAM(S): at 17:16

## 2018-11-28 RX ADMIN — ATORVASTATIN CALCIUM 40 MILLIGRAM(S): 80 TABLET, FILM COATED ORAL at 21:44

## 2018-11-28 RX ADMIN — PANTOPRAZOLE SODIUM 40 MILLIGRAM(S): 20 TABLET, DELAYED RELEASE ORAL at 11:31

## 2018-11-28 RX ADMIN — ARGATROBAN 7.54 MICROGRAM(S)/KG/MIN: 50 INJECTION, SOLUTION INTRAVENOUS at 11:31

## 2018-11-28 RX ADMIN — Medication 100 MILLIGRAM(S): at 05:44

## 2018-11-28 RX ADMIN — Medication 100 MILLIGRAM(S): at 17:15

## 2018-11-28 RX ADMIN — Medication 81 MILLIGRAM(S): at 11:32

## 2018-11-28 RX ADMIN — Medication 40 MILLIGRAM(S): at 05:44

## 2018-11-28 NOTE — PROGRESS NOTE ADULT - SUBJECTIVE AND OBJECTIVE BOX
Patient denies CP, SOB Review of systems otherwise (-)      MEDICATIONS  (STANDING):  argatroban Infusion 2 MICROgram(s)/kG/Min (7.536 mL/Hr) IV Continuous <Continuous>  aspirin enteric coated 81 milliGRAM(s) Oral daily  atorvastatin 40 milliGRAM(s) Oral at bedtime  docusate sodium 100 milliGRAM(s) Oral three times a day  furosemide   Injectable 40 milliGRAM(s) IV Push every 12 hours  metoprolol succinate  milliGRAM(s) Oral every 12 hours  pantoprazole  Injectable 40 milliGRAM(s) IV Push daily  sodium chloride 0.9%. 1000 milliLiter(s) (10 mL/Hr) IV Continuous <Continuous>    MEDICATIONS  (PRN):  benzocaine 15 mG/menthol 3.6 mG (Sugar-Free) Lozenge 1 Lozenge Oral every 4 hours PRN Sore Throat      LABS:                        11.6   4.1   )-----------( 124      ( 28 Nov 2018 10:27 )             33.8     Hemoglobin: 11.6 g/dL (11-28 @ 10:27)  Hemoglobin: 12.2 g/dL (11-27 @ 10:18)  Hemoglobin: 12.2 g/dL (11-26 @ 01:29)  Hemoglobin: 13.2 g/dL (11-25 @ 09:50)  Hemoglobin: 12.6 g/dL (11-24 @ 10:55)    11-28    132<L>  |  95<L>  |  39<H>  ----------------------------<  99  4.2   |  22  |  1.47<H>    Ca    9.6      28 Nov 2018 10:27    TPro  6.7  /  Alb  x   /  TBili  x   /  DBili  x   /  AST  x   /  ALT  x   /  AlkPhos  x   11-27    Creatinine Trend: 1.47<--, 1.41<--, 1.36<--, 1.42<--, 1.37<--, 1.37<--   PT/INR - ( 28 Nov 2018 10:27 )   PT: 24.6 sec;   INR: 2.09 ratio         PTT - ( 28 Nov 2018 10:27 )  PTT:61.9 sec        PHYSICAL EXAM  Vital Signs Last 24 Hrs  T(C): 36.6 (28 Nov 2018 14:05), Max: 36.8 (28 Nov 2018 04:59)  T(F): 97.9 (28 Nov 2018 14:05), Max: 98.3 (28 Nov 2018 04:59)  HR: 76 (28 Nov 2018 14:05) (76 - 105)  BP: 100/70 (28 Nov 2018 14:05) (98/72 - 112/70)  BP(mean): --  RR: 18 (28 Nov 2018 14:05) (18 - 18)  SpO2: 99% (28 Nov 2018 14:05) (98% - 99%)        HEENT: Normal Oral mucosa, PERRL, EOMI  Lymphatic: No obvious lymphadenopathy, No edema  Cardiovascular: Normal S1S2, IRRR, 1/6 CARL, Peripheral pulses palpable 2+ B/L  Respiratory: Lungs clear to auscultation, normal effort  Gastrointestinal: Abdomen soft, ND, NT, +BS  Skin: Warm, dry, intact. No cyanosis, No rash.  Musculoskeletal: Normal ROM, normal strength  Psychiatric: Appropriate Mood and Affect    DIAGNOSTIC DATA  TELEMETRY: -160    < from: YENNY w/Doppler (09.24.18 @ 13:09) >  CONCLUSIONS:  1. Mild posterior mitral annular calcification. Severe,  predominantly centrally-directed mitral regurgitation with  multiple regurgitant jets. PISA could not be performed for  quantification of MR due to multiple regurgitant jets.  2. Mildly calcified trileaflet aortic valve with decreased  opening. Mild aortic stenosis. Trace aortic regurgitation.  Linear strands on the left ventricular side of the aortic  leaflets consistent with Lambl's excresences.  3. Normal aortic root. Simple atheroma noted in aortic  arch/descending aorta.  4. No left atrial or left atrial appendagethrombus.  5. Mild to moderate global left ventricular systolic  dysfunction.  6. No clot seen in right atrium.  7. Normal right ventricular size and function.  8. Normal tricuspid valve. Trace tricuspid regurgitation.  9. Normal pulmonic valve. Trace pulmonic insufficiency is  noted.  10. Atrial septum appears intact on 2D echo and color  Doppler.  11. No pericardial effusion.    *** Compared with echocardiogram of 9/22/2018, a YENNY was  performed and additional information provided.  < end of copied text >     < from: Intra-Operative Transesophageal Echo (11.21.18 @ 13:40) >  Conclusions:  1. Mitral annular calcification. Tethered mitral valve  leaflets with normal opening. Abnormal posterior mitral  valve leaflet, possible cleft leaflet. Severe mitral  regurgitation. Large lateral jet with other small jets  along the commissural line. Mean transmitral valve gradient  equals 1 mm Hg. HR 83bpm.  2. Normal right atrium. Large approx 4-5cm long, highly  mobile thrombus is seen swirling around in the right  atrium.  3. Normal right ventricular size with decreased right  ventricular systolic function.  Confirmedon  11/21/2018 - 14:37:52 by Manuel De León M.D.        < from: Transthoracic Echocardiogram (11.22.18 @ 09:42) >  Conclusions:  1. Mitral annular calcification,tethered mitral valve  leaflets. Severe mitral regurgitation.  2. Mildly dilated left atrium.  LA volume index = 38 cc/m2.  3. Low normal left ventricular systolic function. No  segmental wall motion abnormalities.  4. A linear structure compatible with a Chiari-network is  identified in the right atrium (normal variant). No  thrombus seen.  5. Normal right ventricular size and function.  6. Normal tricuspid valve. Moderate tricuspid  regurgitation.  7. Estimated pulmonary artery systolic pressure equals 35  mm Hg, assuming right atrial pressure equals 8 mm Hg,  consistent with borderline pulmonary pressures.  8. Left pleural effusion.    < end of copied text >          ASSESSMENT AND PLAN:     He is a pleasant 87 y/o male PMH persistent AF and severe MR who was scheduled to have a Lucy Clip, but the procedure was cancelled because he had a large RA thrombus on intra-op YENNY, which was then aspirated. He denies palpitations, syncope, nor angina.    -management of severe MR as per CT surgery  -anticoagulation as per vascular cardiology - on Argatroban given thrombocytopenia - ruling out HIT       - follow up heme  -recommend lifelong ac for cva prevention   -continue with rate control with beta blockers  -repeat TTE pending per CTS  - care per CTS

## 2018-11-28 NOTE — PROGRESS NOTE ADULT - ASSESSMENT
87 yo AA M with PMH, HTN, HLD, hyponatremia and Afib (on Eliquis last 11/19 am), colon CA (2001 s/p chemo), prostate Ca (2006 tx with radiation), CKD, Patient recently admitted to [facility] on 10/2018 for CHF, cough with work up revealing  severe MR now transferred to step down secondary to aborted procedure due to RA thrombus.   11/22 Toprol increased to 50 BID for rate control. Transition from Heparin drip to therapeutic Lovenox as per Vascular. Platelets 89.  11/23 PLTS 88  11/24  PLTS 91 today  RVP negative pending discharge home in am 11/25 BBlocker increased for rate control. 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 On toprol 75 BID currently, awaiting cards input for today for rate control. Heme-onc for hit+, PLT stable, awaiting AVIVA.   11/27   afib  110  inc toprol to 100 BID,   awaiting aviva results and BW today, phlebotomy  11/28 afib on toprol.  AVIVA results PND.  CT abd shows hepatic dome density.  For MRI if hem/onc agrees with a creat 1.47

## 2018-11-28 NOTE — PROGRESS NOTE ADULT - SUBJECTIVE AND OBJECTIVE BOX
S:  feels well.  No sob/no cp    Telemetry:  Afib 100-130    Vital Signs Last 24 Hrs  T(C): 36.8 (18 @ 04:59), Max: 36.8 (18 @ 04:59)  T(F): 98.3 (18 @ 04:59), Max: 98.3 (18 @ 04:59)  HR: 88 (18 @ 04:59) (70 - 105)  BP: 112/70 (18 @ 04:59) (92/66 - 112/70)  RR: 18 (18 @ 04:59) (18 - 18)  SpO2: 98% (18 @ 04:59) (98% - 100%)                    @ 07:01  -   @ 07:00  --------------------------------------------------------  IN: 652.5 mL / OUT: 425 mL / NET: 227.5 mL     @ 07:01  -   @ 13:12  --------------------------------------------------------  IN: 120 mL / OUT: 200 mL / NET: -80 mL            Daily Weight in k.7 (2018 08:16)              Drains:     MS         [  ] Drainage:                 L Pleural  [  ]  Drainage:                R Pleural  [  ]  Drainage:    Pacing Wires        [  ]   Settings:                                  Isolated  [  ]    Coumadin    [ ] YES          [x]      NO         Reason:                                 11.6   4.1   )-----------( 124      ( 2018 10:27 )             33.8           132<L>  |  95<L>  |  39<H>  ----------------------------<  99  4.2   |  22  |  1.47<H>    Ca    9.6      2018 10:27    TPro  6.7  /  Alb  x   /  TBili  x   /  DBili  x   /  AST  x   /  ALT  x   /  AlkPhos  x   11-27      PT/INR - ( 2018 10:27 )   PT: 24.6 sec;   INR: 2.09 ratio         PTT - ( 2018 10:27 )  PTT:61.9 sec    PHYSICAL EXAM:    Neurology: alert and oriented x 3, nonfocal, no gross deficits    CV : S1S2 heard irreg irreg    Lungs: clear to ausc.  No w/r/r    Abdomen: soft, nontender, nondistended, positive bowel sounds,    Extremities:     no edema          argatroban Infusion 2 MICROgram(s)/kG/Min IV Continuous <Continuous>  aspirin enteric coated 81 milliGRAM(s) Oral daily  atorvastatin 40 milliGRAM(s) Oral at bedtime  benzocaine 15 mG/menthol 3.6 mG (Sugar-Free) Lozenge 1 Lozenge Oral every 4 hours PRN  docusate sodium 100 milliGRAM(s) Oral three times a day  furosemide   Injectable 40 milliGRAM(s) IV Push every 12 hours  metoprolol succinate  milliGRAM(s) Oral every 12 hours  pantoprazole  Injectable 40 milliGRAM(s) IV Push daily  sodium chloride 0.9%. 1000 milliLiter(s) IV Continuous <Continuous>                              Physical Therapy Rec:   Home  [  ]   Home w/ PT  [  ]  Rehab  [  ]    Discussed with Cardiothoracic Team at AM rounds.

## 2018-11-29 LAB
ANION GAP SERPL CALC-SCNC: 14 MMOL/L — SIGNIFICANT CHANGE UP (ref 5–17)
APTT BLD: 54.5 SEC — HIGH (ref 27.5–36.3)
B2 GLYCOPROT1 AB SER QL: POSITIVE
BUN SERPL-MCNC: 42 MG/DL — HIGH (ref 7–23)
CALCIUM SERPL-MCNC: 9.6 MG/DL — SIGNIFICANT CHANGE UP (ref 8.4–10.5)
CARDIOLIPIN AB SER-ACNC: POSITIVE
CARDIOLIPIN IGM SER-MCNC: <5 MPL — SIGNIFICANT CHANGE UP (ref 0–12.5)
CHLORIDE SERPL-SCNC: 99 MMOL/L — SIGNIFICANT CHANGE UP (ref 96–108)
CO2 SERPL-SCNC: 23 MMOL/L — SIGNIFICANT CHANGE UP (ref 22–31)
CREAT SERPL-MCNC: 1.57 MG/DL — HIGH (ref 0.5–1.3)
DEPRECATED CARDIOLIPIN IGA SER: <5 APL — SIGNIFICANT CHANGE UP (ref 0–12.5)
GLUCOSE SERPL-MCNC: 116 MG/DL — HIGH (ref 70–99)
HCT VFR BLD CALC: 36.4 % — LOW (ref 39–50)
HGB BLD-MCNC: 12.2 G/DL — LOW (ref 13–17)
INR BLD: 2.2 RATIO — HIGH (ref 0.88–1.16)
MCHC RBC-ENTMCNC: 30.7 PG — SIGNIFICANT CHANGE UP (ref 27–34)
MCHC RBC-ENTMCNC: 33.5 GM/DL — SIGNIFICANT CHANGE UP (ref 32–36)
MCV RBC AUTO: 91.6 FL — SIGNIFICANT CHANGE UP (ref 80–100)
PLATELET # BLD AUTO: 118 K/UL — LOW (ref 150–400)
POTASSIUM SERPL-MCNC: 4.2 MMOL/L — SIGNIFICANT CHANGE UP (ref 3.5–5.3)
POTASSIUM SERPL-SCNC: 4.2 MMOL/L — SIGNIFICANT CHANGE UP (ref 3.5–5.3)
PROTHROM AB SERPL-ACNC: 25.7 SEC — HIGH (ref 10–12.9)
RBC # BLD: 3.98 M/UL — LOW (ref 4.2–5.8)
RBC # FLD: 15.3 % — HIGH (ref 10.3–14.5)
SODIUM SERPL-SCNC: 136 MMOL/L — SIGNIFICANT CHANGE UP (ref 135–145)
WBC # BLD: 3.8 K/UL — SIGNIFICANT CHANGE UP (ref 3.8–10.5)
WBC # FLD AUTO: 3.8 K/UL — SIGNIFICANT CHANGE UP (ref 3.8–10.5)

## 2018-11-29 PROCEDURE — 99232 SBSQ HOSP IP/OBS MODERATE 35: CPT | Mod: GC

## 2018-11-29 RX ADMIN — Medication 100 MILLIGRAM(S): at 06:11

## 2018-11-29 RX ADMIN — Medication 100 MILLIGRAM(S): at 18:06

## 2018-11-29 RX ADMIN — PANTOPRAZOLE SODIUM 40 MILLIGRAM(S): 20 TABLET, DELAYED RELEASE ORAL at 12:26

## 2018-11-29 RX ADMIN — ATORVASTATIN CALCIUM 40 MILLIGRAM(S): 80 TABLET, FILM COATED ORAL at 22:14

## 2018-11-29 RX ADMIN — Medication 100 MILLIGRAM(S): at 22:14

## 2018-11-29 RX ADMIN — Medication 40 MILLIGRAM(S): at 06:11

## 2018-11-29 RX ADMIN — Medication 40 MILLIGRAM(S): at 18:06

## 2018-11-29 RX ADMIN — ARGATROBAN 7.54 MICROGRAM(S)/KG/MIN: 50 INJECTION, SOLUTION INTRAVENOUS at 11:22

## 2018-11-29 RX ADMIN — Medication 81 MILLIGRAM(S): at 11:23

## 2018-11-29 NOTE — PROGRESS NOTE ADULT - PROBLEM SELECTOR PLAN 4
Check daily BMP, trend BUN/Creatinine
Check daily BMP, trend BUN/Creatinine
trend creat
Check daily BMP, trend BUN/Creatinine

## 2018-11-29 NOTE — PROGRESS NOTE ADULT - ASSESSMENT
89 yo AA male with PMH, HTN, HLD, hyponatremia and Afib (on Eliquis ) colon CA (2001 s/p chemo), prostate Ca (2006 tx with radiation), CKD severe MR admitted for scheduled  Mitral MR clip on 11/21/18. Hematology consulted for thrombocytopenia and positive HIT antibody testing.    Thrombocytopenia, appears to be longstanding  - present on labs as far back as 2013  - CT without splenomegaly or acute pathology  - stop argatroban gtt as AVIVA negative, d/w Dr. Rodney  - outpatient f/u with hematology     Right atrial thrombus  - removed intra-operatively  - discussed with cardiology, please change anticoagulation on discharge to Lovenox  - anticardiolipin antibody and beta 2 gylcoprotein screen positive, but titers not available for review at this time  - patient should follow up with hematology on discharge for repeat testing to r/o APLS, but currently does not meet diagnostic criteria    History of prostate cancer  - PSA wnl  - CT A/P without evidence of malignancy, recent CT chest negative as well     IgA MGUS  - unclear if patient had workup completed at the time  - patient can follow up with hematology as outpatient to review labs and further workup if needed    History of colon cancer  - CT A/P without evidence of malignancy, recent CT chest negative as well     Alexander Magallon, PGY5  Hematology/Oncology Fellow  Pager: [phone]

## 2018-11-29 NOTE — PROGRESS NOTE ADULT - SUBJECTIVE AND OBJECTIVE BOX
INTERVAL HPI/OVERNIGHT EVENTS:  Patient denies any complaints at this time. Still on argatroban gtt.    VITAL SIGNS:  T(F): 98.3 (11-29-18 @ 13:58)  HR: 78 (11-29-18 @ 13:58)  BP: 101/64 (11-29-18 @ 13:58)  RR: 16 (11-29-18 @ 13:58)  SpO2: 98% (11-29-18 @ 13:58)  Wt(kg): --    PHYSICAL EXAM:    Constitutional: lean  Eyes: EOMI, sclera non-icteric  Neck: supple, no masses, no JVD  Respiratory: CTA b/l, good air entry b/l  Cardiovascular: holosystolic murmur  Gastrointestinal: soft, NTND, no masses palpable, + BS, no hepatosplenomegaly  Extremities: no c/c/e  Neurological: AAOx3      MEDICATIONS  (STANDING):  argatroban Infusion 2 MICROgram(s)/kG/Min (7.536 mL/Hr) IV Continuous <Continuous>  aspirin enteric coated 81 milliGRAM(s) Oral daily  atorvastatin 40 milliGRAM(s) Oral at bedtime  docusate sodium 100 milliGRAM(s) Oral three times a day  furosemide   Injectable 40 milliGRAM(s) IV Push every 12 hours  metoprolol succinate  milliGRAM(s) Oral every 12 hours  pantoprazole  Injectable 40 milliGRAM(s) IV Push daily  sodium chloride 0.9%. 1000 milliLiter(s) (10 mL/Hr) IV Continuous <Continuous>    MEDICATIONS  (PRN):  benzocaine 15 mG/menthol 3.6 mG (Sugar-Free) Lozenge 1 Lozenge Oral every 4 hours PRN Sore Throat      Allergies    penicillin (Hives)  shellfish (Hives)    Intolerances        LABS:                        12.2   3.8   )-----------( 118      ( 29 Nov 2018 10:50 )             36.4     11-29    136  |  99  |  42<H>  ----------------------------<  116<H>  4.2   |  23  |  1.57<H>    Ca    9.6      29 Nov 2018 10:50      PT/INR - ( 29 Nov 2018 10:50 )   PT: 25.7 sec;   INR: 2.20 ratio         PTT - ( 29 Nov 2018 10:50 )  PTT:54.5 sec      RADIOLOGY & ADDITIONAL TESTS:  Studies reviewed.    ASSESSMENT & PLAN:

## 2018-11-29 NOTE — PROGRESS NOTE ADULT - PROBLEM SELECTOR PLAN 5
Echo with severe MR  Plan to discharge pt and return for mitral clip as outpatient
Echo with severe MR  Plan to discharge pt and return for mitral clip as outpatient
dvt: Dopplers bilateral lower extremities negative   on Heparin  GI: PPI
Echo with severe MR  Plan to discharge pt and return for mitral clip as outpatient
Echo with severe MR  Plan to discharge pt and return for mitral clip as outpatient
poss MRI to further evaluate if cleared with creat 1.47
poss MRI to further evaluate if cleared with creat 1.47

## 2018-11-29 NOTE — PROGRESS NOTE ADULT - SUBJECTIVE AND OBJECTIVE BOX
*****Structural Heart Team*****    Subjective: Pt has no complaints this am.    T(C): 36.3 (11-29-18 @ 05:35), Max: 36.6 (11-28-18 @ 14:05)  HR: 91 (11-29-18 @ 05:35) (76 - 102)  BP: 116/79 (11-29-18 @ 05:35) (100/70 - 116/79)  RR: 16 (11-29-18 @ 05:35) (16 - 18)  SpO2: 100% (11-29-18 @ 05:35) (97% - 100%)  Wt(kg): --  11-28 @ 07:01  -  11-29 @ 07:00  --------------------------------------------------------  IN: 552.5 mL / OUT: 550 mL / NET: 2.5 mL      MEDICATIONS  (STANDING):  argatroban Infusion 2 MICROgram(s)/kG/Min (7.536 mL/Hr) IV Continuous <Continuous>  aspirin enteric coated 81 milliGRAM(s) Oral daily  atorvastatin 40 milliGRAM(s) Oral at bedtime  docusate sodium 100 milliGRAM(s) Oral three times a day  furosemide   Injectable 40 milliGRAM(s) IV Push every 12 hours  metoprolol succinate  milliGRAM(s) Oral every 12 hours  pantoprazole  Injectable 40 milliGRAM(s) IV Push daily  sodium chloride 0.9%. 1000 milliLiter(s) (10 mL/Hr) IV Continuous <Continuous>    MEDICATIONS  (PRN):  benzocaine 15 mG/menthol 3.6 mG (Sugar-Free) Lozenge 1 Lozenge Oral every 4 hours PRN Sore Throat        Exam:  General: NAD  Pulmonary: clear b/l  Cor: S1s2, IRRR, I/VI systolic murmur  Abdomen: soft nt/nd. +bs  Neuro: A&Ox3  Extremeties: warm, +edema b/l                          11.6   4.1   )-----------( 124      ( 28 Nov 2018 10:27 )             33.8   11-28    132<L>  |  95<L>  |  39<H>  ----------------------------<  99  4.2   |  22  |  1.47<H>    Ca    9.6      28 Nov 2018 10:27    TPro  6.7  /  Alb  x   /  TBili  x   /  DBili  x   /  AST  x   /  ALT  x   /  AlkPhos  x   11-27  PT/INR - ( 28 Nov 2018 10:27 )   PT: 24.6 sec;   INR: 2.09 ratio         PTT - ( 28 Nov 2018 10:27 )  PTT:61.9 sec    Assesment/Plan:    89 y/o male s/p aborted michelle clip due to thromboembolus (evacuated in the OR)  - AVIVA negative, ?d/c argatroban? Heme to recommend  - will arrange for michelle clip again as an outpt.

## 2018-11-29 NOTE — PROGRESS NOTE ADULT - PROBLEM SELECTOR PROBLEM 2
Non-rheumatic mitral regurgitation
Thrombocytopenia

## 2018-11-29 NOTE — PROGRESS NOTE ADULT - ASSESSMENT
89 yo AA M with PMH, HTN, HLD, hyponatremia and Afib (on Eliquis last 11/19 am), colon CA (2001 s/p chemo), prostate Ca (2006 tx with radiation), CKD, [facility], cough with work up revealing  severe MR now transferred to step down secondary to aborted procedure due to RA thrombus.   11/22 Toprol increased to 50 BID for rate control. Transition from Heparin drip to therapeutic Lovenox as per Vascular. Platelets 89.  11/23 PLTS 88  11/24  PLTS 91 today  RVP negative pending discharge home in am 11/25 BBlocker increased for rate control. 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 On toprol 75 BID currently, awaiting cards input for today for rate control. Heme-onc for hit+, PLT stable, awaiting AVIVA.   11/27   afib  110  inc toprol to 100 BID,   awaiting aviva results and BW today, phlebotomy  11/28 afib on toprol.  AVIVA results PND.  CT abd shows hepatic dome density.  For MRI if hem/onc agrees with a creat 1.47  11/29 vss aFIB,  aviva negative.  Will discuss plan for AC of thrombus with Dr Floyd today.  PLan for home vs rehab?

## 2018-11-29 NOTE — PROGRESS NOTE ADULT - PROBLEM SELECTOR PROBLEM 5
Non-rheumatic mitral regurgitation
Non-rheumatic mitral regurgitation
Prophylactic measure
Hepatic lesion
Hepatic lesion
Non-rheumatic mitral regurgitation
Non-rheumatic mitral regurgitation

## 2018-11-29 NOTE — PROGRESS NOTE ADULT - PROBLEM SELECTOR PLAN 3
Chronic atrial fibrillation  Resumed on AC with Lovenox  Beta-blocker increased to Toprol XL 50 BID
Chronic atrial fibrillation
Chronic atrial fibrillation  argatroban gtt  Beta-blocker increased to Toprol XL 75 BID
Chronic atrial fibrillation  Resumed on AC with Lovenox  Beta-blocker increased to Toprol XL 50 BID
Chronic atrial fibrillation  Resumed on AC with Lovenox  Beta-blocker increased to Toprol XL 50 BID
Chronic atrial fibrillation  argatroban gtt  Beta-blocker increased to Toprol  BID

## 2018-11-29 NOTE — PROGRESS NOTE ADULT - PROBLEM SELECTOR PROBLEM 4
CKD (chronic kidney disease)

## 2018-11-29 NOTE — PROGRESS NOTE ADULT - SUBJECTIVE AND OBJECTIVE BOX
Im doing ok     VITAL SIGNS    Telemetry:  fib     Vital Signs Last 24 Hrs  T(C): 36.3 (18 @ 05:35), Max: 36.6 (18 @ 14:05)  T(F): 97.3 (18 @ 05:35), Max: 97.9 (18 @ 14:05)  HR: 91 (18 @ 05:35) (76 - 102)  BP: 116/79 (18 @ 05:35) (100/70 - 116/79)  RR: 16 (18 @ 05:35) (16 - 18)  SpO2: 100% (18 @ 05:35) (97% - 100%)                    @ 07:01  -   @ 07:00  --------------------------------------------------------  IN: 552.5 mL / OUT: 550 mL / NET: 2.5 mL     @ 07:01  -   @ 12:55  --------------------------------------------------------  IN: 277.5 mL / OUT: 0 mL / NET: 277.5 mL          Daily     Daily Weight in k (2018 11:00)        CAPILLARY BLOOD GLUCOSE              Drains:     MS         [  ] Drainage:                 L Pleural  [  ]  Drainage:                R Pleural  [  ]  Drainage:    Pacing Wires        [  ]   Settings:                                  Isolated  [  ]    Coumadin    [ ] YES          [  X]      NO         Reason:     Argatroban HIT + AVIVA Negative                          PHYSICAL EXAM    Neurology: alert and oriented x 3, nonfocal, no gross deficits  CV :  IRIR no murmur  Sternal Wound :  CDI , Stable  Lungs: CTA B/L  Abdomen: soft, nontender, nondistended, positive bowel sounds   : Incontinent of urine             Extremities:  FROM X 4  NO C/E/E             argatroban Infusion 2 MICROgram(s)/kG/Min IV Continuous <Continuous>  aspirin enteric coated 81 milliGRAM(s) Oral daily  atorvastatin 40 milliGRAM(s) Oral at bedtime  benzocaine 15 mG/menthol 3.6 mG (Sugar-Free) Lozenge 1 Lozenge Oral every 4 hours PRN  docusate sodium 100 milliGRAM(s) Oral three times a day  furosemide   Injectable 40 milliGRAM(s) IV Push every 12 hours  metoprolol succinate  milliGRAM(s) Oral every 12 hours  pantoprazole  Injectable 40 milliGRAM(s) IV Push daily  sodium chloride 0.9%. 1000 milliLiter(s) IV Continuous <Continuous>                    Physical Therapy Rec:   Home  [  ]   Home w/ PT  [  ]  Rehab  [ x ]  Discussed with Cardiothoracic Team at AM rounds.

## 2018-11-29 NOTE — PROGRESS NOTE ADULT - PROBLEM SELECTOR PROBLEM 3
Atrial fibrillation, unspecified type

## 2018-11-30 VITALS — WEIGHT: 169.98 LBS

## 2018-11-30 LAB
% ALBUMIN: 51 % — SIGNIFICANT CHANGE UP
% ALPHA 1: 6.3 % — SIGNIFICANT CHANGE UP
% ALPHA 2: 11.6 % — SIGNIFICANT CHANGE UP
% BETA: 11.7 % — SIGNIFICANT CHANGE UP
% GAMMA: 19.4 % — SIGNIFICANT CHANGE UP
ALBUMIN SERPL ELPH-MCNC: 3.4 G/DL — LOW (ref 3.6–5.5)
ALBUMIN/GLOB SERPL ELPH: 1 RATIO — SIGNIFICANT CHANGE UP
ALPHA1 GLOB SERPL ELPH-MCNC: 0.4 G/DL — SIGNIFICANT CHANGE UP (ref 0.1–0.4)
ALPHA2 GLOB SERPL ELPH-MCNC: 0.8 G/DL — SIGNIFICANT CHANGE UP (ref 0.5–1)
ANION GAP SERPL CALC-SCNC: 14 MMOL/L — SIGNIFICANT CHANGE UP (ref 5–17)
APTT BLD: 65.7 SEC — HIGH (ref 27.5–36.3)
B-GLOBULIN SERPL ELPH-MCNC: 0.8 G/DL — SIGNIFICANT CHANGE UP (ref 0.5–1)
B2 GLYCOPROT1 IGA SER QL: 17.1 SAU — SIGNIFICANT CHANGE UP
B2 GLYCOPROT1 IGG SER-ACNC: <5 SGU — SIGNIFICANT CHANGE UP
B2 GLYCOPROT1 IGM SER-ACNC: 36.8 SMU — HIGH
BUN SERPL-MCNC: 45 MG/DL — HIGH (ref 7–23)
CALCIUM SERPL-MCNC: 9.5 MG/DL — SIGNIFICANT CHANGE UP (ref 8.4–10.5)
CARDIOLIPIN IGM SER-MCNC: 74.4 GPL — HIGH (ref 0–12.5)
CHLORIDE SERPL-SCNC: 98 MMOL/L — SIGNIFICANT CHANGE UP (ref 96–108)
CO2 SERPL-SCNC: 24 MMOL/L — SIGNIFICANT CHANGE UP (ref 22–31)
CREAT SERPL-MCNC: 1.58 MG/DL — HIGH (ref 0.5–1.3)
GAMMA GLOBULIN: 1.3 G/DL — SIGNIFICANT CHANGE UP (ref 0.6–1.6)
GLUCOSE SERPL-MCNC: 145 MG/DL — HIGH (ref 70–99)
HCT VFR BLD CALC: 34.8 % — LOW (ref 39–50)
HGB BLD-MCNC: 11.7 G/DL — LOW (ref 13–17)
INR BLD: 2.18 RATIO — HIGH (ref 0.88–1.16)
INTERPRETATION SERPL IFE-IMP: SIGNIFICANT CHANGE UP
MCHC RBC-ENTMCNC: 30 PG — SIGNIFICANT CHANGE UP (ref 27–34)
MCHC RBC-ENTMCNC: 33.6 GM/DL — SIGNIFICANT CHANGE UP (ref 32–36)
MCV RBC AUTO: 89.2 FL — SIGNIFICANT CHANGE UP (ref 80–100)
PLATELET # BLD AUTO: 128 K/UL — LOW (ref 150–400)
POTASSIUM SERPL-MCNC: 3.7 MMOL/L — SIGNIFICANT CHANGE UP (ref 3.5–5.3)
POTASSIUM SERPL-SCNC: 3.7 MMOL/L — SIGNIFICANT CHANGE UP (ref 3.5–5.3)
PROT PATTERN SERPL ELPH-IMP: SIGNIFICANT CHANGE UP
PROTHROM AB SERPL-ACNC: 25.7 SEC — HIGH (ref 10–12.9)
RBC # BLD: 3.89 M/UL — LOW (ref 4.2–5.8)
RBC # FLD: 15.2 % — HIGH (ref 10.3–14.5)
SODIUM SERPL-SCNC: 136 MMOL/L — SIGNIFICANT CHANGE UP (ref 135–145)
WBC # BLD: 3.9 K/UL — SIGNIFICANT CHANGE UP (ref 3.8–10.5)
WBC # FLD AUTO: 3.9 K/UL — SIGNIFICANT CHANGE UP (ref 3.8–10.5)

## 2018-11-30 PROCEDURE — 82553 CREATINE MB FRACTION: CPT

## 2018-11-30 PROCEDURE — 93970 EXTREMITY STUDY: CPT

## 2018-11-30 PROCEDURE — 84132 ASSAY OF SERUM POTASSIUM: CPT

## 2018-11-30 PROCEDURE — 80053 COMPREHEN METABOLIC PANEL: CPT

## 2018-11-30 PROCEDURE — C1894: CPT

## 2018-11-30 PROCEDURE — 85014 HEMATOCRIT: CPT

## 2018-11-30 PROCEDURE — 84295 ASSAY OF SERUM SODIUM: CPT

## 2018-11-30 PROCEDURE — 71045 X-RAY EXAM CHEST 1 VIEW: CPT

## 2018-11-30 PROCEDURE — 86900 BLOOD TYPING SEROLOGIC ABO: CPT

## 2018-11-30 PROCEDURE — 82550 ASSAY OF CK (CPK): CPT

## 2018-11-30 PROCEDURE — 93355 ECHO TRANSESOPHAGEAL (TEE): CPT

## 2018-11-30 PROCEDURE — C1887: CPT

## 2018-11-30 PROCEDURE — 82947 ASSAY GLUCOSE BLOOD QUANT: CPT

## 2018-11-30 PROCEDURE — 86147 CARDIOLIPIN ANTIBODY EA IG: CPT

## 2018-11-30 PROCEDURE — 86146 BETA-2 GLYCOPROTEIN ANTIBODY: CPT

## 2018-11-30 PROCEDURE — 82784 ASSAY IGA/IGD/IGG/IGM EACH: CPT

## 2018-11-30 PROCEDURE — 87798 DETECT AGENT NOS DNA AMP: CPT

## 2018-11-30 PROCEDURE — 86901 BLOOD TYPING SEROLOGIC RH(D): CPT

## 2018-11-30 PROCEDURE — 87486 CHLMYD PNEUM DNA AMP PROBE: CPT

## 2018-11-30 PROCEDURE — 93306 TTE W/DOPPLER COMPLETE: CPT

## 2018-11-30 PROCEDURE — 87633 RESP VIRUS 12-25 TARGETS: CPT

## 2018-11-30 PROCEDURE — 74176 CT ABD & PELVIS W/O CONTRAST: CPT

## 2018-11-30 PROCEDURE — 80048 BASIC METABOLIC PNL TOTAL CA: CPT

## 2018-11-30 PROCEDURE — 84165 PROTEIN E-PHORESIS SERUM: CPT

## 2018-11-30 PROCEDURE — 83605 ASSAY OF LACTIC ACID: CPT

## 2018-11-30 PROCEDURE — C1769: CPT

## 2018-11-30 PROCEDURE — G0103: CPT

## 2018-11-30 PROCEDURE — 86334 IMMUNOFIX E-PHORESIS SERUM: CPT

## 2018-11-30 PROCEDURE — 82803 BLOOD GASES ANY COMBINATION: CPT

## 2018-11-30 PROCEDURE — 87581 M.PNEUMON DNA AMP PROBE: CPT

## 2018-11-30 PROCEDURE — P9047: CPT

## 2018-11-30 PROCEDURE — 82962 GLUCOSE BLOOD TEST: CPT

## 2018-11-30 PROCEDURE — 84484 ASSAY OF TROPONIN QUANT: CPT

## 2018-11-30 PROCEDURE — 86923 COMPATIBILITY TEST ELECTRIC: CPT

## 2018-11-30 PROCEDURE — 76000 FLUOROSCOPY <1 HR PHYS/QHP: CPT

## 2018-11-30 PROCEDURE — 86022 PLATELET ANTIBODIES: CPT

## 2018-11-30 PROCEDURE — P9016: CPT

## 2018-11-30 PROCEDURE — 85610 PROTHROMBIN TIME: CPT

## 2018-11-30 PROCEDURE — 82435 ASSAY OF BLOOD CHLORIDE: CPT

## 2018-11-30 PROCEDURE — 85730 THROMBOPLASTIN TIME PARTIAL: CPT

## 2018-11-30 PROCEDURE — 82330 ASSAY OF CALCIUM: CPT

## 2018-11-30 PROCEDURE — 84155 ASSAY OF PROTEIN SERUM: CPT

## 2018-11-30 PROCEDURE — 85027 COMPLETE CBC AUTOMATED: CPT

## 2018-11-30 RX ORDER — ENOXAPARIN SODIUM 100 MG/ML
60 INJECTION SUBCUTANEOUS
Qty: 1 | Refills: 0 | OUTPATIENT
Start: 2018-11-30 | End: 2018-12-29

## 2018-11-30 RX ORDER — METOPROLOL TARTRATE 50 MG
1 TABLET ORAL
Qty: 60 | Refills: 0 | OUTPATIENT
Start: 2018-11-30

## 2018-11-30 RX ADMIN — Medication 81 MILLIGRAM(S): at 11:59

## 2018-11-30 RX ADMIN — PANTOPRAZOLE SODIUM 40 MILLIGRAM(S): 20 TABLET, DELAYED RELEASE ORAL at 11:59

## 2018-11-30 RX ADMIN — Medication 40 MILLIGRAM(S): at 05:54

## 2018-11-30 RX ADMIN — Medication 100 MILLIGRAM(S): at 05:54

## 2018-11-30 NOTE — DIETITIAN INITIAL EVALUATION ADULT. - NS AS NUTRI INTERV ED CONTENT3
Extensive education provided on heart healthy recommendations, benefits of including lean proteins for healing and muscular health, lower sodium intake, adequate intake of fresh fruits and vegetables and protein. Patient receptive and accepted Low Sodium nutrition therapy handout; Heart-healthy nutrition therapy handouts./Purpose of the nutrition education/Recommended modifications/Priority modifications/Nutrition relationship to health/disease

## 2018-11-30 NOTE — DIETITIAN INITIAL EVALUATION ADULT. - NS AS NUTRI INTERV MEDICAL AND FOOD SUPPLEMENTS
Commercial beverage/Health shake 2x day if pt not discharged today as planned. Educated pt & wife on possible adding Ensure beverages once pt is at home.

## 2018-11-30 NOTE — DIETITIAN INITIAL EVALUATION ADULT. - FACTORS AFF FOOD INTAKE
other (specify)/persistent lack of appetite/Per pt & wife, "dentures being uncomfortable on occasion".

## 2018-11-30 NOTE — PROGRESS NOTE ADULT - PROVIDER SPECIALTY LIST ADULT
CT Surgery
Cardiology
Heme/Onc
Heme/Onc
Structural Heart
Vascular Cardiology
Vascular Cardiology
Cardiology
Cardiology
CT Surgery

## 2018-11-30 NOTE — DIETITIAN INITIAL EVALUATION ADULT. - NUTRITION INTERVENTION
Medical Food Supplements/Nutrition Education/Meals and Snack Meals and Snack/Nutrition Education/Collaboration and Referral of Nutrition Care/Medical Food Supplements Meals and Snack/Nutrition Education

## 2018-11-30 NOTE — DIETITIAN INITIAL EVALUATION ADULT. - ENERGY NEEDS
ht: 6 feet. wt: 126 pounds (current, bed, no edema). BMI: 17.1 kG/m2. UBW: 170 pounds.   IBW: 178 pounds +/- 10%. Other pertinent objective information: As per medical records, pt is 88 y o AA male admitted with progressive shortness of breath & orthopnea. PMH HTN, HLD, hyponatremia and Afib (on Eliquis last 11/19 am), CKD.  Per EMR, PT with persistent AF and severe MR was scheduled to have a Lucy Clip, but the procedure was cancelled because he had a large RA thrombus on intra-op YENNY.  Pt now transferred to step down secondary to aborted procedure due to RA thrombus and plan to be discharged.  Fluids per team.

## 2018-11-30 NOTE — PROGRESS NOTE ADULT - ATTENDING COMMENTS
Agree with above  Heparin gtt without a bolus today  If he tolerates this, then transition to therapuetic lovenox tomorrow  LE venous duplex, 2d echo    Ronel [id number]
Agree with above  Heparin gtt without a bolus today  If he tolerates this, then transition to therapuetic lovenox tomorrow  LE venous duplex, 2d echo    Ronel [id number]
Agree with above.   -continue with rate control with beta blockers  -ac per lavon Solorzano MD
CARDIOLOGY ATTENDING    Agree with above. Management of severe MR as per CT surgery. Recommend lifelong anticoagulation for persistent Afib with elevated chads-vasc. F/U with Dr. Scott after discharge.
Patient seen and examined, agree with above assessment and plan as transcribed above.    - f/u repeat TTE    Nabor Salazar MD, FACC
Agree with above. Pt evaluated at bedside. Laboratory and radiographic studies reviewed.   Pt with R atrial thrombus, found to have high titers for beta 2 glycoprotein and anticardiolipin suggesting the presence of antiphospholipid syndrome. Labs need to be repeated in 3 mos to confirm diagnosis. At this time pt is to be d/c on renally dosed full dose AC with Lovenox. May need to be switched to Coumadin if APLS is confirmed.   Cont ASA 81 mg.   Pt to f/u with hematology as an out pt for repeat blood testing as well as h/o IgA MGUS (although current SPEP, TRISHA is negative).
Agree with above. Pt seen and evaluated at bedside.   Thrombocytopenia  - chronic, worse now.   - smear unremarkable. occasional large platelets   - check ldh, fibrinogen, PSA  - HIT assay mildly positive, await AVIVA. Ok with argatroban for now    Right atrial thrombus  - occurred while on Eliquis? compliance? pt is not a great historian  - can consider switching to Lovenox (once AVIVA is confirmed negative) although unclear if pt will be able to inject or will need someone there to help him   - If Lovenox is not an option, can consider direct thrombin inhibitors (although it is not a standard approval for atrial thrombus)  - he is a fall risk so any AC will have a risk of bleeding     IgA MGUS  - has never had a marrow to eval this further   - pt insists in going home on wednesday  - can repeat MM labs and pursue further w/u as an out pt

## 2018-11-30 NOTE — DIETITIAN INITIAL EVALUATION ADULT. - NS AS NUTRI INTERV ED CONTENT
Purpose of the nutrition education/Nutrition relationship to health/disease/Priority modifications/Recommended modifications/Pt encouraged to consume high intake of nutrient dense meals/snacks with emphasis on protein of high biological value. Educated pt on high calorie, protein meal choice, collected food preferences to optimize PO intake. Provided MyPlate style of meal portioning with adequate intake of vegetables and protein. Pt and wife voiced understanding.

## 2018-11-30 NOTE — PROGRESS NOTE ADULT - SUBJECTIVE AND OBJECTIVE BOX
Patient with no anginal chest pain or shortness of breath      MEDICATIONS  (STANDING):  aspirin enteric coated 81 milliGRAM(s) Oral daily  atorvastatin 40 milliGRAM(s) Oral at bedtime  docusate sodium 100 milliGRAM(s) Oral three times a day  furosemide   Injectable 40 milliGRAM(s) IV Push every 12 hours  metoprolol succinate  milliGRAM(s) Oral every 12 hours  pantoprazole  Injectable 40 milliGRAM(s) IV Push daily  sodium chloride 0.9%. 1000 milliLiter(s) (10 mL/Hr) IV Continuous <Continuous>    MEDICATIONS  (PRN):  benzocaine 15 mG/menthol 3.6 mG (Sugar-Free) Lozenge 1 Lozenge Oral every 4 hours PRN Sore Throat      LABS:                        11.7   3.9   )-----------( 128      ( 30 Nov 2018 10:11 )             34.8     Hemoglobin: 11.7 g/dL (11-30 @ 10:11)  Hemoglobin: 12.2 g/dL (11-29 @ 10:50)  Hemoglobin: 11.6 g/dL (11-28 @ 10:27)  Hemoglobin: 12.2 g/dL (11-27 @ 10:18)  Hemoglobin: 12.2 g/dL (11-26 @ 01:29)    11-30    136  |  98  |  45<H>  ----------------------------<  145<H>  3.7   |  24  |  1.58<H>    Ca    9.5      30 Nov 2018 10:11      Creatinine Trend: 1.58<--, 1.57<--, 1.47<--, 1.41<--, 1.36<--, 1.42<--   PT/INR - ( 30 Nov 2018 10:11 )   PT: 25.7 sec;   INR: 2.18 ratio         PTT - ( 30 Nov 2018 10:11 )  PTT:65.7 sec        PHYSICAL EXAM  Vital Signs Last 24 Hrs  T(C): 36.4 (30 Nov 2018 04:32), Max: 36.8 (29 Nov 2018 13:58)  T(F): 97.5 (30 Nov 2018 04:32), Max: 98.3 (29 Nov 2018 13:58)  HR: 64 (30 Nov 2018 04:32) (64 - 87)  BP: 110/76 (30 Nov 2018 04:32) (101/64 - 115/78)  BP(mean): --  RR: 18 (30 Nov 2018 04:32) (8 - 18)  SpO2: 100% (30 Nov 2018 04:32) (94% - 100%)      HEENT: Normal Oral mucosa, PERRL, EOMI  Lymphatic: No obvious lymphadenopathy, No edema  Cardiovascular: Normal S1S2, IRRR, 1/6 CARL, Peripheral pulses palpable 2+ B/L  Respiratory: Lungs clear to auscultation, normal effort  Gastrointestinal: Abdomen soft, ND, NT, +BS  Skin: Warm, dry, intact. No cyanosis, No rash.  Musculoskeletal: Normal ROM, normal strength  Psychiatric: Appropriate Mood and Affect    DIAGNOSTIC DATA  TELEMETRY: AF   up to 130 o/n    < from: YENNY w/Doppler (09.24.18 @ 13:09) >  CONCLUSIONS:  1. Mild posterior mitral annular calcification. Severe,  predominantly centrally-directed mitral regurgitation with  multiple regurgitant jets. PISA could not be performed for  quantification of MR due to multiple regurgitant jets.  2. Mildly calcified trileaflet aortic valve with decreased  opening. Mild aortic stenosis. Trace aortic regurgitation.  Linear strands on the left ventricular side of the aortic  leaflets consistent with Lambl's excresences.  3. Normal aortic root. Simple atheroma noted in aortic  arch/descending aorta.  4. No left atrial or left atrial appendagethrombus.  5. Mild to moderate global left ventricular systolic  dysfunction.  6. No clot seen in right atrium.  7. Normal right ventricular size and function.  8. Normal tricuspid valve. Trace tricuspid regurgitation.  9. Normal pulmonic valve. Trace pulmonic insufficiency is  noted.  10. Atrial septum appears intact on 2D echo and color  Doppler.  11. No pericardial effusion.    *** Compared with echocardiogram of 9/22/2018, a YENNY was  performed and additional information provided.  < end of copied text >     < from: Intra-Operative Transesophageal Echo (11.21.18 @ 13:40) >  Conclusions:  1. Mitral annular calcification. Tethered mitral valve  leaflets with normal opening. Abnormal posterior mitral  valve leaflet, possible cleft leaflet. Severe mitral  regurgitation. Large lateral jet with other small jets  along the commissural line. Mean transmitral valve gradient  equals 1 mm Hg. HR 83bpm.  2. Normal right atrium. Large approx 4-5cm long, highly  mobile thrombus is seen swirling around in the right  atrium.  3. Normal right ventricular size with decreased right  ventricular systolic function.  Confirmedon  11/21/2018 - 14:37:52 by Manuel De León M.D.        < from: Transthoracic Echocardiogram (11.22.18 @ 09:42) >  Conclusions:  1. Mitral annular calcification,tethered mitral valve  leaflets. Severe mitral regurgitation.  2. Mildly dilated left atrium.  LA volume index = 38 cc/m2.  3. Low normal left ventricular systolic function. No  segmental wall motion abnormalities.  4. A linear structure compatible with a Chiari-network is  identified in the right atrium (normal variant). No  thrombus seen.  5. Normal right ventricular size and function.  6. Normal tricuspid valve. Moderate tricuspid  regurgitation.  7. Estimated pulmonary artery systolic pressure equals 35  mm Hg, assuming right atrial pressure equals 8 mm Hg,  consistent with borderline pulmonary pressures.  8. Left pleural effusion.    < end of copied text >          ASSESSMENT AND PLAN:     He is a pleasant 87 y/o male PMH persistent AF and severe MR who was scheduled to have a Lucy Clip, but the procedure was cancelled because he had a large RA thrombus on intra-op YENNY, which was then aspirated. He denies palpitations, syncope, nor angina.    -management of severe MR as per CT surgery  -anticoagulation as per vascular cardiology/heme - AVIVA negative thus Argatroban dced - per heme, patient to be maintained on therapeutic Lovenox and to have outpt follow up to determine long term AC strategy  -recommend lifelong ac for cva prevention   -continue with rate control with beta blockers  -repeat TTE PRN to assess for thrombus per CTS  - care per CTS  - f/u with Dr Valentine for cardiology upon DC

## 2018-11-30 NOTE — DIETITIAN INITIAL EVALUATION ADULT. - ORAL INTAKE PTA
Pt reports decreased po intake/appetite ~1 year PTA as he was not feeling well, had low appetite and was not able to chew well due to his dentures being uncomfortable. Pt was able to eat fried chicken, vegetables stew, meatloaf, noodle and vegetable soups all home cooked by wife.  Pt consumed small portions of normally chopped meals, encouraged by wife and grandson. Prior to reduced intake pt's typical intake as follows PTA: breakfast: grits with butter and cream, eggs with sausages, banana, tea. Lunch: fried chicken & fish, potato salad. Dinner: usually macaroni & cheese, fried vegetables, traditional Southern foods such as fried greens and stews. Pt reports that: ‘I love salt, fried foods and add table salt to my foods. Pt’s main sources of protein are chicken and fish. Relies on small portion. Pt has allergy to shellfish./poor

## 2018-11-30 NOTE — DIETITIAN INITIAL EVALUATION ADULT. - PERTINENT MEDS FT
MEDICATIONS  (STANDING):  aspirin enteric coated 81 milliGRAM(s) Oral daily  atorvastatin 40 milliGRAM(s) Oral at bedtime  docusate sodium 100 milliGRAM(s) Oral three times a day  furosemide   Injectable 40 milliGRAM(s) IV Push every 12 hours  metoprolol succinate  milliGRAM(s) Oral every 12 hours  pantoprazole  Injectable 40 milliGRAM(s) IV Push daily  sodium chloride 0.9%. 1000 milliLiter(s) (10 mL/Hr) IV Continuous <Continuous>    MEDICATIONS  (PRN):  benzocaine 15 mG/menthol 3.6 mG (Sugar-Free) Lozenge 1 Lozenge Oral every 4 hours PRN Sore Throat    MEDICATIONS  (STANDING):  aspirin enteric coated 81 milliGRAM(s) Oral daily  atorvastatin 40 milliGRAM(s) Oral at bedtime  docusate sodium 100 milliGRAM(s) Oral three times a day  furosemide   Injectable 40 milliGRAM(s) IV Push every 12 hours  metoprolol succinate  milliGRAM(s) Oral every 12 hours  pantoprazole  Injectable 40 milliGRAM(s) IV Push daily  sodium chloride 0.9%. 1000 milliLiter(s) (10 mL/Hr) IV Continuous <Continuous>    Home Medications:  aspirin 81 mg oral tablet: 1 tab(s) orally once a day (21 Nov 2018 09:11)  Senna: orally once a day (at bedtime) (21 Nov 2018 09:11)

## 2018-11-30 NOTE — DIETITIAN INITIAL EVALUATION ADULT. - NS AS NUTRI INTERV COLLABORAT
Consult primary doctor and/or dentist for proper denture fittings to promote optimal po intake./Collaboration with other providers

## 2018-11-30 NOTE — DIETITIAN INITIAL EVALUATION ADULT. - SIGNS/SYMPTOMS
wt loss of 40 pounds (26% within 1 y), poor po intake & appetite, underwt appearance wt loss of 40 pounds (26% within 1 y), poor po intake & appetite, loose dentures, underwt appearance suspected excessive sodium intake as per diet recall

## 2018-11-30 NOTE — DIETITIAN INITIAL EVALUATION ADULT. - OTHER INFO
Pt seen for LOS. Pt with low po intake/appetite at this time, confirmed with flowsheets intake of 25-50% within last 2 days.  Pt denies GI distress no N+V and diarrhea. According to pt’s wife pt weight himself regularly and lost 45 pounds within last years.  pounds. Admission wt 136 pounds (11/21). Current weight 126 pounds, bed (11/30/201). Pt on Lasix, for diuresis. Pt’s wife voiced concerns over pt’s wt loss pta and his  preferences of fried high sodium foods. Wife is primary caretaker for pt and has general heart healthy diet prior knowledge. Pt and his wife were amenable to education to high biological value proteins, low-sodium and heart healthy nutrition therapy. Pt planned to be discharged today as per chart.

## 2018-11-30 NOTE — DIETITIAN INITIAL EVALUATION ADULT. - PHYSICAL APPEARANCE
NFPE was not appropriate at this moment, due to pt comfortably resting and being tired. However, observed visible severe temporal muscle wasting and severe orbital fat losses. Pt’s wife stated that his face: ‘thinned down tremendously”./emaciated

## 2018-12-07 RX ORDER — METOPROLOL SUCCINATE 50 MG/1
50 TABLET, EXTENDED RELEASE ORAL
Refills: 3 | Status: DISCONTINUED | COMMUNITY
End: 2018-12-07

## 2018-12-07 RX ORDER — DOCUSATE SODIUM 100 MG/1
100 CAPSULE, LIQUID FILLED ORAL 3 TIMES DAILY
Refills: 0 | Status: ACTIVE | COMMUNITY

## 2018-12-07 RX ORDER — FUROSEMIDE 20 MG/1
20 TABLET ORAL DAILY
Qty: 7 | Refills: 0 | Status: DISCONTINUED | COMMUNITY
End: 2018-12-07

## 2018-12-07 RX ORDER — ADHESIVE TAPE 3"X 2.3 YD
50 MCG TAPE, NON-MEDICATED TOPICAL
Refills: 0 | Status: DISCONTINUED | COMMUNITY
End: 2018-12-07

## 2018-12-07 RX ORDER — NORMAL SALT TABLETS 1 G/G
1 TABLET ORAL TWICE DAILY
Refills: 0 | Status: DISCONTINUED | COMMUNITY
End: 2018-12-07

## 2018-12-10 ENCOUNTER — APPOINTMENT (OUTPATIENT)
Dept: CARDIOTHORACIC SURGERY | Facility: CLINIC | Age: 83
End: 2018-12-10
Payer: MEDICARE

## 2018-12-10 VITALS
WEIGHT: 132 LBS | RESPIRATION RATE: 14 BRPM | TEMPERATURE: 98.3 F | OXYGEN SATURATION: 98 % | HEIGHT: 73 IN | DIASTOLIC BLOOD PRESSURE: 65 MMHG | BODY MASS INDEX: 17.49 KG/M2 | HEART RATE: 82 BPM | SYSTOLIC BLOOD PRESSURE: 100 MMHG

## 2018-12-10 DIAGNOSIS — I51.3 INTRACARDIAC THROMBOSIS, NOT ELSEWHERE CLASSIFIED: ICD-10-CM

## 2018-12-10 PROCEDURE — 99024 POSTOP FOLLOW-UP VISIT: CPT

## 2018-12-12 PROBLEM — I51.3 RIGHT ATRIAL THROMBUS: Status: ACTIVE | Noted: 2018-12-07

## 2018-12-24 ENCOUNTER — INPATIENT (INPATIENT)
Facility: HOSPITAL | Age: 83
LOS: 6 days | Discharge: HOME | DRG: 228 | End: 2018-12-31
Attending: THORACIC SURGERY (CARDIOTHORACIC VASCULAR SURGERY) | Admitting: THORACIC SURGERY (CARDIOTHORACIC VASCULAR SURGERY)
Payer: MEDICARE

## 2018-12-24 VITALS
OXYGEN SATURATION: 95 % | WEIGHT: 130.07 LBS | DIASTOLIC BLOOD PRESSURE: 64 MMHG | HEIGHT: 73 IN | TEMPERATURE: 98 F | SYSTOLIC BLOOD PRESSURE: 94 MMHG | HEART RATE: 95 BPM | RESPIRATION RATE: 18 BRPM

## 2018-12-24 DIAGNOSIS — I34.0 NONRHEUMATIC MITRAL (VALVE) INSUFFICIENCY: ICD-10-CM

## 2018-12-24 DIAGNOSIS — I48.91 UNSPECIFIED ATRIAL FIBRILLATION: ICD-10-CM

## 2018-12-24 DIAGNOSIS — Z98.890 OTHER SPECIFIED POSTPROCEDURAL STATES: Chronic | ICD-10-CM

## 2018-12-24 DIAGNOSIS — I51.3 INTRACARDIAC THROMBOSIS, NOT ELSEWHERE CLASSIFIED: ICD-10-CM

## 2018-12-24 DIAGNOSIS — N18.9 CHRONIC KIDNEY DISEASE, UNSPECIFIED: ICD-10-CM

## 2018-12-24 DIAGNOSIS — Z90.49 ACQUIRED ABSENCE OF OTHER SPECIFIED PARTS OF DIGESTIVE TRACT: Chronic | ICD-10-CM

## 2018-12-24 DIAGNOSIS — Z85.46 PERSONAL HISTORY OF MALIGNANT NEOPLASM OF PROSTATE: Chronic | ICD-10-CM

## 2018-12-24 LAB
ALBUMIN SERPL ELPH-MCNC: 3.8 G/DL — SIGNIFICANT CHANGE UP (ref 3.3–5)
ALP SERPL-CCNC: 69 U/L — SIGNIFICANT CHANGE UP (ref 40–120)
ALT FLD-CCNC: 11 U/L — SIGNIFICANT CHANGE UP (ref 10–45)
ANION GAP SERPL CALC-SCNC: 15 MMOL/L — SIGNIFICANT CHANGE UP (ref 5–17)
APPEARANCE UR: CLEAR — SIGNIFICANT CHANGE UP
AST SERPL-CCNC: 13 U/L — SIGNIFICANT CHANGE UP (ref 10–40)
BASOPHILS # BLD AUTO: 0 K/UL — SIGNIFICANT CHANGE UP (ref 0–0.2)
BASOPHILS NFR BLD AUTO: 0.5 % — SIGNIFICANT CHANGE UP (ref 0–2)
BILIRUB SERPL-MCNC: 0.4 MG/DL — SIGNIFICANT CHANGE UP (ref 0.2–1.2)
BILIRUB UR-MCNC: NEGATIVE — SIGNIFICANT CHANGE UP
BLD GP AB SCN SERPL QL: NEGATIVE — SIGNIFICANT CHANGE UP
BUN SERPL-MCNC: 40 MG/DL — HIGH (ref 7–23)
CALCIUM SERPL-MCNC: 9.7 MG/DL — SIGNIFICANT CHANGE UP (ref 8.4–10.5)
CHLORIDE SERPL-SCNC: 101 MMOL/L — SIGNIFICANT CHANGE UP (ref 96–108)
CO2 SERPL-SCNC: 23 MMOL/L — SIGNIFICANT CHANGE UP (ref 22–31)
COLOR SPEC: SIGNIFICANT CHANGE UP
CREAT SERPL-MCNC: 1.91 MG/DL — HIGH (ref 0.5–1.3)
DIFF PNL FLD: NEGATIVE — SIGNIFICANT CHANGE UP
EOSINOPHIL # BLD AUTO: 0.3 K/UL — SIGNIFICANT CHANGE UP (ref 0–0.5)
EOSINOPHIL NFR BLD AUTO: 9.2 % — HIGH (ref 0–6)
GLUCOSE SERPL-MCNC: 88 MG/DL — SIGNIFICANT CHANGE UP (ref 70–99)
GLUCOSE UR QL: NEGATIVE — SIGNIFICANT CHANGE UP
HCT VFR BLD CALC: 28.8 % — LOW (ref 39–50)
HGB BLD-MCNC: 9.6 G/DL — LOW (ref 13–17)
INR BLD: 1.17 RATIO — HIGH (ref 0.88–1.16)
KETONES UR-MCNC: NEGATIVE — SIGNIFICANT CHANGE UP
LEUKOCYTE ESTERASE UR-ACNC: ABNORMAL
LYMPHOCYTES # BLD AUTO: 1.2 K/UL — SIGNIFICANT CHANGE UP (ref 1–3.3)
LYMPHOCYTES # BLD AUTO: 42.4 % — SIGNIFICANT CHANGE UP (ref 13–44)
MCHC RBC-ENTMCNC: 30.9 PG — SIGNIFICANT CHANGE UP (ref 27–34)
MCHC RBC-ENTMCNC: 33.3 GM/DL — SIGNIFICANT CHANGE UP (ref 32–36)
MCV RBC AUTO: 92.8 FL — SIGNIFICANT CHANGE UP (ref 80–100)
MONOCYTES # BLD AUTO: 0.2 K/UL — SIGNIFICANT CHANGE UP (ref 0–0.9)
MONOCYTES NFR BLD AUTO: 7.7 % — SIGNIFICANT CHANGE UP (ref 2–14)
NEUTROPHILS # BLD AUTO: 1.2 K/UL — LOW (ref 1.8–7.4)
NEUTROPHILS NFR BLD AUTO: 40.1 % — LOW (ref 43–77)
NITRITE UR-MCNC: NEGATIVE — SIGNIFICANT CHANGE UP
NT-PROBNP SERPL-SCNC: 3008 PG/ML — HIGH (ref 0–300)
PH UR: 6 — SIGNIFICANT CHANGE UP (ref 5–8)
PLATELET # BLD AUTO: 79 K/UL — LOW (ref 150–400)
POTASSIUM SERPL-MCNC: 4.4 MMOL/L — SIGNIFICANT CHANGE UP (ref 3.5–5.3)
POTASSIUM SERPL-SCNC: 4.4 MMOL/L — SIGNIFICANT CHANGE UP (ref 3.5–5.3)
PROT SERPL-MCNC: 7.4 G/DL — SIGNIFICANT CHANGE UP (ref 6–8.3)
PROT UR-MCNC: NEGATIVE — SIGNIFICANT CHANGE UP
PROTHROM AB SERPL-ACNC: 13.5 SEC — HIGH (ref 10–12.9)
RBC # BLD: 3.1 M/UL — LOW (ref 4.2–5.8)
RBC # FLD: 15.4 % — HIGH (ref 10.3–14.5)
RH IG SCN BLD-IMP: POSITIVE — SIGNIFICANT CHANGE UP
SODIUM SERPL-SCNC: 139 MMOL/L — SIGNIFICANT CHANGE UP (ref 135–145)
SP GR SPEC: 1.01 — SIGNIFICANT CHANGE UP (ref 1.01–1.02)
UROBILINOGEN FLD QL: NEGATIVE — SIGNIFICANT CHANGE UP
WBC # BLD: 2.9 K/UL — LOW (ref 3.8–10.5)
WBC # FLD AUTO: 2.9 K/UL — LOW (ref 3.8–10.5)

## 2018-12-24 PROCEDURE — 93970 EXTREMITY STUDY: CPT | Mod: 26

## 2018-12-24 PROCEDURE — 99233 SBSQ HOSP IP/OBS HIGH 50: CPT | Mod: 24

## 2018-12-24 PROCEDURE — 71046 X-RAY EXAM CHEST 2 VIEWS: CPT | Mod: 26

## 2018-12-24 RX ORDER — FUROSEMIDE 40 MG
20 TABLET ORAL
Qty: 0 | Refills: 0 | Status: DISCONTINUED | OUTPATIENT
Start: 2018-12-24 | End: 2018-12-31

## 2018-12-24 RX ORDER — ENOXAPARIN SODIUM 100 MG/ML
60 INJECTION SUBCUTANEOUS DAILY
Qty: 0 | Refills: 0 | Status: DISCONTINUED | OUTPATIENT
Start: 2018-12-24 | End: 2018-12-25

## 2018-12-24 RX ORDER — SODIUM CHLORIDE 9 MG/ML
3 INJECTION INTRAMUSCULAR; INTRAVENOUS; SUBCUTANEOUS EVERY 8 HOURS
Qty: 0 | Refills: 0 | Status: DISCONTINUED | OUTPATIENT
Start: 2018-12-24 | End: 2018-12-31

## 2018-12-24 RX ORDER — DOCUSATE SODIUM 100 MG
100 CAPSULE ORAL THREE TIMES A DAY
Qty: 0 | Refills: 0 | Status: DISCONTINUED | OUTPATIENT
Start: 2018-12-24 | End: 2018-12-27

## 2018-12-24 RX ORDER — ATORVASTATIN CALCIUM 80 MG/1
40 TABLET, FILM COATED ORAL AT BEDTIME
Qty: 0 | Refills: 0 | Status: DISCONTINUED | OUTPATIENT
Start: 2018-12-24 | End: 2018-12-31

## 2018-12-24 RX ORDER — ASPIRIN/CALCIUM CARB/MAGNESIUM 324 MG
81 TABLET ORAL DAILY
Qty: 0 | Refills: 0 | Status: DISCONTINUED | OUTPATIENT
Start: 2018-12-24 | End: 2018-12-31

## 2018-12-24 RX ORDER — METOPROLOL TARTRATE 50 MG
100 TABLET ORAL DAILY
Qty: 0 | Refills: 0 | Status: DISCONTINUED | OUTPATIENT
Start: 2018-12-24 | End: 2018-12-26

## 2018-12-24 RX ORDER — PANTOPRAZOLE SODIUM 20 MG/1
40 TABLET, DELAYED RELEASE ORAL
Qty: 0 | Refills: 0 | Status: DISCONTINUED | OUTPATIENT
Start: 2018-12-24 | End: 2018-12-31

## 2018-12-24 RX ADMIN — Medication 100 MILLIGRAM(S): at 21:47

## 2018-12-24 RX ADMIN — ATORVASTATIN CALCIUM 40 MILLIGRAM(S): 80 TABLET, FILM COATED ORAL at 21:47

## 2018-12-24 RX ADMIN — SODIUM CHLORIDE 3 MILLILITER(S): 9 INJECTION INTRAMUSCULAR; INTRAVENOUS; SUBCUTANEOUS at 21:45

## 2018-12-24 RX ADMIN — Medication 20 MILLIGRAM(S): at 19:50

## 2018-12-24 NOTE — H&P ADULT - PROBLEM SELECTOR PLAN 1
Preop Lucy Clip work up   CMP and CBC sent   MRSA /MSSA nasal swab   Activity as tolerated   Continue with Toprol 100 mg PO daily   Continue with Lasix 20 mg PO daily   Continue with Lovenox 60 mg SQ daily  B/L LE Duplex study   TTE   Vascular / Cardiology consult called    CXR PA/LA   Plan for Lucy Clip procedure Wednesday 12/26 with Dr. Floyd

## 2018-12-24 NOTE — CONSULT NOTE ADULT - ASSESSMENT
Assessment:  1. RA Thrombus / DVT   - clot visualized in right common iliac vein on angiogram with subsequent embolization to right heart.  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 Severe MR  - noted on TTE and YENNY  - for mitral clip on Wednesday due to high surgical risk  - euvolemic on exam  3. CAD  - non obstructive disease on angiogram Nov 2018  4. Afib  5. CKD    Plan:  1. RA Thrombus/DVT   - repeat LE venous duplex   - repeat TTE  - cont therapeutic lovenox 1mg/kg daily (renal dose)  - confirmatory testing for APLS should be done in two months (3 months post initial positive screen)  2. Severe MR  - for mitraclip Wednesday   - check CXR  - f/u BMP, lasix as needed   3. CAD  - cont aspirin, lipitor  4. Afib  - cont Toprol   5. CKD  - check BMP

## 2018-12-24 NOTE — H&P ADULT - NSHPLABSRESULTS_GEN_ALL_CORE
LABS:                        9.6    2.9   )-----------( x        ( 24 Dec 2018 17:28 )             28.8     12-24    139  |  101  |  40<H>  ----------------------------<  88  4.4   |  23  |  1.91<H>    Ca    9.7      24 Dec 2018 17:28    TPro  7.4  /  Alb  3.8  /  TBili  0.4  /  DBili  x   /  AST  13  /  ALT  11  /  AlkPhos  69  12-24    PT/INR - ( 24 Dec 2018 17:28 )   PT: 13.5 sec;   INR: 1.17 ratio    Cardiac Cath: < from: Cardiac Cath Lab - Adult (11.12.18 @ 13:08) >    The coronary circulation is right dominant.  LM:   --  LM: Normal.  LAD:   --  Proximal LAD: Angiography showed minor luminal irregularities  with no flow limiting lesions.  --  Mid LAD: Angiography showed mild atherosclerosis with no flow limiting  lesions.  CX:   --  Circumflex: Normal.  RCA:   --  RCA: Normal.  COMPLICATIONS: There were no complications.    TTE / YENNY: < from: Transthoracic Echocardiogram (11.22.18 @ 09:42) >    1. Mitral annular calcification, tethered mitral valve  leaflets. Severe mitral regurgitation.  2. Mildly dilated left atrium.  LA volume index = 38 cc/m2.  3. Low normal left ventricular systolic function. No  segmental wall motion abnormalities.  4. A linear structure compatible with a Chiari-network is  identified in the right atrium (normal variant). No  thrombus seen.  5. Normal right ventricular size and function.  6. Normal tricuspid valve. Moderate tricuspid  regurgitation.  7. Estimated pulmonary artery systolic pressure equals 35  mm Hg, assuming right atrial pressure equals 8 mm Hg,  consistent with borderline pulmonary pressures.  8. Left pleural effusion.

## 2018-12-24 NOTE — H&P ADULT - NSHPPHYSICALEXAM_GEN_ALL_CORE
PHYSICAL EXAM  Vital Signs Last 24 Hrs  T(C): 36.5 (24 Dec 2018 14:20), Max: 36.5 (24 Dec 2018 14:20)  T(F): 97.7 (24 Dec 2018 14:20), Max: 97.7 (24 Dec 2018 14:20)  HR: 95 (24 Dec 2018 14:20) (95 - 95)  BP: 94/64 (24 Dec 2018 14:20) (94/64 - 94/64)  BP(mean): --  RR: 18 (24 Dec 2018 14:20) (18 - 18)  SpO2: 95% (24 Dec 2018 14:20) (95% - 95%)    General: Well nourished, well developed, no acute distress.                                                         Neuro: Normal exam oriented to person/place & time with no focal motor or sensory  deficits.                    Eyes: Normal exam of conjunctiva & lids, pupils equally reactive.   ENT: Normal exam of nasal/oral mucosa with absence of cyanosis.   Neck: Normal exam of jugular veins, trachea & thyroid.   Chest: Normal lung exam with good air movement absence of wheezes, rales, or rhonchi:                                                                          CV:  Auscultation: normal [ ] S3[ ] S4[ ] Irregular [ ] Rub[ ] Clicks[ ]  Murmurs none:[ ]systolic [ ]  diastolic [ ] holosystolic [ ]  Carotids: No Bruits[ ] Other____________ Abdominal Aorta: normal [ ] nonpalpable[ ]                                                                         GI: Normal exam of abdomen, liver & spleen with no noted masses or tenderness.  (+) BS X 4 Quadrants, Nontender / Non Distended.                                                                          Extremities: Normal no evidence of cyanosis or deformity Edema: none[ ]trace[ ]1+[ ]2+[ ]3+[ ]4+[ ]  Lower Extremity Pulses: Right[ ] Left[ ]Varicosities[ ]  SKIN : Normal exam to inspection & palpation. PHYSICAL EXAM  Vital Signs Last 24 Hrs  T(C): 36.5 (24 Dec 2018 14:20), Max: 36.5 (24 Dec 2018 14:20)  T(F): 97.7 (24 Dec 2018 14:20), Max: 97.7 (24 Dec 2018 14:20)  HR: 95 (24 Dec 2018 14:20) (95 - 95)  BP: 94/64 (24 Dec 2018 14:20) (94/64 - 94/64)  BP(mean): --  RR: 18 (24 Dec 2018 14:20) (18 - 18)  SpO2: 95% (24 Dec 2018 14:20) (95% - 95%)    General: Well nourished, well developed, no acute distress.                                                         Neuro: Normal exam oriented to person/place & time with no focal motor or sensory  deficits.                    Eyes: Normal exam of conjunctiva & lids, pupils equally reactive.   ENT: Normal exam of nasal/oral mucosa with absence of cyanosis.   Neck: Normal exam of jugular veins, trachea & thyroid.   Chest: Normal lung exam with good air movement absence of wheezes, rales, or rhonchi:                                                                          CV:  Auscultation: normal [X] S3[ ] S4[ ] Irregular [ ] Rub[ ] Clicks[ ]  Murmurs none:[ ]systolic [X]  diastolic [ ] holosystolic [ ]  Carotids: No Bruits[- ] Other____________ Abdominal Aorta: normal [ ] nonpalpable[ ]                                                                         GI: Normal exam of abdomen, liver & spleen with no noted masses or tenderness.  (+) BS X 4 Quadrants, Nontender / Non Distended.                                                                          Extremities: Normal no evidence of cyanosis or deformity Edema: none[X]trace[ ]1+[ ]2+[ ]3+[ ]4+[ ]  Lower Extremity Pulses: Right[+2 ] Left[ +2]Varicosities[-]  SKIN : Normal exam to inspection & palpation. PHYSICAL EXAM  Vital Signs Last 24 Hrs  T(C): 36.5 (24 Dec 2018 14:20), Max: 36.5 (24 Dec 2018 14:20)  T(F): 97.7 (24 Dec 2018 14:20), Max: 97.7 (24 Dec 2018 14:20)  HR: 95 (24 Dec 2018 14:20) (95 - 95)  BP: 94/64 (24 Dec 2018 14:20) (94/64 - 94/64)  BP(mean): --  RR: 18 (24 Dec 2018 14:20) (18 - 18)  SpO2: 95% (24 Dec 2018 14:20) (95% - 95%)    General: Well nourished, well developed, no acute distress.                                                         Neuro: Normal exam oriented to person/place & time with no focal motor or sensory  deficits.                    Eyes: Normal exam of conjunctiva & lids, pupils equally reactive.   ENT: Normal exam of nasal/oral mucosa with absence of cyanosis.   Neck: Normal exam of jugular veins, trachea & thyroid.   Chest: Normal lung exam with good air movement absence of wheezes, rales, or rhonchi:                                                                          CV:  Auscultation: normal [] S3[ ] S4[ ] Irregular [X ] Rub[ ] Clicks[ ]  Murmurs none:[ ]systolic [X]  diastolic [ ] holosystolic [ ]  Carotids: No Bruits[- ] Other____________ Abdominal Aorta: normal [ x] nonpalpable[ X]                                                                         GI: Normal exam of abdomen, liver & spleen with no noted masses or tenderness.  (+) BS X 4 Quadrants, Nontender / Non Distended.                                                                          Extremities: Normal no evidence of cyanosis or deformity Edema: none[X]trace[ ]1+[ ]2+[ ]3+[ ]4+[ ]  Lower Extremity Pulses: Right[+2 ] Left[ +2]Varicosities[-]  SKIN : Normal exam to inspection & palpation.

## 2018-12-24 NOTE — H&P ADULT - PMH
Afib    Atrial fibrillation    CKD (chronic kidney disease)    Colon cancer    GERD (gastroesophageal reflux disease)    HLD (hyperlipidemia)    HLD (hyperlipidemia)    HTN (hypertension)    HTN (hypertension)    Hyponatremia    Mitral regurgitation    Pleural effusion, bilateral  as of latest chest xray  s/p latest hospitalization for CHF  Prostate CA    Prostate cancer Afib    Atrial fibrillation    CKD (chronic kidney disease)    Colon cancer    GERD (gastroesophageal reflux disease)    HLD (hyperlipidemia)    HLD (hyperlipidemia)    HTN (hypertension)    HTN (hypertension)    Hyponatremia    Mitral regurgitation    Pleural effusion, bilateral  as of latest chest xray  s/p latest hospitalization for CHF  Prostate CA    Prostate cancer    Thrombus in heart chamber

## 2018-12-24 NOTE — H&P ADULT - NSHPREVIEWOFSYSTEMS_GEN_ALL_CORE
Review of Systems  GENERAL:  Fevers[] chills[] sweats[] fatigue[] weight loss[] weight gain []                                        NEURO:  parathesias[] seizures []  syncope []  confusion []                                                                                  EYES: glasses[]  blurry vision[]  discharge[] pain[] glaucoma []                                                                            ENMT:  difficulty hearing []  vertigo[]  dysphagia[] epistaxis[] recent dental work []                                      CV:  chest pain[] palpitations[] CAMACHO [] diaphoresis [] edema[]                                                                                             RESPIRATORY:  wheezing[] SOB[] cough [] sputum[] hemoptysis[]                                                                    GI:  nausea[]  vomiting []  diarrhea[] constipation [] melena []                                                                        : hematuria[ ]  dysuria[ ] urgency[] incontinence[]                                                                                              MUSCULOSKELETAL:  arthritis[ ]  joint swelling [ ] muscle weakness [ ]                                                                  SKIN/BREAST:  rash[ ] itching [ ]  hair loss[ ] masses[ ]                                                                                                PSYCH:  dementia [ ] depression [ ] anxiety[ ]                                                                                                                  HEME/LYMPH:  bruises easily[ ] enlarged lymph nodes[ ] tender lymph nodes[ ]                                                 ENDOCRINE:  cold intolerance[ ] heat intolerance[ ] polydipsia[ ]

## 2018-12-24 NOTE — H&P ADULT - HISTORY OF PRESENT ILLNESS
History of Present Illness:  88y Male    Past Medical History  Pleural effusion, bilateral: as of latest chest xray  s/p latest hospitalization for CHF  Atelectasis  CKD (chronic kidney disease)  Colon cancer  Prostate cancer  Mitral regurgitation  Hyponatremia  HLD (hyperlipidemia)  HTN (hypertension)  Atrial fibrillation  Prostate CA  GERD (gastroesophageal reflux disease)  Afib  HLD (hyperlipidemia)  HTN (hypertension)      Past Surgical History  S/P coronary angiogram: 11/12/18  History of appendectomy  History of appendectomy  H/O colectomy  History of prostate cancer      MEDICATIONS  (STANDING):  sodium chloride 0.9% lock flush 3 milliLiter(s) IV Push every 8 hours    MEDICATIONS  (PRN):    Antiplatelet therapy:                           Last dose/amt:    Allergies: penicillin (Hives)  shellfish (Hives)      SOCIAL HISTORY:  Smoker: [ ] Yes  [ ] No        PACK YEARS:                         WHEN QUIT?  ETOH use: [ ] Yes  [ ] No              FREQUENCY / QUANTITY:  Ilicit Drug use:  [ ] Yes  [ ] No  Occupation:  Live with:  Assist device use:    Relevant Family History  FAMILY HISTORY:  Family history of colon cancer (Sibling)  Family history of ovarian cancer (Mother)  Family history of cancer (Sibling)      Review of Systems  GENERAL:  Fevers[] chills[] sweats[] fatigue[] weight loss[] weight gain []                                        NEURO:  parathesias[] seizures []  syncope []  confusion []                                                                                  EYES: glasses[]  blurry vision[]  discharge[] pain[] glaucoma []                                                                            ENMT:  difficulty hearing []  vertigo[]  dysphagia[] epistaxis[] recent dental work []                                      CV:  chest pain[] palpitations[] CAMACHO [] diaphoresis [] edema[]                                                                                             RESPIRATORY:  wheezing[] SOB[] cough [] sputum[] hemoptysis[]                                                                    GI:  nausea[]  vomiting []  diarrhea[] constipation [] melena []                                                                        : hematuria[ ]  dysuria[ ] urgency[] incontinence[]                                                                                              MUSKULOSKELETAL:  arthritis[ ]  joint swelling [ ] muscle weakness [ ]                                                                  SKIN/BREAST:  rash[ ] itching [ ]  hair loss[ ] masses[ ]                                                                                                PSYCH:  dementia [ ] depresion [ ] anxiety[ ]                                                                                                                  HEME/LYMPH:  bruises easily[ ] enlarged lymph nodes[ ] tender lymph nodes[ ]                                                 ENDOCRINE:  cold intolerance[ ] heat intolerance[ ] polydipsia[ ]                                                                              PHYSICAL EXAM  Vital Signs Last 24 Hrs  T(C): 36.5 (24 Dec 2018 14:20), Max: 36.5 (24 Dec 2018 14:20)  T(F): 97.7 (24 Dec 2018 14:20), Max: 97.7 (24 Dec 2018 14:20)  HR: 95 (24 Dec 2018 14:20) (95 - 95)  BP: 94/64 (24 Dec 2018 14:20) (94/64 - 94/64)  BP(mean): --  RR: 18 (24 Dec 2018 14:20) (18 - 18)  SpO2: 95% (24 Dec 2018 14:20) (95% - 95%)    General: Well nourished, well developed, no acute distress.                                                         Neuro: Normal exam oriented to person/place & time with no focal motor or sensory  deficits.                    Eyes: Normal exam of conjunctiva & lids, pupils equally reactive.   ENT: Normal exam of nasal/oral mucosa with absence of cyanosis.   Neck: Normal exam of jugular veins, trachea & thyroid.   Chest: Normal lung exam with good air movement absence of wheezes, rales, or rhonchi:                                                                          CV:  Auscultation: normal [ ] S3[ ] S4[ ] Irregular [ ] Rub[ ] Clicks[ ]  Murmurs none:[ ]systolic [ ]  diastolic [ ] holosystolic [ ]  Carotids: No Bruits[ ] Other____________ Abdominal Aorta: normal [ ] nonpalpable[ ]                                                                         GI: Normal exam of abdomen, liver & spleen with no noted masses or tenderness.  (+) BS X 4 Quadrants, Nontender / Non Distended.                                                                                             Extremities: Normal no evidence of cyanosis or deformity Edema: none[ ]trace[ ]1+[ ]2+[ ]3+[ ]4+[ ]  Lower Extremity Pulses: Right[ ] Left[ ]Varicosities[ ]  SKIN : Normal exam to inspection & palation.                                                           LABS:                        9.6    2.9   )-----------( x        ( 24 Dec 2018 17:28 )             28.8     12-24    139  |  101  |  40<H>  ----------------------------<  88  4.4   |  23  |  1.91<H>    Ca    9.7      24 Dec 2018 17:28    TPro  7.4  /  Alb  3.8  /  TBili  0.4  /  DBili  x   /  AST  13  /  ALT  11  /  AlkPhos  69  12-24    PT/INR - ( 24 Dec 2018 17:28 )   PT: 13.5 sec;   INR: 1.17 ratio                     Cardiac Cath:    TTE / YENNY: History of Present Illness:    89 yo AA male with PMH, HTN, HLD, hyponatremia and Afib (on lovenox) colon CA (2001 s/p chemo), prostate Ca (2006 tx with radiation), CKD severe MR admitted for scheduled  Mitral MR clip originally scheduled for 11/21/18.  Case aborted during initial stages due to right common iliac vein thrombus that embolized to the right atrium.  Endovascular thrombectomy performed.  Pt stable.  LE dopplers negative for DVT.  Repeat TTE with no evidence of intracardiac thrombus.  Pt was switched from home eliquis to renal dose lovenox.  Now admitted for elective mitral clip scheduled on Wednesday 12/26/18.

## 2018-12-24 NOTE — PATIENT PROFILE ADULT - FALL HARM RISK
age(85 years old or older)/surgery/bones(Osteoporosis,prev fx,steroid use,metastatic bone ca)/coagulation(Bleeding disorder R/T clinical cond/anti-coags)

## 2018-12-24 NOTE — H&P ADULT - PROBLEM SELECTOR PLAN 2
Preop Lucy Clip work up   CMP and CBC sent   Continue with Toprol 100 mg PO daily   Continue with Lasix 20 mg PO daily   Continue with Lovenox 60 mg SQ daily  B/L LE Duplex study   TTE   Vascular / Cardiology consult called    Plan for Lucy Clip procedure Wednesday 12/26 with Dr. Floyd

## 2018-12-24 NOTE — H&P ADULT - FAMILY HISTORY
Sibling  Still living? No  Family history of cancer, Age at diagnosis: Age Unknown     Mother  Still living? Unknown  Family history of ovarian cancer, Age at diagnosis: Age Unknown     Sibling  Still living? Unknown  Family history of colon cancer, Age at diagnosis: Age Unknown

## 2018-12-24 NOTE — H&P ADULT - NSHPSOCIALHISTORY_GEN_ALL_CORE
SOCIAL HISTORY:  Smoker: [ ] Yes  [ ] No        PACK YEARS:                         WHEN QUIT?  ETOH use: [ ] Yes  [ ] No              FREQUENCY / QUANTITY:  Ilicit Drug use:  [ ] Yes  [ ] No  Occupation:  Live with:  Assist device use: SOCIAL HISTORY:  Smoker: [ ] Yes  [ X] No        PACK YEARS:                         WHEN QUIT?  ETOH use: [ ] Yes  [ X] No              FREQUENCY / QUANTITY:  Ilicit Drug use:  [ ] Yes  [x ] No  Occupation: Retired   Live with: Spouse

## 2018-12-25 DIAGNOSIS — I34.0 NONRHEUMATIC MITRAL (VALVE) INSUFFICIENCY: ICD-10-CM

## 2018-12-25 LAB
ANION GAP SERPL CALC-SCNC: 15 MMOL/L — SIGNIFICANT CHANGE UP (ref 5–17)
BLD GP AB SCN SERPL QL: NEGATIVE — SIGNIFICANT CHANGE UP
BUN SERPL-MCNC: 38 MG/DL — HIGH (ref 7–23)
CALCIUM SERPL-MCNC: 9.9 MG/DL — SIGNIFICANT CHANGE UP (ref 8.4–10.5)
CHLORIDE SERPL-SCNC: 101 MMOL/L — SIGNIFICANT CHANGE UP (ref 96–108)
CO2 SERPL-SCNC: 23 MMOL/L — SIGNIFICANT CHANGE UP (ref 22–31)
CREAT SERPL-MCNC: 1.72 MG/DL — HIGH (ref 0.5–1.3)
GLUCOSE SERPL-MCNC: 117 MG/DL — HIGH (ref 70–99)
HBA1C BLD-MCNC: 6.1 % — HIGH (ref 4–5.6)
HCT VFR BLD CALC: 36 % — LOW (ref 39–50)
HGB BLD-MCNC: 12.3 G/DL — LOW (ref 13–17)
MCHC RBC-ENTMCNC: 31.1 PG — SIGNIFICANT CHANGE UP (ref 27–34)
MCHC RBC-ENTMCNC: 34.2 GM/DL — SIGNIFICANT CHANGE UP (ref 32–36)
MCV RBC AUTO: 90.8 FL — SIGNIFICANT CHANGE UP (ref 80–100)
MRSA PCR RESULT.: SIGNIFICANT CHANGE UP
PLATELET # BLD AUTO: 107 K/UL — LOW (ref 150–400)
POTASSIUM SERPL-MCNC: 4 MMOL/L — SIGNIFICANT CHANGE UP (ref 3.5–5.3)
POTASSIUM SERPL-SCNC: 4 MMOL/L — SIGNIFICANT CHANGE UP (ref 3.5–5.3)
RBC # BLD: 3.97 M/UL — LOW (ref 4.2–5.8)
RBC # FLD: 15.4 % — HIGH (ref 10.3–14.5)
RH IG SCN BLD-IMP: POSITIVE — SIGNIFICANT CHANGE UP
S AUREUS DNA NOSE QL NAA+PROBE: SIGNIFICANT CHANGE UP
SODIUM SERPL-SCNC: 139 MMOL/L — SIGNIFICANT CHANGE UP (ref 135–145)
TSH SERPL-MCNC: 2.47 UIU/ML — SIGNIFICANT CHANGE UP (ref 0.27–4.2)
WBC # BLD: 3.5 K/UL — LOW (ref 3.8–10.5)
WBC # FLD AUTO: 3.5 K/UL — LOW (ref 3.8–10.5)

## 2018-12-25 PROCEDURE — 71045 X-RAY EXAM CHEST 1 VIEW: CPT | Mod: 26

## 2018-12-25 PROCEDURE — 99222 1ST HOSP IP/OBS MODERATE 55: CPT

## 2018-12-25 PROCEDURE — 93010 ELECTROCARDIOGRAM REPORT: CPT

## 2018-12-25 RX ORDER — VANCOMYCIN HCL 1 G
1000 VIAL (EA) INTRAVENOUS ONCE
Qty: 0 | Refills: 0 | Status: DISCONTINUED | OUTPATIENT
Start: 2018-12-25 | End: 2018-12-26

## 2018-12-25 RX ORDER — CHLORHEXIDINE GLUCONATE 213 G/1000ML
1 SOLUTION TOPICAL ONCE
Qty: 0 | Refills: 0 | Status: COMPLETED | OUTPATIENT
Start: 2018-12-25 | End: 2018-12-25

## 2018-12-25 RX ORDER — CHLORHEXIDINE GLUCONATE 213 G/1000ML
30 SOLUTION TOPICAL ONCE
Qty: 0 | Refills: 0 | Status: COMPLETED | OUTPATIENT
Start: 2018-12-25 | End: 2018-12-26

## 2018-12-25 RX ADMIN — ENOXAPARIN SODIUM 60 MILLIGRAM(S): 100 INJECTION SUBCUTANEOUS at 12:56

## 2018-12-25 RX ADMIN — Medication 100 MILLIGRAM(S): at 06:06

## 2018-12-25 RX ADMIN — PANTOPRAZOLE SODIUM 40 MILLIGRAM(S): 20 TABLET, DELAYED RELEASE ORAL at 06:06

## 2018-12-25 RX ADMIN — Medication 20 MILLIGRAM(S): at 17:14

## 2018-12-25 RX ADMIN — CHLORHEXIDINE GLUCONATE 1 APPLICATION(S): 213 SOLUTION TOPICAL at 20:00

## 2018-12-25 RX ADMIN — SODIUM CHLORIDE 3 MILLILITER(S): 9 INJECTION INTRAMUSCULAR; INTRAVENOUS; SUBCUTANEOUS at 22:18

## 2018-12-25 RX ADMIN — ATORVASTATIN CALCIUM 40 MILLIGRAM(S): 80 TABLET, FILM COATED ORAL at 22:13

## 2018-12-25 RX ADMIN — SODIUM CHLORIDE 3 MILLILITER(S): 9 INJECTION INTRAMUSCULAR; INTRAVENOUS; SUBCUTANEOUS at 06:03

## 2018-12-25 RX ADMIN — Medication 81 MILLIGRAM(S): at 12:56

## 2018-12-25 RX ADMIN — SODIUM CHLORIDE 3 MILLILITER(S): 9 INJECTION INTRAMUSCULAR; INTRAVENOUS; SUBCUTANEOUS at 12:56

## 2018-12-25 RX ADMIN — Medication 20 MILLIGRAM(S): at 06:06

## 2018-12-25 RX ADMIN — Medication 100 MILLIGRAM(S): at 22:13

## 2018-12-25 NOTE — CONSULT NOTE ADULT - ATTENDING COMMENTS
Patient seen and examined 12/25/2018  Agree with above  Plan for non-invasive testing prior to Mitral Clip with echo and LE venous duplex  Continue Lovenox  Post clip can transition to coumadin     Thanks    Lj Rodney
Agree with above.   MitraClip tomorrow  follow up CTS    Ethan Solorzano MD

## 2018-12-25 NOTE — PROGRESS NOTE ADULT - SUBJECTIVE AND OBJECTIVE BOX
Cardiac Surgery Pre-op Note:    CC: Patient is a 88y old  Male who presents with a chief complaint of Lucy Clip scheduled for AM         Surgeon:Dr Floyd    Procedure: 18    Allergies    penicillin (Hives)  shellfish (Hives)    Intolerances        HPI:   This is a 89 yo AA male with PMH, HTN, HLD, hyponatremia and Afib (on lovenox) colon CA ( s/p chemo), prostate Ca ( tx with radiation), CKD severe MR admitted for scheduled  Mitral MR clip originally scheduled for 18.  Case aborted during initial stages due to right common iliac vein thrombus that embolized to the right atrium.  Endovascular thrombectomy performed.  Pt stable.  LE dopplers negative for DVT.  Repeat TTE with no evidence of intracardiac thrombus.  Pt was switched from home eliquis to renal dose lovenox.  Now admitted for elective mitral clip scheduled on 18.       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  Pleural effusion, bilateral: as of latest chest xray  s/p latest hospitalization for CHF  CKD (chronic kidney disease)  Colon cancer  Prostate cancer  Mitral regurgitation  Hyponatremia  HLD (hyperlipidemia)  HTN (hypertension)  Atrial fibrillation  Prostate CA  GERD (gastroesophageal reflux disease)  Afib  HLD (hyperlipidemia)  HTN (hypertension)  S/P coronary angiogram: 18  History of appendectomy  H/O colectomy  History of prostate cancer      MEDICATIONS  (STANDING):  aspirin enteric coated 81 milliGRAM(s) Oral daily  atorvastatin 40 milliGRAM(s) Oral at bedtime  chlorhexidine 0.12% Liquid 30 milliLiter(s) Swish and Spit once  chlorhexidine 4% Liquid 1 Application(s) Topical once  docusate sodium 100 milliGRAM(s) Oral three times a day  enoxaparin Injectable 60 milliGRAM(s) SubCutaneous daily  furosemide    Tablet 20 milliGRAM(s) Oral two times a day  metoprolol succinate  milliGRAM(s) Oral daily  pantoprazole    Tablet 40 milliGRAM(s) Oral before breakfast  sodium chloride 0.9% lock flush 3 milliLiter(s) IV Push every 8 hours  vancomycin  IVPB 1000 milliGRAM(s) IV Intermittent once    MEDICATIONS  (PRN):  T(C): 36.7 (18 @ 04:54), Max: 36.7 (18 @ 04:54)  T(F): 98 (18 @ 04:54), Max: 98 (18 @ 04:54)  HR: 84 (18 @ 04:54) (75 - 95)  BP: 124/84 (18 @ 04:54) (94/64 - 124/84)  RR: 18 (18 @ 04:54) (18 - 18)  SpO2: 98% (18 @ 04:54) (95% - 98%)        Labs:                        12.3   3.5   )-----------( 107      ( 25 Dec 2018 09:19 )             36.0         139  |  101  |  38<H>  ----------------------------<  117<H>  4.0   |  23  |  1.72<H>    Ca    9.9      25 Dec 2018 09:19    TPro  7.4  /  Alb  3.8  /  TBili  0.4  /  DBili  x   /  AST  13  /  ALT  11  /  AlkPhos  69      PT/INR - ( 24 Dec 2018 17:28 )   PT: 13.5 sec;   INR: 1.17 ratio             Blood Type: ABO Interpretation: O ( @ 17:12)    HGB A1C: Hemoglobin A1C, Whole Blood: 6.1 % ( @ 01:25)    Prealbumin:   Pro-BNP: Serum Pro-Brain Natriuretic Peptide: 3008 pg/mL ( @ 17:28)    Thyroid Panel:  @ 18:14/2.47  --/--/--    MRSA: MRSA PCR Result.: NotDetec ( @ 21:26)   / MSSA:   Urinalysis Basic - ( 24 Dec 2018 21:45 )    Color: Light Yellow / Appearance: Clear / S.010 / pH: x  Gluc: x / Ketone: Negative  / Bili: Negative / Urobili: Negative   Blood: x / Protein: Negative / Nitrite: Negative   Leuk Esterase: Large / RBC: 1 /hpf / WBC 22 /HPF   Sq Epi: x / Non Sq Epi: 0 / Bacteria: Moderate        CXR:  < from: Xray Chest 1 View- PORTABLE-Routine (18 @ 05:34) >  re are small bilateral pleural effusions likely with   associated areas of atelectasis predominantly unchanged. There is no   pneumothorax.    EKG:< from: 12 Lead ECG (18 @ 08:22) >  nosis Line ATRIAL FIBRILLATION  LOW VOLTAGE QRS  SEPTAL INFARCT , AGE UNDETERMINED  ABNORMAL ECG  Carotid Duplex:  < from: VA Duplex Carotid, Bilat (18 @ 13:45) >   Scattered calcified pleural plaques resulting in a mild, less   than 50% stenosis, of the bilateral common carotid arteries and the left   internal carotid artery.    < end of copied text >      PFT's:    Echocardiogram:< from: Transthoracic Echocardiogram (18 @ 09:42) >  1. Mitral annular calcification,tethered mitral valve  leaflets. Severe mitral regurgitation.  2. Mildly dilated left atrium.  LA volume index = 38 cc/m2.  3. Low normal left ventricular systolic function. No  segmental wall motion abnormalities.  4. A linear structure compatible with a Chiari-network is  identified in the right atrium (normal variant). No  thrombus seen.  5. Normal right ventricular size and function.  6. Normal tricuspid valve. Moderate tricuspid  regurgitation.  7. Estimated pulmonary artery systolic pressure equals 35  mm Hg, assuming right atrial pressure equals 8 mm Hg,  consistent with borderline pulmonary pressures.  8. Left pleural effusion.      Cardiac catheterization:  Gen:  WN/WD NAD  Neuro: AAOx3, nonfocal  Pulm: CTA B/L  CV: IRRR, S1S2  Abd: Soft, NT, ND +BS  Ext: No edema, + peripheral pulses      Pt has AICD/PPM [ ] Yes  [ x] No             Brand Name:  Pre-op Beta Blocker ordered within 24 hrs of surgery (CABG ONLY)?  [ ] Yes  [x ] No  If not, Why? Mitral clip  Type & Cross  [ x] Yes  [ ] No  NPO after Midnight [ x] Yes  [ ] No  Pre-op ABX ordered, to be taped on chart:  x[ ] Yes  [ ] No     Hibiclens/Peridex ordered [ x] Yes  [ ] No  Intraop on Hold: PRBCs, CXR, YENNY x[ ]   Consent obtained  [x ] Yes  [ ] No Cardiac Surgery Pre-op Note:    CC: Patient is a 88y old  Male who presents with a chief complaint of Lucy Clip scheduled for AM         Surgeon:Dr Floyd    Procedure: 18    Allergies    penicillin (Hives)  shellfish (Hives)    Intolerances        HPI:   This is a 87 yo AA male with PMH, HTN, HLD, hyponatremia and Afib (on lovenox) colon CA ( s/p chemo), prostate Ca ( tx with radiation), CKD severe MR admitted for scheduled  Mitral MR clip originally scheduled for 18.  Case aborted during initial stages due to right common iliac vein thrombus that embolized to the right atrium.  Endovascular thrombectomy performed.  Pt stable.  LE dopplers negative for DVT.  Repeat TTE with no evidence of intracardiac thrombus.  Pt was switched from home eliquis to renal dose lovenox.  Now admitted for elective mitral clip scheduled on 18.       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  Pleural effusion, bilateral: as of latest chest xray  s/p latest hospitalization for CHF  CKD (chronic kidney disease)  Colon cancer  Prostate cancer  Mitral regurgitation  Hyponatremia  HLD (hyperlipidemia)  HTN (hypertension)  Atrial fibrillation  Prostate CA  GERD (gastroesophageal reflux disease)  Afib  HLD (hyperlipidemia)  HTN (hypertension)  S/P coronary angiogram: 18  History of appendectomy  H/O colectomy  History of prostate cancer      MEDICATIONS  (STANDING):  aspirin enteric coated 81 milliGRAM(s) Oral daily  atorvastatin 40 milliGRAM(s) Oral at bedtime  chlorhexidine 0.12% Liquid 30 milliLiter(s) Swish and Spit once  chlorhexidine 4% Liquid 1 Application(s) Topical once  docusate sodium 100 milliGRAM(s) Oral three times a day  enoxaparin Injectable 60 milliGRAM(s) SubCutaneous daily  furosemide    Tablet 20 milliGRAM(s) Oral two times a day  metoprolol succinate  milliGRAM(s) Oral daily  pantoprazole    Tablet 40 milliGRAM(s) Oral before breakfast  sodium chloride 0.9% lock flush 3 milliLiter(s) IV Push every 8 hours  vancomycin  IVPB 1000 milliGRAM(s) IV Intermittent once    MEDICATIONS  (PRN):  T(C): 36.7 (18 @ 04:54), Max: 36.7 (18 @ 04:54)  T(F): 98 (18 @ 04:54), Max: 98 (18 @ 04:54)  HR: 84 (18 @ 04:54) (75 - 95)  BP: 124/84 (18 @ 04:54) (94/64 - 124/84)  RR: 18 (18 @ 04:54) (18 - 18)  SpO2: 98% (18 @ 04:54) (95% - 98%)        Labs:                        12.3   3.5   )-----------( 107      ( 25 Dec 2018 09:19 )             36.0         139  |  101  |  38<H>  ----------------------------<  117<H>  4.0   |  23  |  1.72<H>    Ca    9.9      25 Dec 2018 09:19    TPro  7.4  /  Alb  3.8  /  TBili  0.4  /  DBili  x   /  AST  13  /  ALT  11  /  AlkPhos  69      PT/INR - ( 24 Dec 2018 17:28 )   PT: 13.5 sec;   INR: 1.17 ratio             Blood Type: ABO Interpretation: O ( @ 17:12)    HGB A1C: Hemoglobin A1C, Whole Blood: 6.1 % ( @ 01:25)    Prealbumin:   Pro-BNP: Serum Pro-Brain Natriuretic Peptide: 3008 pg/mL ( @ 17:28)    Thyroid Panel:  @ 18:14/2.47  --/--/--    MRSA: MRSA PCR Result.: NotDetec ( @ 21:26)   / MSSA:   Urinalysis Basic - ( 24 Dec 2018 21:45 )    Color: Light Yellow / Appearance: Clear / S.010 / pH: x  Gluc: x / Ketone: Negative  / Bili: Negative / Urobili: Negative   Blood: x / Protein: Negative / Nitrite: Negative   Leuk Esterase: Large / RBC: 1 /hpf / WBC 22 /HPF   Sq Epi: x / Non Sq Epi: 0 / Bacteria: Moderate        CXR:  < from: Xray Chest 1 View- PORTABLE-Routine (18 @ 05:34) >  re are small bilateral pleural effusions likely with   associated areas of atelectasis predominantly unchanged. There is no   pneumothorax.    EKG:< from: 12 Lead ECG (18 @ 08:22) >  nosis Line ATRIAL FIBRILLATION  LOW VOLTAGE QRS  SEPTAL INFARCT , AGE UNDETERMINED  ABNORMAL ECG  Carotid Duplex:  < from: VA Duplex Carotid, Bilat (18 @ 13:45) >   Scattered calcified pleural plaques resulting in a mild, less   than 50% stenosis, of the bilateral common carotid arteries and the left   internal carotid artery.    < end of copied text >      PFT's:  LE Duplex negative for DVT    Echocardiogram:< from: Transthoracic Echocardiogram (18 @ 09:42) >  1. Mitral annular calcification,tethered mitral valve  leaflets. Severe mitral regurgitation.  2. Mildly dilated left atrium.  LA volume index = 38 cc/m2.  3. Low normal left ventricular systolic function. No  segmental wall motion abnormalities.  4. A linear structure compatible with a Chiari-network is  identified in the right atrium (normal variant). No  thrombus seen.  5. Normal right ventricular size and function.  6. Normal tricuspid valve. Moderate tricuspid  regurgitation.  7. Estimated pulmonary artery systolic pressure equals 35  mm Hg, assuming right atrial pressure equals 8 mm Hg,  consistent with borderline pulmonary pressures.  8. Left pleural effusion.      Cardiac catheterization:  Gen:  WN/WD NAD  Neuro: AAOx3, nonfocal  Pulm: CTA B/L  CV: IRRR, S1S2  Abd: Soft, NT, ND +BS  Ext: No edema, + peripheral pulses      Pt has AICD/PPM [ ] Yes  [ x] No             Brand Name:  Pre-op Beta Blocker ordered within 24 hrs of surgery (CABG ONLY)?  [ ] Yes  [x ] No  If not, Why? Mitral clip  Type & Cross  [ x] Yes  [ ] No  NPO after Midnight [ x] Yes  [ ] No  Pre-op ABX ordered, to be taped on chart:  x[ ] Yes  [ ] No     Hibiclens/Peridex ordered [ x] Yes  [ ] No  Intraop on Hold: PRBCs, CXR, YENNY x[ ]   Consent obtained  [x ] Yes  [ ] No

## 2018-12-25 NOTE — PROGRESS NOTE ADULT - ASSESSMENT
This is a 87 yo AA male with PMH, HTN, HLD, hyponatremia and Afib (on lovenox) colon CA (2001 s/p chemo), prostate Ca (2006 tx with radiation), CKD severe MR admitted for scheduled  Mitral MR clip originally scheduled for 11/21/18.  Case aborted during initial stages due to right common iliac vein thrombus that embolized to the right atrium.  Endovascular thrombectomy performed.  Pt stable.  LE dopplers negative for DVT.  Repeat TTE with no evidence of intracardiac thrombus.  Pt was switched from home eliquis to renal dose lovenox.  Now admitted for elective mitral clip scheduled on Wednesday 12/26/18.  Amenable to  mitral clip in am

## 2018-12-26 ENCOUNTER — APPOINTMENT (OUTPATIENT)
Dept: CARDIOTHORACIC SURGERY | Facility: HOSPITAL | Age: 83
End: 2018-12-26

## 2018-12-26 LAB
ALBUMIN SERPL ELPH-MCNC: 3.5 G/DL — SIGNIFICANT CHANGE UP (ref 3.3–5)
ALP SERPL-CCNC: 60 U/L — SIGNIFICANT CHANGE UP (ref 40–120)
ALT FLD-CCNC: 11 U/L — SIGNIFICANT CHANGE UP (ref 10–45)
ANION GAP SERPL CALC-SCNC: 14 MMOL/L — SIGNIFICANT CHANGE UP (ref 5–17)
APTT BLD: 41.4 SEC — HIGH (ref 27.5–36.3)
APTT BLD: >200 SEC — CRITICAL HIGH (ref 27.5–36.3)
APTT BLD: >200 SEC — CRITICAL HIGH (ref 27.5–36.3)
AST SERPL-CCNC: 11 U/L — SIGNIFICANT CHANGE UP (ref 10–40)
BASOPHILS # BLD AUTO: 0 K/UL — SIGNIFICANT CHANGE UP (ref 0–0.2)
BASOPHILS NFR BLD AUTO: 0.3 % — SIGNIFICANT CHANGE UP (ref 0–2)
BILIRUB SERPL-MCNC: 0.6 MG/DL — SIGNIFICANT CHANGE UP (ref 0.2–1.2)
BUN SERPL-MCNC: 35 MG/DL — HIGH (ref 7–23)
CALCIUM SERPL-MCNC: 9.7 MG/DL — SIGNIFICANT CHANGE UP (ref 8.4–10.5)
CHLORIDE SERPL-SCNC: 101 MMOL/L — SIGNIFICANT CHANGE UP (ref 96–108)
CK MB CFR SERPL CALC: 2.6 NG/ML — SIGNIFICANT CHANGE UP (ref 0–6.7)
CK SERPL-CCNC: 51 U/L — SIGNIFICANT CHANGE UP (ref 30–200)
CO2 SERPL-SCNC: 21 MMOL/L — LOW (ref 22–31)
CREAT SERPL-MCNC: 1.51 MG/DL — HIGH (ref 0.5–1.3)
EOSINOPHIL # BLD AUTO: 0.4 K/UL — SIGNIFICANT CHANGE UP (ref 0–0.5)
EOSINOPHIL NFR BLD AUTO: 7.5 % — HIGH (ref 0–6)
FIBRINOGEN PPP-MCNC: <50 (ref 242–442)
FIBRINOGEN PPP-MCNC: <60 (ref 350–510)
GAS PNL BLDA: SIGNIFICANT CHANGE UP
GAS PNL BLDA: SIGNIFICANT CHANGE UP
GLUCOSE SERPL-MCNC: 125 MG/DL — HIGH (ref 70–99)
HCT VFR BLD CALC: 35.6 % — LOW (ref 39–50)
HGB BLD-MCNC: 11.8 G/DL — LOW (ref 13–17)
INR BLD: 1.17 RATIO — HIGH (ref 0.88–1.16)
INR BLD: 1.32 RATIO — HIGH (ref 0.88–1.16)
INR BLD: >15 RATIO (ref 0.88–1.16)
LYMPHOCYTES # BLD AUTO: 1.4 K/UL — SIGNIFICANT CHANGE UP (ref 1–3.3)
LYMPHOCYTES # BLD AUTO: 30.1 % — SIGNIFICANT CHANGE UP (ref 13–44)
MAGNESIUM SERPL-MCNC: 1.9 MG/DL — SIGNIFICANT CHANGE UP (ref 1.6–2.6)
MCHC RBC-ENTMCNC: 30.4 PG — SIGNIFICANT CHANGE UP (ref 27–34)
MCHC RBC-ENTMCNC: 33.3 GM/DL — SIGNIFICANT CHANGE UP (ref 32–36)
MCV RBC AUTO: 91.3 FL — SIGNIFICANT CHANGE UP (ref 80–100)
MONOCYTES # BLD AUTO: 0.2 K/UL — SIGNIFICANT CHANGE UP (ref 0–0.9)
MONOCYTES NFR BLD AUTO: 4.3 % — SIGNIFICANT CHANGE UP (ref 2–14)
NEUTROPHILS # BLD AUTO: 2.8 K/UL — SIGNIFICANT CHANGE UP (ref 1.8–7.4)
NEUTROPHILS NFR BLD AUTO: 57.8 % — SIGNIFICANT CHANGE UP (ref 43–77)
PHOSPHATE SERPL-MCNC: 4.3 MG/DL — SIGNIFICANT CHANGE UP (ref 2.5–4.5)
PLATELET # BLD AUTO: 106 K/UL — LOW (ref 150–400)
POTASSIUM SERPL-MCNC: 4.1 MMOL/L — SIGNIFICANT CHANGE UP (ref 3.5–5.3)
POTASSIUM SERPL-SCNC: 4.1 MMOL/L — SIGNIFICANT CHANGE UP (ref 3.5–5.3)
PROT SERPL-MCNC: 6.6 G/DL — SIGNIFICANT CHANGE UP (ref 6–8.3)
PROTHROM AB SERPL-ACNC: 13.5 SEC — HIGH (ref 10–12.9)
PROTHROM AB SERPL-ACNC: 15.3 SEC — HIGH (ref 10–12.9)
PROTHROM AB SERPL-ACNC: >200 SEC — SIGNIFICANT CHANGE UP (ref 10–12.9)
RBC # BLD: 3.9 M/UL — LOW (ref 4.2–5.8)
RBC # FLD: 15.8 % — HIGH (ref 10.3–14.5)
SODIUM SERPL-SCNC: 136 MMOL/L — SIGNIFICANT CHANGE UP (ref 135–145)
TROPONIN T, HIGH SENSITIVITY RESULT: 56 NG/L — HIGH (ref 0–51)
WBC # BLD: 4.8 K/UL — SIGNIFICANT CHANGE UP (ref 3.8–10.5)
WBC # FLD AUTO: 4.8 K/UL — SIGNIFICANT CHANGE UP (ref 3.8–10.5)

## 2018-12-26 PROCEDURE — 94010 BREATHING CAPACITY TEST: CPT | Mod: 26

## 2018-12-26 PROCEDURE — 99291 CRITICAL CARE FIRST HOUR: CPT

## 2018-12-26 PROCEDURE — 33418 REPAIR TCAT MITRAL VALVE: CPT | Mod: 62,Q0

## 2018-12-26 PROCEDURE — 93010 ELECTROCARDIOGRAM REPORT: CPT | Mod: 59

## 2018-12-26 PROCEDURE — 93355 ECHO TRANSESOPHAGEAL (TEE): CPT

## 2018-12-26 PROCEDURE — 99233 SBSQ HOSP IP/OBS HIGH 50: CPT

## 2018-12-26 RX ORDER — ACETAMINOPHEN 500 MG
1000 TABLET ORAL ONCE
Qty: 0 | Refills: 0 | Status: COMPLETED | OUTPATIENT
Start: 2018-12-26 | End: 2018-12-26

## 2018-12-26 RX ORDER — DOCUSATE SODIUM 100 MG
100 CAPSULE ORAL THREE TIMES A DAY
Qty: 0 | Refills: 0 | Status: DISCONTINUED | OUTPATIENT
Start: 2018-12-26 | End: 2018-12-31

## 2018-12-26 RX ORDER — WARFARIN SODIUM 2.5 MG/1
5 TABLET ORAL ONCE
Qty: 0 | Refills: 0 | Status: COMPLETED | OUTPATIENT
Start: 2018-12-26 | End: 2018-12-26

## 2018-12-26 RX ORDER — CEFUROXIME AXETIL 250 MG
1500 TABLET ORAL EVERY 8 HOURS
Qty: 0 | Refills: 0 | Status: COMPLETED | OUTPATIENT
Start: 2018-12-26 | End: 2018-12-27

## 2018-12-26 RX ORDER — MAGNESIUM SULFATE 500 MG/ML
1 VIAL (ML) INJECTION ONCE
Qty: 0 | Refills: 0 | Status: COMPLETED | OUTPATIENT
Start: 2018-12-26 | End: 2018-12-26

## 2018-12-26 RX ORDER — SODIUM CHLORIDE 9 MG/ML
1000 INJECTION INTRAMUSCULAR; INTRAVENOUS; SUBCUTANEOUS
Qty: 0 | Refills: 0 | Status: DISCONTINUED | OUTPATIENT
Start: 2018-12-26 | End: 2018-12-27

## 2018-12-26 RX ORDER — CHLORHEXIDINE GLUCONATE 213 G/1000ML
5 SOLUTION TOPICAL EVERY 4 HOURS
Qty: 0 | Refills: 0 | Status: DISCONTINUED | OUTPATIENT
Start: 2018-12-26 | End: 2018-12-27

## 2018-12-26 RX ORDER — AMIODARONE HYDROCHLORIDE 400 MG/1
400 TABLET ORAL EVERY 8 HOURS
Qty: 0 | Refills: 0 | Status: DISCONTINUED | OUTPATIENT
Start: 2018-12-26 | End: 2018-12-27

## 2018-12-26 RX ORDER — POTASSIUM CHLORIDE 20 MEQ
20 PACKET (EA) ORAL
Qty: 0 | Refills: 0 | Status: COMPLETED | OUTPATIENT
Start: 2018-12-26 | End: 2018-12-27

## 2018-12-26 RX ORDER — DEXTROSE 50 % IN WATER 50 %
50 SYRINGE (ML) INTRAVENOUS
Qty: 0 | Refills: 0 | Status: DISCONTINUED | OUTPATIENT
Start: 2018-12-26 | End: 2018-12-31

## 2018-12-26 RX ORDER — INSULIN HUMAN 100 [IU]/ML
3 INJECTION, SOLUTION SUBCUTANEOUS
Qty: 100 | Refills: 0 | Status: DISCONTINUED | OUTPATIENT
Start: 2018-12-26 | End: 2018-12-27

## 2018-12-26 RX ORDER — HEPARIN SODIUM 5000 [USP'U]/ML
400 INJECTION INTRAVENOUS; SUBCUTANEOUS
Qty: 25000 | Refills: 0 | Status: DISCONTINUED | OUTPATIENT
Start: 2018-12-26 | End: 2018-12-27

## 2018-12-26 RX ORDER — ALBUMIN HUMAN 25 %
250 VIAL (ML) INTRAVENOUS ONCE
Qty: 0 | Refills: 0 | Status: COMPLETED | OUTPATIENT
Start: 2018-12-26 | End: 2018-12-26

## 2018-12-26 RX ORDER — POTASSIUM CHLORIDE 20 MEQ
10 PACKET (EA) ORAL
Qty: 0 | Refills: 0 | Status: DISCONTINUED | OUTPATIENT
Start: 2018-12-26 | End: 2018-12-26

## 2018-12-26 RX ORDER — AMIODARONE HYDROCHLORIDE 400 MG/1
150 TABLET ORAL ONCE
Qty: 0 | Refills: 0 | Status: COMPLETED | OUTPATIENT
Start: 2018-12-26 | End: 2018-12-26

## 2018-12-26 RX ORDER — PHENYLEPHRINE HYDROCHLORIDE 10 MG/ML
0.5 INJECTION INTRAVENOUS
Qty: 40 | Refills: 0 | Status: DISCONTINUED | OUTPATIENT
Start: 2018-12-26 | End: 2018-12-27

## 2018-12-26 RX ORDER — POTASSIUM CHLORIDE 20 MEQ
10 PACKET (EA) ORAL ONCE
Qty: 0 | Refills: 0 | Status: COMPLETED | OUTPATIENT
Start: 2018-12-26 | End: 2018-12-26

## 2018-12-26 RX ORDER — DEXTROSE 50 % IN WATER 50 %
25 SYRINGE (ML) INTRAVENOUS
Qty: 0 | Refills: 0 | Status: DISCONTINUED | OUTPATIENT
Start: 2018-12-26 | End: 2018-12-27

## 2018-12-26 RX ORDER — MEPERIDINE HYDROCHLORIDE 50 MG/ML
25 INJECTION INTRAMUSCULAR; INTRAVENOUS; SUBCUTANEOUS ONCE
Qty: 0 | Refills: 0 | Status: DISCONTINUED | OUTPATIENT
Start: 2018-12-26 | End: 2018-12-26

## 2018-12-26 RX ORDER — PANTOPRAZOLE SODIUM 20 MG/1
40 TABLET, DELAYED RELEASE ORAL DAILY
Qty: 0 | Refills: 0 | Status: DISCONTINUED | OUTPATIENT
Start: 2018-12-26 | End: 2018-12-26

## 2018-12-26 RX ADMIN — Medication 50 MILLIEQUIVALENT(S): at 15:23

## 2018-12-26 RX ADMIN — Medication 100 MILLIGRAM(S): at 05:51

## 2018-12-26 RX ADMIN — HEPARIN SODIUM 4 UNIT(S)/HR: 5000 INJECTION INTRAVENOUS; SUBCUTANEOUS at 23:20

## 2018-12-26 RX ADMIN — AMIODARONE HYDROCHLORIDE 400 MILLIGRAM(S): 400 TABLET ORAL at 21:01

## 2018-12-26 RX ADMIN — SODIUM CHLORIDE 3 MILLILITER(S): 9 INJECTION INTRAMUSCULAR; INTRAVENOUS; SUBCUTANEOUS at 14:40

## 2018-12-26 RX ADMIN — SODIUM CHLORIDE 3 MILLILITER(S): 9 INJECTION INTRAMUSCULAR; INTRAVENOUS; SUBCUTANEOUS at 05:12

## 2018-12-26 RX ADMIN — PANTOPRAZOLE SODIUM 40 MILLIGRAM(S): 20 TABLET, DELAYED RELEASE ORAL at 05:52

## 2018-12-26 RX ADMIN — Medication 100 GRAM(S): at 16:33

## 2018-12-26 RX ADMIN — WARFARIN SODIUM 5 MILLIGRAM(S): 2.5 TABLET ORAL at 21:00

## 2018-12-26 RX ADMIN — Medication 20 MILLIEQUIVALENT(S): at 22:09

## 2018-12-26 RX ADMIN — Medication 100 MILLIGRAM(S): at 22:09

## 2018-12-26 RX ADMIN — SODIUM CHLORIDE 3 MILLILITER(S): 9 INJECTION INTRAMUSCULAR; INTRAVENOUS; SUBCUTANEOUS at 21:01

## 2018-12-26 RX ADMIN — ATORVASTATIN CALCIUM 40 MILLIGRAM(S): 80 TABLET, FILM COATED ORAL at 21:00

## 2018-12-26 RX ADMIN — Medication 100 MILLIGRAM(S): at 16:33

## 2018-12-26 RX ADMIN — Medication 20 MILLIGRAM(S): at 05:51

## 2018-12-26 RX ADMIN — Medication 1000 MILLIGRAM(S): at 16:00

## 2018-12-26 RX ADMIN — CHLORHEXIDINE GLUCONATE 5 MILLILITER(S): 213 SOLUTION TOPICAL at 17:38

## 2018-12-26 RX ADMIN — Medication 400 MILLIGRAM(S): at 15:46

## 2018-12-26 RX ADMIN — Medication 81 MILLIGRAM(S): at 21:00

## 2018-12-26 RX ADMIN — AMIODARONE HYDROCHLORIDE 600 MILLIGRAM(S): 400 TABLET ORAL at 17:47

## 2018-12-26 RX ADMIN — Medication 100 MILLIGRAM(S): at 21:00

## 2018-12-26 RX ADMIN — CHLORHEXIDINE GLUCONATE 30 MILLILITER(S): 213 SOLUTION TOPICAL at 08:46

## 2018-12-26 RX ADMIN — Medication 125 MILLILITER(S): at 16:33

## 2018-12-26 RX ADMIN — Medication 20 MILLIGRAM(S): at 21:00

## 2018-12-26 NOTE — PROGRESS NOTE ADULT - SUBJECTIVE AND OBJECTIVE BOX
Subjective: no complaints   	  MEDICATIONS:  MEDICATIONS  (STANDING):  amiodarone    Tablet 400 milliGRAM(s) Oral every 8 hours  amiodarone IVPB 150 milliGRAM(s) IV Intermittent once  aspirin enteric coated 81 milliGRAM(s) Oral daily  atorvastatin 40 milliGRAM(s) Oral at bedtime  cefuroxime  IVPB 1500 milliGRAM(s) IV Intermittent every 8 hours  chlorhexidine 0.12% Liquid 5 milliLiter(s) Oral Mucosa every 4 hours  dextrose 50% Injectable 50 milliLiter(s) IV Push every 15 minutes  dextrose 50% Injectable 25 milliLiter(s) IV Push every 15 minutes  docusate sodium 100 milliGRAM(s) Oral three times a day  docusate sodium 100 milliGRAM(s) Oral three times a day  furosemide    Tablet 20 milliGRAM(s) Oral two times a day  insulin Infusion 3 Unit(s)/Hr (3 mL/Hr) IV Continuous <Continuous>  pantoprazole    Tablet 40 milliGRAM(s) Oral before breakfast  phenylephrine    Infusion 0.5 MICROgram(s)/kG/Min (11.063 mL/Hr) IV Continuous <Continuous>  sodium chloride 0.9% lock flush 3 milliLiter(s) IV Push every 8 hours  sodium chloride 0.9%. 1000 milliLiter(s) (10 mL/Hr) IV Continuous <Continuous>  warfarin 5 milliGRAM(s) Oral once      LABS:	 	    CARDIAC MARKERS:  CARDIAC MARKERS ( 26 Dec 2018 15:09 )  x     / x     / 51 U/L / x     / 2.6 ng/mL                                11.8   4.8   )-----------( 106      ( 26 Dec 2018 15:08 )             35.6     12-26    136  |  101  |  35<H>  ----------------------------<  125<H>  4.1   |  21<L>  |  1.51<H>    Ca    9.7      26 Dec 2018 15:09  Phos  4.3     12-26  Mg     1.9     12-26    TPro  6.6  /  Alb  3.5  /  TBili  0.6  /  DBili  x   /  AST  11  /  ALT  11  /  AlkPhos  60  12-26    proBNP:   Lipid Profile:   HgA1c:   TSH:       PHYSICAL EXAM:  T(C): 35.2 (12-26-18 @ 17:00), Max: 36.8 (12-25-18 @ 19:33)  HR: 91 (12-26-18 @ 17:15) (78 - 133)  BP: 70/51 (12-26-18 @ 17:00) (70/51 - 106/72)  RR: 18 (12-26-18 @ 17:15) (13 - 19)  SpO2: 100% (12-26-18 @ 17:15) (96% - 100%)  Wt(kg): --  I&O's Summary    25 Dec 2018 07:01  -  26 Dec 2018 07:00  --------------------------------------------------------  IN: 800 mL / OUT: 875 mL / NET: -75 mL    26 Dec 2018 07:01  -  26 Dec 2018 17:32  --------------------------------------------------------  IN: 630 mL / OUT: 200 mL / NET: 430 mL      Height (cm): 185.42 (12-26 @ 10:15)  Weight (kg): 59 (12-26 @ 10:15)  BMI (kg/m2): 17.2 (12-26 @ 10:15)  BSA (m2): 1.79 (12-26 @ 10:15)    I	  Lymphatic: No lymphadenopathy , no edema  Cardiovascular: Normal S1 S2,IRRR No JVD, 1/6 systolic murmur   Respiratory: Lungs clear to auscultation, 	  Gastrointestinal:  Soft, Non-tender, + BS	  Skin: No rashes, No ecchymoses, No cyanosis, warm to touch      TELEMETRY: AF	    ECG:  	  RADIOLOGY:   DIAGNOSTIC TESTING:  [ ] Echocardiogram: < from: Transthoracic Echocardiogram (11.22.18 @ 09:42) >  Conclusions:  1. Mitral annular calcification,tethered mitral valve  leaflets. Severe mitral regurgitation.  2. Mildly dilated left atrium.  LA volume index = 38 cc/m2.  3. Low normal left ventricular systolic function. No  segmental wall motion abnormalities.  4. A linear structure compatible with a Chiari-network is  identified in the right atrium (normal variant). No  thrombus seen.  5. Normal right ventricular size and function.  6. Normal tricuspid valve. Moderate tricuspid  regurgitation.  7. Estimated pulmonary artery systolic pressure equals 35  mm Hg, assuming right atrial pressure equals 8 mm Hg,  consistent with borderline pulmonary pressures.  8. Left pleural effusion.    < end of copied text >    [ ]  Catheterization: < from: Cardiac Cath Lab - Adult (12.26.18 @ 10:48) >  DIAGNOSTIC RECOMMENDATIONS: Proceed with percutaneous mitral repair in the  setting of symptomatic severe mitral regurgitation (predominantly  functional) and chronic systolic congestive heart failure (with recent  decline).  INTERVENTIONAL IMPRESSIONS: Successful mitral repair with a single Lucy  Clip XTR on the lateral edge of A2-P2; all MR medial to the Clip was  eliminated; there was residual MR lateral to the Clip, at the site of a  large cleft, which was not suitable for an additional Clip.  INTERVENTIONAL RECOMMENDATIONS: Continue current medical therapy, with  optimization of the CHF and AFIB regimens (I discussed the latter with Dr Mckenzie, who will be following for EPS); TTE prior to discharge;  consideration for device therapy as per Dr Mckenzie; start warfarin when  deemed appropriate.  Prepared and signed by  Galdino Ortega M.D.  Signed 12/26/2018 16:48:47  HEMODYNAMIC TABLES    < end of copied text >    [ ] Stress Test:    OTHER: 	      ASSESSMENT/PLAN: 89 yo AA male with PMH, HTN, HLD, hyponatremia and Afib (on lovenox) colon CA (2001 s/p chemo), prostate Ca (2006 tx with radiation), CKD  and severe MR s/p MitraClip POD#0.  -pt. tolerated procedure well  -continue with ac for cva prevention if ok with CTS and CTU  -follow up vascular  -supportive care per CTU    Ethan Solorzano MD

## 2018-12-26 NOTE — PROGRESS NOTE ADULT - ASSESSMENT
Assessment:  1. RA Thrombus / DVT   - clot visualized in right common iliac vein on angiogram with subsequent embolization to right heart.  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 Severe MR  - noted on TTE and YENNY  - for mitral clip on Wednesday due to high surgical risk  - euvolemic on exam  3. CAD  - non obstructive disease on angiogram Nov 2018  4. Afib  5. CKD    Plan:  1. RA Thrombus/DVT   - repeat LE venous duplex with no clot  - confirmatory testing for APLS should be done in two months (3 months post initial positive screen)  2. Severe MR  - for mitraclip today  3. CAD  - cont aspirin, lipitor  4. Afib  - cont Toprol   5. CKD

## 2018-12-26 NOTE — PROGRESS NOTE ADULT - SUBJECTIVE AND OBJECTIVE BOX
CRITICAL CARE ATTENDING - CTICU    MEDICATIONS  (STANDING):  amiodarone    Tablet 400 milliGRAM(s) Oral every 8 hours  aspirin enteric coated 81 milliGRAM(s) Oral daily  atorvastatin 40 milliGRAM(s) Oral at bedtime  cefuroxime  IVPB 1500 milliGRAM(s) IV Intermittent every 8 hours  chlorhexidine 0.12% Liquid 5 milliLiter(s) Oral Mucosa every 4 hours  dextrose 50% Injectable 50 milliLiter(s) IV Push every 15 minutes  dextrose 50% Injectable 25 milliLiter(s) IV Push every 15 minutes  docusate sodium 100 milliGRAM(s) Oral three times a day  docusate sodium 100 milliGRAM(s) Oral three times a day  furosemide    Tablet 20 milliGRAM(s) Oral two times a day  insulin Infusion 3 Unit(s)/Hr (3 mL/Hr) IV Continuous <Continuous>  pantoprazole    Tablet 40 milliGRAM(s) Oral before breakfast  sodium chloride 0.9% lock flush 3 milliLiter(s) IV Push every 8 hours  sodium chloride 0.9%. 1000 milliLiter(s) (10 mL/Hr) IV Continuous <Continuous>                                    12.3   3.5   )-----------( 107      ( 25 Dec 2018 09:19 )             36.0           139  |  101  |  38<H>  ----------------------------<  117<H>  4.0   |  23  |  1.72<H>    Ca    9.9      25 Dec 2018 09:19    TPro  7.4  /  Alb  3.8  /  TBili  0.4  /  DBili  x   /  AST  13  /  ALT  11  /  AlkPhos  69  1224      PT/INR - ( 24 Dec 2018 17:28 )   PT: 13.5 sec;   INR: 1.17 ratio                 Daily Height in cm: 185.42 (26 Dec 2018 10:15)    Daily Weight in k.6 (26 Dec 2018 07:50)       @ :  -   @ 07:00  --------------------------------------------------------  IN: 800 mL / OUT: 875 mL / NET: -75 mL     @ 07:01  -   @ 14:50  --------------------------------------------------------  IN: 0 mL / OUT: 200 mL / NET: -200 mL        Critically Ill patient  : [ ] preoperative ,   [ x] post operative    Requires :  [x ] Arterial Line   [x ] Central Line  [ ] PA catheter  [ ] IABP  [ ] ECMO  [ ] LVAD  [ ] Ventilator  [ ] pacemaker [ ] Impella.    Bedside evaluation , monitoring , treatment of hemodynamics , fluids , IVP/ IVCD meds.        Diagnosis:     Op Day Mitral Clip    CHF- acute [x ]   chronic [x ]    systolic [x ]   diatolic [ ]          - Echo- EF - 20's            [ ] RV dysfunction          - Cxr-cardiomegally, edema          - Clinical-  [ ]inotropes   [ ]pressors   [ ]diuresis   [ ]IABP   [ ]ECMO   [ ]LVAD   [ ]Respiratory Failure    A Fib    tachycardia    Thrombocytopenia     Renal Failure - Acute Kidney Injury - chronic renal insufficiency    R Femoral Access site - f/u vascular checks    ECG    IVCD Insulin    Coagulopathy 2 to heparin    Requires Anticoagulation    Cardiac cachexia    obtundation 2 to anesthetic period    Requires bedside physical therapy, mobilization and total longterm care.                         -                     Discussed with CT surgeon, Physician's Assistant - Nurse Practitioner- Critical care medicine team.   Dicussed at  AM / PM rounds.   Chart, labs , films reviewed.    Total Time: 30 min

## 2018-12-27 LAB
ALBUMIN SERPL ELPH-MCNC: 3.6 G/DL — SIGNIFICANT CHANGE UP (ref 3.3–5)
ALP SERPL-CCNC: 56 U/L — SIGNIFICANT CHANGE UP (ref 40–120)
ALT FLD-CCNC: 10 U/L — SIGNIFICANT CHANGE UP (ref 10–45)
ANION GAP SERPL CALC-SCNC: 15 MMOL/L — SIGNIFICANT CHANGE UP (ref 5–17)
APTT BLD: 34.9 SEC — SIGNIFICANT CHANGE UP (ref 27.5–36.3)
APTT BLD: 36.1 SEC — SIGNIFICANT CHANGE UP (ref 27.5–36.3)
AST SERPL-CCNC: 16 U/L — SIGNIFICANT CHANGE UP (ref 10–40)
BILIRUB SERPL-MCNC: 0.8 MG/DL — SIGNIFICANT CHANGE UP (ref 0.2–1.2)
BUN SERPL-MCNC: 35 MG/DL — HIGH (ref 7–23)
CALCIUM SERPL-MCNC: 9.4 MG/DL — SIGNIFICANT CHANGE UP (ref 8.4–10.5)
CHLORIDE SERPL-SCNC: 103 MMOL/L — SIGNIFICANT CHANGE UP (ref 96–108)
CO2 SERPL-SCNC: 19 MMOL/L — LOW (ref 22–31)
CREAT SERPL-MCNC: 1.56 MG/DL — HIGH (ref 0.5–1.3)
GAS PNL BLDA: SIGNIFICANT CHANGE UP
GLUCOSE SERPL-MCNC: 108 MG/DL — HIGH (ref 70–99)
HCT VFR BLD CALC: 33.4 % — LOW (ref 39–50)
HGB BLD-MCNC: 11.4 G/DL — LOW (ref 13–17)
INR BLD: 1.2 RATIO — HIGH (ref 0.88–1.16)
MCHC RBC-ENTMCNC: 31.2 PG — SIGNIFICANT CHANGE UP (ref 27–34)
MCHC RBC-ENTMCNC: 34.1 GM/DL — SIGNIFICANT CHANGE UP (ref 32–36)
MCV RBC AUTO: 91.4 FL — SIGNIFICANT CHANGE UP (ref 80–100)
PLATELET # BLD AUTO: 94 K/UL — LOW (ref 150–400)
POTASSIUM SERPL-MCNC: 4.3 MMOL/L — SIGNIFICANT CHANGE UP (ref 3.5–5.3)
POTASSIUM SERPL-SCNC: 4.3 MMOL/L — SIGNIFICANT CHANGE UP (ref 3.5–5.3)
PROT SERPL-MCNC: 6.8 G/DL — SIGNIFICANT CHANGE UP (ref 6–8.3)
PROTHROM AB SERPL-ACNC: 13.7 SEC — HIGH (ref 10–12.9)
RBC # BLD: 3.66 M/UL — LOW (ref 4.2–5.8)
RBC # FLD: 15.7 % — HIGH (ref 10.3–14.5)
SODIUM SERPL-SCNC: 137 MMOL/L — SIGNIFICANT CHANGE UP (ref 135–145)
WBC # BLD: 5.1 K/UL — SIGNIFICANT CHANGE UP (ref 3.8–10.5)
WBC # FLD AUTO: 5.1 K/UL — SIGNIFICANT CHANGE UP (ref 3.8–10.5)

## 2018-12-27 PROCEDURE — 99024 POSTOP FOLLOW-UP VISIT: CPT

## 2018-12-27 PROCEDURE — 99232 SBSQ HOSP IP/OBS MODERATE 35: CPT

## 2018-12-27 PROCEDURE — 71045 X-RAY EXAM CHEST 1 VIEW: CPT | Mod: 26

## 2018-12-27 PROCEDURE — 93306 TTE W/DOPPLER COMPLETE: CPT | Mod: 26

## 2018-12-27 RX ORDER — WARFARIN SODIUM 2.5 MG/1
2 TABLET ORAL ONCE
Qty: 0 | Refills: 0 | Status: COMPLETED | OUTPATIENT
Start: 2018-12-27 | End: 2018-12-27

## 2018-12-27 RX ORDER — AMIODARONE HYDROCHLORIDE 400 MG/1
400 TABLET ORAL EVERY 8 HOURS
Qty: 0 | Refills: 0 | Status: DISCONTINUED | OUTPATIENT
Start: 2018-12-27 | End: 2018-12-28

## 2018-12-27 RX ORDER — HEPARIN SODIUM 5000 [USP'U]/ML
700 INJECTION INTRAVENOUS; SUBCUTANEOUS
Qty: 25000 | Refills: 0 | Status: DISCONTINUED | OUTPATIENT
Start: 2018-12-27 | End: 2018-12-31

## 2018-12-27 RX ADMIN — SODIUM CHLORIDE 3 MILLILITER(S): 9 INJECTION INTRAMUSCULAR; INTRAVENOUS; SUBCUTANEOUS at 06:14

## 2018-12-27 RX ADMIN — Medication 20 MILLIGRAM(S): at 18:28

## 2018-12-27 RX ADMIN — AMIODARONE HYDROCHLORIDE 400 MILLIGRAM(S): 400 TABLET ORAL at 09:53

## 2018-12-27 RX ADMIN — PANTOPRAZOLE SODIUM 40 MILLIGRAM(S): 20 TABLET, DELAYED RELEASE ORAL at 06:13

## 2018-12-27 RX ADMIN — Medication 100 MILLIGRAM(S): at 06:14

## 2018-12-27 RX ADMIN — AMIODARONE HYDROCHLORIDE 400 MILLIGRAM(S): 400 TABLET ORAL at 21:54

## 2018-12-27 RX ADMIN — ATORVASTATIN CALCIUM 40 MILLIGRAM(S): 80 TABLET, FILM COATED ORAL at 21:55

## 2018-12-27 RX ADMIN — Medication 100 MILLIGRAM(S): at 06:13

## 2018-12-27 RX ADMIN — WARFARIN SODIUM 2 MILLIGRAM(S): 2.5 TABLET ORAL at 21:55

## 2018-12-27 RX ADMIN — Medication 20 MILLIGRAM(S): at 06:13

## 2018-12-27 RX ADMIN — AMIODARONE HYDROCHLORIDE 400 MILLIGRAM(S): 400 TABLET ORAL at 14:00

## 2018-12-27 RX ADMIN — Medication 81 MILLIGRAM(S): at 14:00

## 2018-12-27 RX ADMIN — AMIODARONE HYDROCHLORIDE 400 MILLIGRAM(S): 400 TABLET ORAL at 06:13

## 2018-12-27 RX ADMIN — HEPARIN SODIUM 5 UNIT(S)/HR: 5000 INJECTION INTRAVENOUS; SUBCUTANEOUS at 06:14

## 2018-12-27 RX ADMIN — SODIUM CHLORIDE 3 MILLILITER(S): 9 INJECTION INTRAMUSCULAR; INTRAVENOUS; SUBCUTANEOUS at 21:43

## 2018-12-27 RX ADMIN — SODIUM CHLORIDE 3 MILLILITER(S): 9 INJECTION INTRAMUSCULAR; INTRAVENOUS; SUBCUTANEOUS at 14:10

## 2018-12-27 RX ADMIN — Medication 100 MILLIGRAM(S): at 21:55

## 2018-12-27 NOTE — PROGRESS NOTE ADULT - SUBJECTIVE AND OBJECTIVE BOX
Overnight Events: S/p mitraclip yesterday. No issues.    Medications:  amiodarone    Tablet 400 milliGRAM(s) Oral every 8 hours  aspirin enteric coated 81 milliGRAM(s) Oral daily  atorvastatin 40 milliGRAM(s) Oral at bedtime  dextrose 50% Injectable 50 milliLiter(s) IV Push every 15 minutes  docusate sodium 100 milliGRAM(s) Oral three times a day  furosemide    Tablet 20 milliGRAM(s) Oral two times a day  heparin  Infusion 400 Unit(s)/Hr IV Continuous <Continuous>  pantoprazole    Tablet 40 milliGRAM(s) Oral before breakfast  sodium chloride 0.9% lock flush 3 milliLiter(s) IV Push every 8 hours      Vitals:  T(F): 97.3 (12-27), Max: 98.6 (12-27)  HR: 91 (12-27) (87 - 133)  BP: 97/59 (12-27) (70/51 - 97/59)  RR: 18 (12-27)  SpO2: 99% (12-27)  I&O's Summary    26 Dec 2018 07:01  -  27 Dec 2018 07:00  --------------------------------------------------------  IN: 1038 mL / OUT: 335 mL / NET: 703 mL    27 Dec 2018 07:01  -  27 Dec 2018 13:35  --------------------------------------------------------  IN: 250 mL / OUT: 170 mL / NET: 80 mL        Physical Exam:  Appearance: No acute distress; cachetic  Eyes: PERRL, EOMI, pink conjunctiva  HENT: Normal oral muscosa  Cardiovascular: Irregular, S1, S2, no murmur  Respiratory: Clear to auscultation bilaterally  Gastrointestinal: soft, non-tender, non-distended with normal bowel sounds  Musculoskeletal: No clubbing; no joint deformity   Neurologic: Non-focal  Lymphatic: No lymphadenopathy  Psychiatry: AAOx3, mood & affect appropriate                          11.4   5.1   )-----------( 94       ( 27 Dec 2018 02:13 )             33.4     12-27    137  |  103  |  35<H>  ----------------------------<  108<H>  4.3   |  19<L>  |  1.56<H>    Ca    9.4      27 Dec 2018 02:13  Phos  4.3     12-26  Mg     1.9     12-26    TPro  6.8  /  Alb  3.6  /  TBili  0.8  /  DBili  x   /  AST  16  /  ALT  10  /  AlkPhos  56  12-27    PT/INR - ( 26 Dec 2018 21:14 )   PT: 13.5 sec;   INR: 1.17 ratio         PTT - ( 27 Dec 2018 05:16 )  PTT:36.1 sec  CARDIAC MARKERS ( 26 Dec 2018 15:09 )  x     / x     / 51 U/L / x     / 2.6 ng/mL    Serum Pro-Brain Natriuretic Peptide: 3008 pg/mL (12-24 @ 17:28)

## 2018-12-27 NOTE — PROGRESS NOTE ADULT - ASSESSMENT
69  yr old male sp   mitral clip 12/26   H   afib,  colon ca   prostate ca  12/27   trf too floor   coumadin dosing ,   r groin stitch   lasix 20BID

## 2018-12-27 NOTE — PROGRESS NOTE ADULT - SUBJECTIVE AND OBJECTIVE BOX
This patient has been implanted with a Mitraclip under the following NCT/PERLA:      MitraClip:  Commercial Implant NCT [id number], and will be placed into the [facility] Registry

## 2018-12-27 NOTE — PHYSICAL THERAPY INITIAL EVALUATION ADULT - ADDITIONAL COMMENTS
Pt resides with spouse in apartment with elevators  with no  steps to enter and no steps to bedroom and bathroom. Patient states he is independent in ADLS, personal care and uses a walker or cane for ambulation.

## 2018-12-27 NOTE — PHYSICAL THERAPY INITIAL EVALUATION ADULT - GENERAL OBSERVATIONS, REHAB EVAL
Pt received seated in chair, A&Ox3, +2L O2 via NC, +cardiac monitor, +IVL, +continous pulse ox, agreeable to physical therapy trae cadet 45 min.

## 2018-12-27 NOTE — PHYSICAL THERAPY INITIAL EVALUATION ADULT - STRENGTHENING, PT EVAL
GOAL: Pt will improve bilateral LE strength to 4/5, for increased limb stability, to improve gait in 2 weeks.

## 2018-12-27 NOTE — PROGRESS NOTE ADULT - SUBJECTIVE AND OBJECTIVE BOX
*** Structural Heart Team ***      History of Present Illness:    89 yo AA male with PMH, HTN, HLD, hyponatremia and Afib (on lovenox) colon CA (2001 s/p chemo), prostate Ca (2006 tx with radiation), CKD severe MR admitted for scheduled  Mitral MR clip originally scheduled for 11/21/18.  Case aborted during initial stages due to right common iliac vein thrombus that embolized to the right atrium.  Endovascular thrombectomy performed.  Pt stable.  LE dopplers negative for DVT.  Repeat TTE with no evidence of intracardiac thrombus.  Pt was switched from home eliquis to renal dose lovenox.  Re- admitted for elective mitral clip scheduled on Wednesday 12/26/18.     Pt has no complaints today.    Allergies    penicillin (Hives)  shellfish (Hives)    Intolerances      Vital Signs Last 24 Hrs  T(C): 36.3 (27 Dec 2018 13:46), Max: 37 (27 Dec 2018 08:00)  T(F): 97.4 (27 Dec 2018 13:46), Max: 98.6 (27 Dec 2018 08:00)  HR: 81 (27 Dec 2018 13:46) (81 - 133)  BP: 104/71 (27 Dec 2018 13:46) (70/51 - 104/71)  BP(mean): 72 (27 Dec 2018 11:44) (56 - 72)  RR: 18 (27 Dec 2018 13:46) (4 - 21)  SpO2: 96% (27 Dec 2018 13:46) (90% - 100%)    MEDICATIONS  (STANDING):  amiodarone    Tablet 400 milliGRAM(s) Oral every 8 hours  aspirin enteric coated 81 milliGRAM(s) Oral daily  atorvastatin 40 milliGRAM(s) Oral at bedtime  dextrose 50% Injectable 50 milliLiter(s) IV Push every 15 minutes  docusate sodium 100 milliGRAM(s) Oral three times a day  furosemide    Tablet 20 milliGRAM(s) Oral two times a day  heparin  Infusion 400 Unit(s)/Hr (5 mL/Hr) IV Continuous <Continuous>  pantoprazole    Tablet 40 milliGRAM(s) Oral before breakfast  sodium chloride 0.9% lock flush 3 milliLiter(s) IV Push every 8 hours      Exam-  General: NAD  Cor: s1s2, IRR, no murmurs noted   Pulm: cta b/l  Abd: soft, nontender, nondistended, +bowel sounds  Extremities: no edema, good pulses  Groin: dressing intact L groin- clean and dry, R groin dry, suture in place. b/l- good pulses, no hematoma                            11.4   5.1   )-----------( 94       ( 27 Dec 2018 02:13 )             33.4   12-27    137  |  103  |  35<H>  ----------------------------<  108<H>  4.3   |  19<L>  |  1.56<H>    Ca    9.4      27 Dec 2018 02:13  Phos  4.3     12-26  Mg     1.9     12-26    TPro  6.8  /  Alb  3.6  /  TBili  0.8  /  DBili  x   /  AST  16  /  ALT  10  /  AlkPhos  56  12-27  PT/INR - ( 26 Dec 2018 21:14 )   PT: 13.5 sec;   INR: 1.17 ratio         PTT - ( 27 Dec 2018 05:16 )  PTT:36.1 sec  I&O's Summary    26 Dec 2018 07:01  -  27 Dec 2018 07:00  --------------------------------------------------------  IN: 1038 mL / OUT: 335 mL / NET: 703 mL    27 Dec 2018 07:01  -  27 Dec 2018 14:57  --------------------------------------------------------  IN: 490 mL / OUT: 370 mL / NET: 120 mL              Assessment/Plan:  89 y/o male POD 1 s/p Mitraclip for severe MR  - continue Amio  - ASA, Coumadin (awaiting therapeutic INR)   - needs TTE  - EKG in am  - Discharge plan: follow up with Dr. Floyd  in one week and follow up with Structural Heart Team in one month.  Echo will be done at 1 month follow up visit.

## 2018-12-27 NOTE — PROGRESS NOTE ADULT - SUBJECTIVE AND OBJECTIVE BOX
Patient denies CP, SOB Review of systems otherwise (-)    amiodarone    Tablet 400 milliGRAM(s) Oral every 8 hours  aspirin enteric coated 81 milliGRAM(s) Oral daily  atorvastatin 40 milliGRAM(s) Oral at bedtime  dextrose 50% Injectable 50 milliLiter(s) IV Push every 15 minutes  docusate sodium 100 milliGRAM(s) Oral three times a day  furosemide    Tablet 20 milliGRAM(s) Oral two times a day  heparin  Infusion 400 Unit(s)/Hr IV Continuous <Continuous>  pantoprazole    Tablet 40 milliGRAM(s) Oral before breakfast  sodium chloride 0.9% lock flush 3 milliLiter(s) IV Push every 8 hours  warfarin 2 milliGRAM(s) Oral once                            11.4   5.1   )-----------( 94       ( 27 Dec 2018 02:13 )             33.4       Hemoglobin: 11.4 g/dL (12-27 @ 02:13)  Hemoglobin: 11.8 g/dL (12-26 @ 15:08)  Hemoglobin: 12.3 g/dL (12-25 @ 09:19)  Hemoglobin: 9.6 g/dL (12-24 @ 17:28)      12-27    137  |  103  |  35<H>  ----------------------------<  108<H>  4.3   |  19<L>  |  1.56<H>    Ca    9.4      27 Dec 2018 02:13  Phos  4.3     12-26  Mg     1.9     12-26    TPro  6.8  /  Alb  3.6  /  TBili  0.8  /  DBili  x   /  AST  16  /  ALT  10  /  AlkPhos  56  12-27    Creatinine Trend: 1.56<--, 1.51<--, 1.72<--, 1.91<--, 1.58<--, 1.57<--    COAGS: PT/INR - ( 27 Dec 2018 17:41 )   PT: 13.7 sec;   INR: 1.20 ratio         PTT - ( 27 Dec 2018 17:41 )  PTT:34.9 sec    CARDIAC MARKERS ( 26 Dec 2018 15:09 )  x     / x     / 51 U/L / x     / 2.6 ng/mL        T(C): 36.3 (12-27-18 @ 13:46), Max: 37 (12-27-18 @ 08:00)  HR: 81 (12-27-18 @ 13:46) (81 - 125)  BP: 104/71 (12-27-18 @ 13:46) (97/59 - 104/71)  RR: 18 (12-27-18 @ 13:46) (4 - 21)  SpO2: 96% (12-27-18 @ 13:46) (90% - 99%)  Wt(kg): --    I&O's Summary    26 Dec 2018 07:01  -  27 Dec 2018 07:00  --------------------------------------------------------  IN: 1038 mL / OUT: 335 mL / NET: 703 mL    27 Dec 2018 07:01  -  27 Dec 2018 19:11  --------------------------------------------------------  IN: 490 mL / OUT: 370 mL / NET: 120 mL      I	  Lymphatic: No lymphadenopathy , no edema  Cardiovascular: Normal S1 S2,IRRR No JVD, 1/6 systolic murmur   Respiratory: Lungs clear to auscultation, 	  Gastrointestinal:  Soft, Non-tender, + BS	  Skin: No rashes, No ecchymoses, No cyanosis, warm to touch      TELEMETRY: AF	    ECG:  	  RADIOLOGY:   DIAGNOSTIC TESTING:  [ ] Echocardiogram: < from: Transthoracic Echocardiogram (11.22.18 @ 09:42) >  Conclusions:  1. Mitral annular calcification,tethered mitral valve  leaflets. Severe mitral regurgitation.  2. Mildly dilated left atrium.  LA volume index = 38 cc/m2.  3. Low normal left ventricular systolic function. No  segmental wall motion abnormalities.  4. A linear structure compatible with a Chiari-network is  identified in the right atrium (normal variant). No  thrombus seen.  5. Normal right ventricular size and function.  6. Normal tricuspid valve. Moderate tricuspid  regurgitation.  7. Estimated pulmonary artery systolic pressure equals 35  mm Hg, assuming right atrial pressure equals 8 mm Hg,  consistent with borderline pulmonary pressures.  8. Left pleural effusion.    < end of copied text >    [ ]  Catheterization: < from: Cardiac Cath Lab - Adult (12.26.18 @ 10:48) >  DIAGNOSTIC RECOMMENDATIONS: Proceed with percutaneous mitral repair in the  setting of symptomatic severe mitral regurgitation (predominantly  functional) and chronic systolic congestive heart failure (with recent  decline).  INTERVENTIONAL IMPRESSIONS: Successful mitral repair with a single Lucy  Clip XTR on the lateral edge of A2-P2; all MR medial to the Clip was  eliminated; there was residual MR lateral to the Clip, at the site of a  large cleft, which was not suitable for an additional Clip.  INTERVENTIONAL RECOMMENDATIONS: Continue current medical therapy, with  optimization of the CHF and AFIB regimens (I discussed the latter with Dr Mckenzie, who will be following for EPS); TTE prior to discharge;  consideration for device therapy as per Dr Mckenzie; start warfarin when  deemed appropriate.  Prepared and signed by  Galdino Ortega M.D.  Signed 12/26/2018 16:48:47  HEMODYNAMIC TABLES    < end of copied text >    [ ] Stress Test:    OTHER: 	      ASSESSMENT/PLAN: 89 yo AA male with PMH, HTN, HLD, hyponatremia and Afib (on lovenox) colon CA (2001 s/p chemo), prostate Ca (2006 tx with radiation), CKD  and severe MR s/p MitraClip POD#1    -pt. tolerated procedure well  -continue with ac for cva prevention if ok with CTS   -follow up vascular  - Progressing well    Nabor Salazar MD, FACC

## 2018-12-27 NOTE — PROGRESS NOTE ADULT - ASSESSMENT
Assessment:  1. RA Thrombus / DVT   - clot visualized in right common iliac vein on angiogram with subsequent embolization to right heart.  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 Severe MR  - noted on TTE and YENNY  - s/p mitral clip 12/26  - euvolemic on exam  3. CAD  - non obstructive disease on angiogram Nov 2018  4. Afib  5. CKD    Plan:  1. RA Thrombus/DVT   - repeat LE venous duplex with no clot  - confirmatory testing for APLS should be done in two months (3 months post initial positive screen)  - cont heparin to coumadin bridge  2. Severe MR  - s/p mitral clip 12/26  3. CAD  - cont aspirin, lipitor  4. Afib  - cont Toprol   5. CKD

## 2018-12-27 NOTE — PHYSICAL THERAPY INITIAL EVALUATION ADULT - PRECAUTIONS/LIMITATIONS, REHAB EVAL
CONT: PMH, HTN, HLD, hyponatremia and Afib (on lovenox), colon CA (2001 s/p chemo), prostate Ca (2006 tx with radiation), & CKD severe MR./sternal precautions

## 2018-12-27 NOTE — PROGRESS NOTE ADULT - SUBJECTIVE AND OBJECTIVE BOX
VITAL SIGNS    Telemetry:  AFIB  80    Vital Signs Last 24 Hrs  T(C): 36.3 (18 @ 11:44), Max: 37 (18 @ 08:00)  T(F): 97.3 (18 @ 11:44), Max: 98.6 (18 @ 08:00)  HR: 91 (18 @ 11:44) (87 - 133)  BP: 97/59 (18 @ 11:44) (70/51 - 97/59)  RR: 18 (18 @ 11:44) (4 - 21)  SpO2: 99% (18 @ 11:44) (90% - 100%)             Daily Weight in k.5 (27 Dec 2018 00:16)      Bilirubin Total, Serum: 0.8 mg/dL ( @ 02:13)  Bilirubin Total, Serum: 0.6 mg/dL ( @ 15:09)    CAPILLARY BLOOD GLUCOSE          Coumadin    YES                     PHYSICAL EXAM    Neurology: alert and oriented x 3, moves all extremities with no defecits  CV :  RRR  Lungs:   CTA B/L  Abdomen: soft, nontender, nondistended, positive bowel sounds  Extremities:     R   groin  stitch, no edema

## 2018-12-27 NOTE — PHYSICAL THERAPY INITIAL EVALUATION ADULT - PERTINENT HX OF CURRENT PROBLEM, REHAB EVAL
89 y/o M admitted for scheduled Mitral MR clip originally scheduled for 11/21/18. Case aborted during initial stages due to right common iliac vein thrombus that embolized to the right atrium. Endovascular thrombectomy performed. Pt stable. LE dopplers negative for DVT.  Repeat TTE with no evidence of intracardiac thrombus. Pt was switched from home eliquis to renal dose lovenox.  Now s/p mitral clip on 12/26/18.

## 2018-12-28 ENCOUNTER — TRANSCRIPTION ENCOUNTER (OUTPATIENT)
Age: 83
End: 2018-12-28

## 2018-12-28 LAB
ANION GAP SERPL CALC-SCNC: 15 MMOL/L — SIGNIFICANT CHANGE UP (ref 5–17)
ANION GAP SERPL CALC-SCNC: 15 MMOL/L — SIGNIFICANT CHANGE UP (ref 5–17)
APTT BLD: 52.4 SEC — HIGH (ref 27.5–36.3)
APTT BLD: 67.3 SEC — HIGH (ref 27.5–36.3)
APTT BLD: 70.6 SEC — HIGH (ref 27.5–36.3)
BUN SERPL-MCNC: 33 MG/DL — HIGH (ref 7–23)
BUN SERPL-MCNC: 35 MG/DL — HIGH (ref 7–23)
CALCIUM SERPL-MCNC: 9.6 MG/DL — SIGNIFICANT CHANGE UP (ref 8.4–10.5)
CALCIUM SERPL-MCNC: 9.6 MG/DL — SIGNIFICANT CHANGE UP (ref 8.4–10.5)
CHLORIDE SERPL-SCNC: 100 MMOL/L — SIGNIFICANT CHANGE UP (ref 96–108)
CHLORIDE SERPL-SCNC: 100 MMOL/L — SIGNIFICANT CHANGE UP (ref 96–108)
CO2 SERPL-SCNC: 21 MMOL/L — LOW (ref 22–31)
CO2 SERPL-SCNC: 23 MMOL/L — SIGNIFICANT CHANGE UP (ref 22–31)
CREAT SERPL-MCNC: 1.57 MG/DL — HIGH (ref 0.5–1.3)
CREAT SERPL-MCNC: 1.69 MG/DL — HIGH (ref 0.5–1.3)
GLUCOSE SERPL-MCNC: 101 MG/DL — HIGH (ref 70–99)
GLUCOSE SERPL-MCNC: 119 MG/DL — HIGH (ref 70–99)
HCT VFR BLD CALC: 32.8 % — LOW (ref 39–50)
HCT VFR BLD CALC: 33.1 % — LOW (ref 39–50)
HGB BLD-MCNC: 11.2 G/DL — LOW (ref 13–17)
HGB BLD-MCNC: 11.2 G/DL — LOW (ref 13–17)
INR BLD: 1.28 RATIO — HIGH (ref 0.88–1.16)
MCHC RBC-ENTMCNC: 31.3 PG — SIGNIFICANT CHANGE UP (ref 27–34)
MCHC RBC-ENTMCNC: 31.5 PG — SIGNIFICANT CHANGE UP (ref 27–34)
MCHC RBC-ENTMCNC: 33.8 GM/DL — SIGNIFICANT CHANGE UP (ref 32–36)
MCHC RBC-ENTMCNC: 34 GM/DL — SIGNIFICANT CHANGE UP (ref 32–36)
MCV RBC AUTO: 92.5 FL — SIGNIFICANT CHANGE UP (ref 80–100)
MCV RBC AUTO: 92.6 FL — SIGNIFICANT CHANGE UP (ref 80–100)
PLATELET # BLD AUTO: 88 K/UL — LOW (ref 150–400)
PLATELET # BLD AUTO: 92 K/UL — LOW (ref 150–400)
POTASSIUM SERPL-MCNC: 4 MMOL/L — SIGNIFICANT CHANGE UP (ref 3.5–5.3)
POTASSIUM SERPL-MCNC: 4 MMOL/L — SIGNIFICANT CHANGE UP (ref 3.5–5.3)
POTASSIUM SERPL-SCNC: 4 MMOL/L — SIGNIFICANT CHANGE UP (ref 3.5–5.3)
POTASSIUM SERPL-SCNC: 4 MMOL/L — SIGNIFICANT CHANGE UP (ref 3.5–5.3)
PROTHROM AB SERPL-ACNC: 14.7 SEC — HIGH (ref 10–12.9)
RBC # BLD: 3.54 M/UL — LOW (ref 4.2–5.8)
RBC # BLD: 3.58 M/UL — LOW (ref 4.2–5.8)
RBC # FLD: 15.5 % — HIGH (ref 10.3–14.5)
RBC # FLD: 15.7 % — HIGH (ref 10.3–14.5)
SODIUM SERPL-SCNC: 136 MMOL/L — SIGNIFICANT CHANGE UP (ref 135–145)
SODIUM SERPL-SCNC: 138 MMOL/L — SIGNIFICANT CHANGE UP (ref 135–145)
WBC # BLD: 4.4 K/UL — SIGNIFICANT CHANGE UP (ref 3.8–10.5)
WBC # BLD: 4.7 K/UL — SIGNIFICANT CHANGE UP (ref 3.8–10.5)
WBC # FLD AUTO: 4.4 K/UL — SIGNIFICANT CHANGE UP (ref 3.8–10.5)
WBC # FLD AUTO: 4.7 K/UL — SIGNIFICANT CHANGE UP (ref 3.8–10.5)

## 2018-12-28 PROCEDURE — 71045 X-RAY EXAM CHEST 1 VIEW: CPT | Mod: 26

## 2018-12-28 PROCEDURE — 99024 POSTOP FOLLOW-UP VISIT: CPT

## 2018-12-28 RX ORDER — METOPROLOL TARTRATE 50 MG
50 TABLET ORAL DAILY
Qty: 0 | Refills: 0 | Status: DISCONTINUED | OUTPATIENT
Start: 2018-12-28 | End: 2018-12-29

## 2018-12-28 RX ORDER — WARFARIN SODIUM 2.5 MG/1
5 TABLET ORAL ONCE
Qty: 0 | Refills: 0 | Status: COMPLETED | OUTPATIENT
Start: 2018-12-28 | End: 2018-12-28

## 2018-12-28 RX ADMIN — WARFARIN SODIUM 5 MILLIGRAM(S): 2.5 TABLET ORAL at 21:32

## 2018-12-28 RX ADMIN — Medication 100 MILLIGRAM(S): at 21:32

## 2018-12-28 RX ADMIN — SODIUM CHLORIDE 3 MILLILITER(S): 9 INJECTION INTRAMUSCULAR; INTRAVENOUS; SUBCUTANEOUS at 21:34

## 2018-12-28 RX ADMIN — ATORVASTATIN CALCIUM 40 MILLIGRAM(S): 80 TABLET, FILM COATED ORAL at 21:32

## 2018-12-28 RX ADMIN — AMIODARONE HYDROCHLORIDE 400 MILLIGRAM(S): 400 TABLET ORAL at 05:41

## 2018-12-28 RX ADMIN — HEPARIN SODIUM 7 UNIT(S)/HR: 5000 INJECTION INTRAVENOUS; SUBCUTANEOUS at 12:33

## 2018-12-28 RX ADMIN — Medication 100 MILLIGRAM(S): at 05:42

## 2018-12-28 RX ADMIN — SODIUM CHLORIDE 3 MILLILITER(S): 9 INJECTION INTRAMUSCULAR; INTRAVENOUS; SUBCUTANEOUS at 05:42

## 2018-12-28 RX ADMIN — Medication 20 MILLIGRAM(S): at 17:36

## 2018-12-28 RX ADMIN — Medication 20 MILLIGRAM(S): at 05:42

## 2018-12-28 RX ADMIN — SODIUM CHLORIDE 3 MILLILITER(S): 9 INJECTION INTRAMUSCULAR; INTRAVENOUS; SUBCUTANEOUS at 14:18

## 2018-12-28 RX ADMIN — HEPARIN SODIUM 7 UNIT(S)/HR: 5000 INJECTION INTRAVENOUS; SUBCUTANEOUS at 03:12

## 2018-12-28 RX ADMIN — PANTOPRAZOLE SODIUM 40 MILLIGRAM(S): 20 TABLET, DELAYED RELEASE ORAL at 05:42

## 2018-12-28 RX ADMIN — Medication 81 MILLIGRAM(S): at 12:24

## 2018-12-28 RX ADMIN — Medication 100 MILLIGRAM(S): at 14:18

## 2018-12-28 NOTE — DISCHARGE NOTE ADULT - OTHER SIGNIFICANT FINDINGS
VSS  tele: Afib 70's  lungs clear, RR easy unlabored  +bs nt nd + bm; neg n/v/d  LE: neg calf tenderness, neg LE edema, right groin cdi tiffanie- neg hematoma; PPP bilaterally    Discharge pt home today as per DR. Floyd 12/31

## 2018-12-28 NOTE — DISCHARGE NOTE ADULT - NS AS ACTIVITY OBS
Sex allowed/Walking-Outdoors allowed/No Heavy lifting/straining/Do not make important decisions/Do not drive or operate machinery/Showering allowed/Walking-Indoors allowed/Stairs allowed

## 2018-12-28 NOTE — PROGRESS NOTE ADULT - SUBJECTIVE AND OBJECTIVE BOX
VITAL SIGNS    Telemetry:      Vital Signs Last 24 Hrs  T(C): 36.7 (18 @ 14:24), Max: 36.7 (18 @ 14:24)  T(F): 98 (18 @ 14:24), Max: 98 (18 @ 14:24)  HR: 95 (18 @ 14:24) (95 - 100)  BP: 108/75 (18 @ 14:24) (96/60 - 120/81)  RR: 18 (18 @ 14:24) (18 - 18)  SpO2: 98% (18 @ 14:24) (96% - 100%)                    @ 07:01  -   @ 07:00  --------------------------------------------------------  IN: 892 mL / OUT: 670 mL / NET: 222 mL     @ 07:01  -   @ 15:20  --------------------------------------------------------  IN: 440 mL / OUT: 700 mL / NET: -260 mL          Daily     Daily Weight in k.2 (28 Dec 2018 11:36)        CAPILLARY BLOOD GLUCOSE      Drains:     MS         [  ] Drainage:                 L Pleural  [  ]  Drainage:                R Pleural  [  ]  Drainage:    Pacing Wires        [  ]   Settings:                                  Isolated  [  ]    Coumadin    [X ] YES          [  ]      NO         Reason:                               PHYSICAL EXAM    Neurology: alert and oriented x 3, nonfocal, no gross deficits  CV : IRIR  Wound :  CDI , Stable, stitch removed  Lungs: CTA   Abdomen: soft, nontender, nondistended, positive bowel sounds,  :           +Urination   Extremities:     FROM X 4 NO C/E/E          aspirin enteric coated 81 milliGRAM(s) Oral daily  atorvastatin 40 milliGRAM(s) Oral at bedtime  dextrose 50% Injectable 50 milliLiter(s) IV Push every 15 minutes  docusate sodium 100 milliGRAM(s) Oral three times a day  furosemide    Tablet 20 milliGRAM(s) Oral two times a day  heparin  Infusion 700 Unit(s)/Hr IV Continuous <Continuous>  metoprolol succinate ER 50 milliGRAM(s) Oral daily  pantoprazole    Tablet 40 milliGRAM(s) Oral before breakfast  sodium chloride 0.9% lock flush 3 milliLiter(s) IV Push every 8 hours                    Physical Therapy Rec:   Home  [  ]   Home w/ PT  [  ]  Rehab  [  ]  Discussed with Cardiothoracic Team at AM rounds.

## 2018-12-28 NOTE — PROGRESS NOTE ADULT - SUBJECTIVE AND OBJECTIVE BOX
*** Structural Heart Team ***      History of Present Illness:    87 yo AA male with PMH, HTN, HLD, hyponatremia and Afib (on lovenox) colon CA ( s/p chemo), prostate Ca ( tx with radiation), CKD severe MR admitted for scheduled  Mitral MR clip originally scheduled for 18.  Case aborted during initial stages due to right common iliac vein thrombus that embolized to the right atrium.  Endovascular thrombectomy performed.  Pt stable.  LE dopplers negative for DVT.  Repeat TTE with no evidence of intracardiac thrombus.  Pt was switched from home eliquis to renal dose lovenox.  Now admitted for elective mitral clip.    Pt has no complaints today, feels well.    Allergies    penicillin (Hives)  shellfish (Hives)    Intolerances      Vital Signs Last 24 Hrs  T(C): 36.4 (28 Dec 2018 06:00), Max: 37 (27 Dec 2018 09:30)  T(F): 97.6 (28 Dec 2018 06:00), Max: 98.6 (27 Dec 2018 09:30)  HR: 97 (28 Dec 2018 06:00) (81 - 115)  BP: 120/81 (28 Dec 2018 06:00) (96/60 - 120/81)  BP(mean): 72 (27 Dec 2018 11:44) (72 - 72)  RR: 18 (28 Dec 2018 06:00) (11 - 18)  SpO2: 100% (28 Dec 2018 06:00) (96% - 100%)    MEDICATIONS  (STANDING):  amiodarone    Tablet 400 milliGRAM(s) Oral every 8 hours  aspirin enteric coated 81 milliGRAM(s) Oral daily  atorvastatin 40 milliGRAM(s) Oral at bedtime  dextrose 50% Injectable 50 milliLiter(s) IV Push every 15 minutes  docusate sodium 100 milliGRAM(s) Oral three times a day  furosemide    Tablet 20 milliGRAM(s) Oral two times a day  heparin  Infusion 700 Unit(s)/Hr (7 mL/Hr) IV Continuous <Continuous>  pantoprazole    Tablet 40 milliGRAM(s) Oral before breakfast  sodium chloride 0.9% lock flush 3 milliLiter(s) IV Push every 8 hours      Exam-  General: NAD  Cor: s1s2, IRR, no murmur noted  Pulm: clear b/l  Abd: soft, nontender, nondistended, +bowel sounds  Extremities: no edema, good distal pulses   Groin: clean and dry, good pulses, no hematoma b/l  EK.2   4.4   )-----------( 88       ( 28 Dec 2018 01:45 )             32.8       138  |  100  |  35<H>  ----------------------------<  101<H>  4.0   |  23  |  1.69<H>    Ca    9.6      28 Dec 2018 01:45  Phos  4.3       Mg     1.9         TPro  6.8  /  Alb  3.6  /  TBili  0.8  /  DBili  x   /  AST  16  /  ALT  10  /  AlkPhos  56    PT/INR - ( 28 Dec 2018 01:45 )   PT: 14.7 sec;   INR: 1.28 ratio         PTT - ( 28 Dec 2018 01:45 )  PTT:52.4 sec  I&O's Summary    27 Dec 2018 07:01  -  28 Dec 2018 07:00  --------------------------------------------------------  IN: 892 mL / OUT: 670 mL / NET: 222 mL      < from: Transthoracic Echocardiogram (.27.18 @ 16:22) >  Dimensions:    Normal Values:  LA:     3.7    2.0 - 4.0 cm  Ao:     3.1    2.0 - 3.8 cm  SEPTUM: 1.2    0.6 - 1.2 cm  PWT:    1.1    0.6 - 1.1 cm  LVIDd:  4.5    3.0 - 5.6 cm  LVIDs:  3.4    1.8 - 4.0 cm  Derived variables:  LVMI: 104 g/m2  RWT: 0.48  Fractional short: 24 %  EF (Visual Estimate): 40-45 %  Doppler Peak Velocity (m/sec): MV=1.1 AoV=0.9  ------------------------------------------------------------------------  Observations:  Mitral Valve: Mitraclip is seen in the mitral position.  Moderate mitral regurgitation. Multiple jets. Peak mitral  valve gradient equals 5 mm Hg, mean transmitral valve  gradient equals 2 mm Hg, which is probably normal in the  setting of a mitraclip.  Aortic Valve/Aorta: Calcified trileaflet aortic valve with  normal opening. Peak transaortic valve gradient equals 3 mm  Hg, estimated aortic valve area equals 2.2 sqcm. Mild  aortic regurgitation.  Peak left ventricular outflow tract  gradient equals 1 mm Hg.  Aortic Root: 3.1 cm.  LVOT diameter: 2.1 cm.  Left Atrium: Severely dilated left atrium.  LA volume index  = 71 cc/m2.  Left Ventricle: Moderate global left ventricular systolic  dysfunction. Mild concentric left ventricular hypertrophy.  Moderate diastolic dysfunction (Stage II). Increased E/e'  is consistent with elevated left ventricular filling  pressure.  Right Heart: Severe right atrial enlargement. Right  ventricular enlargement with decreased right ventricular  systolic function. TVs' = 6.32 cm/sec. Normal tricuspid  valve. Mild-moderate tricuspid regurgitation. Pulmonic  valve not well visualized. Mild pulmonic regurgitation.  Pericardium/Pleura: Normal pericardium with no pericardial  effusion.  Hemodynamic: Estimated right atrial pressure is 8 mm Hg.  Estimated right ventricular systolic pressure equals 36 mm  Hg, assuming right atrial pressure equals 8 mm Hg,  consistent with borderline pulmonary hypertension. Color  Doppler demonstrates evidence of a patent foramen ovale.  (s/p transeptal puncture for mitraclip)  ------------------------------------------------------------------------  Conclusions:  1. Mitraclip is seen in themitral position.  Moderate  mitral regurgitation. Multiple jets. Peak mitral valve  gradient equals 5 mm Hg, mean transmitral valve gradient  equals 2 mm Hg, which is probably normal in the setting of  a mitraclip.  2. Calcified trileaflet aortic valve with normal opening.  Mild aortic regurgitation.  3. Severely dilated left atrium.  LA volume index = 71  cc/m2.  4. Mild concentric left ventricular hypertrophy.  5. Moderate global left ventricular systolic dysfunction.  6. Moderate diastolic dysfunction (Stage II). Increased  E/e'  is consistent with elevated left ventricular filling  pressure.  7. Right ventricular enlargement with decreased right  ventricular systolic function. TVs' = 6.32 cm/sec.  8. Estimated right ventricular systolic pressure equals 36  mm Hg, assuming right atrial pressure equals 8 mm Hg,  consistent with borderline pulmonary hypertension.  9. Color Doppler demonstrates evidence of a patent foramen  ovale. (s/p transeptal puncture for mitraclip)  *** Compared withechocardiogram of 2018  9intraoprocedural YENNY for mitraclip placement), a  mitralclip is now seen in the mitral position. Mitral  regurgitation has decreased. A PFO is now seen suggestive  of transseptal puncture in the setting of mitraclip. Other  findings are grossly similar.  ------------------------------------------------------------------------  Confirmed on  2018 - 19:33:35 by Arnulfo Go M.D.  ------------------------------------------------------------------------    < end of copied text >          Assessment/Plan:  89 y/o male POD 2 s/p Mitraclip    - cont ASA and Coumadin (heparin drip until INR therapeutic)   - Amiodarone  - cont PT

## 2018-12-28 NOTE — DISCHARGE NOTE ADULT - HOSPITAL COURSE
87 y/o male s/p mitral clip 12/26 with PMHx of afib,  colon ca   prostate ca  12/27   trf too floor   coumadin dosing ,   r groin stitch   lasix 20BID  12/28 vss coumadin for tonight 5mg INR 1.3  removed groin stitch and press applied. Amiodarone dc'd per EP due to persistent Afib  12/29 INR 1.28, Coumadin 7.5mg. Continue Heparin bridge, VSS. Post-op TTE: EF 40-45%, mod MR, mild Ar, mild-mod TR, no pericardial effusion.  12/30 VSS d/c home when INR near 2  12/31 VSS INR 2.4- discharge pt home today as per Dr. Benoit

## 2018-12-28 NOTE — PROGRESS NOTE ADULT - SUBJECTIVE AND OBJECTIVE BOX
Patient denies CP, SOB Review of systems otherwise (-)    amiodarone    Tablet 400 milliGRAM(s) Oral every 8 hours  aspirin enteric coated 81 milliGRAM(s) Oral daily  atorvastatin 40 milliGRAM(s) Oral at bedtime  dextrose 50% Injectable 50 milliLiter(s) IV Push every 15 minutes  docusate sodium 100 milliGRAM(s) Oral three times a day  furosemide    Tablet 20 milliGRAM(s) Oral two times a day  heparin  Infusion 700 Unit(s)/Hr IV Continuous <Continuous>  pantoprazole    Tablet 40 milliGRAM(s) Oral before breakfast  sodium chloride 0.9% lock flush 3 milliLiter(s) IV Push every 8 hours                            11.2   4.4   )-----------( 88       ( 28 Dec 2018 01:45 )             32.8       Hemoglobin: 11.2 g/dL (12-28 @ 01:45)  Hemoglobin: 11.4 g/dL (12-27 @ 02:13)  Hemoglobin: 11.8 g/dL (12-26 @ 15:08)  Hemoglobin: 12.3 g/dL (12-25 @ 09:19)  Hemoglobin: 9.6 g/dL (12-24 @ 17:28)      12-28    138  |  100  |  35<H>  ----------------------------<  101<H>  4.0   |  23  |  1.69<H>    Ca    9.6      28 Dec 2018 01:45  Phos  4.3     12-26  Mg     1.9     12-26    TPro  6.8  /  Alb  3.6  /  TBili  0.8  /  DBili  x   /  AST  16  /  ALT  10  /  AlkPhos  56  12-27    Creatinine Trend: 1.69<--, 1.56<--, 1.51<--, 1.72<--, 1.91<--, 1.58<--    COAGS: PT/INR - ( 28 Dec 2018 01:45 )   PT: 14.7 sec;   INR: 1.28 ratio         PTT - ( 28 Dec 2018 01:45 )  PTT:52.4 sec    CARDIAC MARKERS ( 26 Dec 2018 15:09 )  x     / x     / 51 U/L / x     / 2.6 ng/mL        T(C): 36.4 (12-28-18 @ 06:00), Max: 37 (12-27-18 @ 08:00)  HR: 97 (12-28-18 @ 06:00) (81 - 115)  BP: 120/81 (12-28-18 @ 06:00) (96/60 - 120/81)  RR: 18 (12-28-18 @ 06:00) (11 - 18)  SpO2: 100% (12-28-18 @ 06:00) (96% - 100%)  Wt(kg): --    I&O's Summary    27 Dec 2018 07:01  -  28 Dec 2018 07:00  --------------------------------------------------------  IN: 892 mL / OUT: 670 mL / NET: 222 mL      Lymphatic: No lymphadenopathy , no edema  Cardiovascular: Normal S1 S2,IRRR No JVD, 1/6 systolic murmur   Respiratory: Lungs clear to auscultation, 	  Gastrointestinal:  Soft, Non-tender, + BS	  Skin: No rashes, No ecchymoses, No cyanosis, warm to touch      TELEMETRY: AF      DIAGNOSTIC TESTING:  [ ] Echocardiogram: < from: Transthoracic Echocardiogram (11.22.18 @ 09:42) >  Conclusions:  1. Mitral annular calcification,tethered mitral valve  leaflets. Severe mitral regurgitation.  2. Mildly dilated left atrium.  LA volume index = 38 cc/m2.  3. Low normal left ventricular systolic function. No  segmental wall motion abnormalities.  4. A linear structure compatible with a Chiari-network is  identified in the right atrium (normal variant). No  thrombus seen.  5. Normal right ventricular size and function.  6. Normal tricuspid valve. Moderate tricuspid  regurgitation.  7. Estimated pulmonary artery systolic pressure equals 35  mm Hg, assuming right atrial pressure equals 8 mm Hg,  consistent with borderline pulmonary pressures.  8. Left pleural effusion.    < end of copied text >    [ ]  Catheterization: < from: Cardiac Cath Lab - Adult (12.26.18 @ 10:48) >  DIAGNOSTIC RECOMMENDATIONS: Proceed with percutaneous mitral repair in the  setting of symptomatic severe mitral regurgitation (predominantly  functional) and chronic systolic congestive heart failure (with recent  decline).  INTERVENTIONAL IMPRESSIONS: Successful mitral repair with a single Lucy  Clip XTR on the lateral edge of A2-P2; all MR medial to the Clip was  eliminated; there was residual MR lateral to the Clip, at the site of a  large cleft, which was not suitable for an additional Clip.  INTERVENTIONAL RECOMMENDATIONS: Continue current medical therapy, with  optimization of the CHF and AFIB regimens (I discussed the latter with Dr Mckenzie, who will be following for EPS); TTE prior to discharge;  consideration for device therapy as per Dr Mckenzie; start warfarin when  deemed appropriate.  Prepared and signed by  Galdino Ortega M.D.  Signed 12/26/2018 16:48:47  HEMODYNAMIC TABLES    < end of copied text >    [ ] Stress Test:    OTHER: 	      ASSESSMENT/PLAN: 89 yo AA male with PMH, HTN, HLD, hyponatremia and Afib (on lovenox) colon CA (2001 s/p chemo), prostate Ca (2006 tx with radiation), CKD  and severe MR s/p MitraClip POD#1    - A/C with heparin gtt with coumadin bridge.   - physical therapy follow up   - Amio load   - tele stable   -follow up vascular  - Progressing well  D/W Dr Salazar

## 2018-12-28 NOTE — PROGRESS NOTE ADULT - ASSESSMENT
69  yr old male sp   mitral clip 12/26   H   afib,  colon ca   prostate ca  12/27   trf too floor   coumadin dosing ,   r groin stitch   lasix 20BID  12/28 vss coumadin for tonight 5mg INR 1.3  removed groin stitch and press applied.

## 2018-12-28 NOTE — DISCHARGE NOTE ADULT - MEDICATION SUMMARY - MEDICATIONS TO TAKE
I will START or STAY ON the medications listed below when I get home from the hospital:    STAT PT/ INR levels   -- every monday and thurdsay starting Thurs jan 3rd   -- Indication: For Coumadin bloodwork    aspirin 81 mg oral delayed release tablet  -- 1 tab(s) by mouth once a day  -- Indication: For blood thinner    Coumadin 6 mg oral tablet  -- 1 tab(s) by mouth once a day - dose to be adjusted by Dr. Hailey Jeffery   -- Do not take this drug if you are pregnant.  It is very important that you take or use this exactly as directed.  Do not skip doses or discontinue unless directed by your doctor.  Obtain medical advice before taking any non-prescription drugs as some may affect the action of this medication.    -- Indication: For blood thinner    atorvastatin 40 mg oral tablet  -- 1 tab(s) by mouth once a day (at bedtime)  -- Indication: For Cholesterol    metoprolol succinate 100 mg oral tablet, extended release  -- 1 tab(s) by mouth once a day  -- Indication: For Anti-arrythmic    furosemide 20 mg oral tablet  -- 1 tab(s) by mouth 2 times a day  -- Indication: For diuretic    docusate sodium 100 mg oral capsule  -- 1 cap(s) by mouth 3 times a day  -- Indication: For stool softener    Senna  -- orally once a day (at bedtime)  -- Indication: For Constipation    pantoprazole 40 mg oral delayed release tablet  -- 1 tab(s) by mouth once a day (before a meal)  -- Indication: For Antacid

## 2018-12-28 NOTE — DISCHARGE NOTE ADULT - CARE PLAN
Principal Discharge DX:	S/P mitral valve clip implantation  Goal:	complete recovery  Assessment and plan of treatment:	shower daily  weigh yourself daily  continue current prescriptions as ordered  increase activity as tolerated   no added salt; low salt regular diet.   follow up with Cardiologist, Dr. Scott, in 1-2 weeks. Call to schedule appointment.  follow up with cardiac surgeon, Dr. Floyd, on January 14th at 10:00 am. Call to confirm appointment. [phone]  follow up with primary care doctor, Dr. Hailey Jeffery, for coumadin bloodwork (INR levels) every monday and thursday starting Jan 3rd, 2019

## 2018-12-28 NOTE — DISCHARGE NOTE ADULT - CARE PROVIDERS DIRECT ADDRESSES
[email or web address]

## 2018-12-28 NOTE — CONSULT NOTE ADULT - SUBJECTIVE AND OBJECTIVE BOX
PAGER:  [phone]               [facility] [id number]              EMAIL [email or web address]   OFFICE [phone]                              ********VASCULAR MEDICINE & CARDIOLOGY PROGRESS NOTE********                            CC:  Mitral regurgitation    HISTORY OF PRESENT ILLNESS:  89 yo AA male with PMH, HTN, HLD, hyponatremia and Afib (on lovenox) colon CA (2001 s/p chemo), prostate Ca (2006 tx with radiation), CKD severe MR admitted for scheduled  Mitral MR clip originally scheduled for 11/21/18.  Case aborted during initial stages due to right common iliac vein thrombus that embolized to the right atrium.  Endovascular thrombectomy performed.  Pt stable.  LE dopplers negative for DVT.  Repeat TTE with no evidence of intracardiac thrombus.  Pt was switched from home eliquis to lovenox (renally dosed).  Pt admitted for planned mitral clip to be done on Wednesday.  Pt feeling well, no acute complaints.            Allergies:     penicillin (Hives)  shellfish (Hives)      MEDICATIONS:  lovenox 60mg daily  aspirin  lipitor 40mg  Toprol XL 100mg BID  Lasix 20mg BID  Pantoprazole 20mg   Senna/colace     PAST MEDICAL & SURGICAL HISTORY:  Pleural effusion, bilateral: as of latest chest xray  s/p latest hospitalization for CHF  CKD (chronic kidney disease)  Colon cancer  Prostate cancer  Mitral regurgitation  Hyponatremia  HLD (hyperlipidemia)  HTN (hypertension)  Atrial fibrillation  Prostate CA  GERD (gastroesophageal reflux disease)  Afib  HLD (hyperlipidemia)  HTN (hypertension)  S/P coronary angiogram: 11/12/18  History of appendectomy  H/O colectomy  History of prostate cancer      FAMILY HISTORY:  Family history of colon cancer (Sibling)  Family history of ovarian cancer (Mother)  Family history of cancer (Sibling)      SOCIAL HISTORY:  unchanged    REVIEW OF SYSTEMS:  CONSTITUTIONAL: No fever, weight loss, or fatigue  EYES: No eye pain, visual disturbances, or discharge  ENMT:  No difficulty hearing, tinnitus, vertigo; No sinus or throat pain  NECK: No pain or stiffness  RESPIRATORY: No cough, wheezing, chills or hemoptysis; No Shortness of Breath  CARDIOVASCULAR: No chest pain, palpitations, passing out, dizziness, or leg swelling  GASTROINTESTINAL: No abdominal or epigastric pain. No nausea, vomiting, or hematemesis; No diarrhea or constipation. No melena or hematochezia.  GENITOURINARY: No dysuria, frequency, hematuria, or incontinence  NEUROLOGICAL: No headaches, memory loss, loss of strength, numbness, or tremors  SKIN: No itching, burning, rashes, or lesions   LYMPH Nodes: No enlarged glands  ENDOCRINE: No heat or cold intolerance; No hair loss  MUSCULOSKELETAL: No joint pain or swelling; No muscle, back, or extremity pain  PSYCHIATRIC: No depression, anxiety, mood swings, or difficulty sleeping  HEME/LYMPH: No easy bruising, or bleeding gums  ALLERY AND IMMUNOLOGIC: No hives or eczema	    [X] All others negative	  [ ] Unable to obtain    PHYSICAL EXAM:  T(C): 36.5 (12-24-18 @ 14:20), Max: 36.5 (12-24-18 @ 14:20)  HR: 95 (12-24-18 @ 14:20) (95 - 95)  BP: 94/64 (12-24-18 @ 14:20) (94/64 - 94/64)  RR: 18 (12-24-18 @ 14:20) (18 - 18)  SpO2: 95% (12-24-18 @ 14:20) (95% - 95%)  Wt(kg): --  I&O's Summary      Appearance: Cachectic 	  HEENT:   Normal oral mucosa, PERRL, EOMI	  Lymphatic: No lymphadenopathy  Cardiovascular: irregularly irregular, systolic murmur  Respiratory: Lungs clear to auscultation, comfortable, no tachypnea	  Psychiatry: A & O x 3, Mood & affect appropriate  Gastrointestinal:  Soft, Non-tender, + BS	  Skin: No rashes, No ecchymoses, No cyanosis	  Neurologic: Non-focal  Extremities: Normal range of motion, No clubbing, cyanosis or edema  Vascular: Peripheral pulses palpable 2+ bilaterally      LABS:	 	  None in system yet
CARDIOLOGY       HISTORY OF PRESENT ILLNESS: He is a pleasant 89 y/o male PMH persistent AF and severe MR who was supposed to have a Lucy Clip on 11/211/2018. The procedure was cancelled because he had a large RA thrombus on intra-op YENNY, which was then aspirated. He denies palpitations, syncope, nor angina. He was placed on lovenox and a successful Lucy-clip was performed a couple of days ago. His AF is usually managed with toprol xl 100 bid and anticoagulation. EP is now called for help given current AF with RVR. Amio started by CT surgery. His LVEF is 40-45%.    PAST MEDICAL & SURGICAL HISTORY:  CKD (chronic kidney disease)  Colon cancer (2001) s/p colectomy and chemo  Prostate cancer (2006)  severe Mitral regurgitation s/p Lucy Clip  HLD (hyperlipidemia)  HTN (hypertension)  persistent Atrial fibrillation    S/P coronary angiogram: 11/12/18 (unremarkable cors)  History of appendectomy  H/O colectomy  History of prostate cancer    Allergies  penicillin (Hives)  shellfish (Hives)    FAMILY HISTORY:  Family history of colon cancer (Sibling)  Family history of ovarian cancer (Mother)  Family history of cancer (Sibling)    Non-contributary for premature coronary disease or sudden cardiac death    SOCIAL HISTORY:    [ x] Non-smoker  [ ] Smoker  [ ] Alcohol      REVIEW OF SYSTEMS:  [ ]chest pain  [ x ]shortness of breath  [ x ]palpitations  [  ]syncope  [ ]near syncope [ ]upper extremity weakness   [ ] lower extremity weakness  [  ]diplopia  [  ]altered mental status   [  ]fevers  [ ]chills [ ]nausea  [ ]vomitting  [  ]dysphagia    [ ]abdominal pain  [ ]melena  [ ]BRBPR    [  ]epistaxis  [  ]rash    [ ]lower extremity edema        [x ] All others negative	  [ ] Unable to obtain    amiodarone    Tablet 400 milliGRAM(s) Oral every 8 hours  aspirin enteric coated 81 milliGRAM(s) Oral daily  atorvastatin 40 milliGRAM(s) Oral at bedtime  dextrose 50% Injectable 50 milliLiter(s) IV Push every 15 minutes  docusate sodium 100 milliGRAM(s) Oral three times a day  furosemide    Tablet 20 milliGRAM(s) Oral two times a day  heparin  Infusion 700 Unit(s)/Hr IV Continuous <Continuous>  pantoprazole    Tablet 40 milliGRAM(s) Oral before breakfast  sodium chloride 0.9% lock flush 3 milliLiter(s) IV Push every 8 hours    T(C): 36.4 (12-28-18 @ 06:00), Max: 36.4 (12-27-18 @ 21:15)  HR: 97 (12-28-18 @ 10:05) (81 - 100)  BP: 120/81 (12-28-18 @ 06:00) (96/60 - 120/81)  RR: 18 (12-28-18 @ 06:00) (18 - 18)  SpO2: 100% (12-28-18 @ 10:05) (96% - 100%)  Wt(kg): --    no JVD  IRR, 2/6 systolic murmur  CTAB  soft nt/nd  no c/c/e                        11.2   4.4   )-----------( 88       ( 28 Dec 2018 01:45 )             32.8       12-28    138  |  100  |  35<H>  ----------------------------<  101<H>  4.0   |  23  |  1.69<H>    Ca    9.6      28 Dec 2018 01:45  Phos  4.3     12-26  Mg     1.9     12-26    TPro  6.8  /  Alb  3.6  /  TBili  0.8  /  DBili  x   /  AST  16  /  ALT  10  /  AlkPhos  56  12-27      CARDIAC MARKERS ( 26 Dec 2018 15:09 )  x     / x     / 51 U/L / x     / 2.6 ng/mL    TELEMETRY: 	    ECG:  	    Echo:  NST:  Cath:  	  A/P) He is a pleasant 89 y/o male PMH persistent AF and severe MR who was supposed to have a Lucy Clip on 11/211/2018. The procedure was cancelled because he had a large RA thrombus on intra-op YENNY, which was then aspirated. He denies palpitations, syncope, nor angina. He was placed on lovenox and a successful Lucy-clip was performed a couple of days ago. His AF is usually managed with toprol xl 100 bid and anticoagulation. EP is now called for help given current AF with RVR. Amio started by CT surgery. His LVEF is 40-45%.    -recommend lifelong a/c for persistent AF with elevated chads-vasc  -no need for ICD given LVEF 40-45%  -considering is AF is persistent there is no role for amiodarone as a rhythm control strategy is not being pursued  -would d/c amio and restart toprol xl 100mg daily  -f/u with [facility] for cardiology and Hailey Jeffery for primary care after discharge      Guzman Mckenzie M.D., [facility]  Cardiac Electrophysiology  [facility]  [address] [place] [address], [address] [place] [address]  [email or web address]    office [phone]  pager [phone]
CARDIOLOGY CONSULT    HPI:  87 yo AA male with PMH, HTN, HLD, hyponatremia and Afib (on lovenox) colon CA (2001 s/p chemo), prostate Ca (2006 tx with radiation), CKD  and severe MR admitted for scheduled  Mitral MR clip with Dr. Floyd to be completed on 12/26/18. Previously patient had been scheduled for surgery 11/2018 but  case aborted during initial stages due to right common iliac vein thrombus that embolized to the right atrium.  Endovascular thrombectomy performed.  Pt stable.  LE dopplers negative for DVT.  Repeat TTE with no evidence of intracardiac thrombus.  Pt was switched from home eliquis to renal dose lovenox at that time. He is now hospitalized pending this procedure tomorrow. He is doing well denies any current complaints of chest pain or shortness of breath. He denies any dizziness, lightheadedness, palpitations or syncope. He is sitting comfortably in his chair eating lunch with his family present in the room as well.     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  Pleural effusion, bilateral: as of latest chest xray  s/p latest hospitalization for CHF  CKD (chronic kidney disease)  Colon cancer  Prostate cancer  Mitral regurgitation  Hyponatremia  HLD (hyperlipidemia)  HTN (hypertension)  Atrial fibrillation  Prostate CA  GERD (gastroesophageal reflux disease)  Afib  HLD (hyperlipidemia)  HTN (hypertension)  S/P coronary angiogram: 11/12/18  History of appendectomy  H/O colectomy  History of prostate cancer        MEDICATIONS  (STANDING):  aspirin enteric coated 81 milliGRAM(s) Oral daily  atorvastatin 40 milliGRAM(s) Oral at bedtime  chlorhexidine 0.12% Liquid 30 milliLiter(s) Swish and Spit once  chlorhexidine 4% Liquid 1 Application(s) Topical once  docusate sodium 100 milliGRAM(s) Oral three times a day  furosemide    Tablet 20 milliGRAM(s) Oral two times a day  metoprolol succinate  milliGRAM(s) Oral daily  pantoprazole    Tablet 40 milliGRAM(s) Oral before breakfast  sodium chloride 0.9% lock flush 3 milliLiter(s) IV Push every 8 hours  vancomycin  IVPB 1000 milliGRAM(s) IV Intermittent once      Allergies    penicillin (Hives)  shellfish (Hives)    Intolerances        FAMILY HISTORY:  Family history of colon cancer (Sibling)  Family history of ovarian cancer (Mother)  Family history of cancer (Sibling)    Noncontributory for premature coronary disease or sudden cardiac death    SOCIAL HISTORY:    [X] Non-smoker  [ ] Smoker  [ ] Alcohol      REVIEW OF SYSTEMS:  [ ]chest pain  [  ]shortness of breath  [  ]palpitations  [  ]syncope  [ ]near syncope [ ]upper extremity weakness   [ ] lower extremity weakness  [  ]diplopia  [  ]altered mental status   [  ]fevers  [ ]chills [ ]nausea  [ ]vomitting  [  ]dysphagia    [ ]abdominal pain  [ ]melena  [ ]BRBPR    [  ]epistaxis  [  ]rash  [ ]lower extremity edema      [X] All others negative	  [ ] Unable to obtain    PHYSICAL EXAM:  T(C): 36.7 (12-25-18 @ 14:48), Max: 36.7 (12-25-18 @ 04:54)  HR: 91 (12-25-18 @ 14:48) (75 - 91)  BP: 92/66 (12-25-18 @ 14:48) (92/66 - 124/84)  RR: 18 (12-25-18 @ 14:48) (18 - 18)  SpO2: 96% (12-25-18 @ 14:48) (96% - 98%)  Wt(kg): --    Appearance: Normal	  HEENT:   Normal oral mucosa, PERRL, EOMI	  Lymphatic: No lymphadenopathy , no edema  Cardiovascular: Normal S1 S2, No JVD, No murmurs , Peripheral pulses palpable 2+ bilaterally  Respiratory: Lungs clear to auscultation, normal effort 	  Gastrointestinal:  Soft, Non-tender, + BS	  Skin: No rashes, No ecchymoses, No cyanosis, warm to touch  Musculoskeletal: Normal range of motion, normal strength  Psychiatry:  Mood & affect appropriate    DIAGNOSTIC DATA:    TELEMETRY: 	      ECG:  AFIB w/ NSST changes rate of 88 bpm    Echo: < from: Transthoracic Echocardiogram (11.22.18 @ 09:42) >  ------------------------------------------------------------------------  Conclusions:  1. Mitral annular calcification,tethered mitral valve  leaflets. Severe mitral regurgitation.  2. Mildly dilated left atrium.  LA volume index = 38 cc/m2.  3. Low normal left ventricular systolic function. No  segmental wall motion abnormalities.  4. A linear structure compatible with a Chiari-network is  identified in the right atrium (normal variant). No  thrombus seen.  5. Normal right ventricular size and function.  6. Normal tricuspid valve. Moderate tricuspid  regurgitation.  7. Estimated pulmonary artery systolic pressure equals 35  mm Hg, assuming right atrial pressure equals 8 mm Hg,  consistent with borderline pulmonary pressures.  8. Left pleural effusion.  ------------------------------------------------------------------------  Confirmed on  11/22/2018 - 15:20:07 by Caridad Rivera M.D.  ------------------------------------------------------------------------    < end of copied text >      NST: < from: Nuclear Stress Test-Pharmacologic (09.25.18 @ 09:26) >  GATED ANALYSIS:  Post-stress resting myocardial perfusion gated SPECT  imaging was performed (LVEF = 38 %).  Left ventricular  systolic function appears moderately reduced with no  segmental wall motion abnormalities.  ------------------------------------------------------------------------    IMPRESSIONS:Abnormal Study  * Negative ECG evidence of ischemia after IV  administartion of Regadenoson.  * Myocardial Perfusion SPECT results are normal.  * No clear evidence of ischemia or infarct.  * Post-stress resting myocardial perfusion gated SPECT  imaging was performed (LVEF = 38 %).  Left ventricular  systolic function appears moderately reduced with no  segmental wall motion abnormalities.    < end of copied text >       Cath: < from: Cardiac Cath Lab - Adult (11.12.18 @ 13:08) >  CORONARY VESSELS: The coronary circulation is right dominant.  LM:   --  LM: Normal.  LAD:   --  Proximal LAD: Angiography showed minor luminal irregularities  with no flow limiting lesions.  --  Mid LAD: Angiography showed mild atherosclerosis with no flow limiting  lesions.  CX:   --  Circumflex: Normal.  RCA:   --  RCA: Normal.  COMPLICATIONS: There were no complications.  SUMMARY:  HEMODYNAMICS: Hemodynamic assessment demonstrates mildly to moderately  elevated pulmonary capillary wedge pressure.  INTERVENTIONAL RECOMMENDATIONS: Consultation with a cardiac surgeon be  obtained for surgical opinion and mitral valve replacement.  Prepared and signed by  Savannah Scott M.D.  Signed 11/12/2018 15:07:41    < end of copied text >  	    	  LABS:	                         12.3   3.5   )-----------( 107      ( 25 Dec 2018 09:19 )             36.0     12-25    139  |  101  |  38<H>  ----------------------------<  117<H>  4.0   |  23  |  1.72<H>    Ca    9.9      25 Dec 2018 09:19    TPro  7.4  /  Alb  3.8  /  TBili  0.4  /  DBili  x   /  AST  13  /  ALT  11  /  AlkPhos  69  12-24    proBNP: Serum Pro-Brain Natriuretic Peptide: 3008 pg/mL (12-24 @ 17:28)    Lipid Profile:   HgA1c: Hemoglobin A1C, Whole Blood: 6.1 % (12-25 @ 01:25)    TSH: Thyroid Stimulating Hormone, Serum: 2.47 uIU/mL (12-24 @ 18:14)      ASSESSMENT/PLAN:  87 yo AA male with PMH, HTN, HLD, hyponatremia and Afib (on lovenox) colon CA (2001 s/p chemo), prostate Ca (2006 tx with radiation), CKD  and severe MR admitted for scheduled  Mitral MR clip with Dr. Floyd to be completed on 12/26/18.     - Patient hemodynamically stable  - Persistent Afib which is currently rate controlled.   - Continue to monitor tele  - Echo as noted above, continue with current dose of metoprolol.  - Cardiac cath 11/2018 revealed mild luminal irregularities otherwise unremarkable  - Patient to have mitral clip surgery with Dr. Floyd tomorrow 12/26  - Patient may proceed with surgery from a cardiac standpoint  - Further recs per CT surg ( Npo midnight/ Vanco on call to OR)  - Will d/w Dr. Rashad Felton PA-C

## 2018-12-28 NOTE — DISCHARGE NOTE ADULT - ADDITIONAL INSTRUCTIONS
follow up with Cardiologist, Dr. Scott, in 1-2 weeks. Call to schedule appointment.  follow up with cardiac surgeon, Dr. Floyd, on January 14th at 10:00 am. Call to confirm appointment. [phone]  follow up with primary care doctor, Dr. Hailey Jeffery, for coumadin bloodwork (INR levels) every monday and thursday starting Jan 3rd, 2019

## 2018-12-28 NOTE — DISCHARGE NOTE ADULT - PATIENT PORTAL LINK FT
You can access the [facility] Patient Portal, offered by [facility], by registering with the following website: [email or web address]

## 2018-12-28 NOTE — DISCHARGE NOTE ADULT - PLAN OF CARE
complete recovery shower daily  weigh yourself daily  continue current prescriptions as ordered  increase activity as tolerated   no added salt; low salt regular diet.   follow up with Cardiologist, Dr. Sctot, in 1-2 weeks. Call to schedule appointment.  follow up with cardiac surgeon, Dr. Floyd, on January 14th at 10:00 am. Call to confirm appointment. [phone]  follow up with primary care doctor, Dr. Hailey Jeffery, for coumadin bloodwork (INR levels) every monday and thursday starting Jan 3rd, 2019

## 2018-12-29 LAB
ANION GAP SERPL CALC-SCNC: 14 MMOL/L — SIGNIFICANT CHANGE UP (ref 5–17)
APTT BLD: 51.3 SEC — HIGH (ref 27.5–36.3)
APTT BLD: 68.2 SEC — HIGH (ref 27.5–36.3)
BUN SERPL-MCNC: 31 MG/DL — HIGH (ref 7–23)
CALCIUM SERPL-MCNC: 10 MG/DL — SIGNIFICANT CHANGE UP (ref 8.4–10.5)
CHLORIDE SERPL-SCNC: 99 MMOL/L — SIGNIFICANT CHANGE UP (ref 96–108)
CO2 SERPL-SCNC: 25 MMOL/L — SIGNIFICANT CHANGE UP (ref 22–31)
CREAT SERPL-MCNC: 1.47 MG/DL — HIGH (ref 0.5–1.3)
GLUCOSE SERPL-MCNC: 107 MG/DL — HIGH (ref 70–99)
HCT VFR BLD CALC: 36.1 % — LOW (ref 39–50)
HGB BLD-MCNC: 11.5 G/DL — LOW (ref 13–17)
INR BLD: 1.41 RATIO — HIGH (ref 0.88–1.16)
MCHC RBC-ENTMCNC: 29.5 PG — SIGNIFICANT CHANGE UP (ref 27–34)
MCHC RBC-ENTMCNC: 31.9 GM/DL — LOW (ref 32–36)
MCV RBC AUTO: 92.5 FL — SIGNIFICANT CHANGE UP (ref 80–100)
PLATELET # BLD AUTO: 103 K/UL — LOW (ref 150–400)
POTASSIUM SERPL-MCNC: 4.2 MMOL/L — SIGNIFICANT CHANGE UP (ref 3.5–5.3)
POTASSIUM SERPL-SCNC: 4.2 MMOL/L — SIGNIFICANT CHANGE UP (ref 3.5–5.3)
PROTHROM AB SERPL-ACNC: 16.2 SEC — HIGH (ref 10–12.9)
RBC # BLD: 3.91 M/UL — LOW (ref 4.2–5.8)
RBC # FLD: 15.4 % — HIGH (ref 10.3–14.5)
SODIUM SERPL-SCNC: 138 MMOL/L — SIGNIFICANT CHANGE UP (ref 135–145)
WBC # BLD: 4.1 K/UL — SIGNIFICANT CHANGE UP (ref 3.8–10.5)
WBC # FLD AUTO: 4.1 K/UL — SIGNIFICANT CHANGE UP (ref 3.8–10.5)

## 2018-12-29 PROCEDURE — 71045 X-RAY EXAM CHEST 1 VIEW: CPT | Mod: 26

## 2018-12-29 RX ORDER — WARFARIN SODIUM 2.5 MG/1
5 TABLET ORAL ONCE
Qty: 0 | Refills: 0 | Status: DISCONTINUED | OUTPATIENT
Start: 2018-12-29 | End: 2018-12-29

## 2018-12-29 RX ORDER — WARFARIN SODIUM 2.5 MG/1
7.5 TABLET ORAL ONCE
Qty: 0 | Refills: 0 | Status: COMPLETED | OUTPATIENT
Start: 2018-12-29 | End: 2018-12-29

## 2018-12-29 RX ORDER — METOPROLOL TARTRATE 50 MG
50 TABLET ORAL ONCE
Qty: 0 | Refills: 0 | Status: COMPLETED | OUTPATIENT
Start: 2018-12-29 | End: 2018-12-29

## 2018-12-29 RX ORDER — METOPROLOL TARTRATE 50 MG
100 TABLET ORAL DAILY
Qty: 0 | Refills: 0 | Status: DISCONTINUED | OUTPATIENT
Start: 2018-12-30 | End: 2018-12-31

## 2018-12-29 RX ADMIN — HEPARIN SODIUM 6 UNIT(S)/HR: 5000 INJECTION INTRAVENOUS; SUBCUTANEOUS at 08:00

## 2018-12-29 RX ADMIN — WARFARIN SODIUM 7.5 MILLIGRAM(S): 2.5 TABLET ORAL at 21:43

## 2018-12-29 RX ADMIN — ATORVASTATIN CALCIUM 40 MILLIGRAM(S): 80 TABLET, FILM COATED ORAL at 21:43

## 2018-12-29 RX ADMIN — Medication 50 MILLIGRAM(S): at 09:47

## 2018-12-29 RX ADMIN — Medication 100 MILLIGRAM(S): at 12:30

## 2018-12-29 RX ADMIN — Medication 81 MILLIGRAM(S): at 12:30

## 2018-12-29 RX ADMIN — Medication 100 MILLIGRAM(S): at 06:09

## 2018-12-29 RX ADMIN — Medication 20 MILLIGRAM(S): at 16:54

## 2018-12-29 RX ADMIN — SODIUM CHLORIDE 3 MILLILITER(S): 9 INJECTION INTRAMUSCULAR; INTRAVENOUS; SUBCUTANEOUS at 21:41

## 2018-12-29 RX ADMIN — SODIUM CHLORIDE 3 MILLILITER(S): 9 INJECTION INTRAMUSCULAR; INTRAVENOUS; SUBCUTANEOUS at 06:08

## 2018-12-29 RX ADMIN — SODIUM CHLORIDE 3 MILLILITER(S): 9 INJECTION INTRAMUSCULAR; INTRAVENOUS; SUBCUTANEOUS at 12:28

## 2018-12-29 RX ADMIN — Medication 50 MILLIGRAM(S): at 06:10

## 2018-12-29 RX ADMIN — Medication 20 MILLIGRAM(S): at 06:10

## 2018-12-29 RX ADMIN — PANTOPRAZOLE SODIUM 40 MILLIGRAM(S): 20 TABLET, DELAYED RELEASE ORAL at 06:09

## 2018-12-29 NOTE — PROGRESS NOTE ADULT - SUBJECTIVE AND OBJECTIVE BOX
Patient denies CP, SOB Review of systems otherwise (-)    aspirin enteric coated 81 milliGRAM(s) Oral daily  atorvastatin 40 milliGRAM(s) Oral at bedtime  dextrose 50% Injectable 50 milliLiter(s) IV Push every 15 minutes  docusate sodium 100 milliGRAM(s) Oral three times a day  furosemide    Tablet 20 milliGRAM(s) Oral two times a day  heparin  Infusion 700 Unit(s)/Hr IV Continuous <Continuous>  pantoprazole    Tablet 40 milliGRAM(s) Oral before breakfast  sodium chloride 0.9% lock flush 3 milliLiter(s) IV Push every 8 hours  warfarin 7.5 milliGRAM(s) Oral once                            11.5   4.1   )-----------( 103      ( 29 Dec 2018 09:26 )             36.1       Hemoglobin: 11.5 g/dL (12-29 @ 09:26)  Hemoglobin: 11.2 g/dL (12-28 @ 11:22)  Hemoglobin: 11.2 g/dL (12-28 @ 01:45)  Hemoglobin: 11.4 g/dL (12-27 @ 02:13)  Hemoglobin: 11.8 g/dL (12-26 @ 15:08)      12-29    138  |  99  |  31<H>  ----------------------------<  107<H>  4.2   |  25  |  1.47<H>    Ca    10.0      29 Dec 2018 09:26      Creatinine Trend: 1.47<--, 1.57<--, 1.69<--, 1.56<--, 1.51<--, 1.72<--    COAGS: PT/INR - ( 29 Dec 2018 09:26 )   PT: 16.2 sec;   INR: 1.41 ratio         PTT - ( 29 Dec 2018 09:26 )  PTT:51.3 sec          T(C): 36.5 (12-29-18 @ 14:14), Max: 36.6 (12-29-18 @ 05:25)  HR: 96 (12-29-18 @ 14:14) (95 - 96)  BP: 113/81 (12-29-18 @ 14:14) (113/81 - 118/81)  RR: 18 (12-29-18 @ 14:14) (16 - 18)  SpO2: 97% (12-29-18 @ 14:14) (97% - 98%)  Wt(kg): --    I&O's Summary    28 Dec 2018 07:01  -  29 Dec 2018 07:00  --------------------------------------------------------  IN: 882 mL / OUT: 1350 mL / NET: -468 mL    29 Dec 2018 07:01  -  29 Dec 2018 16:04  --------------------------------------------------------  IN: 414 mL / OUT: 450 mL / NET: -36 mL        Lymphatic: No lymphadenopathy , no edema  Cardiovascular: Normal S1 S2,IRRR No JVD, 1/6 systolic murmur   Respiratory: Lungs clear to auscultation, 	  Gastrointestinal:  Soft, Non-tender, + BS	  Skin: No rashes, No ecchymoses, No cyanosis, warm to touch      TELEMETRY: AF      DIAGNOSTIC TESTING:  [ ] Echocardiogram: < from: Transthoracic Echocardiogram (11.22.18 @ 09:42) >  Conclusions:  1. Mitral annular calcification,tethered mitral valve  leaflets. Severe mitral regurgitation.  2. Mildly dilated left atrium.  LA volume index = 38 cc/m2.  3. Low normal left ventricular systolic function. No  segmental wall motion abnormalities.  4. A linear structure compatible with a Chiari-network is  identified in the right atrium (normal variant). No  thrombus seen.  5. Normal right ventricular size and function.  6. Normal tricuspid valve. Moderate tricuspid  regurgitation.  7. Estimated pulmonary artery systolic pressure equals 35  mm Hg, assuming right atrial pressure equals 8 mm Hg,  consistent with borderline pulmonary pressures.  8. Left pleural effusion.    < end of copied text >    [ ]  Catheterization: < from: Cardiac Cath Lab - Adult (12.26.18 @ 10:48) >  DIAGNOSTIC RECOMMENDATIONS: Proceed with percutaneous mitral repair in the  setting of symptomatic severe mitral regurgitation (predominantly  functional) and chronic systolic congestive heart failure (with recent  decline).  INTERVENTIONAL IMPRESSIONS: Successful mitral repair with a single Lucy  Clip XTR on the lateral edge of A2-P2; all MR medial to the Clip was  eliminated; there was residual MR lateral to the Clip, at the site of a  large cleft, which was not suitable for an additional Clip.  INTERVENTIONAL RECOMMENDATIONS: Continue current medical therapy, with  optimization of the CHF and AFIB regimens (I discussed the latter with Dr Mckenzie, who will be following for EPS); TTE prior to discharge;  consideration for device therapy as per Dr Mckenzie; start warfarin when  deemed appropriate.  Prepared and signed by  Galdino Ortega M.D.  Signed 12/26/2018 16:48:47  HEMODYNAMIC TABLES    < end of copied text >    [ ] Stress Test:    OTHER: 	      ASSESSMENT/PLAN: 87 yo AA male with PMH, HTN, HLD, hyponatremia and Afib (on lovenox) colon CA (2001 s/p chemo), prostate Ca (2006 tx with radiation), CKD  and severe MR s/p MitraClip POD#1    - A/C with heparin gtt with coumadin bridge.   - physical therapy follow up   - EP F/U appreciated  - tele stable   -follow up vascular  - Progressing well    Nabor Salazar MD, FACC

## 2018-12-29 NOTE — PROGRESS NOTE ADULT - SUBJECTIVE AND OBJECTIVE BOX
pt seen and examined, no complaints, ROS - .     aspirin enteric coated 81 milliGRAM(s) Oral daily  atorvastatin 40 milliGRAM(s) Oral at bedtime  dextrose 50% Injectable 50 milliLiter(s) IV Push every 15 minutes  docusate sodium 100 milliGRAM(s) Oral three times a day  furosemide    Tablet 20 milliGRAM(s) Oral two times a day  heparin  Infusion 700 Unit(s)/Hr IV Continuous <Continuous>  metoprolol succinate ER 50 milliGRAM(s) Oral daily  pantoprazole    Tablet 40 milliGRAM(s) Oral before breakfast  sodium chloride 0.9% lock flush 3 milliLiter(s) IV Push every 8 hours                            11.2   4.7   )-----------( 92       ( 28 Dec 2018 11:22 )             33.1       Hemoglobin: 11.2 g/dL (12-28 @ 11:22)  Hemoglobin: 11.2 g/dL (12-28 @ 01:45)  Hemoglobin: 11.4 g/dL (12-27 @ 02:13)  Hemoglobin: 11.8 g/dL (12-26 @ 15:08)  Hemoglobin: 12.3 g/dL (12-25 @ 09:19)      12-28    136  |  100  |  33<H>  ----------------------------<  119<H>  4.0   |  21<L>  |  1.57<H>    Ca    9.6      28 Dec 2018 11:21      Creatinine Trend: 1.57<--, 1.69<--, 1.56<--, 1.51<--, 1.72<--, 1.91<--    COAGS: PTT - ( 28 Dec 2018 20:05 )  PTT:70.6 sec    CARDIAC MARKERS ( 26 Dec 2018 15:09 )  x     / x     / 51 U/L / x     / 2.6 ng/mL        T(C): 36.6 (12-29-18 @ 05:25), Max: 36.7 (12-28-18 @ 14:24)  HR: 95 (12-29-18 @ 05:25) (95 - 97)  BP: 118/81 (12-29-18 @ 05:25) (108/75 - 118/81)  RR: 16 (12-29-18 @ 05:25) (16 - 18)  SpO2: 98% (12-29-18 @ 05:25) (98% - 100%)  Wt(kg): --    I&O's Summary    27 Dec 2018 07:01  -  28 Dec 2018 07:00  --------------------------------------------------------  IN: 892 mL / OUT: 670 mL / NET: 222 mL    28 Dec 2018 07:01  -  29 Dec 2018 06:48  --------------------------------------------------------  IN: 882 mL / OUT: 1350 mL / NET: -468 mL        Lymphatic: No lymphadenopathy , no edema  Cardiovascular: Normal S1 S2,IRRR No JVD, 1/6 systolic murmur   Respiratory: Lungs clear to auscultation, 	  Gastrointestinal:  Soft, Non-tender, + BS	  Skin: No rashes, No ecchymoses, No cyanosis, warm to touch      TELEMETRY: AF      DIAGNOSTIC TESTING:  [ ] Echocardiogram: < from: Transthoracic Echocardiogram (11.22.18 @ 09:42) >  Conclusions:  1. Mitral annular calcification,tethered mitral valve  leaflets. Severe mitral regurgitation.  2. Mildly dilated left atrium.  LA volume index = 38 cc/m2.  3. Low normal left ventricular systolic function. No  segmental wall motion abnormalities.  4. A linear structure compatible with a Chiari-network is  identified in the right atrium (normal variant). No  thrombus seen.  5. Normal right ventricular size and function.  6. Normal tricuspid valve. Moderate tricuspid  regurgitation.  7. Estimated pulmonary artery systolic pressure equals 35  mm Hg, assuming right atrial pressure equals 8 mm Hg,  consistent with borderline pulmonary pressures.  8. Left pleural effusion.    < end of copied text >    [ ]  Catheterization: < from: Cardiac Cath Lab - Adult (12.26.18 @ 10:48) >  DIAGNOSTIC RECOMMENDATIONS: Proceed with percutaneous mitral repair in the  setting of symptomatic severe mitral regurgitation (predominantly  functional) and chronic systolic congestive heart failure (with recent  decline).  INTERVENTIONAL IMPRESSIONS: Successful mitral repair with a single Lucy  Clip XTR on the lateral edge of A2-P2; all MR medial to the Clip was  eliminated; there was residual MR lateral to the Clip, at the site of a  large cleft, which was not suitable for an additional Clip.  INTERVENTIONAL RECOMMENDATIONS: Continue current medical therapy, with  optimization of the CHF and AFIB regimens (I discussed the latter with Dr Mckenzie, who will be following for EPS); TTE prior to discharge;  consideration for device therapy as per Dr Mckenzie; start warfarin when  deemed appropriate.  Prepared and signed by  Galdino Ortega M.D.  Signed 12/26/2018 16:48:47  HEMODYNAMIC TABLES    < end of copied text >    [ ] Stress Test:    OTHER: 	      ASSESSMENT/PLAN: 89 yo AA male with PMH, HTN, HLD, hyponatremia and Afib (on lovenox) colon CA (2001 s/p chemo), prostate Ca (2006 tx with radiation), CKD  and severe MR s/p MitraClip POD#1    - A/C with heparin gtt with coumadin bridge.   - ASA, statin   - physical therapy follow up   - off amio , cont Toprol 100 qd   - tele stable - Aflutter , rate   -follow up vascular  -f/u with [facility] for cardiology and Hailey Jeffery for primary care after discharge  D/W Dr Mckenzie

## 2018-12-29 NOTE — PROGRESS NOTE ADULT - PROBLEM SELECTOR PLAN 2
Continue rate control with Toprol  Continue AC on Coumadin with Heparin bridge  Check daily PTT/PT/INR  Plan to discharge home when INR theurapeutic >2

## 2018-12-29 NOTE — PROGRESS NOTE ADULT - ASSESSMENT
89 y/o male s/p mitral clip 12/26 with PMHx of afib,  colon ca   prostate ca  12/27   trf too floor   coumadin dosing ,   r groin stitch   lasix 20BID  12/28 vss coumadin for tonight 5mg INR 1.3  removed groin stitch and press applied. Amiodarone dc'd per EP due to persistent Afib  12/29 INR 1.28, Coumadin 5mg. Continue Heparin bridge, VSS. Post-op TTE: EF 40-45%, mod MR, mild Ar, mild-mod TR, no pericardial effusion. 87 y/o male s/p mitral clip 12/26 with PMHx of afib,  colon ca   prostate ca  12/27   trf too floor   coumadin dosing ,   r groin stitch   lasix 20BID  12/28 vss coumadin for tonight 5mg INR 1.3  removed groin stitch and press applied. Amiodarone dc'd per EP due to persistent Afib  12/29 INR 1.28, Coumadin 7.5mg. Continue Heparin bridge, VSS. Post-op TTE: EF 40-45%, mod MR, mild Ar, mild-mod TR, no pericardial effusion.

## 2018-12-29 NOTE — PROGRESS NOTE ADULT - PROBLEM SELECTOR PLAN 1
s/p 12/26 mitral clip  Continue lasix 20 BID, strict I & Os  Supplement potassium prn  OOB to chair, ambulate as tolerated  Continue asa, beta-blocker, statin

## 2018-12-29 NOTE — PROGRESS NOTE ADULT - SUBJECTIVE AND OBJECTIVE BOX
Subjective: Pt states "I'm ok" denies any CP, SOB, N/V and palpitations. No acute events overnight.     Telemetry:  Afib 80 - 100  Vital Signs Last 24 Hrs  T(C): 36.6 (18 @ 05:25), Max: 36.7 (18 @ 14:24)  T(F): 97.8 (18 @ 05:25), Max: 98 (18 @ 14:24)  HR: 95 (18 @ 05:25) (95 - 97)  BP: 118/81 (18 @ 05:25) (108/75 - 118/81)  RR: 16 (18 @ 05:25) (16 - 18)  SpO2: 98% (18 @ 05:25) (98% - 100%)              @ 07:01  -   @ 09:01  --------------------------------------------------------  IN: 240 mL / OUT: 0 mL / NET: 240 mL      Daily Weight in k.2 (28 Dec 2018 11:36)                        11.2   4.7   )-----------( 92       ( 28 Dec 2018 11:22 )             33.1     136  |  100  |  33<H>  ----------------------------<  119<H>  4.0   |  21<L>  |  1.57<H>      PHYSICAL EXAM  Neurology: A&Ox3, nonfocal, no gross deficits  CV : Irregular, +S1S2  Lungs: Respirations non-labored, B/L BS CTA  Abdomen: Soft, NT/ND, +BSx4Q, last BM  (-)N/V/D  : Voiding without difficulty  Extremities: B/L LE no edema, negative calf tenderness, +PP, +right groin CDI DALJIT, no bleeding no hematoma         MEDICATIONS  aspirin enteric coated 81 milliGRAM(s) Oral daily  atorvastatin 40 milliGRAM(s) Oral at bedtime  dextrose 50% Injectable 50 milliLiter(s) IV Push every 15 minutes  docusate sodium 100 milliGRAM(s) Oral three times a day  furosemide    Tablet 20 milliGRAM(s) Oral two times a day  heparin  Infusion 700 Unit(s)/Hr IV Continuous <Continuous>  metoprolol succinate ER 50 milliGRAM(s) Oral daily  pantoprazole    Tablet 40 milliGRAM(s) Oral before breakfast  sodium chloride 0.9% lock flush 3 milliLiter(s) IV Push every 8 hours  warfarin 5 milliGRAM(s) Oral once      Physical Therapy Rec:   Home  [ X ]   Home w/ PT  [  ]  Rehab  [  ]    Discussed with Cardiothoracic Team at AM rounds.

## 2018-12-30 LAB
ANION GAP SERPL CALC-SCNC: 15 MMOL/L — SIGNIFICANT CHANGE UP (ref 5–17)
APTT BLD: 62.5 SEC — HIGH (ref 27.5–36.3)
APTT BLD: 73.5 SEC — HIGH (ref 27.5–36.3)
BUN SERPL-MCNC: 35 MG/DL — HIGH (ref 7–23)
CALCIUM SERPL-MCNC: 9.8 MG/DL — SIGNIFICANT CHANGE UP (ref 8.4–10.5)
CHLORIDE SERPL-SCNC: 102 MMOL/L — SIGNIFICANT CHANGE UP (ref 96–108)
CO2 SERPL-SCNC: 21 MMOL/L — LOW (ref 22–31)
CREAT SERPL-MCNC: 1.44 MG/DL — HIGH (ref 0.5–1.3)
GLUCOSE SERPL-MCNC: 130 MG/DL — HIGH (ref 70–99)
HCT VFR BLD CALC: 33 % — LOW (ref 39–50)
HGB BLD-MCNC: 11.6 G/DL — LOW (ref 13–17)
INR BLD: 1.74 RATIO — HIGH (ref 0.88–1.16)
MCHC RBC-ENTMCNC: 32.3 PG — SIGNIFICANT CHANGE UP (ref 27–34)
MCHC RBC-ENTMCNC: 35.3 GM/DL — SIGNIFICANT CHANGE UP (ref 32–36)
MCV RBC AUTO: 91.4 FL — SIGNIFICANT CHANGE UP (ref 80–100)
PLATELET # BLD AUTO: 106 K/UL — LOW (ref 150–400)
POTASSIUM SERPL-MCNC: 4 MMOL/L — SIGNIFICANT CHANGE UP (ref 3.5–5.3)
POTASSIUM SERPL-SCNC: 4 MMOL/L — SIGNIFICANT CHANGE UP (ref 3.5–5.3)
PROTHROM AB SERPL-ACNC: 20.4 SEC — HIGH (ref 10–12.9)
RBC # BLD: 3.61 M/UL — LOW (ref 4.2–5.8)
RBC # FLD: 15.3 % — HIGH (ref 10.3–14.5)
SODIUM SERPL-SCNC: 138 MMOL/L — SIGNIFICANT CHANGE UP (ref 135–145)
WBC # BLD: 3.6 K/UL — LOW (ref 3.8–10.5)
WBC # FLD AUTO: 3.6 K/UL — LOW (ref 3.8–10.5)

## 2018-12-30 RX ORDER — WARFARIN SODIUM 2.5 MG/1
7.5 TABLET ORAL ONCE
Qty: 0 | Refills: 0 | Status: COMPLETED | OUTPATIENT
Start: 2018-12-30 | End: 2018-12-30

## 2018-12-30 RX ADMIN — Medication 81 MILLIGRAM(S): at 12:11

## 2018-12-30 RX ADMIN — SODIUM CHLORIDE 3 MILLILITER(S): 9 INJECTION INTRAMUSCULAR; INTRAVENOUS; SUBCUTANEOUS at 05:11

## 2018-12-30 RX ADMIN — HEPARIN SODIUM 5.5 UNIT(S)/HR: 5000 INJECTION INTRAVENOUS; SUBCUTANEOUS at 09:30

## 2018-12-30 RX ADMIN — SODIUM CHLORIDE 3 MILLILITER(S): 9 INJECTION INTRAMUSCULAR; INTRAVENOUS; SUBCUTANEOUS at 21:49

## 2018-12-30 RX ADMIN — ATORVASTATIN CALCIUM 40 MILLIGRAM(S): 80 TABLET, FILM COATED ORAL at 21:53

## 2018-12-30 RX ADMIN — Medication 20 MILLIGRAM(S): at 05:12

## 2018-12-30 RX ADMIN — WARFARIN SODIUM 7.5 MILLIGRAM(S): 2.5 TABLET ORAL at 21:53

## 2018-12-30 RX ADMIN — HEPARIN SODIUM 5.5 UNIT(S)/HR: 5000 INJECTION INTRAVENOUS; SUBCUTANEOUS at 01:38

## 2018-12-30 RX ADMIN — PANTOPRAZOLE SODIUM 40 MILLIGRAM(S): 20 TABLET, DELAYED RELEASE ORAL at 05:12

## 2018-12-30 RX ADMIN — Medication 100 MILLIGRAM(S): at 05:13

## 2018-12-30 RX ADMIN — Medication 20 MILLIGRAM(S): at 16:50

## 2018-12-30 RX ADMIN — SODIUM CHLORIDE 3 MILLILITER(S): 9 INJECTION INTRAMUSCULAR; INTRAVENOUS; SUBCUTANEOUS at 13:25

## 2018-12-30 NOTE — PROGRESS NOTE ADULT - ASSESSMENT
89 y/o male s/p mitral clip 12/26 with PMHx of afib,  colon ca   prostate ca  12/27   trf too floor   coumadin dosing ,   r groin stitch   lasix 20BID  12/28 vss coumadin for tonight 5mg INR 1.3  removed groin stitch and press applied. Amiodarone dc'd per EP due to persistent Afib  12/29 INR 1.28, Coumadin 7.5mg. Continue Heparin bridge, VSS. Post-op TTE: EF 40-45%, mod MR, mild Ar, mild-mod TR, no pericardial effusion.  12/20 VSS d/c home when INR near 2-?monday

## 2018-12-30 NOTE — PROGRESS NOTE ADULT - SUBJECTIVE AND OBJECTIVE BOX
pt seen and examined, no complaints,     aspirin enteric coated 81 milliGRAM(s) Oral daily  atorvastatin 40 milliGRAM(s) Oral at bedtime  dextrose 50% Injectable 50 milliLiter(s) IV Push every 15 minutes  docusate sodium 100 milliGRAM(s) Oral three times a day  furosemide    Tablet 20 milliGRAM(s) Oral two times a day  heparin  Infusion 700 Unit(s)/Hr IV Continuous <Continuous>  metoprolol succinate  milliGRAM(s) Oral daily  pantoprazole    Tablet 40 milliGRAM(s) Oral before breakfast  sodium chloride 0.9% lock flush 3 milliLiter(s) IV Push every 8 hours                            11.5   4.1   )-----------( 103      ( 29 Dec 2018 09:26 )             36.1       Hemoglobin: 11.5 g/dL (12-29 @ 09:26)  Hemoglobin: 11.2 g/dL (12-28 @ 11:22)  Hemoglobin: 11.2 g/dL (12-28 @ 01:45)  Hemoglobin: 11.4 g/dL (12-27 @ 02:13)  Hemoglobin: 11.8 g/dL (12-26 @ 15:08)      12-29    138  |  99  |  31<H>  ----------------------------<  107<H>  4.2   |  25  |  1.47<H>    Ca    10.0      29 Dec 2018 09:26      Creatinine Trend: 1.47<--, 1.57<--, 1.69<--, 1.56<--, 1.51<--, 1.72<--    COAGS: PT/INR - ( 29 Dec 2018 09:26 )   PT: 16.2 sec;   INR: 1.41 ratio         PTT - ( 30 Dec 2018 01:11 )  PTT:62.5 sec          T(C): 36.7 (12-29-18 @ 20:20), Max: 36.7 (12-29-18 @ 20:20)  HR: 87 (12-29-18 @ 20:20) (87 - 96)  BP: 111/73 (12-29-18 @ 20:20) (111/73 - 118/81)  RR: 18 (12-29-18 @ 20:20) (16 - 18)  SpO2: 98% (12-29-18 @ 20:20) (97% - 98%)  Wt(kg): --    I&O's Summary    28 Dec 2018 07:01  -  29 Dec 2018 07:00  --------------------------------------------------------  IN: 882 mL / OUT: 1350 mL / NET: -468 mL    29 Dec 2018 07:01  -  30 Dec 2018 04:15  --------------------------------------------------------  IN: 841 mL / OUT: 900 mL / NET: -59 mL        Lymphatic: No lymphadenopathy , no edema  Cardiovascular: Normal S1 S2,IRRR No JVD, 1/6 systolic murmur   Respiratory: Lungs clear to auscultation, 	  Gastrointestinal:  Soft, Non-tender, + BS	  Skin: No rashes, No ecchymoses, No cyanosis, warm to touch      TELEMETRY: AF      DIAGNOSTIC TESTING:  [ ] Echocardiogram: < from: Transthoracic Echocardiogram (11.22.18 @ 09:42) >  Conclusions:  1. Mitral annular calcification,tethered mitral valve  leaflets. Severe mitral regurgitation.  2. Mildly dilated left atrium.  LA volume index = 38 cc/m2.  3. Low normal left ventricular systolic function. No  segmental wall motion abnormalities.  4. A linear structure compatible with a Chiari-network is  identified in the right atrium (normal variant). No  thrombus seen.  5. Normal right ventricular size and function.  6. Normal tricuspid valve. Moderate tricuspid  regurgitation.  7. Estimated pulmonary artery systolic pressure equals 35  mm Hg, assuming right atrial pressure equals 8 mm Hg,  consistent with borderline pulmonary pressures.  8. Left pleural effusion.    < end of copied text >    [ ]  Catheterization: < from: Cardiac Cath Lab - Adult (12.26.18 @ 10:48) >  DIAGNOSTIC RECOMMENDATIONS: Proceed with percutaneous mitral repair in the  setting of symptomatic severe mitral regurgitation (predominantly  functional) and chronic systolic congestive heart failure (with recent  decline).  INTERVENTIONAL IMPRESSIONS: Successful mitral repair with a single Lucy  Clip XTR on the lateral edge of A2-P2; all MR medial to the Clip was  eliminated; there was residual MR lateral to the Clip, at the site of a  large cleft, which was not suitable for an additional Clip.  INTERVENTIONAL RECOMMENDATIONS: Continue current medical therapy, with  optimization of the CHF and AFIB regimens (I discussed the latter with Dr Mckenzie, who will be following for EPS); TTE prior to discharge;  consideration for device therapy as per Dr Mckenzie; start warfarin when  deemed appropriate.  Prepared and signed by  Galdino Ortega M.D.  Signed 12/26/2018 16:48:47  HEMODYNAMIC TABLES    < end of copied text >    [ ] Stress Test:    OTHER: 	      ASSESSMENT/PLAN: 87 yo AA male with PMH, HTN, HLD, hyponatremia and Afib (on lovenox) colon CA (2001 s/p chemo), prostate Ca (2006 tx with radiation), CKD  and severe MR s/p MitraClip POD#1    - A/C with heparin gtt with coumadin bridge., discharge home once INR >2 ,  - ASA, statin   - physical therapy follow up   * Toprol 100 qd  heart rate stable   - tele stable - Aflutter , rate   -f/u with [facility] for cardiology and Hailey Jeffery for primary care after discharge  D/W Dr Mckenzie

## 2018-12-30 NOTE — PROGRESS NOTE ADULT - SUBJECTIVE AND OBJECTIVE BOX
VITAL SIGNS-Telemetry:  AFlutter   Vital Signs Last 24 Hrs  T(C): 36.8 (12-30-18 @ 05:05), Max: 36.8 (12-30-18 @ 05:05)  T(F): 98.2 (12-30-18 @ 05:05), Max: 98.2 (12-30-18 @ 05:05)  HR: 82 (12-30-18 @ 05:05) (82 - 96)  BP: 114/72 (12-30-18 @ 05:05) (111/73 - 114/72)  RR: 18 (12-30-18 @ 05:05) (18 - 18)  SpO2: 96% (12-30-18 @ 05:05) (96% - 98%)         12-29 @ 07:01  -  12-30 @ 07:00  --------------------------------------------------------  IN: 857.5 mL / OUT: 1050 mL / NET: -192.5 mL    Daily     Daily     Coumadin    [ x] YES          [  ]      NO         Reason:     aflutter  PHYSICAL EXAM:  Neurology: alert and oriented x 3, nonfocal, no gross deficits  CV : S1S2  Lungs: CTA  Abdomen: soft, nontender, nondistended, positive bowel sounds, last bowel movement   pre-op      Extremities:     no edema, no calf tenderness Right groin stitch CDI    aspirin enteric coated 81 milliGRAM(s) Oral daily  atorvastatin 40 milliGRAM(s) Oral at bedtime  dextrose 50% Injectable 50 milliLiter(s) IV Push every 15 minutes  docusate sodium 100 milliGRAM(s) Oral three times a day  furosemide    Tablet 20 milliGRAM(s) Oral two times a day  heparin  Infusion 700 Unit(s)/Hr IV Continuous <Continuous>  metoprolol succinate  milliGRAM(s) Oral daily  pantoprazole    Tablet 40 milliGRAM(s) Oral before breakfast  sodium chloride 0.9% lock flush 3 milliLiter(s) IV Push every 8 hours    Physical Therapy Rec:   Home  [ x ]   Home w/ PT  [  ]  Rehab  [  ]  Discussed with Cardiothoracic Team at AM rounds.

## 2018-12-30 NOTE — PROGRESS NOTE ADULT - SUBJECTIVE AND OBJECTIVE BOX
Patient denies CP, SOB Review of systems otherwise (-)    aspirin enteric coated 81 milliGRAM(s) Oral daily  atorvastatin 40 milliGRAM(s) Oral at bedtime  dextrose 50% Injectable 50 milliLiter(s) IV Push every 15 minutes  docusate sodium 100 milliGRAM(s) Oral three times a day  furosemide    Tablet 20 milliGRAM(s) Oral two times a day  heparin  Infusion 700 Unit(s)/Hr IV Continuous <Continuous>  metoprolol succinate  milliGRAM(s) Oral daily  pantoprazole    Tablet 40 milliGRAM(s) Oral before breakfast  sodium chloride 0.9% lock flush 3 milliLiter(s) IV Push every 8 hours  warfarin 7.5 milliGRAM(s) Oral once                            11.6   3.6   )-----------( 106      ( 30 Dec 2018 09:30 )             33.0       Hemoglobin: 11.6 g/dL (12-30 @ 09:30)  Hemoglobin: 11.5 g/dL (12-29 @ 09:26)  Hemoglobin: 11.2 g/dL (12-28 @ 11:22)  Hemoglobin: 11.2 g/dL (12-28 @ 01:45)  Hemoglobin: 11.4 g/dL (12-27 @ 02:13)      12-30    138  |  102  |  35<H>  ----------------------------<  130<H>  4.0   |  21<L>  |  1.44<H>    Ca    9.8      30 Dec 2018 09:30      Creatinine Trend: 1.44<--, 1.47<--, 1.57<--, 1.69<--, 1.56<--, 1.51<--    COAGS: PT/INR - ( 30 Dec 2018 09:30 )   PT: 20.4 sec;   INR: 1.74 ratio         PTT - ( 30 Dec 2018 09:30 )  PTT:73.5 sec          T(C): 36.8 (12-30-18 @ 05:05), Max: 36.8 (12-30-18 @ 05:05)  HR: 82 (12-30-18 @ 05:05) (82 - 96)  BP: 114/72 (12-30-18 @ 05:05) (111/73 - 114/72)  RR: 18 (12-30-18 @ 05:05) (18 - 18)  SpO2: 96% (12-30-18 @ 05:05) (96% - 98%)  Wt(kg): --    I&O's Summary    29 Dec 2018 07:01  -  30 Dec 2018 07:00  --------------------------------------------------------  IN: 857.5 mL / OUT: 1050 mL / NET: -192.5 mL    30 Dec 2018 07:01  -  30 Dec 2018 12:26  --------------------------------------------------------  IN: 240 mL / OUT: 0 mL / NET: 240 mL      Lymphatic: No lymphadenopathy , no edema  Cardiovascular: Normal S1 S2,IRRR No JVD, 1/6 systolic murmur   Respiratory: Lungs clear to auscultation, 	  Gastrointestinal:  Soft, Non-tender, + BS	  Skin: No rashes, No ecchymoses, No cyanosis, warm to touch      TELEMETRY: AF      DIAGNOSTIC TESTING:  [ ] Echocardiogram: < from: Transthoracic Echocardiogram (11.22.18 @ 09:42) >  Conclusions:  1. Mitral annular calcification,tethered mitral valve  leaflets. Severe mitral regurgitation.  2. Mildly dilated left atrium.  LA volume index = 38 cc/m2.  3. Low normal left ventricular systolic function. No  segmental wall motion abnormalities.  4. A linear structure compatible with a Chiari-network is  identified in the right atrium (normal variant). No  thrombus seen.  5. Normal right ventricular size and function.  6. Normal tricuspid valve. Moderate tricuspid  regurgitation.  7. Estimated pulmonary artery systolic pressure equals 35  mm Hg, assuming right atrial pressure equals 8 mm Hg,  consistent with borderline pulmonary pressures.  8. Left pleural effusion.    < end of copied text >    [ ]  Catheterization: < from: Cardiac Cath Lab - Adult (12.26.18 @ 10:48) >  DIAGNOSTIC RECOMMENDATIONS: Proceed with percutaneous mitral repair in the  setting of symptomatic severe mitral regurgitation (predominantly  functional) and chronic systolic congestive heart failure (with recent  decline).  INTERVENTIONAL IMPRESSIONS: Successful mitral repair with a single Lucy  Clip XTR on the lateral edge of A2-P2; all MR medial to the Clip was  eliminated; there was residual MR lateral to the Clip, at the site of a  large cleft, which was not suitable for an additional Clip.  INTERVENTIONAL RECOMMENDATIONS: Continue current medical therapy, with  optimization of the CHF and AFIB regimens (I discussed the latter with Dr Mckenzie, who will be following for EPS); TTE prior to discharge;  consideration for device therapy as per Dr Mckeznie; start warfarin when  deemed appropriate.  Prepared and signed by  Galdino Ortega M.D.  Signed 12/26/2018 16:48:47  HEMODYNAMIC TABLES    < end of copied text >    [ ] Stress Test:    OTHER: 	      ASSESSMENT/PLAN: 89 yo AA male with PMH, HTN, HLD, hyponatremia and Afib (on lovenox) colon CA (2001 s/p chemo), prostate Ca (2006 tx with radiation), CKD  and severe MR s/p MitraClip POD#1    - A/C with heparin gtt with coumadin bridge.   - physical therapy follow up   - EP F/U appreciated  - tele stable   -follow up vascular  - Progressing well  - awaiting theraputic INR    Nabor Salazar MD, FACC

## 2018-12-31 ENCOUNTER — RX CHANGE (OUTPATIENT)
Age: 83
End: 2018-12-31

## 2018-12-31 ENCOUNTER — MED ADMIN CHARGE (OUTPATIENT)
Age: 83
End: 2018-12-31

## 2018-12-31 VITALS
SYSTOLIC BLOOD PRESSURE: 102 MMHG | HEART RATE: 94 BPM | DIASTOLIC BLOOD PRESSURE: 69 MMHG | OXYGEN SATURATION: 98 % | RESPIRATION RATE: 18 BRPM | TEMPERATURE: 98 F

## 2018-12-31 PROBLEM — I51.3 INTRACARDIAC THROMBOSIS, NOT ELSEWHERE CLASSIFIED: Chronic | Status: ACTIVE | Noted: 2018-12-24

## 2018-12-31 LAB
ANION GAP SERPL CALC-SCNC: 13 MMOL/L — SIGNIFICANT CHANGE UP (ref 5–17)
APTT BLD: 67.9 SEC — HIGH (ref 27.5–36.3)
APTT BLD: >200 SEC — CRITICAL HIGH (ref 27.5–36.3)
BUN SERPL-MCNC: 38 MG/DL — HIGH (ref 7–23)
CALCIUM SERPL-MCNC: 9.8 MG/DL — SIGNIFICANT CHANGE UP (ref 8.4–10.5)
CHLORIDE SERPL-SCNC: 99 MMOL/L — SIGNIFICANT CHANGE UP (ref 96–108)
CO2 SERPL-SCNC: 24 MMOL/L — SIGNIFICANT CHANGE UP (ref 22–31)
CREAT SERPL-MCNC: 1.56 MG/DL — HIGH (ref 0.5–1.3)
GLUCOSE SERPL-MCNC: 165 MG/DL — HIGH (ref 70–99)
HCT VFR BLD CALC: 32.6 % — LOW (ref 39–50)
HGB BLD-MCNC: 11 G/DL — LOW (ref 13–17)
INR BLD: 2.41 RATIO — HIGH (ref 0.88–1.16)
INR BLD: 2.47 RATIO — HIGH (ref 0.88–1.16)
INR BLD: 4.17 RATIO — HIGH (ref 0.88–1.16)
MCHC RBC-ENTMCNC: 31.2 PG — SIGNIFICANT CHANGE UP (ref 27–34)
MCHC RBC-ENTMCNC: 33.9 GM/DL — SIGNIFICANT CHANGE UP (ref 32–36)
MCV RBC AUTO: 92.2 FL — SIGNIFICANT CHANGE UP (ref 80–100)
PLATELET # BLD AUTO: 121 K/UL — LOW (ref 150–400)
POTASSIUM SERPL-MCNC: 3.8 MMOL/L — SIGNIFICANT CHANGE UP (ref 3.5–5.3)
POTASSIUM SERPL-SCNC: 3.8 MMOL/L — SIGNIFICANT CHANGE UP (ref 3.5–5.3)
PROTHROM AB SERPL-ACNC: 28.2 SEC — HIGH (ref 10–12.9)
PROTHROM AB SERPL-ACNC: 28.9 SEC — HIGH (ref 10–12.9)
PROTHROM AB SERPL-ACNC: 50.1 SEC — HIGH (ref 10–12.9)
RBC # BLD: 3.53 M/UL — LOW (ref 4.2–5.8)
RBC # FLD: 15.6 % — HIGH (ref 10.3–14.5)
SODIUM SERPL-SCNC: 136 MMOL/L — SIGNIFICANT CHANGE UP (ref 135–145)
WBC # BLD: 3.8 K/UL — SIGNIFICANT CHANGE UP (ref 3.8–10.5)
WBC # FLD AUTO: 3.8 K/UL — SIGNIFICANT CHANGE UP (ref 3.8–10.5)

## 2018-12-31 PROCEDURE — 84443 ASSAY THYROID STIM HORMONE: CPT

## 2018-12-31 PROCEDURE — 85027 COMPLETE CBC AUTOMATED: CPT

## 2018-12-31 PROCEDURE — 93970 EXTREMITY STUDY: CPT

## 2018-12-31 PROCEDURE — 86900 BLOOD TYPING SEROLOGIC ABO: CPT

## 2018-12-31 PROCEDURE — C1889: CPT

## 2018-12-31 PROCEDURE — 82550 ASSAY OF CK (CPK): CPT

## 2018-12-31 PROCEDURE — 71046 X-RAY EXAM CHEST 2 VIEWS: CPT

## 2018-12-31 PROCEDURE — 82435 ASSAY OF BLOOD CHLORIDE: CPT

## 2018-12-31 PROCEDURE — 81001 URINALYSIS AUTO W/SCOPE: CPT

## 2018-12-31 PROCEDURE — 86850 RBC ANTIBODY SCREEN: CPT

## 2018-12-31 PROCEDURE — P9016: CPT

## 2018-12-31 PROCEDURE — 85014 HEMATOCRIT: CPT

## 2018-12-31 PROCEDURE — 83735 ASSAY OF MAGNESIUM: CPT

## 2018-12-31 PROCEDURE — C1769: CPT

## 2018-12-31 PROCEDURE — 84100 ASSAY OF PHOSPHORUS: CPT

## 2018-12-31 PROCEDURE — 87640 STAPH A DNA AMP PROBE: CPT

## 2018-12-31 PROCEDURE — 99239 HOSP IP/OBS DSCHRG MGMT >30: CPT | Mod: 24

## 2018-12-31 PROCEDURE — 85610 PROTHROMBIN TIME: CPT

## 2018-12-31 PROCEDURE — 94010 BREATHING CAPACITY TEST: CPT

## 2018-12-31 PROCEDURE — 76000 FLUOROSCOPY <1 HR PHYS/QHP: CPT

## 2018-12-31 PROCEDURE — 84132 ASSAY OF SERUM POTASSIUM: CPT

## 2018-12-31 PROCEDURE — 93005 ELECTROCARDIOGRAM TRACING: CPT

## 2018-12-31 PROCEDURE — 86901 BLOOD TYPING SEROLOGIC RH(D): CPT

## 2018-12-31 PROCEDURE — 80053 COMPREHEN METABOLIC PANEL: CPT

## 2018-12-31 PROCEDURE — 93355 ECHO TRANSESOPHAGEAL (TEE): CPT

## 2018-12-31 PROCEDURE — 80048 BASIC METABOLIC PNL TOTAL CA: CPT

## 2018-12-31 PROCEDURE — 82330 ASSAY OF CALCIUM: CPT

## 2018-12-31 PROCEDURE — 82803 BLOOD GASES ANY COMBINATION: CPT

## 2018-12-31 PROCEDURE — 97116 GAIT TRAINING THERAPY: CPT

## 2018-12-31 PROCEDURE — 33418 REPAIR TCAT MITRAL VALVE: CPT | Mod: Q0

## 2018-12-31 PROCEDURE — 86923 COMPATIBILITY TEST ELECTRIC: CPT

## 2018-12-31 PROCEDURE — 93306 TTE W/DOPPLER COMPLETE: CPT

## 2018-12-31 PROCEDURE — 71045 X-RAY EXAM CHEST 1 VIEW: CPT

## 2018-12-31 PROCEDURE — 84295 ASSAY OF SERUM SODIUM: CPT

## 2018-12-31 PROCEDURE — 85384 FIBRINOGEN ACTIVITY: CPT

## 2018-12-31 PROCEDURE — 83036 HEMOGLOBIN GLYCOSYLATED A1C: CPT

## 2018-12-31 PROCEDURE — 84484 ASSAY OF TROPONIN QUANT: CPT

## 2018-12-31 PROCEDURE — 83880 ASSAY OF NATRIURETIC PEPTIDE: CPT

## 2018-12-31 PROCEDURE — 82947 ASSAY GLUCOSE BLOOD QUANT: CPT

## 2018-12-31 PROCEDURE — C1894: CPT

## 2018-12-31 PROCEDURE — 83605 ASSAY OF LACTIC ACID: CPT

## 2018-12-31 PROCEDURE — 85730 THROMBOPLASTIN TIME PARTIAL: CPT

## 2018-12-31 PROCEDURE — 82553 CREATINE MB FRACTION: CPT

## 2018-12-31 PROCEDURE — 97530 THERAPEUTIC ACTIVITIES: CPT

## 2018-12-31 PROCEDURE — C1887: CPT

## 2018-12-31 PROCEDURE — 97161 PT EVAL LOW COMPLEX 20 MIN: CPT

## 2018-12-31 PROCEDURE — P9045: CPT

## 2018-12-31 RX ORDER — FUROSEMIDE 40 MG
1 TABLET ORAL
Qty: 60 | Refills: 0 | OUTPATIENT
Start: 2018-12-31 | End: 2019-01-29

## 2018-12-31 RX ORDER — WARFARIN SODIUM 2.5 MG/1
1 TABLET ORAL
Qty: 30 | Refills: 0 | OUTPATIENT
Start: 2018-12-31 | End: 2019-01-29

## 2018-12-31 RX ORDER — ASPIRIN/CALCIUM CARB/MAGNESIUM 324 MG
1 TABLET ORAL
Qty: 0 | Refills: 0 | COMMUNITY
Start: 2018-12-31

## 2018-12-31 RX ORDER — PANTOPRAZOLE SODIUM 20 MG/1
1 TABLET, DELAYED RELEASE ORAL
Qty: 5 | Refills: 0 | OUTPATIENT
Start: 2018-12-31 | End: 2019-01-04

## 2018-12-31 RX ORDER — APIXABAN 2.5 MG/1
2.5 TABLET, FILM COATED ORAL TWICE DAILY
Refills: 0 | Status: DISCONTINUED | COMMUNITY
End: 2018-12-31

## 2018-12-31 RX ORDER — ENOXAPARIN SODIUM 60 MG/.6ML
60 INJECTION SUBCUTANEOUS
Refills: 0 | Status: DISCONTINUED | COMMUNITY
End: 2018-12-31

## 2018-12-31 RX ORDER — ASPIRIN/CALCIUM CARB/MAGNESIUM 324 MG
1 TABLET ORAL
Qty: 0 | Refills: 0 | COMMUNITY

## 2018-12-31 RX ORDER — ATORVASTATIN CALCIUM 80 MG/1
1 TABLET, FILM COATED ORAL
Qty: 0 | Refills: 0 | COMMUNITY
Start: 2018-12-31

## 2018-12-31 RX ORDER — METOPROLOL TARTRATE 50 MG
1 TABLET ORAL
Qty: 0 | Refills: 0 | COMMUNITY
Start: 2018-12-31

## 2018-12-31 RX ORDER — DOCUSATE SODIUM 100 MG
1 CAPSULE ORAL
Qty: 9 | Refills: 0 | OUTPATIENT
Start: 2018-12-31 | End: 2019-01-02

## 2018-12-31 RX ADMIN — Medication 20 MILLIGRAM(S): at 05:54

## 2018-12-31 RX ADMIN — PANTOPRAZOLE SODIUM 40 MILLIGRAM(S): 20 TABLET, DELAYED RELEASE ORAL at 05:54

## 2018-12-31 RX ADMIN — SODIUM CHLORIDE 3 MILLILITER(S): 9 INJECTION INTRAMUSCULAR; INTRAVENOUS; SUBCUTANEOUS at 13:32

## 2018-12-31 RX ADMIN — Medication 20 MILLIGRAM(S): at 18:57

## 2018-12-31 RX ADMIN — Medication 100 MILLIGRAM(S): at 05:55

## 2018-12-31 RX ADMIN — Medication 81 MILLIGRAM(S): at 12:35

## 2018-12-31 RX ADMIN — SODIUM CHLORIDE 3 MILLILITER(S): 9 INJECTION INTRAMUSCULAR; INTRAVENOUS; SUBCUTANEOUS at 05:01

## 2018-12-31 NOTE — PROGRESS NOTE ADULT - SUBJECTIVE AND OBJECTIVE BOX
pt seen and examined, no complaints,     aspirin enteric coated 81 milliGRAM(s) Oral daily  atorvastatin 40 milliGRAM(s) Oral at bedtime  dextrose 50% Injectable 50 milliLiter(s) IV Push every 15 minutes  docusate sodium 100 milliGRAM(s) Oral three times a day  furosemide    Tablet 20 milliGRAM(s) Oral two times a day  heparin  Infusion 700 Unit(s)/Hr IV Continuous <Continuous>  metoprolol succinate  milliGRAM(s) Oral daily  pantoprazole    Tablet 40 milliGRAM(s) Oral before breakfast  sodium chloride 0.9% lock flush 3 milliLiter(s) IV Push every 8 hours                            11.6   3.6   )-----------( 106      ( 30 Dec 2018 09:30 )             33.0       Hemoglobin: 11.6 g/dL (12-30 @ 09:30)  Hemoglobin: 11.5 g/dL (12-29 @ 09:26)  Hemoglobin: 11.2 g/dL (12-28 @ 11:22)  Hemoglobin: 11.2 g/dL (12-28 @ 01:45)  Hemoglobin: 11.4 g/dL (12-27 @ 02:13)      12-30    138  |  102  |  35<H>  ----------------------------<  130<H>  4.0   |  21<L>  |  1.44<H>    Ca    9.8      30 Dec 2018 09:30      Creatinine Trend: 1.44<--, 1.47<--, 1.57<--, 1.69<--, 1.56<--, 1.51<--    COAGS: PT/INR - ( 30 Dec 2018 09:30 )   PT: 20.4 sec;   INR: 1.74 ratio         PTT - ( 30 Dec 2018 09:30 )  PTT:73.5 sec          T(C): 36.7 (12-30-18 @ 19:52), Max: 36.8 (12-30-18 @ 05:05)  HR: 104 (12-30-18 @ 19:52) (72 - 104)  BP: 108/74 (12-30-18 @ 19:52) (102/63 - 114/72)  RR: 18 (12-30-18 @ 19:52) (18 - 18)  SpO2: 98% (12-30-18 @ 19:52) (93% - 98%)  Wt(kg): --    I&O's Summary    29 Dec 2018 07:01  -  30 Dec 2018 07:00  --------------------------------------------------------  IN: 857.5 mL / OUT: 1050 mL / NET: -192.5 mL    30 Dec 2018 07:01  -  31 Dec 2018 04:43  --------------------------------------------------------  IN: 760.5 mL / OUT: 1200 mL / NET: -439.5 mL          Lymphatic: No lymphadenopathy , no edema  Cardiovascular: Normal S1 S2,IRRR No JVD, 1/6 systolic murmur   Respiratory: Lungs clear to auscultation, 	  Gastrointestinal:  Soft, Non-tender, + BS	  Skin: No rashes, No ecchymoses, No cyanosis, warm to touch      TELEMETRY: AF      DIAGNOSTIC TESTING:  [ ] Echocardiogram: < from: Transthoracic Echocardiogram (11.22.18 @ 09:42) >  Conclusions:  1. Mitral annular calcification,tethered mitral valve  leaflets. Severe mitral regurgitation.  2. Mildly dilated left atrium.  LA volume index = 38 cc/m2.  3. Low normal left ventricular systolic function. No  segmental wall motion abnormalities.  4. A linear structure compatible with a Chiari-network is  identified in the right atrium (normal variant). No  thrombus seen.  5. Normal right ventricular size and function.  6. Normal tricuspid valve. Moderate tricuspid  regurgitation.  7. Estimated pulmonary artery systolic pressure equals 35  mm Hg, assuming right atrial pressure equals 8 mm Hg,  consistent with borderline pulmonary pressures.  8. Left pleural effusion.    < end of copied text >    [ ]  Catheterization: < from: Cardiac Cath Lab - Adult (12.26.18 @ 10:48) >  DIAGNOSTIC RECOMMENDATIONS: Proceed with percutaneous mitral repair in the  setting of symptomatic severe mitral regurgitation (predominantly  functional) and chronic systolic congestive heart failure (with recent  decline).  INTERVENTIONAL IMPRESSIONS: Successful mitral repair with a single Lucy  Clip XTR on the lateral edge of A2-P2; all MR medial to the Clip was  eliminated; there was residual MR lateral to the Clip, at the site of a  large cleft, which was not suitable for an additional Clip.  INTERVENTIONAL RECOMMENDATIONS: Continue current medical therapy, with  optimization of the CHF and AFIB regimens (I discussed the latter with Dr Mckenzie, who will be following for EPS); TTE prior to discharge;  consideration for device therapy as per Dr Mckenzie; start warfarin when  deemed appropriate.  Prepared and signed by  Galdino Ortega M.D.  Signed 12/26/2018 16:48:47  HEMODYNAMIC TABLES    < end of copied text >    [ ] Stress Test:    OTHER: 	      ASSESSMENT/PLAN: 87 yo AA male with PMH, HTN, HLD, hyponatremia and Afib (on lovenox) colon CA (2001 s/p chemo), prostate Ca (2006 tx with radiation), CKD  and severe MR s/p MitraClip POD#1    - A/C with heparin gtt with coumadin bridge., discharge home once INR >2 ,  -* no amio or AICD needed  - ASA, statin   * Toprol 100 qd  heart rate stable   -f/u with [facility] for cardiology and Hailey Jeffery for primary care after discharge  D/W Dr Mckenzie

## 2018-12-31 NOTE — PROGRESS NOTE ADULT - PROVIDER SPECIALTY LIST ADULT
CT Surgery
Cardiology
Critical Care
Electrophysiology
Structural Heart
Vascular Cardiology
Vascular Cardiology
Cardiology

## 2018-12-31 NOTE — PROGRESS NOTE ADULT - ATTENDING COMMENTS
Agree with above.   continue with lifelong ac for cva prevention  management of afib per neo Solorzano MD
EP ATTENDING    Agree with above.     -recommend lifelong a/c for persistent AF with elevated chads-vasc  -no need for ICD given LVEF 40-45%  -considering is AF is persistent there is no role for amiodarone as a rhythm control strategy is not being pursued  -would continue toprol xl 100mg daily, would not use amio for rate control  -f/u with [facility] for cardiology and Hailey Jeffery for primary care after discharge  -d/w CT NP
EP ATTENDING    Agree with above. Continue lifelong a/c an escalating doses of beta blockers. No need for ICD nor amiodarone
Patient seen and examined, agree with above assessment and plan as transcribed above.    - D/C per cts    Nabor Salazar MD, FACC
Seen post mitraclip in [facility]  On phenylephrine, taper as tolerated  Going forward, once access site is stable, then start heparin and coumadin bridge.    Thanks    Lj Rodney [id number]

## 2018-12-31 NOTE — PROGRESS NOTE ADULT - SUBJECTIVE AND OBJECTIVE BOX
Patient denies CP, SOB Review of systems otherwise (-)      MEDICATIONS  (STANDING):  aspirin enteric coated 81 milliGRAM(s) Oral daily  atorvastatin 40 milliGRAM(s) Oral at bedtime  dextrose 50% Injectable 50 milliLiter(s) IV Push every 15 minutes  docusate sodium 100 milliGRAM(s) Oral three times a day  furosemide    Tablet 20 milliGRAM(s) Oral two times a day  heparin  Infusion 700 Unit(s)/Hr (5.5 mL/Hr) IV Continuous <Continuous>  metoprolol succinate  milliGRAM(s) Oral daily  pantoprazole    Tablet 40 milliGRAM(s) Oral before breakfast  sodium chloride 0.9% lock flush 3 milliLiter(s) IV Push every 8 hours    MEDICATIONS  (PRN):      LABS:                        11.0   3.8   )-----------( 121      ( 31 Dec 2018 09:54 )             32.6     136  |  99  |  38<H>  ----------------------------<  165<H>  3.8   |  24  |  1.56<H>    Ca    9.8      31 Dec 2018 09:54      Creatinine Trend: 1.56<--, 1.44<--, 1.47<--, 1.57<--, 1.69<--, 1.56<--   PT/INR - ( 31 Dec 2018 12:53 )   PT: 28.2 sec;   INR: 2.41 ratio      PTT - ( 31 Dec 2018 12:53 )  PTT:67.9 sec    PHYSICAL EXAM  Vital Signs Last 24 Hrs  T(C): 36.6 (31 Dec 2018 12:26), Max: 36.7 (30 Dec 2018 19:52)  T(F): 97.8 (31 Dec 2018 12:26), Max: 98.1 (30 Dec 2018 19:52)  HR: 94 (31 Dec 2018 12:26) (94 - 104)  BP: 102/69 (31 Dec 2018 12:26) (102/69 - 113/77)  RR: 18 (31 Dec 2018 12:26) (18 - 18)  SpO2: 98% (31 Dec 2018 12:26) (98% - 99%)      Lymphatic: No lymphadenopathy , no edema  Cardiovascular: Normal S1 S2,IRRR No JVD, 1/6 systolic murmur   Respiratory: Lungs clear to auscultation, 	  Gastrointestinal:  Soft, Non-tender, + BS	  Skin: No rashes, No ecchymoses, No cyanosis, warm to touch    TELEMETRY: AF    DIAGNOSTIC TESTING:    < from: Transthoracic Echocardiogram (11.22.18 @ 09:42) >  Conclusions:  1. Mitral annular calcification,tethered mitral valve  leaflets. Severe mitral regurgitation.  2. Mildly dilated left atrium.  LA volume index = 38 cc/m2.  3. Low normal left ventricular systolic function. No  segmental wall motion abnormalities.  4. A linear structure compatible with a Chiari-network is  identified in the right atrium (normal variant). No  thrombus seen.  5. Normal right ventricular size and function.  6. Normal tricuspid valve. Moderate tricuspid  regurgitation.  7. Estimated pulmonary artery systolic pressure equals 35  mm Hg, assuming right atrial pressure equals 8 mm Hg,  consistent with borderline pulmonary pressures.  8. Left pleural effusion.    < end of copied text >     < from: Cardiac Cath Lab - Adult (12.26.18 @ 10:48) >  DIAGNOSTIC RECOMMENDATIONS: Proceed with percutaneous mitral repair in the  setting of symptomatic severe mitral regurgitation (predominantly  functional) and chronic systolic congestive heart failure (with recent  decline).  INTERVENTIONAL IMPRESSIONS: Successful mitral repair with a single Lucy  Clip XTR on the lateral edge of A2-P2; all MR medial to the Clip was  eliminated; there was residual MR lateral to the Clip, at the site of a  large cleft, which was not suitable for an additional Clip.  INTERVENTIONAL RECOMMENDATIONS: Continue current medical therapy, with  optimization of the CHF and AFIB regimens (I discussed the latter with Dr Mckenzie, who will be following for EPS); TTE prior to discharge;  consideration for device therapy as per Dr Mckenzie; start warfarin when  deemed appropriate.  Prepared and signed by  Galdino Ortega M.D.  Signed 12/26/2018 16:48:47  HEMODYNAMIC TABLES    < end of copied text >      ASSESSMENT/PLAN: 87 yo AA male with PMH, HTN, HLD, hyponatremia and Afib, colon CA (2001 s/p chemo), prostate Ca (2006 tx with radiation), CKD, no obs CAD by Cath 11/2018  and severe MR, admitted s/p MitraClip 12/26    --INR therapeutic today, goal 2-3  --DC planning  --close OP f/u with Dr Scott in 1-2 weeks

## 2018-12-31 NOTE — PROGRESS NOTE ADULT - REASON FOR ADMISSION
Lucy Clip
Severe mitral regurgitation/ Lucy Clip
Lucy Clip
SP Lucy Clip

## 2019-01-01 RX ORDER — METOPROLOL TARTRATE 50 MG
1 TABLET ORAL
Qty: 30 | Refills: 0
Start: 2019-01-01 | End: 2019-01-30

## 2019-01-01 RX ORDER — ATORVASTATIN CALCIUM 80 MG/1
1 TABLET, FILM COATED ORAL
Qty: 30 | Refills: 0
Start: 2019-01-01 | End: 2019-01-30

## 2019-01-01 RX ORDER — SENNA PLUS 8.6 MG/1
0 TABLET ORAL
Qty: 0 | Refills: 0 | COMMUNITY

## 2019-01-01 RX ORDER — PANTOPRAZOLE SODIUM 20 MG/1
1 TABLET, DELAYED RELEASE ORAL
Qty: 5 | Refills: 0
Start: 2019-01-01 | End: 2019-01-05

## 2019-01-01 RX ORDER — WARFARIN SODIUM 2.5 MG/1
0.5 TABLET ORAL
Qty: 0 | Refills: 0 | DISCHARGE
Start: 2019-01-01 | End: 2019-01-30

## 2019-01-01 RX ORDER — WARFARIN SODIUM 2.5 MG/1
1 TABLET ORAL
Qty: 0 | Refills: 0 | DISCHARGE
Start: 2019-01-01 | End: 2019-01-30

## 2019-01-01 RX ORDER — WARFARIN SODIUM 2.5 MG/1
1 TABLET ORAL
Qty: 30 | Refills: 0
Start: 2019-01-01 | End: 2019-01-30

## 2019-01-01 RX ORDER — DOCUSATE SODIUM 100 MG
1 CAPSULE ORAL
Qty: 9 | Refills: 0
Start: 2019-01-01 | End: 2019-01-03

## 2019-01-01 RX ORDER — SENNA PLUS 8.6 MG/1
2 TABLET ORAL
Qty: 60 | Refills: 0
Start: 2019-01-01 | End: 2019-01-30

## 2019-01-01 RX ORDER — ASPIRIN/CALCIUM CARB/MAGNESIUM 324 MG
1 TABLET ORAL
Qty: 30 | Refills: 0
Start: 2019-01-01 | End: 2019-01-30

## 2019-01-01 RX ORDER — ATORVASTATIN CALCIUM 80 MG/1
1 TABLET, FILM COATED ORAL
Qty: 30 | Refills: 0 | OUTPATIENT
Start: 2019-01-01 | End: 2019-01-30

## 2019-01-01 RX ORDER — FUROSEMIDE 40 MG
1 TABLET ORAL
Qty: 60 | Refills: 0
Start: 2019-01-01 | End: 2019-01-30

## 2019-01-07 RX ORDER — METOPROLOL SUCCINATE 25 MG/1
25 TABLET, EXTENDED RELEASE ORAL
Refills: 0 | Status: DISCONTINUED | COMMUNITY
End: 2019-01-07

## 2019-01-15 ENCOUNTER — NON-APPOINTMENT (OUTPATIENT)
Age: 84
End: 2019-01-15

## 2019-01-15 ENCOUNTER — APPOINTMENT (OUTPATIENT)
Dept: CARDIOTHORACIC SURGERY | Facility: CLINIC | Age: 84
End: 2019-01-15
Payer: MEDICARE

## 2019-01-15 VITALS
WEIGHT: 127 LBS | DIASTOLIC BLOOD PRESSURE: 75 MMHG | RESPIRATION RATE: 15 BRPM | OXYGEN SATURATION: 90 % | TEMPERATURE: 97.6 F | BODY MASS INDEX: 16.83 KG/M2 | SYSTOLIC BLOOD PRESSURE: 105 MMHG | HEART RATE: 94 BPM | HEIGHT: 73 IN

## 2019-01-15 PROCEDURE — 99213 OFFICE O/P EST LOW 20 MIN: CPT | Mod: 24

## 2019-01-15 RX ORDER — FUROSEMIDE 20 MG/1
20 TABLET ORAL TWICE DAILY
Refills: 0 | Status: DISCONTINUED | COMMUNITY
End: 2019-01-15

## 2019-01-23 NOTE — HISTORY OF PRESENT ILLNESS
[FreeTextEntry1] : SHAREE SERNA is a 88 year old Male presenting for a follow up visit after his MitraClip insetion. His past medical history includes HTN, HLD, hyponatremia and Afib (on lovenox) colon CA (2001 s/p chemo), prostate Ca (2006 tx with radiation), CKD  and severe MR. Aborted MitraClip procedure on 11/21/2018 .He underwent  mitral clip on 12/26/2018 with uncomplicated post op course.\par \par

## 2019-01-23 NOTE — CONSULT LETTER
[FreeTextEntry2] : Nabor Salazar MD [address] [facility] [address] [place] [address] [FreeTextEntry3] : Wendy Floyd MD\par Attending Surgeon\par \par \par Cardiovascular & Thoracic Surgery\par [facility].\par

## 2019-01-23 NOTE — ASSESSMENT
[FreeTextEntry1] : SHAREE SERNA is a 88 year old Male with a history HTN, Hyperlipidemia,, hyponatremia and Afib (on lovenox) colon CA (2001 s/p chemo), prostate Ca (2006 tx with radiation), CKD  and severe MR.He is S/P  MitraClip on 12/26/2018. He is here today for his first follow up visit\par \par He states he is doing well with no complaints of pain. He is eating and sleeping well with regular bathroom habits. \par His right groin is soft and non tender, no redness or swelling. His lungs were clear  with diminished breath sounds at bases,  noticed he has difficulty taking a deep enough breath to inflate the lower lobes. No peripheral edema.\par He will be following up with his Cardiologist Dr Valentine on Friday 1/25/2019. He is currently receiving PT at home twice weekly. He is anticoagulated with Coumadin, his PMD  Dr Jeffery is following his INR and dosing Coumadin accordingly. His INR is drawn once weekly on Fridays.\par \par Plan:\par  Follow up with PMD and Cardiologist\par Continue to gradual increase ambulation and activities as tolerated.\par \par \par \par Written by Lizzie Whitaker RN, acting as a scribe for  Dr Hernandez “The documentation recorded by the scribe accurately reflects the service I personally performed and the decisions made by me.” Wendy Floyd MD.\par \par \par

## 2019-01-23 NOTE — REVIEW OF SYSTEMS
[Red Eyes] : red eyes [Shortness Of Breath] : shortness of breath [Cough] : cough [SOB on Exertion] : shortness of breath during exertion [Dizziness] : dizziness [Negative] : Heme/Lymph [Chest Pain] : no chest pain [Palpitations] : no palpitations [Leg Claudication] : no intermittent leg claudication [Lower Ext Edema] : no extremity edema [Wheezing] : no wheezing [Orthopnea] : no orthopnea [PND] : no PND [Easy Bleeding] : no tendency for easy bleeding [Easy Bruising] : no tendency for easy bruising

## 2019-01-23 NOTE — DATA REVIEWED
[FreeTextEntry1] : Post op ECHO done on 12/27/2018 shows:\par 1. Mitraclip is seen in the mitral position.  Moderate mitral regurgitation. Multiple jets. Peak mitral valve\par gradient equals 5 mm Hg, mean transmitral valve gradient equals 2 mm Hg, which is probably normal in the setting of a mitraclip.\par 2. Calcified trileaflet aortic valve with normal opening. Mild aortic regurgitation.\par 3. Severely dilated left atrium.  LA volume index = 71 cc/m2.\par 4. Mild concentric left ventricular hypertrophy.\par 5. Moderate global left ventricular systolic dysfunction.\par 6. Moderate diastolic dysfunction (Stage II). Increased\par E/e'  is consistent with elevated left ventricular filling pressure.\par 7. Right ventricular enlargement with decreased right ventricular systolic function. TVs' = 6.32 cm/sec.\par 8. Estimated right ventricular systolic pressure equals 36 mm Hg, assuming right atrial pressure equals \par 8 mm Hg,consistent with borderline pulmonary hypertension.\par 9. Color Doppler demonstrates evidence of a patent foramen ovale. (s/p transeptal puncture for mitraclip)\par \par

## 2019-01-23 NOTE — PHYSICAL EXAM
[Neck Appearance] : the appearance of the neck was normal [Examination Of The Chest] : the chest was normal in appearance [2+] : left 2+ [1+] : left 1+ [Breast Appearance] : normal in appearance [Bowel Sounds] : normal bowel sounds [Abdomen Soft] : soft [Abdomen Tenderness] : non-tender [No Spinal Tenderness] : no spinal tenderness [Skin Color & Pigmentation] : normal skin color and pigmentation [] : no rash [No Focal Deficits] : no focal deficits [Oriented To Time, Place, And Person] : oriented to person, place, and time [Impaired Insight] : insight and judgment were intact [Affect] : the affect was normal [Mood] : the mood was normal [Right Carotid Bruit] : no bruit heard over the right carotid [Left Carotid Bruit] : no bruit heard over the left carotid [Fingers] :  capillary refill of the fingers was normal [Left Fingers] :  capillary refill of the left fingers was normal [Right Fingers] :  capillary refill of the right fingers was normal [FreeTextEntry1] : Uses a walker to ambulate

## 2019-02-12 ENCOUNTER — OTHER (OUTPATIENT)
Age: 84
End: 2019-02-12

## 2019-02-13 ENCOUNTER — APPOINTMENT (OUTPATIENT)
Dept: CV DIAGNOSITCS | Facility: HOSPITAL | Age: 84
End: 2019-02-13

## 2019-02-13 ENCOUNTER — APPOINTMENT (OUTPATIENT)
Dept: CARDIOTHORACIC SURGERY | Facility: CLINIC | Age: 84
End: 2019-02-13

## 2019-03-01 ENCOUNTER — APPOINTMENT (OUTPATIENT)
Dept: CARDIOTHORACIC SURGERY | Facility: CLINIC | Age: 84
End: 2019-03-01
Payer: MEDICARE

## 2019-03-01 ENCOUNTER — NON-APPOINTMENT (OUTPATIENT)
Age: 84
End: 2019-03-01

## 2019-03-01 ENCOUNTER — OUTPATIENT (OUTPATIENT)
Dept: OUTPATIENT SERVICES | Facility: HOSPITAL | Age: 84
LOS: 1 days | End: 2019-03-01
Payer: MEDICARE

## 2019-03-01 ENCOUNTER — APPOINTMENT (OUTPATIENT)
Dept: CV DIAGNOSITCS | Facility: HOSPITAL | Age: 84
End: 2019-03-01

## 2019-03-01 VITALS
WEIGHT: 127.5 LBS | BODY MASS INDEX: 16.9 KG/M2 | DIASTOLIC BLOOD PRESSURE: 82 MMHG | OXYGEN SATURATION: 100 % | SYSTOLIC BLOOD PRESSURE: 121 MMHG | HEIGHT: 73 IN | HEART RATE: 80 BPM | RESPIRATION RATE: 15 BRPM | TEMPERATURE: 98.4 F

## 2019-03-01 DIAGNOSIS — Z98.890 OTHER SPECIFIED POSTPROCEDURAL STATES: Chronic | ICD-10-CM

## 2019-03-01 DIAGNOSIS — Z90.49 ACQUIRED ABSENCE OF OTHER SPECIFIED PARTS OF DIGESTIVE TRACT: Chronic | ICD-10-CM

## 2019-03-01 DIAGNOSIS — Z85.46 PERSONAL HISTORY OF MALIGNANT NEOPLASM OF PROSTATE: Chronic | ICD-10-CM

## 2019-03-01 DIAGNOSIS — Z98.890 OTHER SPECIFIED POSTPROCEDURAL STATES: ICD-10-CM

## 2019-03-01 PROCEDURE — 99214 OFFICE O/P EST MOD 30 MIN: CPT

## 2019-03-01 PROCEDURE — 93306 TTE W/DOPPLER COMPLETE: CPT

## 2019-03-01 PROCEDURE — 93306 TTE W/DOPPLER COMPLETE: CPT | Mod: 26

## 2019-03-01 NOTE — ASSESSMENT
[FreeTextEntry1] :  continues to recover well from his MitraClip. He has a marked improvement from before the procedure, in terms of his exercise tolerance and appetite. I explained he should continue to increase his activities as tolerated. He will be seeing Dr. Valentine on Monday for follow up. Currently, no changes need to be made to his medications. He should follow up with us in one years time, at which time we will again repeat the TTE. I will review today's Echo once it is fully uploaded.

## 2019-03-01 NOTE — HISTORY OF PRESENT ILLNESS
[FreeTextEntry1] : SHAREE SERNA 88 year year old M, presenting today for his  30-day follow up after his MitraClip procedure on 12/26/2018 .His  past medical history includes: AFib, CKD, CHF, HLD, Colon CA and Prostate Ca. His post operative course was uncomplicated, and he was discharged on POD #5 after achieving a therapeutic INR for AFib.\par \par Toady he presents with his wife, stating he feels much better than before his MitraClip, and that he is able to walk farther, and be more active. He has minimal SOB, and denies any pedal edema or chest pain. He is able to lay flat without issue.\par \par \par Current NYHA Class: I\par  [Dyslipidemia] : Dyslipidemia [Hypertension] : Hypertension [Class I] : Class I [Prior Heart Failure] : Prior Heart Failure [Arrhythmia] : Arrhythmia [A fib / A flutter] : A fib / A flutter

## 2019-03-01 NOTE — REVIEW OF SYSTEMS
[As noted in HPI] : as noted in HPI [Wheezing] : no wheezing [Cough] : no cough [SOB on Exertion] : shortness of breath during exertion [Orthopnea] : no orthopnea [PND] : no PND [Arthralgias] : no arthralgias [Joint Pain] : no joint pain [Joint Swelling] : no joint swelling [Joint Stiffness] : no joint stiffness [Limb Pain] : no limb pain [Limb Swelling] : no limb swelling [Negative] : Endocrine

## 2019-03-01 NOTE — PHYSICAL EXAM
[Sclera] : the sclera and conjunctiva were normal [PERRL With Normal Accommodation] : pupils were equal in size, round, and reactive to light [Neck Appearance] : the appearance of the neck was normal [Jugular Venous Distention Increased] : there was no jugular-venous distention [Respiration, Rhythm And Depth] : normal respiratory rhythm and effort [Exaggerated Use Of Accessory Muscles For Inspiration] : no accessory muscle use [Auscultation Breath Sounds / Voice Sounds] : lungs were clear to auscultation bilaterally [Apical Impulse] : the apical impulse was normal [Heart Sounds] : normal S1 and S2 [Heart Sounds Gallop] : no gallops [Murmurs] : no murmurs [Heart Sounds Pericardial Friction Rub] : no pericardial rub [Arterial Pulses Carotid] : carotid pulses were normal with no bruits [Arterial Pulses Femoral] : femoral pulses were normal without bruits [Edema] : there was no peripheral edema [Bowel Sounds] : normal bowel sounds [Abdomen Soft] : soft [Abdomen Tenderness] : non-tender [Abnormal Walk] : normal gait [Involuntary Movements] : no involuntary movements were seen [Musculoskeletal - Swelling] : no joint swelling seen [Skin Color & Pigmentation] : normal skin color and pigmentation [Skin Turgor] : normal skin turgor [] : no rash [Skin Lesions] : no skin lesions [Cranial Nerves] : cranial nerves 2-12 were intact [Sensation] : the sensory exam was normal to light touch and pinprick [Motor Exam] : the motor exam was normal [No Focal Deficits] : no focal deficits [Oriented To Time, Place, And Person] : oriented to person, place, and time [Affect] : the affect was normal

## 2019-03-01 NOTE — CONSULT LETTER
[Dear  ___] : Dear  [unfilled], [Courtesy Letter:] : I had the pleasure of seeing your patient, [unfilled], in my office today. [Please see my note below.] : Please see my note below. [Consult Closing:] : Thank you very much for allowing me to participate in the care of this patient.  If you have any questions, please do not hesitate to contact me. [Sincerely,] : Sincerely, [FreeTextEntry2] : Dr. GERONIMO Valentine MD\par [address] [facility]\par [place], [place] [address]\par \par \par  [FreeTextEntry3] : .gm

## 2019-09-25 ENCOUNTER — INPATIENT (INPATIENT)
Facility: HOSPITAL | Age: 84
LOS: 4 days | Discharge: HOME | DRG: 391 | End: 2019-09-30
Attending: HOSPITALIST | Admitting: HOSPITALIST
Payer: MEDICARE

## 2019-09-25 VITALS
RESPIRATION RATE: 16 BRPM | SYSTOLIC BLOOD PRESSURE: 105 MMHG | TEMPERATURE: 98 F | WEIGHT: 125 LBS | OXYGEN SATURATION: 96 % | HEIGHT: 73 IN | DIASTOLIC BLOOD PRESSURE: 67 MMHG | HEART RATE: 90 BPM

## 2019-09-25 DIAGNOSIS — Z90.49 ACQUIRED ABSENCE OF OTHER SPECIFIED PARTS OF DIGESTIVE TRACT: Chronic | ICD-10-CM

## 2019-09-25 DIAGNOSIS — Z85.46 PERSONAL HISTORY OF MALIGNANT NEOPLASM OF PROSTATE: Chronic | ICD-10-CM

## 2019-09-25 DIAGNOSIS — R06.00 DYSPNEA, UNSPECIFIED: ICD-10-CM

## 2019-09-25 DIAGNOSIS — Z98.890 OTHER SPECIFIED POSTPROCEDURAL STATES: Chronic | ICD-10-CM

## 2019-09-25 LAB
ALBUMIN SERPL ELPH-MCNC: 3.4 G/DL — LOW (ref 3.5–5)
ALP SERPL-CCNC: 109 U/L — SIGNIFICANT CHANGE UP (ref 40–120)
ALT FLD-CCNC: 38 U/L DA — SIGNIFICANT CHANGE UP (ref 10–60)
ANION GAP SERPL CALC-SCNC: 7 MMOL/L — SIGNIFICANT CHANGE UP (ref 5–17)
AST SERPL-CCNC: 38 U/L — SIGNIFICANT CHANGE UP (ref 10–40)
BASOPHILS # BLD AUTO: 0.06 K/UL — SIGNIFICANT CHANGE UP (ref 0–0.2)
BASOPHILS NFR BLD AUTO: 1.3 % — SIGNIFICANT CHANGE UP (ref 0–2)
BILIRUB SERPL-MCNC: 0.7 MG/DL — SIGNIFICANT CHANGE UP (ref 0.2–1.2)
BUN SERPL-MCNC: 41 MG/DL — HIGH (ref 7–18)
CALCIUM SERPL-MCNC: 9.2 MG/DL — SIGNIFICANT CHANGE UP (ref 8.4–10.5)
CHLORIDE SERPL-SCNC: 105 MMOL/L — SIGNIFICANT CHANGE UP (ref 96–108)
CO2 SERPL-SCNC: 28 MMOL/L — SIGNIFICANT CHANGE UP (ref 22–31)
CREAT SERPL-MCNC: 2.13 MG/DL — HIGH (ref 0.5–1.3)
EOSINOPHIL # BLD AUTO: 0.4 K/UL — SIGNIFICANT CHANGE UP (ref 0–0.5)
EOSINOPHIL NFR BLD AUTO: 8.6 % — HIGH (ref 0–6)
GLUCOSE SERPL-MCNC: 87 MG/DL — SIGNIFICANT CHANGE UP (ref 70–99)
HCT VFR BLD CALC: 32.9 % — LOW (ref 39–50)
HGB BLD-MCNC: 10.8 G/DL — LOW (ref 13–17)
IMM GRANULOCYTES NFR BLD AUTO: 0.2 % — SIGNIFICANT CHANGE UP (ref 0–1.5)
INR BLD: 4.63 RATIO — HIGH (ref 0.88–1.16)
LYMPHOCYTES # BLD AUTO: 1.56 K/UL — SIGNIFICANT CHANGE UP (ref 1–3.3)
LYMPHOCYTES # BLD AUTO: 33.4 % — SIGNIFICANT CHANGE UP (ref 13–44)
MCHC RBC-ENTMCNC: 30.8 PG — SIGNIFICANT CHANGE UP (ref 27–34)
MCHC RBC-ENTMCNC: 32.8 GM/DL — SIGNIFICANT CHANGE UP (ref 32–36)
MCV RBC AUTO: 93.7 FL — SIGNIFICANT CHANGE UP (ref 80–100)
MONOCYTES # BLD AUTO: 0.4 K/UL — SIGNIFICANT CHANGE UP (ref 0–0.9)
MONOCYTES NFR BLD AUTO: 8.6 % — SIGNIFICANT CHANGE UP (ref 2–14)
NEUTROPHILS # BLD AUTO: 2.24 K/UL — SIGNIFICANT CHANGE UP (ref 1.8–7.4)
NEUTROPHILS NFR BLD AUTO: 47.9 % — SIGNIFICANT CHANGE UP (ref 43–77)
NRBC # BLD: 0 /100 WBCS — SIGNIFICANT CHANGE UP (ref 0–0)
NT-PROBNP SERPL-SCNC: 2989 PG/ML — HIGH (ref 0–450)
PLATELET # BLD AUTO: 126 K/UL — LOW (ref 150–400)
POTASSIUM SERPL-MCNC: 4.1 MMOL/L — SIGNIFICANT CHANGE UP (ref 3.5–5.3)
POTASSIUM SERPL-SCNC: 4.1 MMOL/L — SIGNIFICANT CHANGE UP (ref 3.5–5.3)
PROT SERPL-MCNC: 7.6 G/DL — SIGNIFICANT CHANGE UP (ref 6–8.3)
PROTHROM AB SERPL-ACNC: 53.9 SEC — HIGH (ref 10–12.9)
RBC # BLD: 3.51 M/UL — LOW (ref 4.2–5.8)
RBC # FLD: 15.5 % — HIGH (ref 10.3–14.5)
SODIUM SERPL-SCNC: 140 MMOL/L — SIGNIFICANT CHANGE UP (ref 135–145)
TROPONIN I SERPL-MCNC: <0.015 NG/ML — SIGNIFICANT CHANGE UP (ref 0–0.04)
WBC # BLD: 4.67 K/UL — SIGNIFICANT CHANGE UP (ref 3.8–10.5)
WBC # FLD AUTO: 4.67 K/UL — SIGNIFICANT CHANGE UP (ref 3.8–10.5)

## 2019-09-25 PROCEDURE — 99285 EMERGENCY DEPT VISIT HI MDM: CPT

## 2019-09-25 PROCEDURE — 71046 X-RAY EXAM CHEST 2 VIEWS: CPT | Mod: 26

## 2019-09-25 PROCEDURE — 99223 1ST HOSP IP/OBS HIGH 75: CPT

## 2019-09-25 NOTE — ED PROVIDER NOTE - OBJECTIVE STATEMENT
89 yr old male with hx of HTN, HLD, hyponatremia and Afib on coumadin, colon CA (2001 s/p chemo), prostate Ca (2006 tx with radiation), CKD, no obs CAD by Cath 11/2018  and severe MR, admitted s/p MitraClip 12/26 presents to ed c/o sob worsening x 2-3 wks with coughing.  pt also chokes on food everytime he eats.  no leg swelling, no cp, no palpitations, no fever, no chills, no abd pain.  pt able to lie flat. PCP increase his lasix to 60mg daily from 40.

## 2019-09-25 NOTE — H&P ADULT - NSHPSOURCEINFORD_GEN_ALL_CORE
"Jd Bermudez is a 60 y.o. male here for a non-provider visit for:   ZOSTAVAX (Shingles)    Reason for immunization: Overdue/Provider Recommended  Immunization records indicate need for vaccine: Yes, confirmed with Epic  Minimum interval has been met for this vaccine: Yes  ABN completed: Not Indicated    Order and dose verified by: MICHAEL  VIS Dated  10/6/09 was given to patient: Yes  All IAC Questionnaire questions were answered \"No.\"    Patient tolerated injection and no adverse effects were observed or reported: Yes    Pt scheduled for next dose in series: Not Indicated    " Patient

## 2019-09-25 NOTE — H&P ADULT - PROBLEM SELECTOR PLAN 6
p/w creatinine of 2.13, baseline 1.56 from previous admissions  likely from lasix use,   would hold off iv fluids given mod-severe CHF  -monitor bmp

## 2019-09-25 NOTE — H&P ADULT - PROBLEM SELECTOR PLAN 4
pt with known chf with EF 35%, moderate-severe LV systolic dysfunction, decreased RV systolic function, severe LA enlargement; severe MR s/p MV clip  -c/a asa, statin, lasix, lopressor  -fluid restriction  -plan as above

## 2019-09-25 NOTE — H&P ADULT - PROBLEM SELECTOR PLAN 1
P/w difficulty swallowing with feeling of choking, regurgitation  -will start mechanical soft diet   -f/u speech and swallow eval  -gi consult Dr Arroyo  -f/u ESR, CRP to r/o GCA

## 2019-09-25 NOTE — H&P ADULT - PROBLEM SELECTOR PLAN 3
pt on coumadin 3mg and metoprolol at home,   rate controlled on presentation  INR supratherapeutic to 4.63, goal 2-3  holdinh coumaidn for now,   monitor INR and resume pt on coumadin 3mg and metoprolol at home,   rate controlled on presentation  INR supratherapeutic to 4.63, goal 2-3  holding coumadin for now,   monitor INR and resume

## 2019-09-25 NOTE — H&P ADULT - NSICDXPASTSURGICALHX_GEN_ALL_CORE_FT
PAST SURGICAL HISTORY:  H/O colectomy     History of appendectomy     History of prostate cancer     S/P coronary angiogram 11/12/18

## 2019-09-25 NOTE — H&P ADULT - NSICDXFAMILYHX_GEN_ALL_CORE_FT
FAMILY HISTORY:  Family history of ovarian cancer    Sibling  Still living? No  Family history of cancer, Age at diagnosis: Age Unknown  Family history of colon cancer, Age at diagnosis: Age Unknown

## 2019-09-25 NOTE — ED PROVIDER NOTE - CLINICAL SUMMARY MEDICAL DECISION MAKING FREE TEXT BOX
89 yr old male with hx of HTN, HLD, hyponatremia and Afib on coumadin, colon CA (2001 s/p chemo), prostate Ca (2006 tx with radiation), CKD, no obs CAD by Cath 11/2018  and severe MR, admitted s/p MitraClip 12/26 presents to ed c/o sob worsening x 2-3 wks with coughing.  pt also chokes on food everytime he eats.  no leg swelling, no cp, no palpitations, no fever, no chills, no abd pain.  pt able to lie flat. PCP increase his lasix to 60mg daily from 40.     worsening dyspnea.  r/o acs vs PE vs pleural effusion vs pna vs chf.  labs, cxr, re-assess

## 2019-09-25 NOTE — H&P ADULT - PROBLEM SELECTOR PLAN 10
IMPROVE VTE Individual Risk Assessment  RISK                                                                Points  [  ] Previous VTE                                                  3  [  ] Thrombophilia                                               2  [  ] Lower limb paralysis                                      2       (unable to hold up >15 seconds)    [  ] Current Cancer                                              2        (within 6 months)  [ x ] Immobilization > 24 hrs                                1  [  ] ICU/CCU stay > 24 hours                              1  [ x ] Age > 60                                                      1  IMPROVE VTE Score 2, pt on eliquis for AC

## 2019-09-25 NOTE — H&P ADULT - ASSESSMENT
edyta is a 88yo male from home, lives with wife, PMH HTN, HLD, afib (on coumadin), severe MR s/p clipping, s/p cath in 11/2018 (no obstructive), CKD, hyponatremia, colon cancer (s/p chemo, in remission), prostate cancer (s/p RT in [address]), presented to ER for decreased appetite and worsening shortness of breath.    In ER, his vitals were stable; labs noted with bun 41, cr 2.13, hgb 10.8 (at baseline), INR 4.63, bnp 2989. EKG noted with atrial flutter @97bpm.     GOC: counseling/discussion initiated with patient , has not appointed a HCP yet; further GOC counseling initiated with wife at bedside, deferred for now as patient expresses feeling depressed.

## 2019-09-25 NOTE — H&P ADULT - HISTORY OF PRESENT ILLNESS
Patient is a 88yo male from home, lives with wife, PMH HTN, HLD, afib (on coumadin), severe MR s/p clipping, s/p cath in 11/2018 (no obstructive), CKD, hyponatremia, colon cancer (s/p chemo, in remission), prostate cancer (s/p RT in [address]), presented to ER for decreased appetite and worsening shortness of breath. History obtained from both patient and his spouse at bedside, who report decreased appetite since 8 months. Patient reports symptoms started s/p mitraclip surgery in december 2018, with difficulty swallowing. He reports he feels like food is stuck in middle of upper chest, mostly noted with meat but is able to swallow other food. He reports coughing every time he eats and tends to bring up clear phlegm, also reports episodes of sour tasting reflux after he eats. He denies abdominal pain at rest but reports pain when he walks, has occasional constipation, and has lost over 40lbs in 8 months. He complains about being off greens in his diet 2/2 warfarin use which has affected his appetite greatly to the point he sometimes thinks of dying. He does report feeling depressed, with poor sleep due to bad dreams, does not feel happy doing activities he usually likes for fear of falling. Patient also reports episodes of difficulty breathing which wake him up from sleep early in the morning, denies chest pain, palpitations or CAMACHO. He does endorse occasional swelling of lower extremities. His lasix was increased to 60mg daily with no relief in symptoms, also endorses having an echo in august 2019.

## 2019-09-25 NOTE — H&P ADULT - ATTENDING COMMENTS
Vital Signs Last 24 Hrs  T(C): 36.5 (25 Sep 2019 21:04), Max: 36.5 (25 Sep 2019 21:04)  T(F): 97.7 (25 Sep 2019 21:04), Max: 97.7 (25 Sep 2019 21:04)  HR: 90 (25 Sep 2019 21:04) (90 - 90)  BP: 105/67 (25 Sep 2019 21:04) (105/67 - 105/67)  BP(mean): --  RR: 16 (25 Sep 2019 21:04) (16 - 16)  SpO2: 96% (25 Sep 2019 21:04) (96% - 96%) Patient seen and examined ; case was discussed with the admitting resident    ROS: as in the HPI; all other ROS negative    SH and family history as above    Vital Signs Last 24 Hrs  T(C): 36.5 (26 Sep 2019 03:30), Max: 36.5 (25 Sep 2019 21:04)  T(F): 97.7 (26 Sep 2019 03:30), Max: 97.7 (25 Sep 2019 21:04)  HR: 68 (26 Sep 2019 03:30) (68 - 93)  BP: 125/90 (26 Sep 2019 03:30) (105/67 - 133/99)  BP(mean): --  RR: 18 (26 Sep 2019 03:30) (16 - 18)  SpO2: 100% (26 Sep 2019 03:30) (96% - 100%)    GEN: NAD  HEENT- normocephalic; mouth moist  CVS- S1S2+  LUNGS- clear to auscultation; no wheezing  ABD: Soft , nontender, nondistended, Bowel sounds are present  EXTREMITY: no calf tenderness, no cyanosis, no edema  NEURO: AAOx3; non focal neurologic exam; cranial nerves grossly intact  PSYCH: normal affect and behavior  BACK: no swelling or mass;   VASCULAR: ++ distal peripheral pulses  SKIN: warm and dry.       Labs Reviewed:                         10.8   4.67  )-----------( 126      ( 25 Sep 2019 22:30 )             32.9     09-25    140  |  105  |  41<H>  ----------------------------<  87  4.1   |  28  |  2.13<H>    Ca    9.2      25 Sep 2019 22:30    TPro  7.6  /  Alb  3.4<L>  /  TBili  0.7  /  DBili  x   /  AST  38  /  ALT  38  /  AlkPhos  109  09-25    CARDIAC MARKERS ( 25 Sep 2019 22:30 )  <0.015 ng/mL / x     / x     / x     / x            PT/INR - ( 25 Sep 2019 22:30 )   PT: 53.9 sec;   INR: 4.63 ratio           BNP: Serum Pro-Brain Natriuretic Peptide: 2989 pg/mL (09-25 @ 22:30)    MEDICATIONS  (STANDING):  aspirin enteric coated 81 milliGRAM(s) Oral daily  atorvastatin 40 milliGRAM(s) Oral at bedtime  docusate sodium 100 milliGRAM(s) Oral three times a day  furosemide    Tablet 60 milliGRAM(s) Oral daily  metoprolol tartrate 100 milliGRAM(s) Oral two times a day  pantoprazole    Tablet 40 milliGRAM(s) Oral before breakfast  senna 2 Tablet(s) Oral at bedtime    MEDICATIONS  (PRN):  acetaminophen   Tablet .. 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     1. Dyspnea- possible acute on chronic bi-V combined systolic/diastolic CHF-    Plan of care discussed with patient ;  all questions and concerns were addressed. Patient seen and examined ; case was discussed with the admitting resident    ROS: as in the HPI; all other ROS negative    SH and family history as above    Vital Signs Last 24 Hrs  T(C): 36.5 (26 Sep 2019 03:30), Max: 36.5 (25 Sep 2019 21:04)  T(F): 97.7 (26 Sep 2019 03:30), Max: 97.7 (25 Sep 2019 21:04)  HR: 68 (26 Sep 2019 03:30) (68 - 93)  BP: 125/90 (26 Sep 2019 03:30) (105/67 - 133/99)  BP(mean): --  RR: 18 (26 Sep 2019 03:30) (16 - 18)  SpO2: 100% (26 Sep 2019 03:30) (96% - 100%)    GEN: NAD, cachectic   HEENT- normocephalic; mouth moist, bitemporal wasting, b/l cerumen impaction   CVS- S1S2+, L carotid bruit   LUNGS- clear to auscultation; no wheezing, decreased breath sounds   ABD: Soft , nontender, nondistended, Bowel sounds are present  EXTREMITY: no calf tenderness, no cyanosis, no edema  NEURO: AAOx3; non focal neurologic exam; cranial nerves grossly intact  PSYCH: normal affect and behavior  BACK: no swelling or mass;   VASCULAR: + distal peripheral pulses  SKIN: warm and dry.       Labs Reviewed:                         10.8   4.67  )-----------( 126      ( 25 Sep 2019 22:30 )             32.9     09-25    140  |  105  |  41<H>  ----------------------------<  87  4.1   |  28  |  2.13<H>    Ca    9.2      25 Sep 2019 22:30    TPro  7.6  /  Alb  3.4<L>  /  TBili  0.7  /  DBili  x   /  AST  38  /  ALT  38  /  AlkPhos  109  09-25    CARDIAC MARKERS ( 25 Sep 2019 22:30 )  <0.015 ng/mL / x     / x     / x     / x       PT/INR - ( 25 Sep 2019 22:30 )   PT: 53.9 sec;   INR: 4.63 ratio    BNP: Serum Pro-Brain Natriuretic Peptide: 2989 pg/mL (09-25 @ 22:30)    MEDICATIONS  (STANDING):  aspirin enteric coated 81 milliGRAM(s) Oral daily  atorvastatin 40 milliGRAM(s) Oral at bedtime  docusate sodium 100 milliGRAM(s) Oral three times a day  furosemide    Tablet 60 milliGRAM(s) Oral daily  metoprolol tartrate 100 milliGRAM(s) Oral two times a day  pantoprazole    Tablet 40 milliGRAM(s) Oral before breakfast  senna 2 Tablet(s) Oral at bedtime    MEDICATIONS  (PRN):  acetaminophen   Tablet .. 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 Patient also has been losing weight and has become progressively weak with multiple falls. He wakes up in the night feeling like hes choking and cannot breath to the point where he is becoming depressed. He denies suicidal ideation. Per his wife at bedside he started having worse energy when metoprolol and lasix were increased. He has some fullness sensation in the L ear, has worsening vision for some time, and has some muscle aches in the AM- specifically b/l heel pain, shoulder pain, b/l knee pain.     1. Dysphagia- obtain speech and swallow, GI consultation appreciated. Check esr/crp to eval possible GCA/PMR   2. Dyspnea- 2/2 reflux/ reactive airway vs possible acute on chronic bi-V combined systolic/diastolic CHF vs worsening valvular disease- reassess echo, monitor on telemetry, trend troponin, obtain CT chest to further eval pulmonary parenchyma, appreciate cardiology recommendations.   3. MORGAN on CKDIII- suspect intravascular volume depletion  4. A fib- Hold dose of coumadin given increased INR without bleeding, recheck in AM   5. Hypotension- resolved, will decrease metoprolol dose to 75mg bid   6. Severe protein calorie malnutrition- nutrition consult, speech and swallow   7. Thrombocytopenia- chronic issue, stable, has had in patient hematology evaluation previously and had positive anti phospholipid Abs in the past.   8. Recurrent falls- gait instability, check tsh, b12, rpr, CT head without acute pathology, PT consult   9. Dysthymia- denies SI or current plan but states his medical issues have made him think about taking his own life. Appreciate  consultation.    10. Pulsatile tinnitus- patient reports fullness in L ear and possible pulsatile tinnitus check carotid doppler, consider MRI/MRA if persistent if mitraclip mri compatible   Plan of care discussed with patient ;  all questions and concerns were addressed. Patient seen and examined ; case was discussed with the admitting resident    ROS: as in the HPI; all other ROS negative    SH and family history as above    Vital Signs Last 24 Hrs  T(C): 36.5 (26 Sep 2019 03:30), Max: 36.5 (25 Sep 2019 21:04)  T(F): 97.7 (26 Sep 2019 03:30), Max: 97.7 (25 Sep 2019 21:04)  HR: 68 (26 Sep 2019 03:30) (68 - 93)  BP: 125/90 (26 Sep 2019 03:30) (105/67 - 133/99)  BP(mean): --  RR: 18 (26 Sep 2019 03:30) (16 - 18)  SpO2: 100% (26 Sep 2019 03:30) (96% - 100%)    GEN: NAD, cachectic   HEENT- normocephalic; mouth moist, bitemporal wasting, b/l cerumen impaction   CVS- S1S2+, L carotid bruit   LUNGS- clear to auscultation; no wheezing, decreased breath sounds   ABD: Soft , nontender, nondistended, Bowel sounds are present  EXTREMITY: no calf tenderness, no cyanosis, no edema  NEURO: AAOx3; non focal neurologic exam; cranial nerves grossly intact  PSYCH: normal affect and behavior  BACK: no swelling or mass;   VASCULAR: + distal peripheral pulses  SKIN: warm and dry.       Labs Reviewed:                         10.8   4.67  )-----------( 126      ( 25 Sep 2019 22:30 )             32.9     09-25    140  |  105  |  41<H>  ----------------------------<  87  4.1   |  28  |  2.13<H>    Ca    9.2      25 Sep 2019 22:30    TPro  7.6  /  Alb  3.4<L>  /  TBili  0.7  /  DBili  x   /  AST  38  /  ALT  38  /  AlkPhos  109  09-25    CARDIAC MARKERS ( 25 Sep 2019 22:30 )  <0.015 ng/mL / x     / x     / x     / x       PT/INR - ( 25 Sep 2019 22:30 )   PT: 53.9 sec;   INR: 4.63 ratio    BNP: Serum Pro-Brain Natriuretic Peptide: 2989 pg/mL (09-25 @ 22:30)    MEDICATIONS  (STANDING):  aspirin enteric coated 81 milliGRAM(s) Oral daily  atorvastatin 40 milliGRAM(s) Oral at bedtime  docusate sodium 100 milliGRAM(s) Oral three times a day  furosemide    Tablet 60 milliGRAM(s) Oral daily  metoprolol tartrate 100 milliGRAM(s) Oral two times a day  pantoprazole    Tablet 40 milliGRAM(s) Oral before breakfast  senna 2 Tablet(s) Oral at bedtime    MEDICATIONS  (PRN):  acetaminophen   Tablet .. 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 Patient also has been losing weight and has become progressively weak with multiple falls. He wakes up in the night feeling like hes choking and cannot breath to the point where he is becoming depressed. He denies suicidal ideation. Per his wife at bedside he started having worse energy when metoprolol and lasix were increased. He has some fullness sensation in the L ear, has worsening vision for some time, and has some muscle aches in the AM- specifically b/l heel pain, shoulder pain, b/l knee pain.     1. Dysphagia- obtain speech and swallow, GI consultation appreciated. Check esr/crp to eval possible GCA/PMR   2. Dyspnea- 2/2 reflux/ reactive airway vs possible acute on chronic bi-V combined systolic/diastolic CHF vs worsening valvular disease- reassess echo, monitor on telemetry, trend troponin, obtain CT chest to further eval pulmonary parenchyma, appreciate cardiology recommendations.   3. MORGAN on CKDIII- suspect intravascular volume depletion  4. A fib- Hold dose of coumadin given increased INR without bleeding, recheck in AM   5. Hypotension- resolved, will decrease metoprolol dose to 75mg bid   6. Severe protein calorie malnutrition- nutrition consult, speech and swallow   7. Thrombocytopenia- chronic issue, stable, has had in patient hematology evaluation previously and had positive anti phospholipid Abs in the past.   8. Recurrent falls- gait instability, check tsh, b12, rpr, CT head without acute pathology, PT consult   9. Dysthymia- denies SI or current plan but states his medical issues have made him think about taking his own life. Appreciate  consultation.    10. Pulsatile tinnitus- patient reports fullness in L ear and possible pulsatile tinnitus check carotid doppler, consider MRI/MRA if persistent if mitraclip mri compatible   11. Chronic combined sys/luis angel CHF   12. MR s/p Mitraclip   Plan of care discussed with patient ;  all questions and concerns were addressed.

## 2019-09-25 NOTE — H&P ADULT - PROBLEM SELECTOR PLAN 2
P/w difficulty breathing at night  Suspect causes to be ac/chr chf, worsening MR, gerd or reactive airway disease   -troponin 1 negative,   -c/w asa, statin, bb  -f/u 2nd troponin  -telemonitoring  -f/u echocardiogram  cardio eval Dr Macias  GI eval Dr Arroyo

## 2019-09-25 NOTE — H&P ADULT - NSHPPHYSICALEXAM_GEN_ALL_CORE
Vital Signs Last 24 Hrs  T(C): 36.3 (26 Sep 2019 02:30), Max: 36.5 (25 Sep 2019 21:04)  T(F): 97.4 (26 Sep 2019 02:30), Max: 97.7 (25 Sep 2019 21:04)  HR: 92 (26 Sep 2019 02:30) (90 - 93)  BP: 126/80 (26 Sep 2019 02:30) (105/67 - 133/99)  RR: 17 (26 Sep 2019 02:30) (16 - 17)  SpO2: 96% (26 Sep 2019 02:30) (96% - 96%)    PHYSICAL EXAM:  GENERAL: under nourished, elderly male, noted temporal wasting  HEAD:  Atraumatic, Normocephalic  EYES: EOMI, PERRLA, conjunctiva and sclera clear  NECK: Supple, No JVD  CHEST/LUNG: Clear to auscultation bilaterally; No wheeze  HEART: Regular rate and rhythm; No murmurs, rubs, or gallops  ABDOMEN: Soft, Nontender, Nondistended; Bowel sounds present  EXTREMITIES:, No clubbing, cyanosis, or edema  PSYCH: AAOx3  NEUROLOGY: non-focal  SKIN: No rashes or lesions

## 2019-09-25 NOTE — H&P ADULT - NSICDXPASTMEDICALHX_GEN_ALL_CORE_FT
PAST MEDICAL HISTORY:  Afib     Atrial fibrillation     CKD (chronic kidney disease)     Colon cancer     GERD (gastroesophageal reflux disease)     HLD (hyperlipidemia)     HLD (hyperlipidemia)     HTN (hypertension)     HTN (hypertension)     Hyponatremia     Mitral regurgitation     Pleural effusion, bilateral as of latest chest xray  s/p latest hospitalization for CHF    Prostate CA     Prostate cancer     Thrombus in heart chamber

## 2019-09-26 DIAGNOSIS — I50.40 UNSPECIFIED COMBINED SYSTOLIC (CONGESTIVE) AND DIASTOLIC (CONGESTIVE) HEART FAILURE: ICD-10-CM

## 2019-09-26 DIAGNOSIS — Z29.9 ENCOUNTER FOR PROPHYLACTIC MEASURES, UNSPECIFIED: ICD-10-CM

## 2019-09-26 DIAGNOSIS — I48.91 UNSPECIFIED ATRIAL FIBRILLATION: ICD-10-CM

## 2019-09-26 DIAGNOSIS — N17.9 ACUTE KIDNEY FAILURE, UNSPECIFIED: ICD-10-CM

## 2019-09-26 DIAGNOSIS — F32.9 MAJOR DEPRESSIVE DISORDER, SINGLE EPISODE, UNSPECIFIED: ICD-10-CM

## 2019-09-26 DIAGNOSIS — K21.9 GASTRO-ESOPHAGEAL REFLUX DISEASE WITHOUT ESOPHAGITIS: ICD-10-CM

## 2019-09-26 DIAGNOSIS — E78.5 HYPERLIPIDEMIA, UNSPECIFIED: ICD-10-CM

## 2019-09-26 DIAGNOSIS — R13.10 DYSPHAGIA, UNSPECIFIED: ICD-10-CM

## 2019-09-26 DIAGNOSIS — I10 ESSENTIAL (PRIMARY) HYPERTENSION: ICD-10-CM

## 2019-09-26 DIAGNOSIS — R06.00 DYSPNEA, UNSPECIFIED: ICD-10-CM

## 2019-09-26 LAB
ANION GAP SERPL CALC-SCNC: 6 MMOL/L — SIGNIFICANT CHANGE UP (ref 5–17)
APTT BLD: 50.2 SEC — HIGH (ref 27.5–36.3)
BUN SERPL-MCNC: 39 MG/DL — HIGH (ref 7–18)
CALCIUM SERPL-MCNC: 9.6 MG/DL — SIGNIFICANT CHANGE UP (ref 8.4–10.5)
CHLORIDE SERPL-SCNC: 107 MMOL/L — SIGNIFICANT CHANGE UP (ref 96–108)
CHOLEST SERPL-MCNC: 144 MG/DL — SIGNIFICANT CHANGE UP (ref 10–199)
CK MB BLD-MCNC: 1 % — SIGNIFICANT CHANGE UP (ref 0–3.5)
CK MB CFR SERPL CALC: 1 NG/ML — SIGNIFICANT CHANGE UP (ref 0–3.6)
CK SERPL-CCNC: 104 U/L — SIGNIFICANT CHANGE UP (ref 35–232)
CO2 SERPL-SCNC: 29 MMOL/L — SIGNIFICANT CHANGE UP (ref 22–31)
CREAT SERPL-MCNC: 1.88 MG/DL — HIGH (ref 0.5–1.3)
CRP SERPL-MCNC: 0.45 MG/DL — HIGH (ref 0–0.4)
ERYTHROCYTE [SEDIMENTATION RATE] IN BLOOD: 36 MM/HR — HIGH (ref 0–20)
FERRITIN SERPL-MCNC: 445 NG/ML — HIGH (ref 30–400)
FOLATE SERPL-MCNC: 16.1 NG/ML — SIGNIFICANT CHANGE UP
GLUCOSE SERPL-MCNC: 77 MG/DL — SIGNIFICANT CHANGE UP (ref 70–99)
HBA1C BLD-MCNC: 6 % — HIGH (ref 4–5.6)
HCT VFR BLD CALC: 34.3 % — LOW (ref 39–50)
HDLC SERPL-MCNC: 59 MG/DL — SIGNIFICANT CHANGE UP
HGB BLD-MCNC: 11.1 G/DL — LOW (ref 13–17)
INR BLD: 4.36 RATIO — HIGH (ref 0.88–1.16)
IRON SATN MFR SERPL: 26 % — SIGNIFICANT CHANGE UP (ref 20–55)
IRON SATN MFR SERPL: 57 UG/DL — LOW (ref 65–170)
LIPID PNL WITH DIRECT LDL SERPL: 76 MG/DL — SIGNIFICANT CHANGE UP
MAGNESIUM SERPL-MCNC: 2.1 MG/DL — SIGNIFICANT CHANGE UP (ref 1.6–2.6)
MCHC RBC-ENTMCNC: 30.2 PG — SIGNIFICANT CHANGE UP (ref 27–34)
MCHC RBC-ENTMCNC: 32.4 GM/DL — SIGNIFICANT CHANGE UP (ref 32–36)
MCV RBC AUTO: 93.2 FL — SIGNIFICANT CHANGE UP (ref 80–100)
NRBC # BLD: 0 /100 WBCS — SIGNIFICANT CHANGE UP (ref 0–0)
PHOSPHATE SERPL-MCNC: 2.8 MG/DL — SIGNIFICANT CHANGE UP (ref 2.5–4.5)
PLATELET # BLD AUTO: 140 K/UL — LOW (ref 150–400)
POTASSIUM SERPL-MCNC: 3.9 MMOL/L — SIGNIFICANT CHANGE UP (ref 3.5–5.3)
POTASSIUM SERPL-SCNC: 3.9 MMOL/L — SIGNIFICANT CHANGE UP (ref 3.5–5.3)
PROTHROM AB SERPL-ACNC: 50.6 SEC — HIGH (ref 10–12.9)
RBC # BLD: 3.68 M/UL — LOW (ref 4.2–5.8)
RBC # FLD: 15.6 % — HIGH (ref 10.3–14.5)
SODIUM SERPL-SCNC: 142 MMOL/L — SIGNIFICANT CHANGE UP (ref 135–145)
TIBC SERPL-MCNC: 220 UG/DL — LOW (ref 250–450)
TOTAL CHOLESTEROL/HDL RATIO MEASUREMENT: 2.4 RATIO — LOW (ref 3.4–9.6)
TRIGL SERPL-MCNC: 47 MG/DL — SIGNIFICANT CHANGE UP (ref 10–149)
TROPONIN I SERPL-MCNC: <0.015 NG/ML — SIGNIFICANT CHANGE UP (ref 0–0.04)
TSH SERPL-MCNC: 3.85 UU/ML — SIGNIFICANT CHANGE UP (ref 0.34–4.82)
UIBC SERPL-MCNC: 163 UG/DL — SIGNIFICANT CHANGE UP (ref 110–370)
VIT B12 SERPL-MCNC: 866 PG/ML — SIGNIFICANT CHANGE UP (ref 232–1245)
WBC # BLD: 4.61 K/UL — SIGNIFICANT CHANGE UP (ref 3.8–10.5)
WBC # FLD AUTO: 4.61 K/UL — SIGNIFICANT CHANGE UP (ref 3.8–10.5)

## 2019-09-26 PROCEDURE — 99232 SBSQ HOSP IP/OBS MODERATE 35: CPT | Mod: GC

## 2019-09-26 PROCEDURE — 70450 CT HEAD/BRAIN W/O DYE: CPT | Mod: 26

## 2019-09-26 PROCEDURE — 71250 CT THORAX DX C-: CPT | Mod: 26

## 2019-09-26 RX ORDER — METOPROLOL TARTRATE 50 MG
100 TABLET ORAL
Refills: 0 | Status: DISCONTINUED | OUTPATIENT
Start: 2019-09-26 | End: 2019-09-26

## 2019-09-26 RX ORDER — SENNA PLUS 8.6 MG/1
2 TABLET ORAL AT BEDTIME
Refills: 0 | Status: DISCONTINUED | OUTPATIENT
Start: 2019-09-26 | End: 2019-09-30

## 2019-09-26 RX ORDER — DOCUSATE SODIUM 100 MG
100 CAPSULE ORAL THREE TIMES A DAY
Refills: 0 | Status: DISCONTINUED | OUTPATIENT
Start: 2019-09-26 | End: 2019-09-30

## 2019-09-26 RX ORDER — METOPROLOL TARTRATE 50 MG
75 TABLET ORAL
Refills: 0 | Status: DISCONTINUED | OUTPATIENT
Start: 2019-09-26 | End: 2019-09-29

## 2019-09-26 RX ORDER — FUROSEMIDE 40 MG
60 TABLET ORAL DAILY
Refills: 0 | Status: DISCONTINUED | OUTPATIENT
Start: 2019-09-26 | End: 2019-09-30

## 2019-09-26 RX ORDER — ATORVASTATIN CALCIUM 80 MG/1
40 TABLET, FILM COATED ORAL AT BEDTIME
Refills: 0 | Status: DISCONTINUED | OUTPATIENT
Start: 2019-09-26 | End: 2019-09-30

## 2019-09-26 RX ORDER — ACETAMINOPHEN 500 MG
650 TABLET ORAL EVERY 6 HOURS
Refills: 0 | Status: DISCONTINUED | OUTPATIENT
Start: 2019-09-26 | End: 2019-09-30

## 2019-09-26 RX ORDER — ASPIRIN/CALCIUM CARB/MAGNESIUM 324 MG
81 TABLET ORAL DAILY
Refills: 0 | Status: DISCONTINUED | OUTPATIENT
Start: 2019-09-26 | End: 2019-09-30

## 2019-09-26 RX ORDER — PANTOPRAZOLE SODIUM 20 MG/1
40 TABLET, DELAYED RELEASE ORAL
Refills: 0 | Status: DISCONTINUED | OUTPATIENT
Start: 2019-09-26 | End: 2019-09-30

## 2019-09-26 RX ADMIN — Medication 100 MILLIGRAM(S): at 14:15

## 2019-09-26 RX ADMIN — Medication 100 MILLIGRAM(S): at 22:39

## 2019-09-26 RX ADMIN — PANTOPRAZOLE SODIUM 40 MILLIGRAM(S): 20 TABLET, DELAYED RELEASE ORAL at 05:49

## 2019-09-26 RX ADMIN — Medication 100 MILLIGRAM(S): at 05:49

## 2019-09-26 RX ADMIN — Medication 81 MILLIGRAM(S): at 12:46

## 2019-09-26 RX ADMIN — ATORVASTATIN CALCIUM 40 MILLIGRAM(S): 80 TABLET, FILM COATED ORAL at 22:38

## 2019-09-26 RX ADMIN — SENNA PLUS 2 TABLET(S): 8.6 TABLET ORAL at 22:38

## 2019-09-26 RX ADMIN — Medication 60 MILLIGRAM(S): at 05:49

## 2019-09-26 NOTE — PROGRESS NOTE ADULT - PROBLEM SELECTOR PLAN 1
P/w difficulty swallowing with feeling of choking, regurgitation  -will start mechanical soft diet   -f/u speech and swallow eval  -gi consult Dr Arroyo: recoms barium esophagram   -f/u ESR: 36( high) r/o GCA P/w difficulty swallowing with feeling of choking, regurgitation  -will start mechanical soft diet pending speech and swallow eval  -gi consult Dr Arroyo: recommends barium esophagram   -f/u ESR: 36( high) r/o GCA

## 2019-09-26 NOTE — PROGRESS NOTE ADULT - PROBLEM SELECTOR PLAN 2
P/w difficulty breathing at night  Suspect causes to be ac/chr chf, worsening MR, gerd or reactive airway disease   -troponin 1 negative,   -c/w asa, statin, bb  -f/u 2nd troponin  -telemonitoring, can be dc'd per cardio consult  -f/u echocardiogram  cardio eval Dr Salazar: recoms to add ACE once renal fx allows  GI eval Dr Arroyo P/w difficulty breathing at night  Suspect causes to be ac/chr chf, worsening MR, gerd or reactive airway disease   -troponin 1 negative,   -c/w asa, statin, bb  -f/u 2nd troponin  -telemonitoring, can be dc'd per cardio consult  -f/u echocardiogram  cardio eval Dr Salazar: recommends to add ACE once renal fx allows  GI eval Dr Arroyo

## 2019-09-26 NOTE — PROGRESS NOTE ADULT - PROBLEM SELECTOR PLAN 4
pt with known chf with EF 35%, moderate-severe LV systolic dysfunction, decreased RV systolic function, severe LA enlargement; severe MR s/p MV clip  -c/a asa, statin, lasix, lopressor  [] will consider adding ACE if renal func improves  -fluid restriction  -plan as above pt with known chf with EF 35%, moderate-severe LV systolic dysfunction, decreased RV systolic function, severe LA enlargement; severe MR s/p MV clip consistent with chronic systolic and diastolic hear failure  -c/a asa, statin, lasix, lopressor  [] will consider adding ACE if renal func improves  -fluid restriction  -plan as above

## 2019-09-26 NOTE — CONSULT NOTE ADULT - SUBJECTIVE AND OBJECTIVE BOX
Patient is a 89y old  Male who presents with a chief complaint of shortness of breath, poor appetite (26 Sep 2019 12:17)    Patient is a 89 year old  male  with a PM history of HTN, HLD, afib (on coumadin), severe MR s/p clipping, s/p cath in 2018 (no obstructive), CKD, hyponatremia, colon cancer (s/p chemo, in remission), prostate cancer (s/p RT in ), presented to ER for decreased appetite and worsening shortness of breath.  Patient reports symptoms started s/p mitraclip surgery in 2018, with difficulty swallowing. He reports he feels like food is stuck in middle of upper chest, mostly noted with meat but is able to swallow other food. He reports coughing every time he eats and tends to bring up clear phlegm, also reports episodes of sour tasting reflux after he eats. He denies abdominal pain at rest but reports pain when he walks, has occasional constipation, and has lost over 40lbs in 8 months.         REVIEW OF SYSTEMS  Constitutional:   No fever, no fatigue, no pallor, no night sweats, no weight loss.  HEENT:   No eye pain, no vision changes, no icterus, no mouth ulcers.  Respiratory:   No shortness of breath, no cough, no respiratory distress.   Cardiovascular:   No chest pain, no palpitations.   Gastrointestinal: No abdominal pain, no nausea, no vomiting , no diarrhea no constipation, no hematochezia, no melena.  Skin:   No rashes, no jaundice, no eczema.   Musculoskeletal:   No joint pain, no swelling, no myalgia.   Neurologic:   No headache, no seizure, no weakness.   Genitourinary:   No dysuria, no decreased urine output.  Psychiatric:  No depression, no anxiety,   Endocrine:   No thyroid disease, no diabetes.  Heme/Lymphatic:   No anemia, no blood transfusions, no lymph node enlargement, no bleeding, no bruising.  ___________________________________________________________________________________________  Allergies    penicillin (Hives)  shellfish (Hives)    Intolerances      MEDICATIONS  (STANDING):  aspirin enteric coated 81 milliGRAM(s) Oral daily  atorvastatin 40 milliGRAM(s) Oral at bedtime  docusate sodium 100 milliGRAM(s) Oral three times a day  furosemide    Tablet 60 milliGRAM(s) Oral daily  metoprolol tartrate 75 milliGRAM(s) Oral two times a day  pantoprazole    Tablet 40 milliGRAM(s) Oral before breakfast  senna 2 Tablet(s) Oral at bedtime    MEDICATIONS  (PRN):  acetaminophen   Tablet .. 650 milliGRAM(s) Oral every 6 hours PRN Severe Pain (7 - 10)      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  Pleural effusion, bilateral: as of latest chest xray  s/p latest hospitalization for CHF  CKD (chronic kidney disease)  Colon cancer  Prostate cancer  Mitral regurgitation  Hyponatremia  HLD (hyperlipidemia)  HTN (hypertension)  Atrial fibrillation  Prostate CA  GERD (gastroesophageal reflux disease)  Afib  HLD (hyperlipidemia)  HTN (hypertension)  S/P coronary angiogram: 18  History of appendectomy  H/O colectomy  History of prostate cancer    FAMILY HISTORY:  Family history of colon cancer (Sibling)  Family history of ovarian cancer  Family history of cancer (Sibling)    Social History: No history of : Tobacco use, IVDA, EToH  ______________________________________________________________________________________    PHYSICAL EXAM    Daily Height in cm: 185.42 (25 Sep 2019 21:04)    Daily Weight in k.6 (26 Sep 2019 03:30)  BMI: 16.5 ( @ 21:04)  Change in Weight:  Vital Signs Last 24 Hrs  T(C): 36.9 (26 Sep 2019 15:07), Max: 37.1 (26 Sep 2019 11:03)  T(F): 98.5 (26 Sep 2019 15:07), Max: 98.7 (26 Sep 2019 11:03)  HR: 77 (26 Sep 2019 15:07) (68 - 96)  BP: 97/63 (26 Sep 2019 15:07) (97/63 - 133/99)  BP(mean): --  RR: 18 (26 Sep 2019 15:07) (16 - 18)  SpO2: 98% (26 Sep 2019 15:07) (96% - 100%)    General:  NAD.  HEENT:    Normal appearance of conjunctiva, ears, nose, lips, oropharynx, and oral mucosa, anicteric.  Neck:  No masses, no asymmetry.  Lymph Nodes:  No lymphadenopathy.   Cardiovascular:  RRR normal S1/S2, no murmur.  Respiratory:  CTA B/L, normal respiratory effort.   Abdominal:   soft, no masses or tenderness, normoactive BS, NT/ND, no HSM.  Extremities:   No clubbing or cyanosis, normal capillary refill, no edema.   Skin:   No rash, jaundice, lesions, eczema.     _______________________________________________________________________________________________  Lab Results:                          11.1   4.61  )-----------( 140      ( 26 Sep 2019 07:42 )             34.3         142  |  107  |  39<H>  ----------------------------<  77  3.9   |  29  |  1.88<H>    Ca    9.6      26 Sep 2019 07:42  Phos  2.8       Mg     2.1         TPro  7.6  /  Alb  3.4<L>  /  TBili  0.7  /  DBili  x   /  AST  38  /  ALT  38  /  AlkPhos  109      LIVER FUNCTIONS - ( 25 Sep 2019 22:30 )  Alb: 3.4 g/dL / Pro: 7.6 g/dL / ALK PHOS: 109 U/L / ALT: 38 U/L DA / AST: 38 U/L / GGT: x           PT/INR - ( 26 Sep 2019 07:42 )   PT: 50.6 sec;   INR: 4.36 ratio         PTT - ( 26 Sep 2019 07:42 )  PTT:50.2 sec  C-Reactive Protein, Serum: 0.45 mg/dL ( @ 15:06)  Sedimentation Rate, Erythrocyte: 36 mm/Hr ( @ 07:42)  Triglycerides, Serum: 47 mg/dL ( @ 07:42)    CARDIAC MARKERS ( 26 Sep 2019 09:26 )  <0.015 ng/mL / x     / 104 U/L / x     / 1.0 ng/mL  CARDIAC MARKERS ( 25 Sep 2019 22:30 )  <0.015 ng/mL / x     / x     / x     / x          Stool Results:          RADIOLOGY RESULTS:  < from: CT Chest No Cont (19 @ 02:33) >    EXAM:  CT CHEST                            PROCEDURE DATE:  2019          INTERPRETATION:  HISTORY: Shortness of breath. Recurrent falls.   Generalized weakness.    TECHNIQUE: A non-contrast CT scan of the chest was performed from the   thoracic inlet to the adrenal glands. Coronal and sagittal reformatted   images are provided.    COMPARISON: CT of the chest from 10/10/2018    FINDINGS:   Evaluation of lung parenchyma demonstrates no pulmonary contusion or   hemorrhage. Interval resolution of bilateral pleural effusions. There is   no suspicious pulmonary nodule or mass. Calcified granuloma in the left   lower lobe. There is no pneumothorax. The trachea and main bronchi are   clear.    The heart is again upper limits of normal in size. There is no   pericardial effusion. Interval placement of a cardiac device at the over   the left ventricle.    The thoracic aorta and main pulmonary arteries are normal in caliber.     The thyroid gland is unremarkable.    There is no significant mediastinal or hilar adenopathy. Bilateral   supraclavicular and axillary lymph nodes suggested appear unchanged from   the prior exam.    Images of the upper abdomen are unremarkable.    The osseous structures demonstrates no acutely displaced rib fracture or   thoracic vertebral compression fracture deformity. There are no acute   osseous abnormalities. Generalized osteopenia is again noted.    IMPRESSION:   No acute or posttraumatic finding in the thorax on this noncontrast chest   CT.                PARTHA IGLESIAS M.D., RADIOLOGIST  This document has been electronically signed. Sep 26 2019  4:52AM                < end of copied text >    SURGICAL PATHOLOGY:

## 2019-09-26 NOTE — CONSULT NOTE ADULT - SUBJECTIVE AND OBJECTIVE BOX
HISTORY OF PRESENT ILLNESS: HPI:  Patient is a 88yo male from home, lives with wife, PMH HTN, HLD, afib (on coumadin), severe MR s/p clipping with residual moderate to sever MR, severe LV dysfunction s/p cath in 11/2018 (no obstructive), CKD (not on ACE) hyponatremia, colon cancer (s/p chemo, in remission), prostate cancer (s/p RT in [address]), presented to ER for decreased appetite.  He is currently denying SOB, CP or LOC History obtained from both patient and his spouse at bedside, who report decreased appetite since 8 months. Patient reports symptoms started s/p mitraclip surgery in december 2018, with difficulty swallowing. He reports he feels like food is stuck in middle of upper chest, mostly noted with meat but is able to swallow other food. He reports coughing every time he eats and tends to bring up clear phlegm, also reports episodes of sour tasting reflux after he eats. He denies abdominal pain at rest but reports pain when he walks, has occasional constipation, and has lost over 40lbs in 8 months. He complains about being off greens in his diet 2/2 warfarin use which has affected his appetite greatly to the point he sometimes thinks of dying. He does report feeling depressed, with poor sleep due to bad dreams, does not feel happy doing activities he usually likes for fear of falling. His lasix was increased to 60mg daily and symptoms appear improved.    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  Pleural effusion, bilateral: as of latest chest xray  s/p latest hospitalization for CHF  CKD (chronic kidney disease)  Colon cancer  Prostate cancer  Mitral regurgitation  Hyponatremia  HLD (hyperlipidemia)  HTN (hypertension)  Atrial fibrillation  Prostate CA  GERD (gastroesophageal reflux disease)  Afib  HLD (hyperlipidemia)  HTN (hypertension)  S/P coronary angiogram: 11/12/18  History of appendectomy  H/O colectomy  History of prostate cancer          MEDICATIONS:  MEDICATIONS  (STANDING):  aspirin enteric coated 81 milliGRAM(s) Oral daily  atorvastatin 40 milliGRAM(s) Oral at bedtime  docusate sodium 100 milliGRAM(s) Oral three times a day  furosemide    Tablet 60 milliGRAM(s) Oral daily  metoprolol tartrate 75 milliGRAM(s) Oral two times a day  pantoprazole    Tablet 40 milliGRAM(s) Oral before breakfast  senna 2 Tablet(s) Oral at bedtime      Allergies    penicillin (Hives)  shellfish (Hives)    Intolerances        FAMILY HISTORY:  Family history of colon cancer (Sibling)  Family history of ovarian cancer  Family history of cancer (Sibling)    Non-contributary for premature coronary disease or sudden cardiac death    SOCIAL HISTORY:    [ X] Non-smoker  [ ] Smoker  [ ] Alcohol      REVIEW OF SYSTEMS:  [ ]chest pain  [  ]shortness of breath  [  ]palpitations  [  ]syncope  [ ]near syncope [ ]upper extremity weakness   [ ] lower extremity weakness  [  ]diplopia  [  ]altered mental status   [  ]fevers  [ ]chills [ ]nausea  [ ]vomitting  [  ]dysphagia    [ ]abdominal pain  [ ]melena  [ ]BRBPR    [  ]epistaxis  [  ]rash    [ ]lower extremity edema        [ X] All others negative	  [ ] Unable to obtain      LABS:	 	    CARDIAC MARKERS:  CARDIAC MARKERS ( 26 Sep 2019 09:26 )  <0.015 ng/mL / x     / 104 U/L / x     / 1.0 ng/mL  CARDIAC MARKERS ( 25 Sep 2019 22:30 )  <0.015 ng/mL / x     / x     / x     / x                                  11.1   4.61  )-----------( 140      ( 26 Sep 2019 07:42 )             34.3     Hb Trend: 11.1<--, 10.8<--    09-26    142  |  107  |  39<H>  ----------------------------<  77  3.9   |  29  |  1.88<H>    Ca    9.6      26 Sep 2019 07:42  Phos  2.8     09-26  Mg     2.1     09-26    TPro  7.6  /  Alb  3.4<L>  /  TBili  0.7  /  DBili  x   /  AST  38  /  ALT  38  /  AlkPhos  109  09-25    Creatinine Trend: 1.88<--, 2.13<--    Coags:  PT/INR - ( 26 Sep 2019 07:42 )   PT: 50.6 sec;   INR: 4.36 ratio         PTT - ( 26 Sep 2019 07:42 )  PTT:50.2 sec    proBNP: Serum Pro-Brain Natriuretic Peptide: 2989 pg/mL (09-25 @ 22:30)    Lipid Profile:   HgA1c: Hemoglobin A1C, Whole Blood: 6.0 % (09-26 @ 09:57)    TSH: Thyroid Stimulating Hormone, Serum: 3.85 uU/mL (09-26 @ 07:42)          PHYSICAL EXAM:  T(C): 37.1 (09-26-19 @ 11:03), Max: 37.1 (09-26-19 @ 11:03)  HR: 90 (09-26-19 @ 11:03) (68 - 96)  BP: 110/70 (09-26-19 @ 11:03) (105/67 - 133/99)  RR: 18 (09-26-19 @ 11:03) (16 - 18)  SpO2: 98% (09-26-19 @ 11:03) (96% - 100%)  Wt(kg): --  I&O's Summary    26 Sep 2019 07:01  -  26 Sep 2019 12:18  --------------------------------------------------------  IN: 200 mL / OUT: 0 mL / NET: 200 mL        Gen: Appears well in NAD  HEENT:  (-)icterus (-)pallor  CV: N S1 S2 1/6 CARL (+)2 Pulses B/l  Resp:  Clear to ausculatation B/L, normal effort  GI: (+) BS Soft, NT, ND  Lymph:  (-)Edema, (-)obvious lymphadenopathy  Skin: Warm to touch, Normal turgor  Psych: Appropriate mood and affect        TELEMETRY: 	  Atypical flutter    ECG:  	Atypical flutter 97 BPM     RADIOLOGY:         CXR:  no infiltrate     ASSESSMENT/PLAN: 	89y Male PMH HTN, HLD, afib (on coumadin), severe MR s/p clipping with residual moderate to sever MR, severe LV dysfunction s/p cath in 11/2018 (no obstructive), CKD (not on ACE) hyponatremia, colon cancer (s/p chemo, in remission), prostate cancer (s/p RT in [address]), presented to ER for decreased appetite.       - Well compensated from a CHF prospective  - HR appear well controlled, would cont prior dose of Toprol  Daily  - Cont LAsix 60 mg PO daily  - would like to add ACE once renal fx allows  - Can d/c tele  - Dysphagia w/u per primary team    I once again thank you for allowing me to participate in the care of our mutual patient.  If you have any questions or concerns please do not hesitate to contact me.    Nabor Salazar MD, FACC  BEEPER [phone]

## 2019-09-26 NOTE — PROGRESS NOTE ADULT - PROBLEM SELECTOR PLAN 3
pt on coumadin 3mg and metoprolol at home,   rate controlled on presentation  INR supratherapeutic to 4.63, goal 2-3  holding coumadin for now,   monitor INR and resume pt on coumadin 3mg and metoprolol at home,   rate controlled on presentation  INR supra- therapeutic to 4.63, goal 2-3  holding coumadin for now,   monitor INR and resume

## 2019-09-26 NOTE — CONSULT NOTE ADULT - ASSESSMENT
89 year old male with dysphagia     Problem list  ·	CHF   ·	Dysphagia   ·	Mitral regurgitation       Plan:  1.	Speech and swallow evaluation   2.	please obtain barium esophagram   3.	aspiration precautions   4.	not an ideal candidate for EGD with anes,. Will plan on further intervention based on esophagram findings.     Thank you for your consultation and allowing  me to participate in the care of your patients. If you have further questions please contact me at [phone].     Cliff Montaño M.D.

## 2019-09-26 NOTE — PROGRESS NOTE ADULT - SUBJECTIVE AND OBJECTIVE BOX
Patient is a 89y old  Male who presents with a chief complaint of shortness of breath, poor appetite (26 Sep 2019 15:51)      INTERVAL HPI/OVERNIGHT EVENTS: pt seen and examined, no overnight acute events.        REVIEW OF SYSTEMS:  CONSTITUTIONAL: No fever, weight loss, or fatigue  EYES: No eye pain, visual disturbances, or discharge  ENMT:  No difficulty hearing, tinnitus, vertigo; No sinus or throat pain  NECK: No pain or stiffness  BREASTS: No pain, masses, or nipple discharge  RESPIRATORY: No cough, wheezing, chills or hemoptysis; No shortness of breath  CARDIOVASCULAR: No chest pain, palpitations, dizziness, or leg swelling  GASTROINTESTINAL: No abdominal or epigastric pain. No nausea, vomiting, or hematemesis; No diarrhea or constipation. No melena or hematochezia.  GENITOURINARY: No dysuria, frequency, hematuria, or incontinence  NEUROLOGICAL: No headaches, memory loss, loss of strength, numbness, or tremors  SKIN: No itching, burning, rashes, or lesions   LYMPH NODES: No enlarged glands  ENDOCRINE: No heat or cold intolerance; No hair loss  MUSCULOSKELETAL: No joint pain or swelling; No muscle, back, or extremity pain  HEME/LYMPH: No easy bruising, or bleeding gums  ALLERY AND IMMUNOLOGIC: No hives or eczema    FAMILY HISTORY:  Family history of colon cancer (Sibling)  Family history of ovarian cancer  Family history of cancer (Sibling)    Vital Signs Last 24 Hrs  T(C): 36.9 (26 Sep 2019 15:07), Max: 37.1 (26 Sep 2019 11:03)  T(F): 98.5 (26 Sep 2019 15:07), Max: 98.7 (26 Sep 2019 11:03)  HR: 77 (26 Sep 2019 15:07) (68 - 96)  BP: 97/63 (26 Sep 2019 15:07) (97/63 - 133/99)  BP(mean): --  RR: 18 (26 Sep 2019 15:07) (16 - 18)  SpO2: 98% (26 Sep 2019 15:07) (96% - 100%)    09-26-19 @ 07:01  -  09-26-19 @ 19:40  --------------------------------------------------------  IN: 430 mL / OUT: 0 mL / NET: 430 mL        PHYSICAL EXAM:  GENERAL: NAD, well-groomed, well-developed  HEAD:  Atraumatic, Normocephalic  EYES: EOMI, PERRLA, conjunctiva and sclera clear  ENMT: No tonsillar erythema, exudates, or enlargement; Moist mucous membranes, Good dentition, No lesions  NECK: Supple, No JVD, Normal thyroid  NERVOUS SYSTEM:  Alert & Oriented X3, Good concentration; Motor Strength 5/5 B/L upper and lower extremities; DTRs 2+ intact and symmetric  CHEST/LUNG: Clear to percussion bilaterally; No rales, rhonchi, wheezing, or rubs  HEART: Regular rate and rhythm; No murmurs, rubs, or gallops  ABDOMEN: Soft, Nontender, Nondistended; Bowel sounds present  EXTREMITIES:  2+ Peripheral Pulses, No clubbing, cyanosis, or edema  LYMPH: No lymphadenopathy noted  SKIN: No rashes or lesions    Consultant(s) Notes Reviewed:  [x ] YES  [ ] NO  Care Discussed with Consultants/Other Providers [ x] YES  [ ] NO    LABS:                       11.1   4.61  )-----------( 140      ( 26 Sep 2019 07:42 )             34.3   09-26    142  |  107  |  39<H>  ----------------------------<  77  3.9   |  29  |  1.88<H>    Ca    9.6      26 Sep 2019 07:42  Phos  2.8     09-26  Mg     2.1     09-26    TPro  7.6  /  Alb  3.4<L>  /  TBili  0.7  /  DBili  x   /  AST  38  /  ALT  38  /  AlkPhos  109  09-25          RADIOLOGY & ADDITIONAL TESTS:    Imaging Personally Reviewed:  [ ] YES  [ ] NO  acetaminophen   Tablet .. 650 milliGRAM(s) Oral every 6 hours PRN  aspirin enteric coated 81 milliGRAM(s) Oral daily  atorvastatin 40 milliGRAM(s) Oral at bedtime  docusate sodium 100 milliGRAM(s) Oral three times a day  furosemide    Tablet 60 milliGRAM(s) Oral daily  metoprolol tartrate 75 milliGRAM(s) Oral two times a day  pantoprazole    Tablet 40 milliGRAM(s) Oral before breakfast  senna 2 Tablet(s) Oral at bedtime      HEALTH ISSUES - PROBLEM Dx:  Prophylactic measure: Prophylactic measure  HLD (hyperlipidemia): HLD (hyperlipidemia)  HTN (hypertension): HTN (hypertension)  Depressed mood: Depressed mood  Acute on chronic renal failure: Acute on chronic renal failure  GERD (gastroesophageal reflux disease): GERD (gastroesophageal reflux disease)  Combined systolic and diastolic congestive heart failure, unspecified HF chronicity: Combined systolic and diastolic congestive heart failure, unspecified HF chronicity  Atrial fibrillation: Atrial fibrillation  Dysphagia: Dysphagia  Dyspnea: Dyspnea Patient is a 89y old  Male who presents with a chief complaint of shortness of breath, poor appetite (26 Sep 2019 15:51)      INTERVAL HPI/OVERNIGHT EVENTS: pt seen and examined, no overnight acute events.    REVIEW OF SYSTEMS:  CONSTITUTIONAL: No fever, weight loss, or fatigue  RESPIRATORY: No cough, wheezing, chills or hemoptysis; No shortness of breath  CARDIOVASCULAR: No chest pain, palpitations, dizziness, or leg swelling  GASTROINTESTINAL: No abdominal or epigastric pain. No nausea, vomiting; No diarrhea or constipation. No melena or hematochezia.  GENITOURINARY: No dysuria, frequency, hematuria, or incontinence  NEUROLOGICAL: No headaches, memory loss, loss of strength, numbness, or tremors  PSYCH: denies suicidality    FAMILY HISTORY:  Family history of colon cancer (Sibling)  Family history of ovarian cancer  Family history of cancer (Sibling)    Vital Signs Last 24 Hrs  T(C): 36.9 (26 Sep 2019 15:07), Max: 37.1 (26 Sep 2019 11:03)  T(F): 98.5 (26 Sep 2019 15:07), Max: 98.7 (26 Sep 2019 11:03)  HR: 77 (26 Sep 2019 15:07) (68 - 96)  BP: 97/63 (26 Sep 2019 15:07) (97/63 - 133/99)  BP(mean): --  RR: 18 (26 Sep 2019 15:07) (16 - 18)  SpO2: 98% (26 Sep 2019 15:07) (96% - 100%)    09-26-19 @ 07:01  -  09-26-19 @ 19:40  --------------------------------------------------------  IN: 430 mL / OUT: 0 mL / NET: 430 mL        PHYSICAL EXAM:  GENERAL: NAD, bitemporal wasting+  HEAD:  Atraumatic, Normocephalic  EYES: EOMI, PERRLA, conjunctiva and sclera clear  NERVOUS SYSTEM:  Alert & Oriented X3, Good concentration; Motor Strength 5/5 B/L upper and lower extremities; DTRs 2+ intact and symmetric  CHEST/LUNG: Clear to percussion bilaterally; No rales, rhonchi, wheezing, or rubs  HEART: S1S2+  ABDOMEN: Soft, Nontender, Nondistended; Bowel sounds present  EXTREMITIES:  2+ Peripheral Pulses, No clubbing, cyanosis, or edema  SKIN: warm and dry    Consultant(s) Notes Reviewed:  [x ] YES  [ ] NO  Care Discussed with Consultants/Other Providers [ x] YES  [ ] NO    LABS:                       11.1   4.61  )-----------( 140      ( 26 Sep 2019 07:42 )             34.3   09-26    142  |  107  |  39<H>  ----------------------------<  77  3.9   |  29  |  1.88<H>    Ca    9.6      26 Sep 2019 07:42  Phos  2.8     09-26  Mg     2.1     09-26    TPro  7.6  /  Alb  3.4<L>  /  TBili  0.7  /  DBili  x   /  AST  38  /  ALT  38  /  AlkPhos  109  09-25      RADIOLOGY & ADDITIONAL TESTS:    Imaging Personally Reviewed:  [ ] YES  [ ] NO  acetaminophen   Tablet .. 650 milliGRAM(s) Oral every 6 hours PRN  aspirin enteric coated 81 milliGRAM(s) Oral daily  atorvastatin 40 milliGRAM(s) Oral at bedtime  docusate sodium 100 milliGRAM(s) Oral three times a day  furosemide    Tablet 60 milliGRAM(s) Oral daily  metoprolol tartrate 75 milliGRAM(s) Oral two times a day  pantoprazole    Tablet 40 milliGRAM(s) Oral before breakfast  senna 2 Tablet(s) Oral at bedtime      HEALTH ISSUES - PROBLEM Dx:  Prophylactic measure: Prophylactic measure  HLD (hyperlipidemia): HLD (hyperlipidemia)  HTN (hypertension): HTN (hypertension)  Depressed mood: Depressed mood  Acute on chronic renal failure: Acute on chronic renal failure  GERD (gastroesophageal reflux disease): GERD (gastroesophageal reflux disease)  Combined systolic and diastolic congestive heart failure, unspecified HF chronicity: Combined systolic and diastolic congestive heart failure, unspecified HF chronicity  Atrial fibrillation: Atrial fibrillation  Dysphagia: Dysphagia  Dyspnea: Dyspnea

## 2019-09-27 ENCOUNTER — TRANSCRIPTION ENCOUNTER (OUTPATIENT)
Age: 84
End: 2019-09-27

## 2019-09-27 DIAGNOSIS — Z71.89 OTHER SPECIFIED COUNSELING: ICD-10-CM

## 2019-09-27 LAB
ALBUMIN SERPL ELPH-MCNC: 3.1 G/DL — LOW (ref 3.5–5)
ALP SERPL-CCNC: 95 U/L — SIGNIFICANT CHANGE UP (ref 40–120)
ALT FLD-CCNC: 34 U/L DA — SIGNIFICANT CHANGE UP (ref 10–60)
ANION GAP SERPL CALC-SCNC: 6 MMOL/L — SIGNIFICANT CHANGE UP (ref 5–17)
APTT BLD: 49.6 SEC — HIGH (ref 27.5–36.3)
AST SERPL-CCNC: 33 U/L — SIGNIFICANT CHANGE UP (ref 10–40)
BASOPHILS # BLD AUTO: 0.06 K/UL — SIGNIFICANT CHANGE UP (ref 0–0.2)
BASOPHILS NFR BLD AUTO: 1.3 % — SIGNIFICANT CHANGE UP (ref 0–2)
BILIRUB SERPL-MCNC: 0.8 MG/DL — SIGNIFICANT CHANGE UP (ref 0.2–1.2)
BUN SERPL-MCNC: 36 MG/DL — HIGH (ref 7–18)
CALCIUM SERPL-MCNC: 9.3 MG/DL — SIGNIFICANT CHANGE UP (ref 8.4–10.5)
CHLORIDE SERPL-SCNC: 106 MMOL/L — SIGNIFICANT CHANGE UP (ref 96–108)
CO2 SERPL-SCNC: 26 MMOL/L — SIGNIFICANT CHANGE UP (ref 22–31)
CREAT SERPL-MCNC: 1.55 MG/DL — HIGH (ref 0.5–1.3)
EOSINOPHIL # BLD AUTO: 0.45 K/UL — SIGNIFICANT CHANGE UP (ref 0–0.5)
EOSINOPHIL NFR BLD AUTO: 9.7 % — HIGH (ref 0–6)
GLUCOSE SERPL-MCNC: 89 MG/DL — SIGNIFICANT CHANGE UP (ref 70–99)
HCT VFR BLD CALC: 33.8 % — LOW (ref 39–50)
HGB BLD-MCNC: 11.1 G/DL — LOW (ref 13–17)
IMM GRANULOCYTES NFR BLD AUTO: 0.2 % — SIGNIFICANT CHANGE UP (ref 0–1.5)
INR BLD: 4.53 RATIO — HIGH (ref 0.88–1.16)
LYMPHOCYTES # BLD AUTO: 1.49 K/UL — SIGNIFICANT CHANGE UP (ref 1–3.3)
LYMPHOCYTES # BLD AUTO: 32.3 % — SIGNIFICANT CHANGE UP (ref 13–44)
MAGNESIUM SERPL-MCNC: 1.9 MG/DL — SIGNIFICANT CHANGE UP (ref 1.6–2.6)
MCHC RBC-ENTMCNC: 30.2 PG — SIGNIFICANT CHANGE UP (ref 27–34)
MCHC RBC-ENTMCNC: 32.8 GM/DL — SIGNIFICANT CHANGE UP (ref 32–36)
MCV RBC AUTO: 92.1 FL — SIGNIFICANT CHANGE UP (ref 80–100)
MONOCYTES # BLD AUTO: 0.3 K/UL — SIGNIFICANT CHANGE UP (ref 0–0.9)
MONOCYTES NFR BLD AUTO: 6.5 % — SIGNIFICANT CHANGE UP (ref 2–14)
NEUTROPHILS # BLD AUTO: 2.31 K/UL — SIGNIFICANT CHANGE UP (ref 1.8–7.4)
NEUTROPHILS NFR BLD AUTO: 50 % — SIGNIFICANT CHANGE UP (ref 43–77)
NRBC # BLD: 0 /100 WBCS — SIGNIFICANT CHANGE UP (ref 0–0)
PHOSPHATE SERPL-MCNC: 2.7 MG/DL — SIGNIFICANT CHANGE UP (ref 2.5–4.5)
PLATELET # BLD AUTO: 129 K/UL — LOW (ref 150–400)
POTASSIUM SERPL-MCNC: 3.8 MMOL/L — SIGNIFICANT CHANGE UP (ref 3.5–5.3)
POTASSIUM SERPL-SCNC: 3.8 MMOL/L — SIGNIFICANT CHANGE UP (ref 3.5–5.3)
PROT SERPL-MCNC: 7.1 G/DL — SIGNIFICANT CHANGE UP (ref 6–8.3)
PROTHROM AB SERPL-ACNC: 52.7 SEC — HIGH (ref 10–12.9)
RBC # BLD: 3.67 M/UL — LOW (ref 4.2–5.8)
RBC # FLD: 15.3 % — HIGH (ref 10.3–14.5)
SODIUM SERPL-SCNC: 138 MMOL/L — SIGNIFICANT CHANGE UP (ref 135–145)
WBC # BLD: 4.62 K/UL — SIGNIFICANT CHANGE UP (ref 3.8–10.5)
WBC # FLD AUTO: 4.62 K/UL — SIGNIFICANT CHANGE UP (ref 3.8–10.5)

## 2019-09-27 PROCEDURE — 74220 X-RAY XM ESOPHAGUS 1CNTRST: CPT | Mod: 26

## 2019-09-27 PROCEDURE — 99232 SBSQ HOSP IP/OBS MODERATE 35: CPT | Mod: GC

## 2019-09-27 RX ADMIN — Medication 81 MILLIGRAM(S): at 11:38

## 2019-09-27 RX ADMIN — ATORVASTATIN CALCIUM 40 MILLIGRAM(S): 80 TABLET, FILM COATED ORAL at 22:13

## 2019-09-27 RX ADMIN — Medication 100 MILLIGRAM(S): at 05:31

## 2019-09-27 RX ADMIN — PANTOPRAZOLE SODIUM 40 MILLIGRAM(S): 20 TABLET, DELAYED RELEASE ORAL at 06:08

## 2019-09-27 RX ADMIN — SENNA PLUS 2 TABLET(S): 8.6 TABLET ORAL at 22:13

## 2019-09-27 RX ADMIN — Medication 100 MILLIGRAM(S): at 22:13

## 2019-09-27 RX ADMIN — Medication 100 MILLIGRAM(S): at 13:02

## 2019-09-27 NOTE — SWALLOW BEDSIDE ASSESSMENT ADULT - SLP PERTINENT HISTORY OF CURRENT PROBLEM
Pt is 89 y/o male from home. PMHx of HTN, HLD, afib, CKD, colon cancer, prostate cancer, severe MR s/p clipping. Admitted for decreased appetite and SOB. Pt c/o difficulty swallowing; he reports coughing every time he eats and tends to bring up clear phlegm. Reports sour tasking reflux after eating. Reports losing 40 lbs in 8 months.

## 2019-09-27 NOTE — DISCHARGE NOTE PROVIDER - CARE PROVIDER_API CALL
Hailey Jefefry)  Family Medicine  [address] [place] [address]  Phone: [phone]  Fax: [phone]  Follow Up Time: 2 weeks Hailey Jeffery)  Family Medicine  [address] [place] [address]  Phone: [phone]  Fax: [phone]  Follow Up Time: 1-3 days    Cliff Arroyo)  Gastroenterology; Internal Medicine  [address] [place] [address]  Phone: [phone]  Fax: [phone]  Follow Up Time: 1 week

## 2019-09-27 NOTE — DISCHARGE NOTE PROVIDER - PROVIDER TOKENS
PROVIDER:[TOKEN:[[id number]],FOLLOWUP:[2 weeks]] PROVIDER:[TOKEN:[[id number]],FOLLOWUP:[1-3 days]],PROVIDER:[TOKEN:[[id number]],FOLLOWUP:[1 week]]

## 2019-09-27 NOTE — PROGRESS NOTE ADULT - PROBLEM SELECTOR PLAN 5
-p/w creatinine of 2.13, baseline around 1.5 likely overdiuresis from Lasix   -Cr. Improving, trending close to pt's baseline   -cont Lasix at current dose   -cont to hold off IVF due to mod CHF exacerbation   -Mon BMP

## 2019-09-27 NOTE — PROGRESS NOTE ADULT - SUBJECTIVE AND OBJECTIVE BOX
Patient is a 89y old  Male who presents with a chief complaint of shortness of breath, poor appetite (27 Sep 2019 10:17)      INTERVAL HPI/OVERNIGHT EVENTS: no acute events overnight, tolerating mechanical soft diet, appears in good spirits, offering no new complaints         REVIEW OF SYSTEMS:  CONSTITUTIONAL: No fever, chills  NECK: No pain or stiffness  RESPIRATORY: No cough, SOB  CARDIOVASCULAR: No chest pain, palpitations  GASTROINTESTINAL: No abdominal pain. No nausea, vomiting, or diarrhea  GENITOURINARY: No dysuria  NEUROLOGICAL: No HA  MUSCULOSKELETAL: No joint pain or swelling; No muscle, back, or extremity pain    T(C): 36.3 (09-27-19 @ 07:43), Max: 36.9 (09-26-19 @ 15:07)  HR: 98 (09-27-19 @ 07:43) (50 - 98)  BP: 133/87 (09-27-19 @ 07:43) (97/63 - 133/87)  RR: 16 (09-27-19 @ 07:43) (16 - 18)  SpO2: 99% (09-27-19 @ 07:43) (97% - 100%)  Wt(kg): --Vital Signs Last 24 Hrs  T(C): 36.3 (27 Sep 2019 07:43), Max: 36.9 (26 Sep 2019 15:07)  T(F): 97.3 (27 Sep 2019 07:43), Max: 98.5 (26 Sep 2019 15:07)  HR: 98 (27 Sep 2019 07:43) (50 - 98)  BP: 133/87 (27 Sep 2019 07:43) (97/63 - 133/87)  BP(mean): --  RR: 16 (27 Sep 2019 07:43) (16 - 18)  SpO2: 99% (27 Sep 2019 07:43) (97% - 100%)    MEDICATIONS  (STANDING):  aspirin enteric coated 81 milliGRAM(s) Oral daily  atorvastatin 40 milliGRAM(s) Oral at bedtime  docusate sodium 100 milliGRAM(s) Oral three times a day  furosemide    Tablet 60 milliGRAM(s) Oral daily  metoprolol tartrate 75 milliGRAM(s) Oral two times a day  pantoprazole    Tablet 40 milliGRAM(s) Oral before breakfast  senna 2 Tablet(s) Oral at bedtime    MEDICATIONS  (PRN):  acetaminophen   Tablet .. 650 milliGRAM(s) Oral every 6 hours PRN Severe Pain (7 - 10)      PHYSICAL EXAM:  GENERAL: NAD, b/l temporal wasting  EYES: clear conjunctiva  ENMT: Moist mucous membranes  NECK: Supple, No JVD  CHEST/LUNG: Clear to diminished bilaterally; No rales, rhonchi, wheezing, or rubs  HEART: S1, S2, Regular rate and rhythm  ABDOMEN: Soft, Nontender, Nondistended; Bowel sounds present  NEURO: Alert & Oriented X3  EXTREMITIES: No LE edema, no calf tenderness    Consultant(s) Notes Reviewed:  [x ] YES  [ ] NO  Care Discussed with Consultants/Other Providers [ x] YES  [ ] NO    LABS:                        11.1   4.62  )-----------( 129      ( 27 Sep 2019 05:47 )             33.8     09-27    138  |  106  |  36<H>  ----------------------------<  89  3.8   |  26  |  1.55<H>    Ca    9.3      27 Sep 2019 05:47  Phos  2.7     09-27  Mg     1.9     09-27    TPro  7.1  /  Alb  3.1<L>  /  TBili  0.8  /  DBili  x   /  AST  33  /  ALT  34  /  AlkPhos  95  09-27    PT/INR - ( 27 Sep 2019 05:47 )   PT: 52.7 sec;   INR: 4.53 ratio         PTT - ( 27 Sep 2019 05:47 )  PTT:49.6 sec    CAPILLARY BLOOD GLUCOSE    RADIOLOGY & ADDITIONAL TESTS:    < from: CT Chest No Cont (09.26.19 @ 02:33) >    EXAM:  CT CHEST                            PROCEDURE DATE:  09/26/2019          INTERPRETATION:  HISTORY: Shortness of breath. Recurrent falls.   Generalized weakness.    TECHNIQUE: A non-contrast CT scan of the chest was performed from the   thoracic inlet to the adrenal glands. Coronal and sagittal reformatted   images are provided.    COMPARISON: CT of the chest from 10/10/2018    FINDINGS:   Evaluation of lung parenchyma demonstrates no pulmonary contusion or   hemorrhage. Interval resolution of bilateral pleural effusions. There is   no suspicious pulmonary nodule or mass. Calcified granuloma in the left   lower lobe. There is no pneumothorax. The trachea and main bronchi are   clear.    The heart is again upper limits of normal in size. There is no   pericardial effusion. Interval placement of a cardiac device at the over   the left ventricle.    The thoracic aorta and main pulmonary arteries are normal in caliber.     The thyroid gland is unremarkable.    There is no significant mediastinal or hilar adenopathy. Bilateral   supraclavicular and axillary lymph nodes suggested appear unchanged from   the prior exam.    Images of the upper abdomen are unremarkable.    The osseous structures demonstrates no acutely displaced rib fracture or   thoracic vertebral compression fracture deformity. There are no acute   osseous abnormalities. Generalized osteopenia is again noted.    IMPRESSION:   No acute or posttraumatic finding in the thorax on this noncontrast chest   CT.    < end of copied text >    < from: CT Head No Cont (09.26.19 @ 02:33) >    EXAM:  CT BRAIN                            PROCEDURE DATE:  09/26/2019          INTERPRETATION:  CLINICAL HISTORY:  Recurrent falls, generalized   weakness, dyspnea. On Coumadin.    TECHNIQUE:  CT of the head without contrast.  Contiguous transaxial images of the head were acquired from the skull   base to the vertex without the administration of iodinated contrast.   Coronal and sagittal reformatted images are provided.     COMPARISON: CT of the head dated 9/20/118.    FINDINGS:    There is noacute intracranial hemorrhage, mass effect from vasogenic   edema or evidence for acute large vascular territory infarct. Stable   coarse calcification in the left side for bowel adjacent volume loss and   possible scoliosis. Stable mild chronic microvascular changes.    The ventricles, sulci and cisternal spaces are stable in size and   configuration demonstrating age-appropriate cerebral volume loss.  There   is no midline shift or abnormal extra-axial fluid collection.    The calvarium is intact.  There are no osteoblastic or lytic calvarial or   skull base lesions.  The paranasal sinuses and mastoid air cells are   clear. Left orbit glaucoma drainage device again seen and unchanged.   Unchanged.    IMPRESSION:  Stable exam. No acute intracranial hemorrhage or calvarial fracture.   Chronic findings as above.                < end of copied text >      Imaging Personally Reviewed:  [ ] YES  [ ] NO Patient is a 89y old  Male who presents with a chief complaint of shortness of breath, poor appetite (27 Sep 2019 10:17)      INTERVAL HPI/OVERNIGHT EVENTS: no acute events overnight, tolerating mechanical soft diet, appears in good spirits, offering no new complaints         REVIEW OF SYSTEMS:  CONSTITUTIONAL: No fever, chills  NECK: No pain or stiffness  RESPIRATORY: No cough, SOB  CARDIOVASCULAR: No chest pain, palpitations  GASTROINTESTINAL: No abdominal pain. No nausea, vomiting, or diarrhea  GENITOURINARY: No dysuria  NEUROLOGICAL: No HA  MUSCULOSKELETAL: No joint pain or swelling; No muscle, back, or extremity pain    T(C): 36.3 (09-27-19 @ 07:43), Max: 36.9 (09-26-19 @ 15:07)  HR: 98 (09-27-19 @ 07:43) (50 - 98)  BP: 133/87 (09-27-19 @ 07:43) (97/63 - 133/87)  RR: 16 (09-27-19 @ 07:43) (16 - 18)  SpO2: 99% (09-27-19 @ 07:43) (97% - 100%)  Wt(kg): --Vital Signs Last 24 Hrs  T(C): 36.3 (27 Sep 2019 07:43), Max: 36.9 (26 Sep 2019 15:07)  T(F): 97.3 (27 Sep 2019 07:43), Max: 98.5 (26 Sep 2019 15:07)  HR: 98 (27 Sep 2019 07:43) (50 - 98)  BP: 133/87 (27 Sep 2019 07:43) (97/63 - 133/87)  BP(mean): --  RR: 16 (27 Sep 2019 07:43) (16 - 18)  SpO2: 99% (27 Sep 2019 07:43) (97% - 100%)    MEDICATIONS  (STANDING):  aspirin enteric coated 81 milliGRAM(s) Oral daily  atorvastatin 40 milliGRAM(s) Oral at bedtime  docusate sodium 100 milliGRAM(s) Oral three times a day  furosemide    Tablet 60 milliGRAM(s) Oral daily  metoprolol tartrate 75 milliGRAM(s) Oral two times a day  pantoprazole    Tablet 40 milliGRAM(s) Oral before breakfast  senna 2 Tablet(s) Oral at bedtime    MEDICATIONS  (PRN):  acetaminophen   Tablet .. 650 milliGRAM(s) Oral every 6 hours PRN Severe Pain (7 - 10)      PHYSICAL EXAM:  GENERAL: NAD, b/l temporal wasting  EYES: clear conjunctiva  ENMT: Moist mucous membranes  NECK: Supple, No JVD  CHEST/LUNG: Clear to diminished bilaterally; No rales, rhonchi, wheezing, or rubs  HEART: S1, S2,+  ABDOMEN: Soft, Nontender, Nondistended; Bowel sounds present  NEURO: Alert & Oriented X3  EXTREMITIES: No LE edema, no calf tenderness    Consultant(s) Notes Reviewed:  [x ] YES  [ ] NO  Care Discussed with Consultants/Other Providers [ x] YES  [ ] NO    LABS:                        11.1   4.62  )-----------( 129      ( 27 Sep 2019 05:47 )             33.8     09-27    138  |  106  |  36<H>  ----------------------------<  89  3.8   |  26  |  1.55<H>    Ca    9.3      27 Sep 2019 05:47  Phos  2.7     09-27  Mg     1.9     09-27    TPro  7.1  /  Alb  3.1<L>  /  TBili  0.8  /  DBili  x   /  AST  33  /  ALT  34  /  AlkPhos  95  09-27    PT/INR - ( 27 Sep 2019 05:47 )   PT: 52.7 sec;   INR: 4.53 ratio         PTT - ( 27 Sep 2019 05:47 )  PTT:49.6 sec    CAPILLARY BLOOD GLUCOSE    RADIOLOGY & ADDITIONAL TESTS:    < from: CT Chest No Cont (09.26.19 @ 02:33) >    EXAM:  CT CHEST                            PROCEDURE DATE:  09/26/2019          INTERPRETATION:  HISTORY: Shortness of breath. Recurrent falls.   Generalized weakness.    TECHNIQUE: A non-contrast CT scan of the chest was performed from the   thoracic inlet to the adrenal glands. Coronal and sagittal reformatted   images are provided.    COMPARISON: CT of the chest from 10/10/2018    FINDINGS:   Evaluation of lung parenchyma demonstrates no pulmonary contusion or   hemorrhage. Interval resolution of bilateral pleural effusions. There is   no suspicious pulmonary nodule or mass. Calcified granuloma in the left   lower lobe. There is no pneumothorax. The trachea and main bronchi are   clear.    The heart is again upper limits of normal in size. There is no   pericardial effusion. Interval placement of a cardiac device at the over   the left ventricle.    The thoracic aorta and main pulmonary arteries are normal in caliber.     The thyroid gland is unremarkable.    There is no significant mediastinal or hilar adenopathy. Bilateral   supraclavicular and axillary lymph nodes suggested appear unchanged from   the prior exam.    Images of the upper abdomen are unremarkable.    The osseous structures demonstrates no acutely displaced rib fracture or   thoracic vertebral compression fracture deformity. There are no acute   osseous abnormalities. Generalized osteopenia is again noted.    IMPRESSION:   No acute or posttraumatic finding in the thorax on this noncontrast chest   CT.    < end of copied text >    < from: CT Head No Cont (09.26.19 @ 02:33) >    EXAM:  CT BRAIN                            PROCEDURE DATE:  09/26/2019          INTERPRETATION:  CLINICAL HISTORY:  Recurrent falls, generalized   weakness, dyspnea. On Coumadin.    TECHNIQUE:  CT of the head without contrast.  Contiguous transaxial images of the head were acquired from the skull   base to the vertex without the administration of iodinated contrast.   Coronal and sagittal reformatted images are provided.     COMPARISON: CT of the head dated 9/20/118.    FINDINGS:    There is noacute intracranial hemorrhage, mass effect from vasogenic   edema or evidence for acute large vascular territory infarct. Stable   coarse calcification in the left side for bowel adjacent volume loss and   possible scoliosis. Stable mild chronic microvascular changes.    The ventricles, sulci and cisternal spaces are stable in size and   configuration demonstrating age-appropriate cerebral volume loss.  There   is no midline shift or abnormal extra-axial fluid collection.    The calvarium is intact.  There are no osteoblastic or lytic calvarial or   skull base lesions.  The paranasal sinuses and mastoid air cells are   clear. Left orbit glaucoma drainage device again seen and unchanged.   Unchanged.    IMPRESSION:  Stable exam. No acute intracranial hemorrhage or calvarial fracture.   Chronic findings as above.                < end of copied text >      Imaging Personally Reviewed:  [ ] YES  [ ] NO

## 2019-09-27 NOTE — PROGRESS NOTE ADULT - PROBLEM SELECTOR PLAN 1
-P/w ongoing difficulty swallowing with choking sensation and regurgitation   -high risk candidate for anesthesia related complication with EGD   -f/u Barium Esophagram    -tolerating mechanical soft diet   -seen by speech and swallow- recommending regular diet   -GI Dr. Barillas

## 2019-09-27 NOTE — DISCHARGE NOTE PROVIDER - NSDCPNSUBOBJ_GEN_ALL_CORE
MEDICAL ATTENDING DISCHARGE NOTE :        Patient is a 89y old  Male who presents with a chief complaint of shortness of breath, poor appetite (30 Sep 2019 10:00)            INTERVAL HPI / OVERNIGHT EVENTS: patient with uneventful night and offers no new complaints        ----------------------------------------------------------------------------------    REVIEW OF SYSTEMS: no fever; no SOB            Vital Signs Last 24 Hrs    T(C): 36.8 (30 Sep 2019 15:39), Max: 36.8 (30 Sep 2019 15:39)    T(F): 98.2 (30 Sep 2019 15:39), Max: 98.2 (30 Sep 2019 15:39)    HR: 88 (30 Sep 2019 15:39) (86 - 97)    BP: 103/76 (30 Sep 2019 15:39) (103/76 - 120/76)    BP(mean): --    RR: 16 (30 Sep 2019 15:39) (16 - 16)    SpO2: 100% (30 Sep 2019 15:39) (97% - 100%)        _________________    PHYSICAL EXAM:    ---------------------------     NAD; Normocephalic    LUNGS - no wheezing    HEART: S1 S2+     ABDOMEN: Soft, Nontender, non distended    EXTREMITIES: no cyanosis; no edema            _________________________________________________    LABS:                            10.8     4.46  )-----------( 114      ( 30 Sep 2019 07:16 )               33.2         09-30        140  |  106  |  29<H>    ----------------------------<  75    3.7   |  29  |  1.51<H>        Ca    9.1      30 Sep 2019 07:16            PT/INR - ( 30 Sep 2019 07:16 )   PT: 28.4 sec;   INR: 2.49 ratio               PTT - ( 30 Sep 2019 07:16 )  PTT:40.6 sec            A/P: 89 y/o M with     1. Dysphagia due to esophageal spasms    2. 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 debility    4. Moderate to Severe protein calorie malnutrition    5. AFIB / Flutter    6. Depressed Mood    7. CKD 3    8. Debility and generalized muscle weakness        clinically stable for discharge     Outptient PCP and GI follow up                Plan of care, test results and findings were  discussed with patient ( and HCP &/or primary care giver) ; all questions and concerns were addressed and care was aligned with patient's wishes.    PMD follow up after discharge from the hospital for continued care and outpatient monitoring was advised.    Discharge plans has been  discussed with care team including the housestaff, nursing staff  and discharge planners- ( /  .)

## 2019-09-27 NOTE — PHYSICAL THERAPY INITIAL EVALUATION ADULT - IMPAIRMENTS FOUND, PT EVAL
ventilation and respiration/gas exchange/aerobic capacity/endurance/muscle strength/gait, locomotion, and balance/gross motor

## 2019-09-27 NOTE — PROGRESS NOTE ADULT - PROBLEM SELECTOR PLAN 3
-Known hx of CHF with HFrEF of 35%, moderate-severe LV systolic dysfunction, decreased RV systolic function, severe LA enlargement; severe MR s/p MV clip consistent with chronic systolic and diastolic heart failure  -cont Lasix, asa, statin and BB  -consider adding ACEi if renal function continues to improve   -1L fluid restriction  -Cardio Dr. Salazar

## 2019-09-27 NOTE — PROGRESS NOTE ADULT - SUBJECTIVE AND OBJECTIVE BOX
Patient denies chest pain or shortness of breath.   Review of systems otherwise (-)  	  MEDICATIONS:  MEDICATIONS  (STANDING):  aspirin enteric coated 81 milliGRAM(s) Oral daily  atorvastatin 40 milliGRAM(s) Oral at bedtime  docusate sodium 100 milliGRAM(s) Oral three times a day  furosemide    Tablet 60 milliGRAM(s) Oral daily  metoprolol tartrate 75 milliGRAM(s) Oral two times a day  pantoprazole    Tablet 40 milliGRAM(s) Oral before breakfast  senna 2 Tablet(s) Oral at bedtime      LABS:	 	    CARDIAC MARKERS:  CARDIAC MARKERS ( 26 Sep 2019 09:26 )  <0.015 ng/mL / x     / 104 U/L / x     / 1.0 ng/mL  CARDIAC MARKERS ( 25 Sep 2019 22:30 )  <0.015 ng/mL / x     / x     / x     / x                              11.1   4.62  )-----------( 129      ( 27 Sep 2019 05:47 )             33.8     Hemoglobin: 11.1 g/dL (09-27 @ 05:47)  Hemoglobin: 11.1 g/dL (09-26 @ 07:42)  Hemoglobin: 10.8 g/dL (09-25 @ 22:30)      09-27    138  |  106  |  36<H>  ----------------------------<  89  3.8   |  26  |  1.55<H>    Ca    9.3      27 Sep 2019 05:47  Phos  2.7     09-27  Mg     1.9     09-27    TPro  7.1  /  Alb  3.1<L>  /  TBili  0.8  /  DBili  x   /  AST  33  /  ALT  34  /  AlkPhos  95  09-27    Creatinine Trend: 1.55<--, 1.88<--, 2.13<--    COAGS:   PT/INR - ( 27 Sep 2019 05:47 )   PT: 52.7 sec;   INR: 4.53 ratio         PTT - ( 27 Sep 2019 05:47 )  PTT:49.6 sec    PHYSICAL EXAM:  T(C): 36.3 (09-27-19 @ 07:43), Max: 37.1 (09-26-19 @ 11:03)  HR: 98 (09-27-19 @ 07:43) (50 - 98)  BP: 133/87 (09-27-19 @ 07:43) (97/63 - 133/87)  RR: 16 (09-27-19 @ 07:43) (16 - 18)  SpO2: 99% (09-27-19 @ 07:43) (97% - 100%)  Wt(kg): --  I&O's Summary    26 Sep 2019 07:01  -  27 Sep 2019 07:00  --------------------------------------------------------  IN: 430 mL / OUT: 0 mL / NET: 430 mL      Gen: Appears well in NAD  HEENT:  (-)icterus (-)pallor  CV: N S1 S2 1/6 CARL (+)2 Pulses B/l  Resp:  Clear to ausculatation B/L, normal effort  GI: (+) BS Soft, NT, ND  Lymph:  (-)Edema, (-)obvious lymphadenopathy  Skin: Warm to touch, Normal turgor  Psych: Appropriate mood and affect      ASSESSMENT/PLAN: 	89y  Male MH HTN, HLD, afib (on coumadin), severe MR s/p clipping with residual moderate to sever MR, severe LV dysfunction s/p cath in 11/2018 (no obstructive), CKD (not on ACE) hyponatremia, colon cancer (s/p chemo, in remission), prostate cancer (s/p RT in [address]), presented to ER for decreased appetite.       - Well compensated from a CHF prospective  - HR appear well controlled, would cont prior dose of Toprol  Daily  - Cont LAsix 60 mg PO daily  - would like to add ACE if crt remains stable, will d/w primary nephrologist at his next office visit  - Dysphagia w/u in progress  - No need for further inpatient cardiac work up.    Nabor Salazar MD, [facility]  BEEPER [phone]

## 2019-09-27 NOTE — DISCHARGE NOTE PROVIDER - NSDCCPCAREPLAN_GEN_ALL_CORE_FT
PRINCIPAL DISCHARGE DIAGNOSIS  Diagnosis: Dysphagia  Assessment and Plan of Treatment: you were admitted for coughing with food and difficulty swallowing.  you were seen by speech pathologist and swallowing test was done and you were able to swallow food. your diet was advanced and you tolerated. follow up with GI doctor and PCP outpt within 1 week of discharge      SECONDARY DISCHARGE DIAGNOSES  Diagnosis: HTN (hypertension)  Assessment and Plan of Treatment: Low salt diet  Activity as tolerated.  Take all medication as prescribed.  Follow up with your medical doctor for routine blood pressure monitoring at your next visit.  Notify your doctor if you have any of the following symptoms:   Dizziness, Lightheadedness, Blurry vision, Headache, Chest pain, Shortness of breath      Diagnosis: Combined systolic and diastolic congestive heart failure, unspecified HF chronicity  Assessment and Plan of Treatment: Weigh yourself daily.  If you gain 3lbs in 3 days, or 5lbs in a week call your Health Care Provider.  Do not eat or drink foods containing more than 2000mg of salt (sodium) in your diet every day.  Call your Health Care Provider if you have any swelling or increased swelling in your feet, ankles, and/or stomach.  Take all of your medication as directed.  If you become dizzy call your Health Care Provider.      Diagnosis: Atrial fibrillation  Assessment and Plan of Treatment: continue taking Coumadin as prescribed   -get your INR checked in 2-3 days after discharge PRINCIPAL DISCHARGE DIAGNOSIS  Diagnosis: Dysphagia  Assessment and Plan of Treatment: you were admitted for coughing with food and difficulty swallowing.  you were seen by GI Dr. Barillas and X ray of esophagus was done which showed esophageal spasm and reflux which is causing your symptoms. you were also seen by speech pathologist and swallowing test was done and you were able to swallow food. your diet was advanced and you tolerated. follow up with GI doctor and PCP outpt within 1 week of discharge  -chew food slowly   -head of bed >50 degrees while eating and at least 30 mins after      SECONDARY DISCHARGE DIAGNOSES  Diagnosis: HTN (hypertension)  Assessment and Plan of Treatment: Low salt diet  Activity as tolerated.  Take all medication as prescribed.  Follow up with your medical doctor for routine blood pressure monitoring at your next visit.  Notify your doctor if you have any of the following symptoms:   Dizziness, Lightheadedness, Blurry vision, Headache, Chest pain, Shortness of breath      Diagnosis: Combined systolic and diastolic congestive heart failure, unspecified HF chronicity  Assessment and Plan of Treatment: Weigh yourself daily.  If you gain 3lbs in 3 days, or 5lbs in a week call your Health Care Provider.  Do not eat or drink foods containing more than 2000mg of salt (sodium) in your diet every day.  Call your Health Care Provider if you have any swelling or increased swelling in your feet, ankles, and/or stomach.  Take all of your medication as directed.  If you become dizzy call your Health Care Provider.      Diagnosis: Atrial fibrillation  Assessment and Plan of Treatment: continue taking Coumadin as prescribed   -get your INR checked in 2-3 days after discharge PRINCIPAL DISCHARGE DIAGNOSIS  Diagnosis: Dysphagia  Assessment and Plan of Treatment: you were admitted for coughing with food and difficulty swallowing.  you were seen by GI Dr. Barillas and X ray of esophagus was done which showed esophageal spasm and reflux which is causing your symptoms. you were also seen by speech pathologist and swallowing test was done and you were able to swallow food. your diet was advanced and you tolerated. follow up with GI doctor and PCP outpt within 1 week of discharge  -consume soft diet and the food that is easier to chew and swallow   -chew food slowly   -head of bed >50 degrees while eating and at least 30 mins after      SECONDARY DISCHARGE DIAGNOSES  Diagnosis: HTN (hypertension)  Assessment and Plan of Treatment: Low salt diet  Activity as tolerated.  Take all medication as prescribed.  Follow up with your medical doctor for routine blood pressure monitoring at your next visit.  Notify your doctor if you have any of the following symptoms:   Dizziness, Lightheadedness, Blurry vision, Headache, Chest pain, Shortness of breath      Diagnosis: Combined systolic and diastolic congestive heart failure, unspecified HF chronicity  Assessment and Plan of Treatment: Weigh yourself daily.  If you gain 3lbs in 3 days, or 5lbs in a week call your Health Care Provider.  Do not eat or drink foods containing more than 2000mg of salt (sodium) in your diet every day.  Call your Health Care Provider if you have any swelling or increased swelling in your feet, ankles, and/or stomach.  Take all of your medication as directed.  If you become dizzy call your Health Care Provider.      Diagnosis: Atrial fibrillation  Assessment and Plan of Treatment: continue taking Coumadin as prescribed   -get your INR checked in 2-3 days after discharge

## 2019-09-27 NOTE — DISCHARGE NOTE PROVIDER - CARE PROVIDERS DIRECT ADDRESSES
,[email or web address]

## 2019-09-27 NOTE — PHYSICAL THERAPY INITIAL EVALUATION ADULT - GENERAL OBSERVATIONS, REHAB EVAL
pt aox4 pleasant, assisted pca oob chair, fairly functional baseline rw transfers and ambulation for brp

## 2019-09-27 NOTE — SWALLOW BEDSIDE ASSESSMENT ADULT - CONSISTENCIES ADMINISTERED
apple sauce x4/puree mech soft/apple sauce and ihsan crackers x3 thin liquid/ice x2 solid/ihsan cracker x2 x3 (single and consecutive)/thin liquid

## 2019-09-27 NOTE — SWALLOW BEDSIDE ASSESSMENT ADULT - ASR SWALLOW ASPIRATION MONITOR
position upright (89Y)/fever/throat clearing/change of breathing pattern/oral hygiene/cough/gurgly voice/pneumonia/upper respiratory infection

## 2019-09-27 NOTE — SWALLOW BEDSIDE ASSESSMENT ADULT - SWALLOW EVAL: DIAGNOSIS
Pt presented with no clinical signs of dysphagia. No s/s of overt aspiration were observed at bedside.

## 2019-09-27 NOTE — SWALLOW BEDSIDE ASSESSMENT ADULT - COMMENTS
Pt seen at bedside, HOB elevated to 90 degrees. Pt AAOx3 (name, place, date). Pt observed talking while chewing; no coughing during or following trial.

## 2019-09-27 NOTE — DISCHARGE NOTE PROVIDER - HOSPITAL COURSE
Patient is a 89yo male from home, lives with wife, PMH HTN, HLD, afib (on coumadin), severe MR s/p clipping, s/p cath in 11/2018 (no obstructive), CKD, hyponatremia, colon cancer (s/p chemo, in remission), prostate cancer (s/p RT in [address]), presented to ER for decreased appetite and worsening shortness of breath. History obtained from both patient and his spouse at bedside, who report decreased appetite since 8 months. Patient reports symptoms started s/p mitraclip surgery in december 2018, with difficulty swallowing. He reports he feels like food is stuck in middle of upper chest, mostly noted with meat but is able to swallow other food. He reports coughing every time he eats and tends to bring up clear phlegm, also reports episodes of sour tasting reflux after he eats. He denies abdominal pain at rest but reports pain when he walks, has occasional constipation, and has lost over 40lbs in 8 months. He complains about being off greens in his diet 2/2 warfarin use which has affected his appetite greatly to the point he sometimes thinks of dying. He does report feeling depressed, with poor sleep due to bad dreams, does not feel happy doing activities he usually likes for fear of falling. Patient also reports episodes of difficulty breathing which wake him up from sleep early in the morning, denies chest pain, palpitations or CAMACHO. He does endorse occasional swelling of lower extremities. His Lasix was increased to 60mg daily with no relief in symptoms, also endorses having an echo in august 2019.     Not completed. Patient is a 88yo male from home, lives with wife, PMH HTN, HLD, afib (on coumadin), severe MR s/p clipping, s/p cath in 11/2018 (no obstructive), CKD, hyponatremia, colon cancer (s/p chemo, in remission), prostate cancer (s/p RT in [address]), presented to ER for decreased appetite and worsening shortness of breath. History obtained from both patient and his spouse at bedside, who report decreased appetite since 8 months. Patient reports symptoms started s/p mitraclip surgery in december 2018, with difficulty swallowing. He reports he feels like food is stuck in middle of upper chest, mostly noted with meat but is able to swallow other food. He reports coughing every time he eats and tends to bring up clear phlegm, also reports episodes of sour tasting reflux after he eats. He denies abdominal pain at rest but reports pain when he walks, has occasional constipation, and has lost over 40lbs in 8 months. He complains about being off greens in his diet 2/2 warfarin use which has affected his appetite greatly to the point he sometimes thinks of dying. He does report feeling depressed, with poor sleep due to bad dreams, does not feel happy doing activities he usually likes for fear of falling. Patient also reports episodes of difficulty breathing which wake him up from sleep early in the morning, denies chest pain, palpitations or CAMACHO. He does endorse occasional swelling of lower extremities. His Lasix was increased to 60mg daily with no relief in symptoms, also endorses having an echo in august 2019.     -initially admitted to tele for CHF exacerbation. transferred to medicine on 9/26    -Dysphagia- c/o ongoing difficulty swallowing with choking sensation and regurgitation, seen by GI, pt high risk candidate for anesthesia related complication with EGD,  Barium Esophagram  was completed tolerating mechanical soft diet, also seen by speech and swallow- recommending regular diet. no further episodes of coughing.     - Atrial fibrillation- pt on coumadin 3mg and metoprolol at home, 12 lead EKG with atrial flutter in 90's, remained rate controlled on Metoprolol. s/p tele monitoring. supra- therapeutic during hospital stay, goal 2-3    - Combined systolic and diastolic congestive heart failure, unspecified HF chronicity- Known hx of CHF with HFrEF of 35%, moderate-severe LV systolic dysfunction, decreased RV systolic function, severe LA enlargement; severe MR s/p MV clip consistent with chronic systolic and diastolic heart failure, continued on Lasix, asa, statin and BB and 1L fluid restriction, seen by Cardio Dr. Salazar.     - GERD (gastroesophageal reflux disease)- pt to continue PPI, seen by GI     - Acute on chronic renal failure- p/w creatinine of 2.13, baseline around 1.5 likely overdiuresis from Lasix, did not receive any IVF due to moderate CHF exacerbation, kidney function improved and trended down Patient is a 88yo male from home, lives with wife, PMH HTN, HLD, afib (on coumadin), severe MR s/p clipping, s/p cath in 11/2018 (no obstructive), CKD, hyponatremia, colon cancer (s/p chemo, in remission), prostate cancer (s/p RT in [address]), presented to ER for decreased appetite and worsening shortness of breath. History obtained from both patient and his spouse at bedside, who report decreased appetite since 8 months. Patient reports symptoms started s/p mitraclip surgery in december 2018, with difficulty swallowing. He reports he feels like food is stuck in middle of upper chest, mostly noted with meat but is able to swallow other food. He reports coughing every time he eats and tends to bring up clear phlegm, also reports episodes of sour tasting reflux after he eats. He denies abdominal pain at rest but reports pain when he walks, has occasional constipation, and has lost over 40lbs in 8 months. He complains about being off greens in his diet 2/2 warfarin use which has affected his appetite greatly to the point he sometimes thinks of dying. He does report feeling depressed, with poor sleep due to bad dreams, does not feel happy doing activities he usually likes for fear of falling. Patient also reports episodes of difficulty breathing which wake him up from sleep early in the morning, denies chest pain, palpitations or CAMACHO. He does endorse occasional swelling of lower extremities. His Lasix was increased to 60mg daily with no relief in symptoms, also endorses having an echo in august 2019.     -initially admitted to tele for CHF exacerbation. transferred to medicine on 9/26    -Dysphagia- c/o ongoing difficulty swallowing with choking sensation and regurgitation, seen by GI, pt high risk candidate for anesthesia related complication with EGD,  Barium Esophagram  was completed tolerating mechanical soft diet, also seen by speech and swallow- recommending regular diet. no further episodes of coughing.     - Atrial fibrillation- pt on coumadin 3mg and metoprolol at home, 12 lead EKG with atrial flutter in 90's, remained rate controlled on Metoprolol. s/p tele monitoring. supra- therapeutic during hospital stay, goal 2-3    - Combined systolic and diastolic congestive heart failure, unspecified HF chronicity- Known hx of CHF with HFrEF of 35%, moderate-severe LV systolic dysfunction, decreased RV systolic function, severe LA enlargement; severe MR s/p MV clip consistent with chronic systolic and diastolic heart failure, continued on Lasix, asa, statin and BB and 1L fluid restriction, seen by Cardio Dr. Salazar.     - GERD (gastroesophageal reflux disease)- pt to continue PPI, seen by GI     - Acute on chronic renal failure- p/w creatinine of 2.13, baseline around 1.5 likely overdiuresis from Lasix, did not receive any IVF due to moderate CHF exacerbation, kidney function improved and trended down to baseline     -pt was alsp seen by psych due to spouse's concern that pt is appearing more depressed and occasional  reports of suicidal thoughts, on psych eval pt's mood and affect is consistent with age and situation and their is no concerns of suicidal thoughts and ideation and no interventions were recommended currently

## 2019-09-27 NOTE — PROGRESS NOTE ADULT - SUBJECTIVE AND OBJECTIVE BOX
Subjective :    No complaints     Objective         MEDICATIONS  (STANDING):  aspirin enteric coated 81 milliGRAM(s) Oral daily  atorvastatin 40 milliGRAM(s) Oral at bedtime  docusate sodium 100 milliGRAM(s) Oral three times a day  furosemide    Tablet 60 milliGRAM(s) Oral daily  metoprolol tartrate 75 milliGRAM(s) Oral two times a day  pantoprazole    Tablet 40 milliGRAM(s) Oral before breakfast  senna 2 Tablet(s) Oral at bedtime    MEDICATIONS  (PRN):  acetaminophen   Tablet .. 650 milliGRAM(s) Oral every 6 hours PRN Severe Pain (7 - 10)      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  Pleural effusion, bilateral: as of latest chest xray  s/p latest hospitalization for CHF  CKD (chronic kidney disease)  Colon cancer  Prostate cancer  Mitral regurgitation  Hyponatremia  HLD (hyperlipidemia)  HTN (hypertension)  Atrial fibrillation  Prostate CA  GERD (gastroesophageal reflux disease)  Afib  HLD (hyperlipidemia)  HTN (hypertension)  S/P coronary angiogram: 18  History of appendectomy  H/O colectomy  History of prostate cancer    FAMILY HISTORY:  Family history of colon cancer (Sibling)  Family history of ovarian cancer  Family history of cancer (Sibling)    Social History: No history of : Tobacco use, IVDA, EToH  ______________________________________________________________________________________    PHYSICAL EXAM    Daily Height in cm: 185.42 (25 Sep 2019 21:04)    Daily Weight in k.6 (26 Sep 2019 03:30)  BMI: 16.5 ( @ 21:04)  Change in Weight:  Vital Signs Last 24 Hrs  T(C): 36.9 (26 Sep 2019 15:07), Max: 37.1 (26 Sep 2019 11:03)  T(F): 98.5 (26 Sep 2019 15:07), Max: 98.7 (26 Sep 2019 11:03)  HR: 77 (26 Sep 2019 15:07) (68 - 96)  BP: 97/63 (26 Sep 2019 15:07) (97/63 - 133/99)  BP(mean): --  RR: 18 (26 Sep 2019 15:07) (16 - 18)  SpO2: 98% (26 Sep 2019 15:07) (96% - 100%)    General:  NAD.  HEENT:    Normal appearance of conjunctiva, ears, nose, lips, oropharynx, and oral mucosa, anicteric.  Neck:  No masses, no asymmetry.  Lymph Nodes:  No lymphadenopathy.   Cardiovascular:  RRR normal S1/S2, no murmur.  Respiratory:  CTA B/L, normal respiratory effort.   Abdominal:   soft, no masses or tenderness, normoactive BS, NT/ND, no HSM.  Extremities:   No clubbing or cyanosis, normal capillary refill, no edema.   Skin:   No rash, jaundice, lesions, eczema.     _______________________________________________________________________________________________  Lab Results:                          11.1   4.61  )-----------( 140      ( 26 Sep 2019 07:42 )             34.3         142  |  107  |  39<H>  ----------------------------<  77  3.9   |  29  |  1.88<H>    Ca    9.6      26 Sep 2019 07:42  Phos  2.8       Mg     2.1         TPro  7.6  /  Alb  3.4<L>  /  TBili  0.7  /  DBili  x   /  AST  38  /  ALT  38  /  AlkPhos  109      LIVER FUNCTIONS - ( 25 Sep 2019 22:30 )  Alb: 3.4 g/dL / Pro: 7.6 g/dL / ALK PHOS: 109 U/L / ALT: 38 U/L DA / AST: 38 U/L / GGT: x           PT/INR - ( 26 Sep 2019 07:42 )   PT: 50.6 sec;   INR: 4.36 ratio         PTT - ( 26 Sep 2019 07:42 )  PTT:50.2 sec  C-Reactive Protein, Serum: 0.45 mg/dL ( @ 15:06)  Sedimentation Rate, Erythrocyte: 36 mm/Hr ( @ 07:42)  Triglycerides, Serum: 47 mg/dL ( @ 07:42)    CARDIAC MARKERS ( 26 Sep 2019 09:26 )  <0.015 ng/mL / x     / 104 U/L / x     / 1.0 ng/mL  CARDIAC MARKERS ( 25 Sep 2019 22:30 )  <0.015 ng/mL / x     / x     / x     / x          Stool Results:          RADIOLOGY RESULTS:  < from: CT Chest No Cont (19 @ 02:33) >    EXAM:  CT CHEST                            PROCEDURE DATE:  2019          INTERPRETATION:  HISTORY: Shortness of breath. Recurrent falls.   Generalized weakness.    TECHNIQUE: A non-contrast CT scan of the chest was performed from the   thoracic inlet to the adrenal glands. Coronal and sagittal reformatted   images are provided.    COMPARISON: CT of the chest from 10/10/2018    FINDINGS:   Evaluation of lung parenchyma demonstrates no pulmonary contusion or   hemorrhage. Interval resolution of bilateral pleural effusions. There is   no suspicious pulmonary nodule or mass. Calcified granuloma in the left   lower lobe. There is no pneumothorax. The trachea and main bronchi are   clear.    The heart is again upper limits of normal in size. There is no   pericardial effusion. Interval placement of a cardiac device at the over   the left ventricle.    The thoracic aorta and main pulmonary arteries are normal in caliber.     The thyroid gland is unremarkable.    There is no significant mediastinal or hilar adenopathy. Bilateral   supraclavicular and axillary lymph nodes suggested appear unchanged from   the prior exam.    Images of the upper abdomen are unremarkable.    The osseous structures demonstrates no acutely displaced rib fracture or   thoracic vertebral compression fracture deformity. There are no acute   osseous abnormalities. Generalized osteopenia is again noted.    IMPRESSION:   No acute or posttraumatic finding in the thorax on this noncontrast chest   CT.                PARTHA IGLESIAS M.D., RADIOLOGIST  This document has been electronically signed. Sep 26 2019  4:52AM                < end of copied text >    SURGICAL PATHOLOGY:

## 2019-09-28 LAB
ANION GAP SERPL CALC-SCNC: 7 MMOL/L — SIGNIFICANT CHANGE UP (ref 5–17)
APTT BLD: 49.8 SEC — HIGH (ref 27.5–36.3)
BUN SERPL-MCNC: 29 MG/DL — HIGH (ref 7–18)
CALCIUM SERPL-MCNC: 9.4 MG/DL — SIGNIFICANT CHANGE UP (ref 8.4–10.5)
CHLORIDE SERPL-SCNC: 109 MMOL/L — HIGH (ref 96–108)
CO2 SERPL-SCNC: 24 MMOL/L — SIGNIFICANT CHANGE UP (ref 22–31)
CREAT SERPL-MCNC: 1.36 MG/DL — HIGH (ref 0.5–1.3)
GLUCOSE SERPL-MCNC: 89 MG/DL — SIGNIFICANT CHANGE UP (ref 70–99)
HCT VFR BLD CALC: 33 % — LOW (ref 39–50)
HGB BLD-MCNC: 10.9 G/DL — LOW (ref 13–17)
INR BLD: 3.86 RATIO — HIGH (ref 0.88–1.16)
MCHC RBC-ENTMCNC: 29.9 PG — SIGNIFICANT CHANGE UP (ref 27–34)
MCHC RBC-ENTMCNC: 33 GM/DL — SIGNIFICANT CHANGE UP (ref 32–36)
MCV RBC AUTO: 90.4 FL — SIGNIFICANT CHANGE UP (ref 80–100)
NRBC # BLD: 0 /100 WBCS — SIGNIFICANT CHANGE UP (ref 0–0)
PLATELET # BLD AUTO: 122 K/UL — LOW (ref 150–400)
POTASSIUM SERPL-MCNC: 3.7 MMOL/L — SIGNIFICANT CHANGE UP (ref 3.5–5.3)
POTASSIUM SERPL-SCNC: 3.7 MMOL/L — SIGNIFICANT CHANGE UP (ref 3.5–5.3)
PROTHROM AB SERPL-ACNC: 44.7 SEC — HIGH (ref 10–12.9)
RBC # BLD: 3.65 M/UL — LOW (ref 4.2–5.8)
RBC # FLD: 15.4 % — HIGH (ref 10.3–14.5)
SODIUM SERPL-SCNC: 140 MMOL/L — SIGNIFICANT CHANGE UP (ref 135–145)
WBC # BLD: 4.36 K/UL — SIGNIFICANT CHANGE UP (ref 3.8–10.5)
WBC # FLD AUTO: 4.36 K/UL — SIGNIFICANT CHANGE UP (ref 3.8–10.5)

## 2019-09-28 PROCEDURE — 99232 SBSQ HOSP IP/OBS MODERATE 35: CPT | Mod: GC

## 2019-09-28 RX ADMIN — Medication 81 MILLIGRAM(S): at 13:45

## 2019-09-28 RX ADMIN — SENNA PLUS 2 TABLET(S): 8.6 TABLET ORAL at 22:48

## 2019-09-28 RX ADMIN — Medication 100 MILLIGRAM(S): at 13:45

## 2019-09-28 RX ADMIN — Medication 100 MILLIGRAM(S): at 06:54

## 2019-09-28 RX ADMIN — PANTOPRAZOLE SODIUM 40 MILLIGRAM(S): 20 TABLET, DELAYED RELEASE ORAL at 06:54

## 2019-09-28 RX ADMIN — Medication 100 MILLIGRAM(S): at 22:48

## 2019-09-28 RX ADMIN — ATORVASTATIN CALCIUM 40 MILLIGRAM(S): 80 TABLET, FILM COATED ORAL at 22:48

## 2019-09-28 RX ADMIN — Medication 75 MILLIGRAM(S): at 06:54

## 2019-09-28 RX ADMIN — Medication 60 MILLIGRAM(S): at 06:54

## 2019-09-28 NOTE — PROGRESS NOTE ADULT - SUBJECTIVE AND OBJECTIVE BOX
pt seen and examined, no complaints, ROS - .          acetaminophen   Tablet .. 650 milliGRAM(s) Oral every 6 hours PRN  aspirin enteric coated 81 milliGRAM(s) Oral daily  atorvastatin 40 milliGRAM(s) Oral at bedtime  docusate sodium 100 milliGRAM(s) Oral three times a day  furosemide    Tablet 60 milliGRAM(s) Oral daily  metoprolol tartrate 75 milliGRAM(s) Oral two times a day  pantoprazole    Tablet 40 milliGRAM(s) Oral before breakfast  senna 2 Tablet(s) Oral at bedtime                            10.9   4.36  )-----------( 122      ( 28 Sep 2019 06:40 )             33.0       Hemoglobin: 10.9 g/dL (09-28 @ 06:40)  Hemoglobin: 11.1 g/dL (09-27 @ 05:47)  Hemoglobin: 11.1 g/dL (09-26 @ 07:42)  Hemoglobin: 10.8 g/dL (09-25 @ 22:30)      09-28    140  |  109<H>  |  29<H>  ----------------------------<  89  3.7   |  24  |  1.36<H>    Ca    9.4      28 Sep 2019 06:40  Phos  2.7     09-27  Mg     1.9     09-27    TPro  7.1  /  Alb  3.1<L>  /  TBili  0.8  /  DBili  x   /  AST  33  /  ALT  34  /  AlkPhos  95  09-27    Creatinine Trend: 1.36<--, 1.55<--, 1.88<--, 2.13<--    COAGS: PT/INR - ( 28 Sep 2019 06:40 )   PT: 44.7 sec;   INR: 3.86 ratio         PTT - ( 28 Sep 2019 06:40 )  PTT:49.8 sec    CARDIAC MARKERS ( 26 Sep 2019 09:26 )  <0.015 ng/mL / x     / 104 U/L / x     / 1.0 ng/mL  CARDIAC MARKERS ( 25 Sep 2019 22:30 )  <0.015 ng/mL / x     / x     / x     / x            T(C): 36.9 (09-28-19 @ 05:38), Max: 36.9 (09-28-19 @ 05:38)  HR: 105 (09-28-19 @ 05:38) (95 - 105)  BP: 111/77 (09-28-19 @ 05:38) (106/72 - 111/77)  RR: 16 (09-28-19 @ 05:38) (16 - 16)  SpO2: 100% (09-28-19 @ 05:38) (100% - 100%)  Wt(kg): --    I&O's Summary    Gen: Appears well in NAD  HEENT:  (-)icterus (-)pallor  CV: N S1 S2 1/6 CARL (+)2 Pulses B/l  Resp:  Clear to ausculatation B/L, normal effort  GI: (+) BS Soft, NT, ND  Lymph:  (-)Edema, (-)obvious lymphadenopathy  Skin: Warm to touch, Normal turgor  Psych: Appropriate mood and affect      ASSESSMENT/PLAN: 	89y  Male MH HTN, HLD, afib (on coumadin), severe MR s/p clipping with residual moderate to sever MR, severe LV dysfunction s/p cath in 11/2018 (no obstructive), CKD (not on ACE) hyponatremia, colon cancer (s/p chemo, in remission), prostate cancer (s/p RT in [address]), presented to ER for decreased appetite.     - ASA , statin   - diuresing well on lasix pO   - Well compensated from a CHF prospective  - HR appear well controlled, would cont prior dose of Toprol  Daily, presently on 75 mg    - would like to add ACE if crt remains stable, will d/w primary nephrologist at his next office visit  - Dysphagia w/u in progress  - No need for further inpatient cardiac work up.

## 2019-09-28 NOTE — PROGRESS NOTE ADULT - SUBJECTIVE AND OBJECTIVE BOX
MEDICAL ATTENDING NOTE    Patient is a 89y old  Male who presents with a chief complaint of shortness of breath, poor appetite (28 Sep 2019 08:00)      INTERVAL HPI/OVERNIGHT EVENTS: no new complaints    MEDICATIONS  (STANDING):  aspirin enteric coated 81 milliGRAM(s) Oral daily  atorvastatin 40 milliGRAM(s) Oral at bedtime  docusate sodium 100 milliGRAM(s) Oral three times a day  furosemide    Tablet 60 milliGRAM(s) Oral daily  metoprolol tartrate 75 milliGRAM(s) Oral two times a day  pantoprazole    Tablet 40 milliGRAM(s) Oral before breakfast  senna 2 Tablet(s) Oral at bedtime    MEDICATIONS  (PRN):  acetaminophen   Tablet .. 650 milliGRAM(s) Oral every 6 hours PRN Severe Pain (7 - 10)      __________________________________________________  ----------------------------------------------------------------------------------  REVIEW OF SYSTEMS: no fever, no SOB, No Chest pain; feels well      Vital Signs Last 24 Hrs  T(C): 36.4 (28 Sep 2019 08:08), Max: 36.9 (28 Sep 2019 05:38)  T(F): 97.5 (28 Sep 2019 08:08), Max: 98.5 (28 Sep 2019 05:38)  HR: 103 (28 Sep 2019 08:08) (95 - 105)  BP: 113/80 (28 Sep 2019 08:08) (106/72 - 113/80)  BP(mean): --  RR: 17 (28 Sep 2019 08:08) (16 - 17)  SpO2: 98% (28 Sep 2019 08:08) (98% - 100%)    _________________  PHYSICAL EXAM:  ---------------------------   NAD; Normocephalic; temporal wasting+  LUNGS - no wheezing  HEART: S1 S2+   ABDOMEN: Soft, Nontender, non distended; BS+  EXTREMITIES: no cyanosis; no edema  NERVOUS SYSTEM:  Awake and alert; no new deficits    _________________________________________________  LABS:                        10.9   4.36  )-----------( 122      ( 28 Sep 2019 06:40 )             33.0     09-28    140  |  109<H>  |  29<H>  ----------------------------<  89  3.7   |  24  |  1.36<H>    Ca    9.4      28 Sep 2019 06:40  Phos  2.7     09-27  Mg     1.9     09-27    TPro  7.1  /  Alb  3.1<L>  /  TBili  0.8  /  DBili  x   /  AST  33  /  ALT  34  /  AlkPhos  95  09-27    PT/INR - ( 28 Sep 2019 06:40 )   PT: 44.7 sec;   INR: 3.86 ratio         PTT - ( 28 Sep 2019 06:40 )  PTT:49.8 sec    CAPILLARY BLOOD GLUCOSE                Care Discussed with Consultants :     Plan of care was discussed with patient ; all questions and concerns were addressed and care was aligned with patient's wishes.

## 2019-09-29 LAB
ANION GAP SERPL CALC-SCNC: 4 MMOL/L — LOW (ref 5–17)
APTT BLD: 48 SEC — HIGH (ref 27.5–36.3)
BUN SERPL-MCNC: 30 MG/DL — HIGH (ref 7–18)
CALCIUM SERPL-MCNC: 9.5 MG/DL — SIGNIFICANT CHANGE UP (ref 8.4–10.5)
CHLORIDE SERPL-SCNC: 106 MMOL/L — SIGNIFICANT CHANGE UP (ref 96–108)
CO2 SERPL-SCNC: 30 MMOL/L — SIGNIFICANT CHANGE UP (ref 22–31)
CREAT SERPL-MCNC: 1.74 MG/DL — HIGH (ref 0.5–1.3)
GLUCOSE SERPL-MCNC: 92 MG/DL — SIGNIFICANT CHANGE UP (ref 70–99)
HCT VFR BLD CALC: 33.1 % — LOW (ref 39–50)
HGB BLD-MCNC: 10.8 G/DL — LOW (ref 13–17)
INR BLD: 3.12 RATIO — HIGH (ref 0.88–1.16)
MCHC RBC-ENTMCNC: 30.3 PG — SIGNIFICANT CHANGE UP (ref 27–34)
MCHC RBC-ENTMCNC: 32.6 GM/DL — SIGNIFICANT CHANGE UP (ref 32–36)
MCV RBC AUTO: 93 FL — SIGNIFICANT CHANGE UP (ref 80–100)
NRBC # BLD: 0 /100 WBCS — SIGNIFICANT CHANGE UP (ref 0–0)
OB PNL STL: NEGATIVE — SIGNIFICANT CHANGE UP
PLATELET # BLD AUTO: 119 K/UL — LOW (ref 150–400)
POTASSIUM SERPL-MCNC: 4.3 MMOL/L — SIGNIFICANT CHANGE UP (ref 3.5–5.3)
POTASSIUM SERPL-SCNC: 4.3 MMOL/L — SIGNIFICANT CHANGE UP (ref 3.5–5.3)
PROTHROM AB SERPL-ACNC: 35.9 SEC — HIGH (ref 10–12.9)
RBC # BLD: 3.56 M/UL — LOW (ref 4.2–5.8)
RBC # FLD: 15.4 % — HIGH (ref 10.3–14.5)
SODIUM SERPL-SCNC: 140 MMOL/L — SIGNIFICANT CHANGE UP (ref 135–145)
WBC # BLD: 4.64 K/UL — SIGNIFICANT CHANGE UP (ref 3.8–10.5)
WBC # FLD AUTO: 4.64 K/UL — SIGNIFICANT CHANGE UP (ref 3.8–10.5)

## 2019-09-29 PROCEDURE — 99232 SBSQ HOSP IP/OBS MODERATE 35: CPT | Mod: GC

## 2019-09-29 RX ORDER — METOPROLOL TARTRATE 50 MG
100 TABLET ORAL DAILY
Refills: 0 | Status: DISCONTINUED | OUTPATIENT
Start: 2019-09-29 | End: 2019-09-30

## 2019-09-29 RX ADMIN — Medication 100 MILLIGRAM(S): at 15:41

## 2019-09-29 RX ADMIN — Medication 100 MILLIGRAM(S): at 06:41

## 2019-09-29 RX ADMIN — Medication 100 MILLIGRAM(S): at 22:50

## 2019-09-29 RX ADMIN — Medication 81 MILLIGRAM(S): at 12:42

## 2019-09-29 RX ADMIN — ATORVASTATIN CALCIUM 40 MILLIGRAM(S): 80 TABLET, FILM COATED ORAL at 22:50

## 2019-09-29 RX ADMIN — PANTOPRAZOLE SODIUM 40 MILLIGRAM(S): 20 TABLET, DELAYED RELEASE ORAL at 06:41

## 2019-09-29 RX ADMIN — SENNA PLUS 2 TABLET(S): 8.6 TABLET ORAL at 22:50

## 2019-09-29 NOTE — PROGRESS NOTE ADULT - PROBLEM SELECTOR PLAN 8
-cont Statin   -Lipid panel unremarkable
c/w metoprolol  monitor bp

## 2019-09-29 NOTE — PROGRESS NOTE ADULT - PROBLEM SELECTOR PROBLEM 4
Combined systolic and diastolic congestive heart failure, unspecified HF chronicity
GERD (gastroesophageal reflux disease)

## 2019-09-29 NOTE — PROGRESS NOTE ADULT - ASSESSMENT
89 year old male with dysphagia     Problem list  ·	CHF   ·	Dysphagia   ·	Mitral regurgitation       Plan:  1.	Speech and swallow evaluation today   2.	Barium esophagram Pending   3.	aspiration precautions   4.	not an ideal candidate for EGD with anes,. Will plan on further intervention based on esophagram findings.     Thank you for your consultation and allowing  me to participate in the care of your patients. If you have further questions please contact me at [phone].     Cliff Montaño M.D.   Advanced care planning was discussed with patient and family.  Advanced care planning forms were reviewed and discussed.  Risks, benefits and alternatives of gastroenterologic procedures were discussed in detail and all questions were answered.
HPI: 89yo male from home, lives with wife, PMH HTN, HLD, afib (on coumadin), severe MR s/p clipping, s/p cath in 11/2018 (no obstructive), CKD, hyponatremia, colon cancer (s/p chemo, in remission), prostate cancer (s/p RT in [address]), presented to ER for decreased appetite and worsening shortness of breath. initially admitted to tele for CHF exacerbation. transferred to medicine on 9/26
edyta is a 88yo male from home, lives with wife, PMH HTN, HLD, afib (on coumadin), severe MR s/p clipping, s/p cath in 11/2018 (no obstructive), CKD, hyponatremia, colon cancer (s/p chemo, in remission), prostate cancer (s/p RT in 200[address]), presented to ER for decreased appetite and worsening shortness of breath.    In ER, his vitals were stable; labs noted with bun 41, cr 2.13, hgb 10.8 (at baseline), INR 4.63, bnp 2989. EKG noted with atrial flutter @97bpm.     GOC: counseling/discussion initiated with patient , has not appointed a HCP yet; further GOC counseling initiated with wife at bedside, deferred for now as patient expresses feeling depressed.
HPI: 89yo male from home, lives with wife, PMH HTN, HLD, afib (on coumadin), severe MR s/p clipping, s/p cath in 11/2018 (no obstructive), CKD, hyponatremia, colon cancer (s/p chemo, in remission), prostate cancer (s/p RT in 200[address]), presented to ER for decreased appetite and worsening shortness of breath. initially admitted to tele for CHF exacerbation. transferred to medicine on 9/26
HPI: 88yo male from home, lives with wife, PMH HTN, HLD, afib (on coumadin), severe MR s/p clipping, s/p cath in 11/2018 (no obstructive), CKD, hyponatremia, colon cancer (s/p chemo, in remission), prostate cancer (s/p RT in [address]), presented to ER for decreased appetite and worsening shortness of breath. initially admitted to tele for CHF exacerbation. transferred to medicine on 9/26

## 2019-09-29 NOTE — PROGRESS NOTE ADULT - ATTENDING COMMENTS
Agree with above assessment and plan as outlined above.    - esophagram noted  - Gi f/u    Nabor Salazar MD, [facility]  BEEPER [phone]
CARDIOLOGY ATTENDING ADDENDUM    - Events noted had increased HR this AM  - CHange lopressor back to home dose of Toprol  Daily    Nabor Sal[facility] MD, [facility]  BEEPER [phone]
Plan discussed with patient and family in details at bedside and all their questions / concerns were addressed
Patient was seen and examined by myself. Case was discussed with medicine NP staff in details. I have reviewed and agree with the plan as outlined above with edits where appropriate.    Vital Signs Last 24 Hrs  T(C): 36.3 (27 Sep 2019 15:41), Max: 36.8 (26 Sep 2019 20:59)  T(F): 97.3 (27 Sep 2019 15:41), Max: 98.3 (26 Sep 2019 20:59)  HR: 95 (27 Sep 2019 15:41) (50 - 98)  BP: 106/72 (27 Sep 2019 15:41) (102/58 - 133/87)  BP(mean): --  RR: 16 (27 Sep 2019 15:41) (16 - 18)  SpO2: 100% (27 Sep 2019 15:41) (97% - 100%)                          11.1   4.62  )-----------( 129      ( 27 Sep 2019 05:47 )             33.8     09-27    138  |  106  |  36<H>  ----------------------------<  89  3.8   |  26  |  1.55<H>    Ca    9.3      27 Sep 2019 05:47  Phos  2.7     09-27  Mg     1.9     09-27    TPro  7.1  /  Alb  3.1<L>  /  TBili  0.8  /  DBili  x   /  AST  33  /  ALT  34  /  AlkPhos  95  09-27      A/P: 88 y/o M with   1. Dysphagia due to esophageal spasms  2. Systolic and diastolic heart failure- acute on chronic  3. debility  4. Moderate to Severe protein calorie malnutrition  5. AFIB / Flutter  6. Depressed Mood  7. CKD 3  8. Debility and generalized muscle weakness    Plan as outlined above  Esophagram report reviewed; GI follow up ongoing  Tolerating mechanical soft diet  Nutritional supplements as tolerated.  OOB to chair.
Patient was seen and examined by myself. Case was discussed with house staff in details. I have reviewed and agree with the plan as outlined above with edits where appropriate.    Vital Signs Last 24 Hrs  T(C): 36.8 (26 Sep 2019 20:59), Max: 37.1 (26 Sep 2019 11:03)  T(F): 98.3 (26 Sep 2019 20:59), Max: 98.7 (26 Sep 2019 11:03)  HR: 50 (26 Sep 2019 20:59) (50 - 96)  BP: 102/58 (26 Sep 2019 20:59) (97/63 - 133/99)  BP(mean): --  RR: 18 (26 Sep 2019 20:59) (16 - 18)  SpO2: 97% (26 Sep 2019 20:59) (96% - 100%)    Elderly M with dysphagia and weakness   Plan as outlined above

## 2019-09-29 NOTE — PROGRESS NOTE ADULT - PROBLEM SELECTOR PLAN 3
Clinically improved and compensated.  continue Lasix  Monitor Clinically stable and compensated.  continue Lasix

## 2019-09-29 NOTE — PROGRESS NOTE ADULT - PROBLEM SELECTOR PLAN 6
-episodes of depressed mood and ? suicidal ideation on admission   -with appropriate mood/affect for situation   -denies suicidal ideation
No suicidality at this time but per spouse patient has been showing signs of depression for many months and has refused to seek attention. He has never tried to harm himself. She has noted insomnia and at-times he verbalizes suicidal thoughts but no plan or attempt.  Will obtain routine psych eval
No suicidality at this time but per spouse patient has been showing signs of depression for many months and has refused to seek attention. He has never tried to harm himself. She has noted insomnia and at-times he verbalizes suicidal thoughts but no plan or attempt.  Will obtain routine psych eval
p/w creatinine of 2.13, baseline 1.56 from previous admissions  likely from lasix use,   would hold off iv fluids given mod-severe CHF  -monitor bmp cr: 2.55-->1.88

## 2019-09-29 NOTE — PROGRESS NOTE ADULT - SUBJECTIVE AND OBJECTIVE BOX
MEDICAL ATTENDING NOTE    Patient is a 89y old  Male who presents with a chief complaint of shortness of breath, poor appetite (29 Sep 2019 07:52)      INTERVAL HPI/OVERNIGHT EVENTS: no new complaints    MEDICATIONS  (STANDING):  aspirin enteric coated 81 milliGRAM(s) Oral daily  atorvastatin 40 milliGRAM(s) Oral at bedtime  docusate sodium 100 milliGRAM(s) Oral three times a day  furosemide    Tablet 60 milliGRAM(s) Oral daily  metoprolol succinate  milliGRAM(s) Oral daily  pantoprazole    Tablet 40 milliGRAM(s) Oral before breakfast  senna 2 Tablet(s) Oral at bedtime    MEDICATIONS  (PRN):  acetaminophen   Tablet .. 650 milliGRAM(s) Oral every 6 hours PRN Severe Pain (7 - 10)      __________________________________________________  ----------------------------------------------------------------------------------  REVIEW OF SYSTEMS: no fever, no SOB, No Chest pain; feels well      Vital Signs Last 24 Hrs  T(C): 36.7 (29 Sep 2019 16:06), Max: 36.8 (28 Sep 2019 23:40)  T(F): 98.1 (29 Sep 2019 16:06), Max: 98.3 (28 Sep 2019 23:40)  HR: 116 (29 Sep 2019 16:06) (109 - 116)  BP: 120/73 (29 Sep 2019 16:06) (101/75 - 120/73)  BP(mean): --  RR: 18 (29 Sep 2019 16:06) (17 - 18)  SpO2: 100% (29 Sep 2019 16:06) (98% - 100%)    _________________  PHYSICAL EXAM:  ---------------------------   NAD; Normocephalic;   LUNGS - no wheezing  HEART: S1 S2+   ABDOMEN: Soft, Nontender, non distended  EXTREMITIES: no cyanosis; no edema  NERVOUS SYSTEM:  Awake and alert; no new deficits    _________________________________________________  LABS:                        10.8   4.64  )-----------( 119      ( 29 Sep 2019 07:01 )             33.1     09-29    140  |  106  |  30<H>  ----------------------------<  92  4.3   |  30  |  1.74<H>    Ca    9.5      29 Sep 2019 07:01      PT/INR - ( 29 Sep 2019 12:40 )   PT: 35.9 sec;   INR: 3.12 ratio         PTT - ( 29 Sep 2019 12:40 )  PTT:48.0 sec    CAPILLARY BLOOD GLUCOSE                Care Discussed with Consultants :     Plan of care was discussed with patient ; all questions and concerns were addressed and care was aligned with patient's wishes. MEDICAL ATTENDING NOTE    Patient is a 89y old  Male who presents with a chief complaint of shortness of breath, poor appetite (29 Sep 2019 07:52)      INTERVAL HPI/OVERNIGHT EVENTS: c/o chest pain likely reflux related otherwise no acute events overnight; offers no new complaints at this time    MEDICATIONS  (STANDING):  aspirin enteric coated 81 milliGRAM(s) Oral daily  atorvastatin 40 milliGRAM(s) Oral at bedtime  docusate sodium 100 milliGRAM(s) Oral three times a day  furosemide    Tablet 60 milliGRAM(s) Oral daily  metoprolol succinate  milliGRAM(s) Oral daily  pantoprazole    Tablet 40 milliGRAM(s) Oral before breakfast  senna 2 Tablet(s) Oral at bedtime    MEDICATIONS  (PRN):  acetaminophen   Tablet .. 650 milliGRAM(s) Oral every 6 hours PRN Severe Pain (7 - 10)      __________________________________________________  ----------------------------------------------------------------------------------  REVIEW OF SYSTEMS: no fever, no SOB, No Chest pain; feels ok      Vital Signs Last 24 Hrs  T(C): 36.7 (29 Sep 2019 16:06), Max: 36.8 (28 Sep 2019 23:40)  T(F): 98.1 (29 Sep 2019 16:06), Max: 98.3 (28 Sep 2019 23:40)  HR: 116 (29 Sep 2019 16:06) (109 - 116)  BP: 120/73 (29 Sep 2019 16:06) (101/75 - 120/73)  BP(mean): --  RR: 18 (29 Sep 2019 16:06) (17 - 18)  SpO2: 100% (29 Sep 2019 16:06) (98% - 100%)    _________________  PHYSICAL EXAM:  ---------------------------   NAD; Normocephalic;   LUNGS - no wheezing  HEART: S1 S2+   ABDOMEN: Soft, Nontender, non distended  EXTREMITIES: no cyanosis; no edema  NERVOUS SYSTEM:  Awake and alert; no new deficits    _________________________________________________  LABS:                        10.8   4.64  )-----------( 119      ( 29 Sep 2019 07:01 )             33.1     09-29    140  |  106  |  30<H>  ----------------------------<  92  4.3   |  30  |  1.74<H>    Ca    9.5      29 Sep 2019 07:01      PT/INR - ( 29 Sep 2019 12:40 )   PT: 35.9 sec;   INR: 3.12 ratio         PTT - ( 29 Sep 2019 12:40 )  PTT:48.0 sec    CAPILLARY BLOOD GLUCOSE                Care Discussed with Consultants :     Plan of care was discussed with patient ; all questions and concerns were addressed and care was aligned with patient's wishes.

## 2019-09-29 NOTE — PROGRESS NOTE ADULT - PROBLEM SELECTOR PLAN 2
Rate controlled  Restart coumadin as INR now down trending Rate controlled  continue coumadin  Monitor INR

## 2019-09-29 NOTE — PROGRESS NOTE ADULT - PROBLEM SELECTOR PLAN 10
-full code
IMPROVE VTE Individual Risk Assessment  RISK                                                                Points  [  ] Previous VTE                                                  3  [  ] Thrombophilia                                               2  [  ] Lower limb paralysis                                      2       (unable to hold up >15 seconds)    [  ] Current Cancer                                              2        (within 6 months)  [ x ] Immobilization > 24 hrs                                1  [  ] ICU/CCU stay > 24 hours                              1  [ x ] Age > 60                                                      1  IMPROVE VTE Score 2, pt on eliquis for AC

## 2019-09-29 NOTE — PROGRESS NOTE ADULT - PROBLEM SELECTOR PLAN 9
IMPROVE VTE Score 2  -dvt ppx- no chemo ppx, supra therapeutic INR
Continue therapeutic anticoagulation with Lasix
Continue therapeutic anticoagulation with Lasix
c/w statin  f/u lipid profile LDL: 76

## 2019-09-29 NOTE — PROGRESS NOTE ADULT - SUBJECTIVE AND OBJECTIVE BOX
pt seen and examined, no complaints, ROS - .          acetaminophen   Tablet .. 650 milliGRAM(s) Oral every 6 hours PRN  aspirin enteric coated 81 milliGRAM(s) Oral daily  atorvastatin 40 milliGRAM(s) Oral at bedtime  docusate sodium 100 milliGRAM(s) Oral three times a day  furosemide    Tablet 60 milliGRAM(s) Oral daily  metoprolol tartrate 75 milliGRAM(s) Oral two times a day  pantoprazole    Tablet 40 milliGRAM(s) Oral before breakfast  senna 2 Tablet(s) Oral at bedtime                            10.8   4.64  )-----------( 119      ( 29 Sep 2019 07:01 )             33.1       Hemoglobin: 10.8 g/dL (09-29 @ 07:01)  Hemoglobin: 10.9 g/dL (09-28 @ 06:40)  Hemoglobin: 11.1 g/dL (09-27 @ 05:47)  Hemoglobin: 11.1 g/dL (09-26 @ 07:42)  Hemoglobin: 10.8 g/dL (09-25 @ 22:30)      09-29    140  |  106  |  30<H>  ----------------------------<  92  4.3   |  30  |  1.74<H>    Ca    9.5      29 Sep 2019 07:01      Creatinine Trend: 1.74<--, 1.36<--, 1.55<--, 1.88<--, 2.13<--    COAGS:     CARDIAC MARKERS ( 26 Sep 2019 09:26 )  <0.015 ng/mL / x     / 104 U/L / x     / 1.0 ng/mL        T(C): 36.7 (09-29-19 @ 07:44), Max: 36.8 (09-28-19 @ 23:40)  HR: 109 (09-29-19 @ 07:44) (88 - 115)  BP: 115/58 (09-29-19 @ 07:44) (101/75 - 115/58)  RR: 18 (09-29-19 @ 07:44) (17 - 18)  SpO2: 98% (09-29-19 @ 07:44) (98% - 100%)  Wt(kg): --    I&O's Summary    Gen: Appears well in NAD  HEENT:  (-)icterus (-)pallor  CV: N S1 S2 1/6 CARL (+)2 Pulses B/l  Resp:  Clear to ausculatation B/L, normal effort  GI: (+) BS Soft, NT, ND  Lymph:  (-)Edema, (-)obvious lymphadenopathy  Skin: Warm to touch, Normal turgor  Psych: Appropriate mood and affect      ASSESSMENT/PLAN: 	89y  Male MH HTN, HLD, afib (on coumadin), severe MR s/p clipping with residual moderate to sever MR, severe LV dysfunction s/p cath in 11/2018 (no obstructive), CKD (not on ACE) hyponatremia, colon cancer (s/p chemo, in remission), prostate cancer (s/p RT in 200[address]), presented to ER for decreased appetite.     - ASA , statin   - diuresing well on lasix pO   - Well compensated from a CHF prospective  - cont BB    - would like to add ACE if crt remains stable,   - Dysphagia w/u in progress  - No need for further inpatient cardiac work up.

## 2019-09-29 NOTE — PROGRESS NOTE ADULT - PROBLEM SELECTOR PLAN 4
-cont PPI   -GI following -GERD with esophageal spasm likely cause of intermittent chest discomfort- continue PPI.

## 2019-09-29 NOTE — PROGRESS NOTE ADULT - PROBLEM SELECTOR PROBLEM 3
Atrial fibrillation
Combined systolic and diastolic congestive heart failure, unspecified HF chronicity

## 2019-09-29 NOTE — PROGRESS NOTE ADULT - PROBLEM SELECTOR PROBLEM 5
Acute on chronic renal failure
GERD (gastroesophageal reflux disease)
Acute on chronic renal failure
Acute on chronic renal failure

## 2019-09-29 NOTE — PROGRESS NOTE ADULT - PROBLEM SELECTOR PLAN 5
Creatinine has improved and close to baseline.  Avoid nephrotoxic agents Monitor renal functions  Avoid nephrotoxic agents

## 2019-09-29 NOTE — CHART NOTE - NSCHARTNOTEFT_GEN_A_CORE
EVENT:   Patient's MEW - 7.   OBJECTIVE: Evaluate at bedside. Alert and oriented,. Denied any pain and discomfort. Not in respiratory distress. BP -102/75, HR - 115.   Vital Signs Last 24 Hrs  T(C): 36.4 (29 Sep 2019 01:10), Max: 36.8 (28 Sep 2019 23:40)  T(F): 97.6 (29 Sep 2019 01:10), Max: 98.3 (28 Sep 2019 23:40)  HR: 115 (29 Sep 2019 06:00) (88 - 115)  BP: 102/65 (29 Sep 2019 06:00) (101/75 - 113/80)  BP(mean): --  RR: 17 (29 Sep 2019 01:10) (17 - 17)  SpO2: 100% (29 Sep 2019 01:10) (98% - 100%)    FOCUSED PHYSICAL EXAM:    LABS:                        10.9   4.36  )-----------( 122      ( 28 Sep 2019 06:40 )             33.0     09-28    140  |  109<H>  |  29<H>  ----------------------------<  89  3.7   |  24  |  1.36<H>    Ca    9.4      28 Sep 2019 06:40        EKG:   IMAGING:    ASSESSMENT:  HPI:  Patient is a 88yo male from home, lives with wife, PMH HTN, HLD, afib (on coumadin), severe MR s/p clipping, s/p cath in 11/2018 (no obstructive), CKD, hyponatremia, colon cancer (s/p chemo, in remission), prostate cancer (s/p RT in 200[address]), presented to ER for decreased appetite and worsening shortness of breath. History obtained from both patient and his spouse at bedside, who report decreased appetite since 8 months. Patient reports symptoms started s/p mitraclip surgery in december 2018, with difficulty swallowing. He reports he feels like food is stuck in middle of upper chest, mostly noted with meat but is able to swallow other food. He reports coughing every time he eats and tends to bring up clear phlegm, also reports episodes of sour tasting reflux after he eats. He denies abdominal pain at rest but reports pain when he walks, has occasional constipation, and has lost over 40lbs in 8 months. He complains about being off greens in his diet 2/2 warfarin use which has affected his appetite greatly to the point he sometimes thinks of dying. He does report feeling depressed, with poor sleep due to bad dreams, does not feel happy doing activities he usually likes for fear of falling. Patient also reports episodes of difficulty breathing which wake him up from sleep early in the morning, denies chest pain, palpitations or CAMACHO. He does endorse occasional swelling of lower extremities. His lasix was increased to 60mg daily with no relief in symptoms, also endorses having an echo in august 2019. (25 Sep 2019 23:44)      PLAN: Stat EKG done, showed A. Flutter with 2:1 AV conduction. Left axis deviation. T wave abnormality. Chest pain - denied.       FOLLOW UP / RESULT:

## 2019-09-30 ENCOUNTER — TRANSCRIPTION ENCOUNTER (OUTPATIENT)
Age: 84
End: 2019-09-30

## 2019-09-30 VITALS
TEMPERATURE: 98 F | SYSTOLIC BLOOD PRESSURE: 103 MMHG | RESPIRATION RATE: 16 BRPM | HEART RATE: 88 BPM | DIASTOLIC BLOOD PRESSURE: 76 MMHG | OXYGEN SATURATION: 100 %

## 2019-09-30 LAB
ANION GAP SERPL CALC-SCNC: 5 MMOL/L — SIGNIFICANT CHANGE UP (ref 5–17)
APTT BLD: 40.6 SEC — HIGH (ref 27.5–36.3)
BUN SERPL-MCNC: 29 MG/DL — HIGH (ref 7–18)
CALCIUM SERPL-MCNC: 9.1 MG/DL — SIGNIFICANT CHANGE UP (ref 8.4–10.5)
CHLORIDE SERPL-SCNC: 106 MMOL/L — SIGNIFICANT CHANGE UP (ref 96–108)
CO2 SERPL-SCNC: 29 MMOL/L — SIGNIFICANT CHANGE UP (ref 22–31)
CREAT SERPL-MCNC: 1.51 MG/DL — HIGH (ref 0.5–1.3)
GLUCOSE SERPL-MCNC: 75 MG/DL — SIGNIFICANT CHANGE UP (ref 70–99)
HCT VFR BLD CALC: 33.2 % — LOW (ref 39–50)
HGB BLD-MCNC: 10.8 G/DL — LOW (ref 13–17)
INR BLD: 2.49 RATIO — HIGH (ref 0.88–1.16)
MCHC RBC-ENTMCNC: 30 PG — SIGNIFICANT CHANGE UP (ref 27–34)
MCHC RBC-ENTMCNC: 32.5 GM/DL — SIGNIFICANT CHANGE UP (ref 32–36)
MCV RBC AUTO: 92.2 FL — SIGNIFICANT CHANGE UP (ref 80–100)
NRBC # BLD: 0 /100 WBCS — SIGNIFICANT CHANGE UP (ref 0–0)
PLATELET # BLD AUTO: 114 K/UL — LOW (ref 150–400)
POTASSIUM SERPL-MCNC: 3.7 MMOL/L — SIGNIFICANT CHANGE UP (ref 3.5–5.3)
POTASSIUM SERPL-SCNC: 3.7 MMOL/L — SIGNIFICANT CHANGE UP (ref 3.5–5.3)
PROTHROM AB SERPL-ACNC: 28.4 SEC — HIGH (ref 10–12.9)
RBC # BLD: 3.6 M/UL — LOW (ref 4.2–5.8)
RBC # FLD: 15.4 % — HIGH (ref 10.3–14.5)
SODIUM SERPL-SCNC: 140 MMOL/L — SIGNIFICANT CHANGE UP (ref 135–145)
WBC # BLD: 4.46 K/UL — SIGNIFICANT CHANGE UP (ref 3.8–10.5)
WBC # FLD AUTO: 4.46 K/UL — SIGNIFICANT CHANGE UP (ref 3.8–10.5)

## 2019-09-30 PROCEDURE — 99223 1ST HOSP IP/OBS HIGH 75: CPT

## 2019-09-30 PROCEDURE — 99239 HOSP IP/OBS DSCHRG MGMT >30: CPT | Mod: GC

## 2019-09-30 RX ADMIN — Medication 100 MILLIGRAM(S): at 13:10

## 2019-09-30 RX ADMIN — Medication 100 MILLIGRAM(S): at 05:48

## 2019-09-30 RX ADMIN — Medication 60 MILLIGRAM(S): at 05:48

## 2019-09-30 RX ADMIN — PANTOPRAZOLE SODIUM 40 MILLIGRAM(S): 20 TABLET, DELAYED RELEASE ORAL at 05:48

## 2019-09-30 RX ADMIN — Medication 81 MILLIGRAM(S): at 13:10

## 2019-09-30 NOTE — BEHAVIORAL HEALTH ASSESSMENT NOTE - NSBHCHARTREVIEWVS_PSY_A_CORE FT
Vital Signs Last 24 Hrs  T(C): 36.5 (30 Sep 2019 08:06), Max: 36.7 (29 Sep 2019 16:06)  T(F): 97.7 (30 Sep 2019 08:06), Max: 98.1 (29 Sep 2019 16:06)  HR: 97 (30 Sep 2019 08:06) (86 - 116)  BP: 120/76 (30 Sep 2019 08:06) (100/65 - 120/76)  BP(mean): --  RR: 16 (30 Sep 2019 08:06) (16 - 18)  SpO2: 97% (30 Sep 2019 08:06) (97% - 100%)

## 2019-09-30 NOTE — BEHAVIORAL HEALTH ASSESSMENT NOTE - SUICIDE PROTECTIVE FACTORS
Supportive social network of family or friends/Responsibility to family and others/Cultural, spiritual and/or moral attitudes against suicide/Denominational beliefs/Has future plans/Identifies reasons for living

## 2019-09-30 NOTE — DISCHARGE NOTE NURSING/CASE MANAGEMENT/SOCIAL WORK - PATIENT PORTAL LINK FT
You can access the FollowMyHealth Patient Portal offered by [facility] by registering at the following website: [email or web address]. By joining [facility]’s FollowMyHealth portal, you will also be able to view your health information using other applications (apps) compatible with our system.

## 2019-09-30 NOTE — BEHAVIORAL HEALTH ASSESSMENT NOTE - NSBHCHARTREVIEWINVESTIGATE_PSY_A_CORE FT
< from: 12 Lead ECG (09.29.19 @ 02:05) >    QTC Calculation(Bezet) 507 ms    P Axis 253 degrees    R Axis -33 degrees    T Axis 51 degrees    Diagnosis Line Atrial flutter with 2:1 A-V conduction  Left axis deviation  Nonspecific T wave abnormality  Abnormal ECG    < end of copied text >

## 2019-09-30 NOTE — BEHAVIORAL HEALTH ASSESSMENT NOTE - RISK ASSESSMENT
Low Acute Suicide Risk Pt's only risk factor is older age. He has multiple protective factors including good social support, future orientation and strong Faith beliefs against suicide. Risk level appears very low at this time.

## 2019-09-30 NOTE — PROGRESS NOTE ADULT - PROVIDER SPECIALTY LIST ADULT
Cardiology
Internal Medicine
Gastroenterology
Internal Medicine

## 2019-09-30 NOTE — PROGRESS NOTE ADULT - REASON FOR ADMISSION
shortness of breath, poor appetite

## 2019-09-30 NOTE — BEHAVIORAL HEALTH ASSESSMENT NOTE - NSBHCHARTREVIEWIMAGING_PSY_A_CORE FT
< from: CT Head No Cont (09.26.19 @ 02:33) >    IMPRESSION:  Stable exam. No acute intracranial hemorrhage or calvarial fracture.   Chronic findings as above.    < end of copied text >

## 2019-09-30 NOTE — BEHAVIORAL HEALTH ASSESSMENT NOTE - SUMMARY
89M, retired  living with wife in [place], with no reported PHx and MHx of CHF, Afib on Coumadin, HTN, HLD, CKD, colon cancer in remission s/p chemo, and prostate ca in remission s/p XRT, BIBEMS from home c/o SOB and decreased appetite and admitted for CHF exacerbation. Psych consult was requested due to wife's report to primary team that pt seems depressed. On exam, pt presents pleasant, cooperative. linear and organized and denies being depressed, but does endorse feeling frustrated by his declining health and increasing restrictions on his diet (especially the advice he has been given to completely eliminate dark green leafy vegetables, which he loves). He also reports some friction between himself and his wife over her vigilance regarding his diet. Pt does not appear to be depressed, and appears most likely to benefit from working with a nutritionist with goal of safely re-introducing some favorite food back to his diet to increase his enjoyment in eating. Pt also seems likely to benefit from SW assistance with transportation issues so he can be more active outside the home. Pt does not appear to present an acute risk of harm to self or others at the time of assessment, and does not appear to be in need of admission to  psych at the time of assessment.

## 2019-09-30 NOTE — BEHAVIORAL HEALTH ASSESSMENT NOTE - HPI (INCLUDE ILLNESS QUALITY, SEVERITY, DURATION, TIMING, CONTEXT, MODIFYING FACTORS, ASSOCIATED SIGNS AND SYMPTOMS)
89M, retired  living with wife in [place], with no reported PHx and MHx of CHF, Afib on Coumadin, HTN, HLD, CKD, colon cancer in remission s/p chemo, and prostate ca in remission s/p XRT, BIBEMS from home c/o SOB and decreased appetite and admitted for CHF exacerbation. Psych consult was requested due to wife's report to primary team that pt seems depressed. Pt seen in his room for interview, sitting up in bed awake and alert. Pt describes mood as "great" and denies SI/HI/AVH, but endorses increasing challenges with his health in recent years. Pt reports that in the past couple of years, since he was dx with Afib and CHF and started on Coumadin, he has found it difficult to follow the required diet, especially giving up leafy greens (collards, kale, etc.) which he grew up eating and very much likes. Pt reports that his wife "watches me like a hawk" and frequently suspects him of eating forbidden foods if he tries to make himself a snack in the evening, which he finds oppressive. Pt says he is not as active as he would like to be due to difficulties with transportation (he plays in the band at [employer], but frequently misses rehearsals because [employer] picks him up late). Pt strongly denies SI/HI/AVH, citing love for family and his strong Confucianist beliefs. 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 Pt proves to be A&Ox3 and is able to recite the months of the year backwards, albeit with effort. Pt denies interest in psychiatric f/u or medications at this time.

## 2019-09-30 NOTE — BEHAVIORAL HEALTH ASSESSMENT NOTE - NSBHSOCIALHXDETAILSFT_PSY_A_CORE
B/R in [facility], [place] with 5 siblings (only 1 still living). Left home at age 14 and came to [place]. Worked in factory making shower curtains, then served in [employer] where he got GED. 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  with 2 bio children and 1 stepson, 7 grandchildren. Lives with wife in [place].

## 2019-09-30 NOTE — BEHAVIORAL HEALTH ASSESSMENT NOTE - NSBHCHARTREVIEWLAB_PSY_A_CORE FT
09-30    140  |  106  |  29<H>  ----------------------------<  75  3.7   |  29  |  1.51<H>    Ca    9.1      30 Sep 2019 07:16    Complete Blood Count in AM (09.30.19 @ 07:16)    Nucleated RBC: 0 /100 WBCs    WBC Count: 4.46 K/uL    RBC Count: 3.60 M/uL    Hemoglobin: 10.8 g/dL    Hematocrit: 33.2 %    Mean Cell Volume: 92.2 fl    Mean Cell Hemoglobin: 30.0 pg    Mean Cell Hemoglobin Conc: 32.5 gm/dL    Red Cell Distrib Width: 15.4 %    Platelet Count - Automated: 114 K/uL    PT/INR - ( 30 Sep 2019 07:16 )   PT: 28.4 sec;   INR: 2.49 ratio         PTT - ( 30 Sep 2019 07:16 )  PTT:40.6 sec

## 2019-09-30 NOTE — BEHAVIORAL HEALTH ASSESSMENT NOTE - NSBHCONSULTRECOMMENDOTHER_PSY_A_CORE FT
1. No indication for standing or PRN psychotropic medications at this time  2. Psychiatry is signing off. Reconsult if additional issues arise as inpatient  3. Pt is psych cleared for D/C home as soon as he is medically optimized  4. Case d/w Alber Cardozo NP of primary team    Gale Vasquez MD  Director, Consultation-Liaison Psychiatry Service  [id number]

## 2019-09-30 NOTE — PROGRESS NOTE ADULT - SUBJECTIVE AND OBJECTIVE BOX
Patient denies CP, SOB Review of systems otherwise (-)    acetaminophen   Tablet .. 650 milliGRAM(s) Oral every 6 hours PRN  aspirin enteric coated 81 milliGRAM(s) Oral daily  atorvastatin 40 milliGRAM(s) Oral at bedtime  docusate sodium 100 milliGRAM(s) Oral three times a day  furosemide    Tablet 60 milliGRAM(s) Oral daily  metoprolol succinate  milliGRAM(s) Oral daily  pantoprazole    Tablet 40 milliGRAM(s) Oral before breakfast  senna 2 Tablet(s) Oral at bedtime                            10.8   4.46  )-----------( 114      ( 30 Sep 2019 07:16 )             33.2       Hemoglobin: 10.8 g/dL (09-30 @ 07:16)  Hemoglobin: 10.8 g/dL (09-29 @ 07:01)  Hemoglobin: 10.9 g/dL (09-28 @ 06:40)  Hemoglobin: 11.1 g/dL (09-27 @ 05:47)  Hemoglobin: 11.1 g/dL (09-26 @ 07:42)      09-30    140  |  106  |  29<H>  ----------------------------<  75  3.7   |  29  |  1.51<H>    Ca    9.1      30 Sep 2019 07:16      Creatinine Trend: 1.51<--, 1.74<--, 1.36<--, 1.55<--, 1.88<--, 2.13<--    COAGS: PT/INR - ( 30 Sep 2019 07:16 )   PT: 28.4 sec;   INR: 2.49 ratio         PTT - ( 30 Sep 2019 07:16 )  PTT:40.6 sec          T(C): 36.5 (09-30-19 @ 08:06), Max: 36.7 (09-29-19 @ 16:06)  HR: 97 (09-30-19 @ 08:06) (86 - 116)  BP: 120/76 (09-30-19 @ 08:06) (100/65 - 120/76)  RR: 16 (09-30-19 @ 08:06) (16 - 18)  SpO2: 97% (09-30-19 @ 08:06) (97% - 100%)  Wt(kg): --    I&O's Summary    Gen: Appears well in NAD  HEENT:  (-)icterus (-)pallor  CV: N S1 S2 1/6 CARL (+)2 Pulses B/l  Resp:  Clear to ausculatation B/L, normal effort  GI: (+) BS Soft, NT, ND  Lymph:  (-)Edema, (-)obvious lymphadenopathy  Skin: Warm to touch, Normal turgor  Psych: Appropriate mood and affect      ASSESSMENT/PLAN: 	89y  Male MH HTN, HLD, afib (on coumadin), severe MR s/p clipping with residual moderate to sever MR, severe LV dysfunction s/p cath in 11/2018 (no obstructive), CKD (not on ACE) hyponatremia, colon cancer (s/p chemo, in remission), prostate cancer (s/p RT in 200[address]), presented to ER for decreased appetite.     - ASA , statin   - diuresing well on lasix PO  - Coumadin INR 2-3  - Well compensated from a CHF prospective  - cont  Toprol xl 100 MG po daily   - would like to add ACE if /when renal fx allows. will d/w primary nephrologist  - Dysphagia w/u in progress, ? esophageal spasm  - No need for further inpatient cardiac work up.    Nabor Salazar MD, [facility]  BEEPER [phone]      Nabor Salazar MD, [facility]  BEEPER [phone]

## 2019-10-13 PROCEDURE — 84100 ASSAY OF PHOSPHORUS: CPT

## 2019-10-13 PROCEDURE — 74220 X-RAY XM ESOPHAGUS 1CNTRST: CPT

## 2019-10-13 PROCEDURE — 80053 COMPREHEN METABOLIC PANEL: CPT

## 2019-10-13 PROCEDURE — 82607 VITAMIN B-12: CPT

## 2019-10-13 PROCEDURE — 80061 LIPID PANEL: CPT

## 2019-10-13 PROCEDURE — 36415 COLL VENOUS BLD VENIPUNCTURE: CPT

## 2019-10-13 PROCEDURE — 82553 CREATINE MB FRACTION: CPT

## 2019-10-13 PROCEDURE — 83550 IRON BINDING TEST: CPT

## 2019-10-13 PROCEDURE — 71250 CT THORAX DX C-: CPT

## 2019-10-13 PROCEDURE — 85652 RBC SED RATE AUTOMATED: CPT

## 2019-10-13 PROCEDURE — 93306 TTE W/DOPPLER COMPLETE: CPT

## 2019-10-13 PROCEDURE — 85027 COMPLETE CBC AUTOMATED: CPT

## 2019-10-13 PROCEDURE — 82746 ASSAY OF FOLIC ACID SERUM: CPT

## 2019-10-13 PROCEDURE — 84443 ASSAY THYROID STIM HORMONE: CPT

## 2019-10-13 PROCEDURE — 83735 ASSAY OF MAGNESIUM: CPT

## 2019-10-13 PROCEDURE — 82728 ASSAY OF FERRITIN: CPT

## 2019-10-13 PROCEDURE — 85610 PROTHROMBIN TIME: CPT

## 2019-10-13 PROCEDURE — 97161 PT EVAL LOW COMPLEX 20 MIN: CPT

## 2019-10-13 PROCEDURE — 83880 ASSAY OF NATRIURETIC PEPTIDE: CPT

## 2019-10-13 PROCEDURE — 83036 HEMOGLOBIN GLYCOSYLATED A1C: CPT

## 2019-10-13 PROCEDURE — 71046 X-RAY EXAM CHEST 2 VIEWS: CPT

## 2019-10-13 PROCEDURE — 80048 BASIC METABOLIC PNL TOTAL CA: CPT

## 2019-10-13 PROCEDURE — 99285 EMERGENCY DEPT VISIT HI MDM: CPT | Mod: 25

## 2019-10-13 PROCEDURE — 92610 EVALUATE SWALLOWING FUNCTION: CPT

## 2019-10-13 PROCEDURE — 82272 OCCULT BLD FECES 1-3 TESTS: CPT

## 2019-10-13 PROCEDURE — 93005 ELECTROCARDIOGRAM TRACING: CPT

## 2019-10-13 PROCEDURE — 70450 CT HEAD/BRAIN W/O DYE: CPT

## 2019-10-13 PROCEDURE — 83540 ASSAY OF IRON: CPT

## 2019-10-13 PROCEDURE — 86140 C-REACTIVE PROTEIN: CPT

## 2019-10-13 PROCEDURE — 82550 ASSAY OF CK (CPK): CPT

## 2019-10-13 PROCEDURE — 85730 THROMBOPLASTIN TIME PARTIAL: CPT

## 2019-10-13 PROCEDURE — 84484 ASSAY OF TROPONIN QUANT: CPT

## 2019-12-18 ENCOUNTER — APPOINTMENT (OUTPATIENT)
Dept: INTERNAL MEDICINE | Facility: CLINIC | Age: 84
End: 2019-12-18
Payer: MEDICARE

## 2019-12-18 VITALS
WEIGHT: 120 LBS | DIASTOLIC BLOOD PRESSURE: 74 MMHG | HEIGHT: 73 IN | BODY MASS INDEX: 15.9 KG/M2 | SYSTOLIC BLOOD PRESSURE: 106 MMHG | RESPIRATION RATE: 15 BRPM

## 2019-12-18 DIAGNOSIS — Z12.11 ENCOUNTER FOR SCREENING FOR MALIGNANT NEOPLASM OF COLON: ICD-10-CM

## 2019-12-18 PROCEDURE — 99205 OFFICE O/P NEW HI 60 MIN: CPT

## 2019-12-18 RX ORDER — PANTOPRAZOLE 40 MG/1
40 TABLET, DELAYED RELEASE ORAL DAILY
Refills: 3 | Status: DISCONTINUED | COMMUNITY
End: 2019-12-18

## 2019-12-18 NOTE — PHYSICAL EXAM
[No Acute Distress] : no acute distress [Normal Sclera/Conjunctiva] : normal sclera/conjunctiva [PERRL] : pupils equal round and reactive to light [Normal Outer Ear/Nose] : the outer ears and nose were normal in appearance [EOMI] : extraocular movements intact [No JVD] : no jugular venous distention [Normal Oropharynx] : the oropharynx was normal [No Lymphadenopathy] : no lymphadenopathy [No Respiratory Distress] : no respiratory distress  [Supple] : supple [No Accessory Muscle Use] : no accessory muscle use [Clear to Auscultation] : lungs were clear to auscultation bilaterally [Irregularly Irregular] : irregularly irregular [Normal S1] : normal S1 [Normal S2] : normal S2 [No Edema] : there was no peripheral edema [Soft] : abdomen soft [Non Tender] : non-tender [Non-distended] : non-distended [Normal Bowel Sounds] : normal bowel sounds [Normal Posterior Cervical Nodes] : no posterior cervical lymphadenopathy [Coordination Grossly Intact] : coordination grossly intact [Normal Anterior Cervical Nodes] : no anterior cervical lymphadenopathy [Alert and Oriented x3] : oriented to person, place, and time [No Focal Deficits] : no focal deficits [de-identified] : no w/r/r

## 2019-12-18 NOTE — REVIEW OF SYSTEMS
[Hearing Loss] : hearing loss [Palpitations] : palpitations [Dyspnea on Exertion] : dyspnea on exertion [Chills] : no chills [Fever] : no fever [Fatigue] : no fatigue [Discharge] : no discharge [Pain] : no pain [Earache] : no earache [Nasal Discharge] : no nasal discharge [Lower Ext Edema] : no lower extremity edema [Chest Pain] : no chest pain [Orthopena] : no orthopnea [Wheezing] : no wheezing [Shortness Of Breath] : no shortness of breath [Abdominal Pain] : no abdominal pain [Cough] : no cough [Dysuria] : no dysuria [Nausea] : no nausea [Incontinence] : no incontinence [Joint Pain] : no joint pain [FreeTextEntry4] : chronic  [FreeTextEntry6] : at baseline

## 2019-12-18 NOTE — HISTORY OF PRESENT ILLNESS
[Spouse] : spouse [de-identified] : 89 y.o M w/PMhx of HTN, HLD, Hyponatremia and Afib on (Coumadin), colon CA ( 2001 s/p Chemo), prostate CA( 2006 Tx with radiation), CKD,  MR with Aborted Mitraclip (on 11/21/2018) with residual severe MR, HFrEF with EF 30% -35% on TTE in 09/2019, chronic GERD comes in to establish care \par \par -last time Saw cardiologist and PCP in [facility] is 11/2019; Was hospitalized in [facility] in 09/2019 6 days due to CHF exacerbation and dysphagia; \par \par -no edema; no sob/ CP; prior intermittent palpitation and dizziness;recently saw cardio ( [facility]) in 11/2019; per wife, pt's afib rate is difficulty to control; Pt's metoprolol was increase form 100mg QD to 200mg QD due to uncontrolled rate; Pt was advised by cardio to follow up with his cardiothoracic surgeon; \par \par -INR was recently check by PCP in 11/19 which was in therapeutic range per wife \par \par -still has weight loss and decrease appetite; had not followed up with uro for long time per wife; saw GI during last hospitalization;Had Esophagram done with results of GERD induced esophageal spasms; but not a candidate for EGD; Had Ct abd and pelvic done in 2018 with negative result;

## 2019-12-18 NOTE — HEALTH RISK ASSESSMENT
[No] : In the past 12 months have you used drugs other than those required for medical reasons? No [Two or more falls in past year] : Patient reported two or more falls in the past year [0] : 2) Feeling down, depressed, or hopeless: Not at all (0) [Patient reported colonoscopy was normal] : Patient reported colonoscopy was normal [Behavior] : difficulty with behavior [Learning/Retaining New Information] : difficulty learning/retaining new information [Reasoning] : difficulty with reasoning [Spatial Ability and Orientation] : difficulty with spatial ability and orientation [None] : None [Alone] : lives alone [Retired] : retired [] :  [Feels Safe at Home] : Feels safe at home [] : No [Change in mental status noted] : No change in mental status noted [Language] : denies difficulty with language [Sexually Active] : not sexually active [Handling Complex Tasks] : denies difficulty handling complex tasks [de-identified] : need help with ADLs [High Risk Behavior] : no high risk behavior

## 2019-12-20 ENCOUNTER — APPOINTMENT (OUTPATIENT)
Dept: INTERNAL MEDICINE | Facility: CLINIC | Age: 84
End: 2019-12-20
Payer: MEDICARE

## 2019-12-20 ENCOUNTER — NON-APPOINTMENT (OUTPATIENT)
Age: 84
End: 2019-12-20

## 2019-12-20 ENCOUNTER — LABORATORY RESULT (OUTPATIENT)
Age: 84
End: 2019-12-20

## 2019-12-20 VITALS
SYSTOLIC BLOOD PRESSURE: 113 MMHG | HEIGHT: 73 IN | DIASTOLIC BLOOD PRESSURE: 69 MMHG | RESPIRATION RATE: 16 BRPM | BODY MASS INDEX: 16.04 KG/M2 | WEIGHT: 121 LBS | HEART RATE: 58 BPM

## 2019-12-20 DIAGNOSIS — H54.7 UNSPECIFIED VISUAL LOSS: ICD-10-CM

## 2019-12-20 PROCEDURE — 99213 OFFICE O/P EST LOW 20 MIN: CPT

## 2019-12-20 PROCEDURE — 93000 ELECTROCARDIOGRAM COMPLETE: CPT

## 2019-12-20 NOTE — HISTORY OF PRESENT ILLNESS
[FreeTextEntry8] : 89 y.o M w/PMhx of HTN, HLD, Hyponatremia and Afib on (Coumadin), colon CA ( 2001 s/p Chemo), prostate CA( 2006 Tx with radiation), CKD,  MR with Aborted Mitraclip (on 11/21/2018) with residual severe MR, HFrEF with EF 30% -35% on TTE in 09/2019, chronic GERD comes in to follow up\par \par \par -no current palpitation or sob; dose has intermittent palpitation and sob \par -chronic hearing change for 1 -2 yrs\par -gradually decrease vision of Lt eye had Lt cataracts surgery 2 yrs ago and Rt side thrombus s/p stent 2 yrs ago

## 2019-12-20 NOTE — REVIEW OF SYSTEMS
[Hearing Loss] : hearing loss [Fever] : no fever [Chills] : no chills [Discharge] : no discharge [Pain] : no pain [Wheezing] : no wheezing [Shortness Of Breath] : no shortness of breath [Abdominal Pain] : no abdominal pain [Dyspnea on Exertion] : not dyspnea on exertion [Cough] : no cough [Constipation] : no constipation [Nausea] : no nausea [Hesitancy] : no hesitancy [Dysuria] : no dysuria [Incontinence] : no incontinence [Joint Pain] : no joint pain [Joint Stiffness] : no joint stiffness [Muscle Pain] : no muscle pain [Itching] : no itching [Mole Changes] : no mole changes [Nail Changes] : no nail changes [FreeTextEntry5] : as per HPI

## 2019-12-20 NOTE — PHYSICAL EXAM
[No Acute Distress] : no acute distress [EOMI] : extraocular movements intact [PERRL] : pupils equal round and reactive to light [Normal Oropharynx] : the oropharynx was normal [Normal Outer Ear/Nose] : the outer ears and nose were normal in appearance [No Lymphadenopathy] : no lymphadenopathy [No Respiratory Distress] : no respiratory distress  [Clear to Auscultation] : lungs were clear to auscultation bilaterally [No Accessory Muscle Use] : no accessory muscle use [Normal Rate] : normal rate  [Regular Rhythm] : with a regular rhythm [Normal S1, S2] : normal S1 and S2 [No Edema] : there was no peripheral edema [de-identified] : systolic murmur

## 2019-12-23 LAB
ALBUMIN SERPL ELPH-MCNC: 4.1 G/DL
ALP BLD-CCNC: 101 U/L
ALT SERPL-CCNC: 29 U/L
ANION GAP SERPL CALC-SCNC: 14 MMOL/L
APPEARANCE: CLEAR
AST SERPL-CCNC: 36 U/L
BASOPHILS # BLD AUTO: 0.06 K/UL
BASOPHILS NFR BLD AUTO: 1.7 %
BILIRUB SERPL-MCNC: 0.6 MG/DL
BILIRUBIN URINE: NEGATIVE
BLOOD URINE: NEGATIVE
BUN SERPL-MCNC: 41 MG/DL
CALCIUM SERPL-MCNC: 9.8 MG/DL
CHLORIDE SERPL-SCNC: 101 MMOL/L
CHOLEST SERPL-MCNC: 142 MG/DL
CHOLEST/HDLC SERPL: 2.5 RATIO
CO2 SERPL-SCNC: 26 MMOL/L
COLOR: YELLOW
CREAT SERPL-MCNC: 2.21 MG/DL
EOSINOPHIL # BLD AUTO: 0.38 K/UL
EOSINOPHIL NFR BLD AUTO: 10.7 %
GLUCOSE QUALITATIVE U: NEGATIVE
GLUCOSE SERPL-MCNC: 98 MG/DL
HCT VFR BLD CALC: 36.5 %
HDLC SERPL-MCNC: 58 MG/DL
HGB BLD-MCNC: 11.6 G/DL
IMM GRANULOCYTES NFR BLD AUTO: 0 %
INR PPP: 3.42 RATIO
KETONES URINE: NEGATIVE
LDLC SERPL CALC-MCNC: 70 MG/DL
LEUKOCYTE ESTERASE URINE: NEGATIVE
LYMPHOCYTES # BLD AUTO: 1.19 K/UL
LYMPHOCYTES NFR BLD AUTO: 33.5 %
MAN DIFF?: NORMAL
MCHC RBC-ENTMCNC: 31.2 PG
MCHC RBC-ENTMCNC: 31.8 GM/DL
MCV RBC AUTO: 98.1 FL
MONOCYTES # BLD AUTO: 0.2 K/UL
MONOCYTES NFR BLD AUTO: 5.6 %
NEUTROPHILS # BLD AUTO: 1.72 K/UL
NEUTROPHILS NFR BLD AUTO: 48.5 %
NITRITE URINE: NEGATIVE
PH URINE: 6
PLATELET # BLD AUTO: 101 K/UL
POTASSIUM SERPL-SCNC: 4.9 MMOL/L
PROT SERPL-MCNC: 7.4 G/DL
PROTEIN URINE: NEGATIVE
PT BLD: 40.8 SEC
RBC # BLD: 3.72 M/UL
RBC # FLD: 16.3 %
SODIUM SERPL-SCNC: 140 MMOL/L
SPECIFIC GRAVITY URINE: 1.02
TRIGL SERPL-MCNC: 72 MG/DL
TSH SERPL-ACNC: 5.33 UIU/ML
UROBILINOGEN URINE: NORMAL
WBC # FLD AUTO: 3.55 K/UL

## 2020-01-02 ENCOUNTER — APPOINTMENT (OUTPATIENT)
Dept: CARDIOTHORACIC SURGERY | Facility: CLINIC | Age: 85
End: 2020-01-02
Payer: MEDICARE

## 2020-01-02 VITALS — HEIGHT: 73 IN | BODY MASS INDEX: 15.96 KG/M2

## 2020-01-02 VITALS
HEART RATE: 96 BPM | BODY MASS INDEX: 15.96 KG/M2 | TEMPERATURE: 97.7 F | WEIGHT: 121 LBS | OXYGEN SATURATION: 100 % | SYSTOLIC BLOOD PRESSURE: 114 MMHG | DIASTOLIC BLOOD PRESSURE: 74 MMHG | RESPIRATION RATE: 16 BRPM

## 2020-01-02 DIAGNOSIS — Z09 ENCOUNTER FOR FOLLOW-UP EXAMINATION AFTER COMPLETED TREATMENT FOR CONDITIONS OTHER THAN MALIGNANT NEOPLASM: ICD-10-CM

## 2020-01-02 PROCEDURE — 99214 OFFICE O/P EST MOD 30 MIN: CPT

## 2020-01-02 RX ORDER — SENNOSIDES 8.6 MG TABLETS 8.6 MG/1
TABLET ORAL DAILY
Refills: 0 | Status: ACTIVE | COMMUNITY

## 2020-01-02 RX ORDER — FUROSEMIDE 40 MG/1
40 TABLET ORAL DAILY
Qty: 30 | Refills: 0 | Status: COMPLETED | COMMUNITY
Start: 2019-12-18 | End: 2020-01-02

## 2020-01-02 NOTE — ASSESSMENT
[FreeTextEntry1] : SHAREE SERNA is a 88 year old Male presenting for a follow up visit after his MitraClip insetion. His past medical history includes HTN, HLD, hyponatremia and Afib (on Coumadin),  colon CA (2001 s/p chemo), prostate Ca (2006 tx with radiation), CKD  and severe MR. Aborted MitraClip procedure on 11/21/2018 .He underwent  mitral clip on 12/26/2018 with uncomplicated post op course.He is here for follow up visit .\par \par This visit, he complaints of Dyspnea on exertion and even at rest for 6 months, occasional palpitations, Dizziness, syncope x 2, fatigue  . He was admitted to [facility] on Sep 2019 for heart failure. During admission 9/26/19 ECHO revealed A MitraClip prosthesis is visualized at the mitral position. Moderate to severe mitral regurgitation. Mild aortic regurgitation.Denies any pedal edema, chest pain.  He uses 2 pillows to sleep . He uses walker/walker to walk  and can walk up to 1 block . \par \par I have recommended to follow up with YENNY to evaluate Mitral regurgitation. I will make final recommendation after the YENNY . \par \par Plan:\par 1) YENNY to evaluate mitral regurgitation \par 2) Follow up with Cardiologist and PCP \par 3) Continue current medication regimen\par 4) May return to clinic on PRN basis\par 5) Follow up with Heart failure \par \par \par \par \par \par \par

## 2020-01-02 NOTE — REVIEW OF SYSTEMS
[Feeling Tired] : feeling tired [Palpitations] : palpitations [Dizziness] : dizziness [Fainting] : fainting [Negative] : Heme/Lymph [Chest Pain] : no chest pain [Lower Ext Edema] : no extremity edema [Easy Bleeding] : no tendency for easy bleeding [Easy Bruising] : no tendency for easy bruising

## 2020-01-02 NOTE — HISTORY OF PRESENT ILLNESS
[FreeTextEntry1] : SHAREE SERNA is a 88 year old Male presenting for a follow up visit after his MitraClip insetion. His past medical history includes HTN, HLD, hyponatremia and Afib (on Coumadin),  colon CA (2001 s/p chemo), prostate Ca (2006 tx with radiation), CKD  and severe MR. Aborted MitraClip procedure on 11/21/2018 .He underwent  mitral clip on 12/26/2018 with uncomplicated post op course.He is here for follow up visit .\par \par This visit, he complaints of Dyspnea on exertion and even at rest for 6 months, occasional palpitations, Dizziness, syncope x 2, fatigue  . He was admitted to [facility] on Sep 2019 for heart failure. During admission 9/26/19 ECHO revealed A MitraClip prosthesis is visualized at the mitral position. Moderate to severe mitral regurgitation. Mild aortic regurgitation.Denies any pedal edema, chest pain.  He uses 2 pillows to sleep . He uses walker/walker to walk and can walk up to 1 block  . \par \par

## 2020-01-02 NOTE — DATA REVIEWED
[FreeTextEntry1] : 9/26/19 ECHO revealed A MitraClip prosthesis is visualized at the mitral position. Moderate to severe mitral regurgitation. Mild aortic regurgitation.

## 2020-01-02 NOTE — PHYSICAL EXAM
[Sclera] : the sclera and conjunctiva were normal [PERRL With Normal Accommodation] : pupils were equal in size, round, and reactive to light [Neck Appearance] : the appearance of the neck was normal [Respiration, Rhythm And Depth] : normal respiratory rhythm and effort [] : no respiratory distress [Auscultation Breath Sounds / Voice Sounds] : lungs were clear to auscultation bilaterally [Apical Impulse] : the apical impulse was normal [Heart Rate And Rhythm] : heart rate was normal and rhythm regular [Heart Sounds] : normal S1 and S2 [Examination Of The Chest] : the chest was normal in appearance [2+] : left 2+ [Bowel Sounds] : normal bowel sounds [Breast Appearance] : normal in appearance [Abdomen Soft] : soft [No CVA Tenderness] : no ~M costovertebral angle tenderness [Involuntary Movements] : no involuntary movements were seen [Skin Color & Pigmentation] : normal skin color and pigmentation [Skin Turgor] : normal skin turgor [No Focal Deficits] : no focal deficits [Oriented To Time, Place, And Person] : oriented to person, place, and time [Impaired Insight] : insight and judgment were intact [Affect] : the affect was normal [Mood] : the mood was normal [Memory Recent] : recent memory was not impaired [Memory Remote] : remote memory was not impaired [Systolic grade ___/6] : A grade [unfilled]/6 systolic murmur was heard. [FreeTextEntry1] : Uses walker/cane to walk

## 2020-01-02 NOTE — CONSULT LETTER
[FreeTextEntry2] : Nabor Salazar MD \par [address] [place] [address] [address] [place], \[place] [address] [FreeTextEntry3] : Wendy Floyd MD\par Attending Surgeon\par \par \par Cardiovascular & Thoracic Surgery\par [facility]par

## 2020-01-06 ENCOUNTER — APPOINTMENT (OUTPATIENT)
Dept: INTERNAL MEDICINE | Facility: CLINIC | Age: 85
End: 2020-01-06
Payer: MEDICARE

## 2020-01-06 VITALS
RESPIRATION RATE: 16 BRPM | OXYGEN SATURATION: 98 % | WEIGHT: 123 LBS | HEART RATE: 98 BPM | DIASTOLIC BLOOD PRESSURE: 56 MMHG | SYSTOLIC BLOOD PRESSURE: 86 MMHG | BODY MASS INDEX: 16.3 KG/M2 | TEMPERATURE: 97.4 F | HEIGHT: 73 IN

## 2020-01-06 DIAGNOSIS — I34.0 NONRHEUMATIC MITRAL (VALVE) INSUFFICIENCY: ICD-10-CM

## 2020-01-06 DIAGNOSIS — M25.512 PAIN IN LEFT SHOULDER: ICD-10-CM

## 2020-01-06 PROCEDURE — 99213 OFFICE O/P EST LOW 20 MIN: CPT

## 2020-01-06 NOTE — PHYSICAL EXAM
[No Acute Distress] : no acute distress [PERRL] : pupils equal round and reactive to light [EOMI] : extraocular movements intact [Normal Outer Ear/Nose] : the outer ears and nose were normal in appearance [No Lymphadenopathy] : no lymphadenopathy [Normal Oropharynx] : the oropharynx was normal [No Respiratory Distress] : no respiratory distress  [No Accessory Muscle Use] : no accessory muscle use [Clear to Auscultation] : lungs were clear to auscultation bilaterally [Normal Rate] : normal rate  [Regular Rhythm] : with a regular rhythm [Normal S1, S2] : normal S1 and S2 [Soft] : abdomen soft [No Edema] : there was no peripheral edema [Non Tender] : non-tender [Non-distended] : non-distended [Normal Bowel Sounds] : normal bowel sounds [Normal Affect] : the affect was normal [Alert and Oriented x3] : oriented to person, place, and time [Normal Insight/Judgement] : insight and judgment were intact [de-identified] : systolic murmur [de-identified] : b/l shoulders with no erythema or swelling. decrease ROM of b/l shoulders, Lt> Rt;  sensation/reflex /pulses of all extremities wnl. 5/5 of strength of all extremities; positive Lt empty can test/ Lt Neer test and Lt Madison test.

## 2020-01-06 NOTE — HISTORY OF PRESENT ILLNESS
[de-identified] : 89 y.o M \par \par \par -Lt shoulder pain: acute on  chronic. unknown whether he had recent fall or not per pt and her  wife ; the pain is in the Lt shoulder and worsening when he take deep breath or moving his shoulder decrease shoulder movement; No CP/ worsening of palpiation

## 2020-01-06 NOTE — HISTORY OF PRESENT ILLNESS
[de-identified] : 89 y.o M \par \par \par -Lt shoulder pain: acute on  chronic. unknown whether he had recent fall or not per pt and her  wife ; the pain is in the Lt shoulder and worsening when he take deep breath or moving his shoulder decrease shoulder movement; No CP/ worsening of palpiation

## 2020-01-06 NOTE — ASSESSMENT
[FreeTextEntry1] : -unsure whether recent fall -follow up with Lt shoulder X ray -possible frozen shoulder  -advised ice and phsyical therapy -tasked case management for seeking home visit physical therapy

## 2020-01-06 NOTE — REVIEW OF SYSTEMS
[Hearing Loss] : hearing loss [Fever] : no fever [Chills] : no chills [Discharge] : no discharge [Pain] : no pain [Shortness Of Breath] : no shortness of breath [Wheezing] : no wheezing [Cough] : no cough [Dyspnea on Exertion] : not dyspnea on exertion [Abdominal Pain] : no abdominal pain [Nausea] : no nausea [Constipation] : no constipation [Dysuria] : no dysuria [Incontinence] : no incontinence [Hesitancy] : no hesitancy [Joint Pain] : no joint pain [Joint Stiffness] : no joint stiffness [Muscle Pain] : no muscle pain [Itching] : no itching [Mole Changes] : no mole changes [Nail Changes] : no nail changes [Headache] : no headache [Dizziness] : no dizziness [Fainting] : no fainting [FreeTextEntry5] : as per HPI [FreeTextEntry9] : shoulder pain as per HPI

## 2020-01-06 NOTE — REVIEW OF SYSTEMS
[Hearing Loss] : hearing loss [Fever] : no fever [Discharge] : no discharge [Chills] : no chills [Pain] : no pain [Shortness Of Breath] : no shortness of breath [Wheezing] : no wheezing [Cough] : no cough [Dyspnea on Exertion] : not dyspnea on exertion [Abdominal Pain] : no abdominal pain [Nausea] : no nausea [Constipation] : no constipation [Dysuria] : no dysuria [Incontinence] : no incontinence [Hesitancy] : no hesitancy [Joint Pain] : no joint pain [Joint Stiffness] : no joint stiffness [Muscle Pain] : no muscle pain [Itching] : no itching [Mole Changes] : no mole changes [Nail Changes] : no nail changes [Headache] : no headache [Dizziness] : no dizziness [Fainting] : no fainting [FreeTextEntry5] : as per HPI [FreeTextEntry9] : shoulder pain as per HPI

## 2020-01-06 NOTE — PHYSICAL EXAM
[No Acute Distress] : no acute distress [PERRL] : pupils equal round and reactive to light [Normal Outer Ear/Nose] : the outer ears and nose were normal in appearance [EOMI] : extraocular movements intact [Normal Oropharynx] : the oropharynx was normal [No Lymphadenopathy] : no lymphadenopathy [No Respiratory Distress] : no respiratory distress  [No Accessory Muscle Use] : no accessory muscle use [Normal Rate] : normal rate  [Regular Rhythm] : with a regular rhythm [Clear to Auscultation] : lungs were clear to auscultation bilaterally [Normal S1, S2] : normal S1 and S2 [No Edema] : there was no peripheral edema [Soft] : abdomen soft [Non-distended] : non-distended [Non Tender] : non-tender [Normal Bowel Sounds] : normal bowel sounds [Normal Affect] : the affect was normal [Alert and Oriented x3] : oriented to person, place, and time [Normal Insight/Judgement] : insight and judgment were intact [de-identified] : systolic murmur [de-identified] : b/l shoulders with no erythema or swelling. decrease ROM of b/l shoulders, Lt> Rt;  sensation/reflex /pulses of all extremities wnl. 5/5 of strength of all extremities; positive Lt empty can test/ Lt Neer test and Lt Madison test.

## 2020-01-09 LAB
ANION GAP SERPL CALC-SCNC: 15 MMOL/L
BUN SERPL-MCNC: 30 MG/DL
CALCIUM SERPL-MCNC: 9.8 MG/DL
CHLORIDE SERPL-SCNC: 104 MMOL/L
CO2 SERPL-SCNC: 23 MMOL/L
CREAT SERPL-MCNC: 1.73 MG/DL
GLUCOSE SERPL-MCNC: 85 MG/DL
INR PPP: 3.25 RATIO
POTASSIUM SERPL-SCNC: 4.3 MMOL/L
PT BLD: 38.1 SEC
SODIUM SERPL-SCNC: 142 MMOL/L

## 2020-01-14 ENCOUNTER — APPOINTMENT (OUTPATIENT)
Dept: CV DIAGNOSITCS | Facility: HOSPITAL | Age: 85
End: 2020-01-14

## 2020-01-15 ENCOUNTER — OUTPATIENT (OUTPATIENT)
Dept: OUTPATIENT SERVICES | Facility: HOSPITAL | Age: 85
LOS: 1 days | End: 2020-01-15
Payer: MEDICARE

## 2020-01-15 ENCOUNTER — APPOINTMENT (OUTPATIENT)
Dept: CV DIAGNOSITCS | Facility: HOSPITAL | Age: 85
End: 2020-01-15

## 2020-01-15 DIAGNOSIS — Z85.46 PERSONAL HISTORY OF MALIGNANT NEOPLASM OF PROSTATE: Chronic | ICD-10-CM

## 2020-01-15 DIAGNOSIS — Z90.49 ACQUIRED ABSENCE OF OTHER SPECIFIED PARTS OF DIGESTIVE TRACT: Chronic | ICD-10-CM

## 2020-01-15 DIAGNOSIS — Z98.890 OTHER SPECIFIED POSTPROCEDURAL STATES: Chronic | ICD-10-CM

## 2020-01-15 DIAGNOSIS — I34.0 NONRHEUMATIC MITRAL (VALVE) INSUFFICIENCY: ICD-10-CM

## 2020-01-15 PROCEDURE — 93312 ECHO TRANSESOPHAGEAL: CPT

## 2020-01-15 PROCEDURE — 93312 ECHO TRANSESOPHAGEAL: CPT | Mod: 26

## 2020-01-15 PROCEDURE — 93306 TTE W/DOPPLER COMPLETE: CPT

## 2020-01-15 PROCEDURE — 93306 TTE W/DOPPLER COMPLETE: CPT | Mod: 26

## 2020-01-20 ENCOUNTER — APPOINTMENT (OUTPATIENT)
Dept: INTERNAL MEDICINE | Facility: CLINIC | Age: 85
End: 2020-01-20
Payer: MEDICARE

## 2020-01-20 VITALS
RESPIRATION RATE: 16 BRPM | OXYGEN SATURATION: 96 % | DIASTOLIC BLOOD PRESSURE: 67 MMHG | HEIGHT: 73 IN | SYSTOLIC BLOOD PRESSURE: 101 MMHG | BODY MASS INDEX: 16.44 KG/M2 | WEIGHT: 124 LBS | HEART RATE: 99 BPM | TEMPERATURE: 97.1 F

## 2020-01-20 PROCEDURE — 99212 OFFICE O/P EST SF 10 MIN: CPT

## 2020-01-29 ENCOUNTER — CHART COPY (OUTPATIENT)
Age: 85
End: 2020-01-29

## 2020-01-31 ENCOUNTER — NON-APPOINTMENT (OUTPATIENT)
Age: 85
End: 2020-01-31

## 2020-02-10 NOTE — ASSESSMENT
[FreeTextEntry1] : Patient has a mobility limitation of â??__#OF FT__â? that significantly impairs his ability to participate in mobility related activities of daily living in the home including toileting, grooming, and bathing entirely. Patient is able to safely use the mobility device and the functional mobility deficit can be sufficiently resolved with the use of the this device. Will order rollator.

## 2020-02-10 NOTE — REVIEW OF SYSTEMS
[Hearing Loss] : hearing loss [Unsteady Walk] : ataxia [Fever] : no fever [Chills] : no chills [Discharge] : no discharge [Pain] : no pain [Shortness Of Breath] : no shortness of breath [Wheezing] : no wheezing [Cough] : no cough [Dyspnea on Exertion] : not dyspnea on exertion [Abdominal Pain] : no abdominal pain [Nausea] : no nausea [Constipation] : no constipation [Dysuria] : no dysuria [Incontinence] : no incontinence [Hesitancy] : no hesitancy [Joint Pain] : no joint pain [Joint Stiffness] : no joint stiffness [Muscle Pain] : no muscle pain [Itching] : no itching [Mole Changes] : no mole changes [Nail Changes] : no nail changes [FreeTextEntry5] : as per HPI

## 2020-02-10 NOTE — PHYSICAL EXAM
[No Acute Distress] : no acute distress [EOMI] : extraocular movements intact [PERRL] : pupils equal round and reactive to light [Normal Outer Ear/Nose] : the outer ears and nose were normal in appearance [Normal Oropharynx] : the oropharynx was normal [No Lymphadenopathy] : no lymphadenopathy [No Respiratory Distress] : no respiratory distress  [Clear to Auscultation] : lungs were clear to auscultation bilaterally [No Accessory Muscle Use] : no accessory muscle use [Regular Rhythm] : with a regular rhythm [Normal Rate] : normal rate  [Normal S1, S2] : normal S1 and S2 [No Edema] : there was no peripheral edema [Normal Affect] : the affect was normal [Alert and Oriented x3] : oriented to person, place, and time [Normal Insight/Judgement] : insight and judgment were intact [de-identified] : systolic murmur

## 2020-02-10 NOTE — HISTORY OF PRESENT ILLNESS
[de-identified] : 89 y.o M w/PMhx of HTN, HLD, Hyponatremia and Afib on (Coumadin), colon CA ( 2001 s/p Chemo), prostate CA( 2006 Tx with radiation), CKD,  MR with Aborted Mitraclip (on 11/21/2018) with residual severe MR, HFrEF with EF 30% -35% on TTE in 09/2019, chronic GERD comes in to follow up\par \par -no current palpitation or sob; dose has intermittent palpitation and sob \par -chronic hearing change for 1 -2 yrs\par -gradually decrease vision of Lt eye had Lt cataracts surgery 2 yrs ago and Rt side thrombus s/p stent 2 yrs ago \par -recently saw Thoracic surgery and had YENNY done;\par -will follow up with cardiologist in 03/2020\par -recent INR 3.15; per Wife that INR level should be 2.5-3.5 per thoracic surgery and cardio today

## 2020-02-13 LAB
INR PPP: 3.24 RATIO
PT BLD: 38 SEC

## 2020-02-20 ENCOUNTER — OUTPATIENT (OUTPATIENT)
Dept: OUTPATIENT SERVICES | Facility: HOSPITAL | Age: 85
LOS: 1 days | End: 2020-02-20
Payer: MEDICARE

## 2020-02-20 VITALS
RESPIRATION RATE: 16 BRPM | HEIGHT: 70 IN | HEART RATE: 74 BPM | TEMPERATURE: 98 F | WEIGHT: 123.02 LBS | SYSTOLIC BLOOD PRESSURE: 106 MMHG | DIASTOLIC BLOOD PRESSURE: 72 MMHG | OXYGEN SATURATION: 100 %

## 2020-02-20 DIAGNOSIS — I05.0 RHEUMATIC MITRAL STENOSIS: ICD-10-CM

## 2020-02-20 DIAGNOSIS — I34.0 NONRHEUMATIC MITRAL (VALVE) INSUFFICIENCY: ICD-10-CM

## 2020-02-20 DIAGNOSIS — L03.011 CELLULITIS OF RIGHT FINGER: ICD-10-CM

## 2020-02-20 DIAGNOSIS — Z85.46 PERSONAL HISTORY OF MALIGNANT NEOPLASM OF PROSTATE: Chronic | ICD-10-CM

## 2020-02-20 DIAGNOSIS — Z29.9 ENCOUNTER FOR PROPHYLACTIC MEASURES, UNSPECIFIED: ICD-10-CM

## 2020-02-20 DIAGNOSIS — Z90.49 ACQUIRED ABSENCE OF OTHER SPECIFIED PARTS OF DIGESTIVE TRACT: Chronic | ICD-10-CM

## 2020-02-20 DIAGNOSIS — I48.91 UNSPECIFIED ATRIAL FIBRILLATION: ICD-10-CM

## 2020-02-20 DIAGNOSIS — Z01.818 ENCOUNTER FOR OTHER PREPROCEDURAL EXAMINATION: ICD-10-CM

## 2020-02-20 DIAGNOSIS — Z98.890 OTHER SPECIFIED POSTPROCEDURAL STATES: Chronic | ICD-10-CM

## 2020-02-20 LAB
ANION GAP SERPL CALC-SCNC: 12 MMOL/L — SIGNIFICANT CHANGE UP (ref 5–17)
BLD GP AB SCN SERPL QL: NEGATIVE — SIGNIFICANT CHANGE UP
BUN SERPL-MCNC: 29 MG/DL — HIGH (ref 7–23)
CALCIUM SERPL-MCNC: 9.7 MG/DL — SIGNIFICANT CHANGE UP (ref 8.4–10.5)
CHLORIDE SERPL-SCNC: 102 MMOL/L — SIGNIFICANT CHANGE UP (ref 96–108)
CO2 SERPL-SCNC: 24 MMOL/L — SIGNIFICANT CHANGE UP (ref 22–31)
CREAT SERPL-MCNC: 1.92 MG/DL — HIGH (ref 0.5–1.3)
GLUCOSE SERPL-MCNC: 90 MG/DL — SIGNIFICANT CHANGE UP (ref 70–99)
HBA1C BLD-MCNC: 5.6 % — SIGNIFICANT CHANGE UP (ref 4–5.6)
HCT VFR BLD CALC: 30.9 % — LOW (ref 39–50)
HGB BLD-MCNC: 9.4 G/DL — LOW (ref 13–17)
INR BLD: 3.53 RATIO — HIGH (ref 0.88–1.16)
MCHC RBC-ENTMCNC: 29.7 PG — SIGNIFICANT CHANGE UP (ref 27–34)
MCHC RBC-ENTMCNC: 30.4 GM/DL — LOW (ref 32–36)
MCV RBC AUTO: 97.5 FL — SIGNIFICANT CHANGE UP (ref 80–100)
MRSA PCR RESULT.: SIGNIFICANT CHANGE UP
NRBC # BLD: 0 /100 WBCS — SIGNIFICANT CHANGE UP (ref 0–0)
PLATELET # BLD AUTO: 98 K/UL — LOW (ref 150–400)
POTASSIUM SERPL-MCNC: 4.3 MMOL/L — SIGNIFICANT CHANGE UP (ref 3.5–5.3)
POTASSIUM SERPL-SCNC: 4.3 MMOL/L — SIGNIFICANT CHANGE UP (ref 3.5–5.3)
PROTHROM AB SERPL-ACNC: 41.9 SEC — HIGH (ref 10–13.1)
RBC # BLD: 3.17 M/UL — LOW (ref 4.2–5.8)
RBC # FLD: 16.1 % — HIGH (ref 10.3–14.5)
RH IG SCN BLD-IMP: POSITIVE — SIGNIFICANT CHANGE UP
S AUREUS DNA NOSE QL NAA+PROBE: SIGNIFICANT CHANGE UP
SODIUM SERPL-SCNC: 138 MMOL/L — SIGNIFICANT CHANGE UP (ref 135–145)
WBC # BLD: 4.48 K/UL — SIGNIFICANT CHANGE UP (ref 3.8–10.5)
WBC # FLD AUTO: 4.48 K/UL — SIGNIFICANT CHANGE UP (ref 3.8–10.5)

## 2020-02-20 PROCEDURE — 85027 COMPLETE CBC AUTOMATED: CPT

## 2020-02-20 PROCEDURE — 86923 COMPATIBILITY TEST ELECTRIC: CPT

## 2020-02-20 PROCEDURE — 87640 STAPH A DNA AMP PROBE: CPT

## 2020-02-20 PROCEDURE — 93880 EXTRACRANIAL BILAT STUDY: CPT

## 2020-02-20 PROCEDURE — G0463: CPT

## 2020-02-20 PROCEDURE — 87641 MR-STAPH DNA AMP PROBE: CPT

## 2020-02-20 PROCEDURE — 85610 PROTHROMBIN TIME: CPT

## 2020-02-20 PROCEDURE — 83036 HEMOGLOBIN GLYCOSYLATED A1C: CPT

## 2020-02-20 PROCEDURE — 71046 X-RAY EXAM CHEST 2 VIEWS: CPT | Mod: 26

## 2020-02-20 PROCEDURE — 93880 EXTRACRANIAL BILAT STUDY: CPT | Mod: 26

## 2020-02-20 PROCEDURE — 71046 X-RAY EXAM CHEST 2 VIEWS: CPT

## 2020-02-20 PROCEDURE — 80048 BASIC METABOLIC PNL TOTAL CA: CPT

## 2020-02-20 PROCEDURE — 86901 BLOOD TYPING SEROLOGIC RH(D): CPT

## 2020-02-20 PROCEDURE — 86850 RBC ANTIBODY SCREEN: CPT

## 2020-02-20 PROCEDURE — 86900 BLOOD TYPING SEROLOGIC ABO: CPT

## 2020-02-20 RX ORDER — VANCOMYCIN HCL 1 G
750 VIAL (EA) INTRAVENOUS ONCE
Refills: 0 | Status: COMPLETED | OUTPATIENT
Start: 2020-02-26 | End: 2020-02-26

## 2020-02-20 NOTE — H&P PST ADULT - MALLAMPATI CLASS
Visualization of soft palate, fauces and uvula/Class I (easy) - visualization of the soft palate, fauces, uvula, and both anterior and posterior pillars

## 2020-02-20 NOTE — H&P PST ADULT - HISTORY OF PRESENT ILLNESS
89 year old male from home, lives with wife, PMH HTN, HLD, afib (on coumadin), moderate to severe MR s/p clipping 12/2018, s/p cath in 11/2018 (no obstructive), S/P  PE(11/2018 was on Eliquis/Warfarin/lovenox),CKD, hyponatremia, colon cancer (2001s/p chemo, in remission), prostate cancer (s/p RT in [address]), decreased appetite and worsening shortness of breath. History obtained from both patient and his spouse at bedside, who report decreased appetite since 12 months. Patient reports symptoms started s/p mitral clip surgery in december 2018, with difficulty swallowing. He reports coughing every time he eats and tends to bring up clear phlegm, also reports episodes of sour tasting reflux after he eats. He denies abdominal pain at rest but reports pain when he walks, has occasional constipation, and has lost over 47 lbs in a year. Echo 01/15/2020 reveals severe MR & mitral clip prosthesis at the mitral position , scheduled for mitral clip o repair on 02/26/2020.

## 2020-02-20 NOTE — H&P PST ADULT - EXTREMITIES COMMENTS
right index fingernail swelling, inflammation, discomfort  & pain for > a week, skin intact - informed surgeon's office

## 2020-02-20 NOTE — H&P PST ADULT - NSICDXPASTMEDICALHX_GEN_ALL_CORE_FT
PAST MEDICAL HISTORY:  Afib     Atrial fibrillation On coumadin    CKD (chronic kidney disease)     Colon cancer 2001, s/p chemo    GERD (gastroesophageal reflux disease)     HLD (hyperlipidemia)     HLD (hyperlipidemia)     HTN (hypertension)     HTN (hypertension)     Hyponatremia     Mitral regurgitation     Pleural effusion, bilateral as of latest chest xray  s/p latest hospitalization for CHF    Prostate CA     Prostate cancer 2006    Thrombus in heart chamber

## 2020-02-20 NOTE — H&P PST ADULT - MUSCULOSKELETAL
details… detailed exam ROM intact/no joint erythema/no joint warmth/no joint swelling/normal strength

## 2020-02-20 NOTE — H&P PST ADULT - NSICDXPASTSURGICALHX_GEN_ALL_CORE_FT
PAST SURGICAL HISTORY:  H/O colectomy     History of appendectomy     History of prostate cancer     S/P coronary angiogram 11/12/18    S/P mitral valve clip implantation 12/2018

## 2020-02-20 NOTE — CHART NOTE - NSCHARTNOTEFT_GEN_A_CORE
Called by [facility] to see Mr Mendez for "possible finger infection".  On examination, he has a small area of discoloration on the lateral aspect of his R index finger (purple, yellow) indicative of resolving ecchymosis.  Mildly tender on palpation.  No drainage, redness or warmth, or any other signs of infection.  Normal range of motion and sensation intact.  As per Mr Mendez and his wife, the injury was sustained as a result of a fall in which his walker folded on his finger.  No contraindication to Mitraclip.    YORDAN Cam

## 2020-02-20 NOTE — H&P PST ADULT - NSICDXPROBLEM_GEN_ALL_CORE_FT
PROBLEM DIAGNOSES  Problem: Mitral valve stenosis, severe  Assessment and Plan: mitral clip  pre op CxR, CAROTID ENDARTERECTOMY ENDARTERECTOMY ENDARTERECTOMY DOPPLER SENT    Problem: Afib  Assessment and Plan: off coumadin , ast dose 2/22  asprinto continue to OR    Problem: Paronychia of right index finger  Assessment and Plan: Informed surgeon's office   Mr Bolden ( PA) examined &" its ok to proceed with surgery"      Problem: Prophylactic measure  Assessment and Plan: The Caprini score indicates that this patient is at high risk for a VTE event (score 6 or greater). Surgical patients in this group will benefit from both pharmacologic prophylaxis and intermittent compression devices.  The surgical team will determine the balance between VTE risk and bleeding risk, and other clinical considerations

## 2020-02-20 NOTE — H&P PST ADULT - ASSESSMENT
CAPRINI SCORE [CLOT]    AGE RELATED RISK FACTORS                                                       MOBILITY RELATED FACTORS  [ ] Age 41-60 years                                            (1 Point)                  [ ] Bed rest                                                        (1 Point)  [ ] Age: 61-74 years                                           (2 Points)                 [ ] Plaster cast                                                   (2 Points)  [ x] Age= 75 years                                              (3 Points)                 [ ] Bed bound for more than 72 hours                 (2 Points)    DISEASE RELATED RISK FACTORS                                               GENDER SPECIFIC FACTORS  [ x] Edema in the lower extremities                       (1 Point)                  [ ] Pregnancy                                                     (1 Point)  [ ] Varicose veins                                               (1 Point)                  [ ] Post-partum < 6 weeks                                   (1 Point)             [ ] BMI > 25 Kg/m2                                            (1 Point)                  [ ] Hormonal therapy  or oral contraception          (1 Point)                 [ ] Sepsis (in the previous month)                        (1 Point)                  [ ] History of pregnancy complications                 (1 point)  [ ] Pneumonia or serious lung disease                                               [ ] Unexplained or recurrent                     (1 Point)           (in the previous month)                               (1 Point)  [ ] Abnormal pulmonary function test                     (1 Point)                 SURGERY RELATED RISK FACTORS  [ ] Acute myocardial infarction                              (1 Point)                 [ ]  Section                                             (1 Point)  [x ] Congestive heart failure (in the previous month)  (1 Point)               [ ] Minor surgery                                                  (1 Point)   [ ] Inflammatory bowel disease                             (1 Point)                 [ ] Arthroscopic surgery                                        (2 Points)  [ ] Central venous access                                      (2 Points)                [ x] General surgery lasting more than 45 minutes   (2 Points)       [ ] Stroke (in the previous month)                          (5 Points)               [ ] Elective arthroplasty                                         (5 Points)                                                                                                                                               HEMATOLOGY RELATED FACTORS                                                 TRAUMA RELATED RISK FACTORS  [ ] Prior episodes of VTE                                     (3 Points)                 [ ] Fracture of the hip, pelvis, or leg                       (5 Points)  [ ] Positive family history for VTE                         (3 Points)                 [ ] Acute spinal cord injury (in the previous month)  (5 Points)  [ ] Prothrombin 49750 A                                     (3 Points)                 [ ] Paralysis  (less than 1 month)                             (5 Points)  [ ] Factor V Leiden                                             (3 Points)                  [ ] Multiple Trauma within 1 month                        (5 Points)  [ ] Lupus anticoagulants                                     (3 Points)                                                           [ ] Anticardiolipin antibodies                               (3 Points)                                                       [ ] High homocysteine in the blood                      (3 Points)                                             [ ] Other congenital or acquired thrombophilia      (3 Points)                                                [ ] Heparin induced thrombocytopenia                  (3 Points)                                          Total Score [    7      ]

## 2020-02-20 NOTE — H&P PST ADULT - PRIMARY CARE PROVIDER
Dr Suarez , [facility]  [phone] & [phone], last visit 02/2020

## 2020-02-21 PROBLEM — C61 MALIGNANT NEOPLASM OF PROSTATE: Chronic | Status: ACTIVE | Noted: 2018-11-12

## 2020-02-21 PROBLEM — C18.9 MALIGNANT NEOPLASM OF COLON, UNSPECIFIED: Chronic | Status: ACTIVE | Noted: 2018-11-12

## 2020-02-21 PROBLEM — I48.91 UNSPECIFIED ATRIAL FIBRILLATION: Chronic | Status: ACTIVE | Noted: 2018-11-12

## 2020-02-24 ENCOUNTER — APPOINTMENT (OUTPATIENT)
Dept: INTERNAL MEDICINE | Facility: CLINIC | Age: 85
End: 2020-02-24
Payer: MEDICARE

## 2020-02-24 VITALS
TEMPERATURE: 97.3 F | RESPIRATION RATE: 16 BRPM | WEIGHT: 112 LBS | BODY MASS INDEX: 14.84 KG/M2 | SYSTOLIC BLOOD PRESSURE: 99 MMHG | HEIGHT: 73 IN | HEART RATE: 86 BPM | DIASTOLIC BLOOD PRESSURE: 66 MMHG | OXYGEN SATURATION: 99 %

## 2020-02-24 DIAGNOSIS — Z01.818 ENCOUNTER FOR OTHER PREPROCEDURAL EXAMINATION: ICD-10-CM

## 2020-02-24 PROCEDURE — 99213 OFFICE O/P EST LOW 20 MIN: CPT

## 2020-02-24 NOTE — ASSESSMENT
[FreeTextEntry4] :  -preop lab work done in 02/20/2020 reviewed with H/H low at 9.4/30.9; aurotherapeutic INR 3.53, BUN/Cr 29/1.92 and GFR at 30;\par  -Mets<4; revised cardiac index= 2; class II risk; high risk patient medically optimized for high risk procedural;volume support or anticoagulation reversal or blood transfusion as per surgical team; \par -cardiac clearance and pre and post op anticoagulation and antiplatelet plan as per cardiac thoracic surgery; \par -discussed with pt and wife regarding to health proxy and MOLST form; per pt and wife, they sign the forms; advised to bring to hospital with pt and bring to office for next visit

## 2020-02-24 NOTE — HISTORY OF PRESENT ILLNESS
[Atrial Fibrillation] : atrial fibrillation [Chronic Anticoagulation] : chronic anticoagulation [Chronic Kidney Disease] : chronic kidney disease [Anti-Platelet Agents: _____] : Anti-Platelet Agents: [unfilled] [Poor (<4 METs)] : Poor (<4 METs) [Anticoagulants: _____] : Anticoagulants: [unfilled] [Spouse] : spouse [Aortic Stenosis] : no aortic stenosis [Coronary Artery Disease] : no coronary artery disease [Recent Myocardial Infarction] : no recent myocardial infarction [Implantable Device/Pacemaker] : no implantable device/pacemaker [Asthma] : no asthma [Sleep Apnea] : no sleep apnea [Smoker] : not a smoker [COPD] : no COPD [Family Member] : no family member with adverse anesthesia reaction/sudden death [Self] : no previous adverse anesthesia reaction [Diabetes] : no diabetes [FreeTextEntry4] : 89 y.o M w/PMhx of HTN, HLD, Hyponatremia and Afib on (Coumadin), colon CA ( 2001 s/p Chemo), prostate CA( 2006 Tx with radiation), CKD,  MR with Aborted Mitraclip (on 11/21/2018) with residual severe MR, HFrEF with EF 30% -35% on TTE in 09/2019, chronic GERD comes in for pre op Jairo.\par accompanies by wife\par \par He is scheduled for Mitraclip in 02/26/2020 [facility] with cardiothoracic with ; \par \par he had lab work done in 02/20 with low H/H; INR elevated in 3.53; wife stated that Coumadin has been stopped and continue with ASA per CTSX. CXR done in 02/20 with normal result; Carotid Dopplex done in 02/20 showed Bilateral plaque but No evidence of significant stenosis of either carotid artery.\par \par TTE done in 01/05/2020\par \par Need help for ADLs IADLS by wife and care aid [FreeTextEntry6] : denies of CP\par intermittent palpitation at baseline \par no syncope\par still dyspnea on exertion

## 2020-02-24 NOTE — PHYSICAL EXAM
[No Acute Distress] : no acute distress [PERRL] : pupils equal round and reactive to light [EOMI] : extraocular movements intact [Normal Outer Ear/Nose] : the outer ears and nose were normal in appearance [Normal Oropharynx] : the oropharynx was normal [No Lymphadenopathy] : no lymphadenopathy [No Respiratory Distress] : no respiratory distress  [No Accessory Muscle Use] : no accessory muscle use [Clear to Auscultation] : lungs were clear to auscultation bilaterally [Normal Rate] : normal rate  [Normal S1, S2] : normal S1 and S2 [No Edema] : there was no peripheral edema [Normal Affect] : the affect was normal [Alert and Oriented x3] : oriented to person, place, and time [Normal Insight/Judgement] : insight and judgment were intact [Soft] : abdomen soft [Non Tender] : non-tender [Non-distended] : non-distended [Normal Bowel Sounds] : normal bowel sounds [Normal Anterior Cervical Nodes] : no anterior cervical lymphadenopathy [Normal Posterior Cervical Nodes] : no posterior cervical lymphadenopathy [de-identified] : systolic murmur; irregular rhythm

## 2020-02-24 NOTE — REVIEW OF SYSTEMS
[Hearing Loss] : hearing loss [Palpitations] : palpitations [Dyspnea on Exertion] : dyspnea on exertion [Unsteady Walk] : ataxia [Chills] : no chills [Fever] : no fever [Discharge] : no discharge [Chest Pain] : no chest pain [Pain] : no pain [Cough] : no cough [Shortness Of Breath] : no shortness of breath [Wheezing] : no wheezing [Abdominal Pain] : no abdominal pain [Nausea] : no nausea [Incontinence] : no incontinence [Constipation] : no constipation [Dysuria] : no dysuria [Hesitancy] : no hesitancy [Joint Stiffness] : no joint stiffness [Joint Pain] : no joint pain [Muscle Pain] : no muscle pain [Mole Changes] : no mole changes [Itching] : no itching [Nail Changes] : no nail changes [FreeTextEntry5] : as per HPI

## 2020-02-26 ENCOUNTER — APPOINTMENT (OUTPATIENT)
Dept: CARDIOTHORACIC SURGERY | Facility: HOSPITAL | Age: 85
End: 2020-02-26

## 2020-02-26 ENCOUNTER — INPATIENT (INPATIENT)
Facility: HOSPITAL | Age: 85
LOS: 2 days | Discharge: HOME | DRG: 267 | End: 2020-02-29
Attending: THORACIC SURGERY (CARDIOTHORACIC VASCULAR SURGERY) | Admitting: THORACIC SURGERY (CARDIOTHORACIC VASCULAR SURGERY)
Payer: MEDICARE

## 2020-02-26 VITALS
SYSTOLIC BLOOD PRESSURE: 135 MMHG | OXYGEN SATURATION: 98 % | HEART RATE: 78 BPM | TEMPERATURE: 98 F | RESPIRATION RATE: 18 BRPM | DIASTOLIC BLOOD PRESSURE: 90 MMHG | HEIGHT: 70 IN | WEIGHT: 128.75 LBS

## 2020-02-26 DIAGNOSIS — Z90.49 ACQUIRED ABSENCE OF OTHER SPECIFIED PARTS OF DIGESTIVE TRACT: Chronic | ICD-10-CM

## 2020-02-26 DIAGNOSIS — I34.0 NONRHEUMATIC MITRAL (VALVE) INSUFFICIENCY: ICD-10-CM

## 2020-02-26 DIAGNOSIS — Z85.46 PERSONAL HISTORY OF MALIGNANT NEOPLASM OF PROSTATE: Chronic | ICD-10-CM

## 2020-02-26 DIAGNOSIS — Z98.890 OTHER SPECIFIED POSTPROCEDURAL STATES: Chronic | ICD-10-CM

## 2020-02-26 LAB
ALBUMIN SERPL ELPH-MCNC: 3.3 G/DL — SIGNIFICANT CHANGE UP (ref 3.3–5)
ALP SERPL-CCNC: 85 U/L — SIGNIFICANT CHANGE UP (ref 40–120)
ALT FLD-CCNC: 23 U/L — SIGNIFICANT CHANGE UP (ref 10–45)
ANION GAP SERPL CALC-SCNC: 10 MMOL/L — SIGNIFICANT CHANGE UP (ref 5–17)
APTT BLD: 41.9 SEC — HIGH (ref 27.5–36.3)
AST SERPL-CCNC: 34 U/L — SIGNIFICANT CHANGE UP (ref 10–40)
BILIRUB SERPL-MCNC: 0.7 MG/DL — SIGNIFICANT CHANGE UP (ref 0.2–1.2)
BUN SERPL-MCNC: 25 MG/DL — HIGH (ref 7–23)
CALCIUM SERPL-MCNC: 9.5 MG/DL — SIGNIFICANT CHANGE UP (ref 8.4–10.5)
CHLORIDE SERPL-SCNC: 105 MMOL/L — SIGNIFICANT CHANGE UP (ref 96–108)
CO2 SERPL-SCNC: 23 MMOL/L — SIGNIFICANT CHANGE UP (ref 22–31)
CREAT SERPL-MCNC: 1.58 MG/DL — HIGH (ref 0.5–1.3)
GAS PNL BLDA: SIGNIFICANT CHANGE UP
GLUCOSE BLDC GLUCOMTR-MCNC: 88 MG/DL — SIGNIFICANT CHANGE UP (ref 70–99)
GLUCOSE SERPL-MCNC: 104 MG/DL — HIGH (ref 70–99)
HCT VFR BLD CALC: 29.1 % — LOW (ref 39–50)
HGB BLD-MCNC: 9.4 G/DL — LOW (ref 13–17)
INR BLD: 2.71 RATIO — HIGH (ref 0.88–1.16)
INR BLD: 3 RATIO — HIGH (ref 0.88–1.16)
MCHC RBC-ENTMCNC: 30.4 PG — SIGNIFICANT CHANGE UP (ref 27–34)
MCHC RBC-ENTMCNC: 32.3 GM/DL — SIGNIFICANT CHANGE UP (ref 32–36)
MCV RBC AUTO: 94.2 FL — SIGNIFICANT CHANGE UP (ref 80–100)
NRBC # BLD: 0 /100 WBCS — SIGNIFICANT CHANGE UP (ref 0–0)
PLATELET # BLD AUTO: 96 K/UL — LOW (ref 150–400)
POTASSIUM SERPL-MCNC: 4.4 MMOL/L — SIGNIFICANT CHANGE UP (ref 3.5–5.3)
POTASSIUM SERPL-MCNC: 4.9 MMOL/L — SIGNIFICANT CHANGE UP (ref 3.5–5.3)
POTASSIUM SERPL-SCNC: 4.4 MMOL/L — SIGNIFICANT CHANGE UP (ref 3.5–5.3)
POTASSIUM SERPL-SCNC: 4.9 MMOL/L — SIGNIFICANT CHANGE UP (ref 3.5–5.3)
PROT SERPL-MCNC: 6.5 G/DL — SIGNIFICANT CHANGE UP (ref 6–8.3)
PROTHROM AB SERPL-ACNC: 31.9 SEC — HIGH (ref 10–12.9)
PROTHROM AB SERPL-ACNC: 35.7 SEC — HIGH (ref 10–12.9)
RBC # BLD: 3.09 M/UL — LOW (ref 4.2–5.8)
RBC # FLD: 16.4 % — HIGH (ref 10.3–14.5)
SODIUM SERPL-SCNC: 138 MMOL/L — SIGNIFICANT CHANGE UP (ref 135–145)
WBC # BLD: 3.71 K/UL — LOW (ref 3.8–10.5)
WBC # FLD AUTO: 3.71 K/UL — LOW (ref 3.8–10.5)

## 2020-02-26 PROCEDURE — 33418 REPAIR TCAT MITRAL VALVE: CPT | Mod: 62,Q0

## 2020-02-26 PROCEDURE — 93355 ECHO TRANSESOPHAGEAL (TEE): CPT

## 2020-02-26 PROCEDURE — 93010 ELECTROCARDIOGRAM REPORT: CPT

## 2020-02-26 RX ORDER — PANTOPRAZOLE SODIUM 20 MG/1
40 TABLET, DELAYED RELEASE ORAL ONCE
Refills: 0 | Status: COMPLETED | OUTPATIENT
Start: 2020-02-26 | End: 2020-02-26

## 2020-02-26 RX ORDER — ATORVASTATIN CALCIUM 80 MG/1
20 TABLET, FILM COATED ORAL AT BEDTIME
Refills: 0 | Status: DISCONTINUED | OUTPATIENT
Start: 2020-02-26 | End: 2020-02-26

## 2020-02-26 RX ORDER — SODIUM CHLORIDE 9 MG/ML
1000 INJECTION INTRAMUSCULAR; INTRAVENOUS; SUBCUTANEOUS
Refills: 0 | Status: DISCONTINUED | OUTPATIENT
Start: 2020-02-26 | End: 2020-02-27

## 2020-02-26 RX ORDER — SODIUM CHLORIDE 9 MG/ML
250 INJECTION, SOLUTION INTRAVENOUS ONCE
Refills: 0 | Status: COMPLETED | OUTPATIENT
Start: 2020-02-26 | End: 2020-02-26

## 2020-02-26 RX ORDER — FUROSEMIDE 40 MG
20 TABLET ORAL EVERY 8 HOURS
Refills: 0 | Status: DISCONTINUED | OUTPATIENT
Start: 2020-02-26 | End: 2020-02-29

## 2020-02-26 RX ORDER — SODIUM CHLORIDE 9 MG/ML
3 INJECTION INTRAMUSCULAR; INTRAVENOUS; SUBCUTANEOUS EVERY 8 HOURS
Refills: 0 | Status: DISCONTINUED | OUTPATIENT
Start: 2020-02-26 | End: 2020-02-26

## 2020-02-26 RX ORDER — LIDOCAINE HCL 20 MG/ML
0.2 VIAL (ML) INJECTION ONCE
Refills: 0 | Status: DISCONTINUED | OUTPATIENT
Start: 2020-02-26 | End: 2020-02-26

## 2020-02-26 RX ORDER — ATORVASTATIN CALCIUM 80 MG/1
40 TABLET, FILM COATED ORAL AT BEDTIME
Refills: 0 | Status: DISCONTINUED | OUTPATIENT
Start: 2020-02-26 | End: 2020-02-29

## 2020-02-26 RX ORDER — CHLORHEXIDINE GLUCONATE 213 G/1000ML
1 SOLUTION TOPICAL ONCE
Refills: 0 | Status: DISCONTINUED | OUTPATIENT
Start: 2020-02-26 | End: 2020-02-26

## 2020-02-26 RX ORDER — METOPROLOL TARTRATE 50 MG
25 TABLET ORAL EVERY 8 HOURS
Refills: 0 | Status: DISCONTINUED | OUTPATIENT
Start: 2020-02-26 | End: 2020-02-28

## 2020-02-26 RX ORDER — ASPIRIN/CALCIUM CARB/MAGNESIUM 324 MG
81 TABLET ORAL DAILY
Refills: 0 | Status: DISCONTINUED | OUTPATIENT
Start: 2020-02-26 | End: 2020-02-29

## 2020-02-26 RX ORDER — MEPERIDINE HYDROCHLORIDE 50 MG/ML
25 INJECTION INTRAMUSCULAR; INTRAVENOUS; SUBCUTANEOUS ONCE
Refills: 0 | Status: DISCONTINUED | OUTPATIENT
Start: 2020-02-26 | End: 2020-02-27

## 2020-02-26 RX ORDER — CEFUROXIME AXETIL 250 MG
1500 TABLET ORAL EVERY 12 HOURS
Refills: 0 | Status: COMPLETED | OUTPATIENT
Start: 2020-02-26 | End: 2020-02-27

## 2020-02-26 RX ORDER — CHLORHEXIDINE GLUCONATE 213 G/1000ML
5 SOLUTION TOPICAL EVERY 4 HOURS
Refills: 0 | Status: DISCONTINUED | OUTPATIENT
Start: 2020-02-26 | End: 2020-02-27

## 2020-02-26 RX ADMIN — SODIUM CHLORIDE 500 MILLILITER(S): 9 INJECTION, SOLUTION INTRAVENOUS at 22:44

## 2020-02-26 RX ADMIN — Medication 100 MILLIGRAM(S): at 23:51

## 2020-02-26 RX ADMIN — Medication 20 MILLIGRAM(S): at 15:09

## 2020-02-26 RX ADMIN — Medication 25 MILLIGRAM(S): at 22:49

## 2020-02-26 RX ADMIN — ATORVASTATIN CALCIUM 40 MILLIGRAM(S): 80 TABLET, FILM COATED ORAL at 21:23

## 2020-02-26 RX ADMIN — PANTOPRAZOLE SODIUM 40 MILLIGRAM(S): 20 TABLET, DELAYED RELEASE ORAL at 12:43

## 2020-02-26 RX ADMIN — Medication 25 MILLIGRAM(S): at 12:43

## 2020-02-26 RX ADMIN — Medication 20 MILLIGRAM(S): at 21:23

## 2020-02-26 RX ADMIN — Medication 81 MILLIGRAM(S): at 12:43

## 2020-02-26 NOTE — PRE-ANESTHESIA EVALUATION ADULT - NSANTHADDINFOFT_GEN_ALL_CORE
-Patient w/ multiple comorbidities as above  -Risks of anesthesia were explained to the patient.  -Risks including but not limited to MI, CVA, Heart failure, Malignant arrythmia, Respiratory failure requiring prolonged intubation, PE/Clot, Aspiration, and death were explained to the patient.  -Family at the bedside.  -Patient understands and wishes to proceed.

## 2020-02-26 NOTE — PRE-ANESTHESIA EVALUATION ADULT - NSANTHPMHFT_GEN_ALL_CORE
89 year old male.  Lives at home with wife.  PMHx HTN, HLD, afib (on coumadin, INR 2.7), moderate to severe MR s/p clipping 12/2018, s/p cardiac cath in 11/2018 (no obstructive), S/P  PE (11/2018 was on AC), CKD (cr 1.9), thrombocytopenia to < 100, hyponatremia, colon cancer (2001s/p chemo, in remission), prostate cancer (s/p RT in 2016).  Decreased appetite and worsening shortness of breath. History obtained from both patient and his spouse at bedside, who report decreased appetite since 12 months.   Patient reports symptoms started s/p mitral clip surgery in december 2018, with difficulty swallowing. He reports coughing every time he eats.  Echo 01/15/2020 reveals severe MR & mitral clip prosthesis at the mitral position , scheduled for mitral clip o repair on 02/26/2020.    TTE  Conclusions:  1. A Mitral clip is seen in the mitral position. The Lucy  clip is well seated in the central A2/P2 Position.  Severe  mitral regurgitation. Flow reversal is seen in the  pulmonary veins.  2. Calcified trileaflet aortic valve with normal opening.  Minimal aortic regurgitation.  3. Severe global left ventricular systolic dysfunction.  4. Normal right ventricular size with decreased right  ventricular systolic function.  5. Normal pericardium with no pericardial effusion.  *** Compared with echocardiogram of 3/1/2019, no  significant changes noted.

## 2020-02-26 NOTE — PROGRESS NOTE ADULT - SUBJECTIVE AND OBJECTIVE BOX
This patient has been implanted with a Mitraclip under the following NCT/PERLA:          MitraClip:  Commercial Implant NCT [id number], and will be placed into the [facility] Registry      YORDAN Sánchez  [id number]

## 2020-02-26 NOTE — CONSULT NOTE ADULT - SUBJECTIVE AND OBJECTIVE BOX
HISTORY OF PRESENT ILLNESS: HPI:  89 year old male from home, lives with wife, PMH HTN, HLD, afib (on coumadin), moderate to severe MR s/p clipping 12/2018, s/p cath in 11/2018 (no obstructive), S/P  PE(11/2018 was on Eliquis/Warfarin/lovenox),CKD, hyponatremia, colon cancer (2001s/p chemo, in remission), prostate cancer (s/p RT in 200[address]), decreased appetite and worsening shortness of breath. History obtained from both patient and his spouse at bedside, who report decreased appetite since 12 months. Patient reports symptoms started s/p mitral clip surgery in december 2018, with difficulty swallowing. He reports coughing every time he eats and tends to bring up clear phlegm, also reports episodes of sour tasting reflux after he eats. He denies abdominal pain at rest but reports pain when he walks, has occasional constipation, and has lost over 47 lbs in a year. Echo 01/15/2020 reveals severe MR & mitral clip prosthesis at the mitral position , He is now s/p second mitral clip with residual moderate MR and 3 mmHg transvalvular gradient.  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  Pleural effusion, bilateral: as of latest chest xray  s/p latest hospitalization for CHF  CKD (chronic kidney disease)  Colon cancer: 2001, s/p chemo  Prostate cancer: 2006  Mitral regurgitation  Hyponatremia  HLD (hyperlipidemia)  HTN (hypertension)  Atrial fibrillation: On coumadin  Prostate CA  GERD (gastroesophageal reflux disease)  Afib  HLD (hyperlipidemia)  HTN (hypertension)  S/P mitral valve clip implantation: 12/2018  S/P coronary angiogram: 11/12/18  History of appendectomy  H/O colectomy  History of prostate cancer          MEDICATIONS:  MEDICATIONS  (STANDING):  aspirin enteric coated 81 milliGRAM(s) Oral daily  atorvastatin 40 milliGRAM(s) Oral at bedtime  cefuroxime  IVPB 1500 milliGRAM(s) IV Intermittent every 12 hours  chlorhexidine 0.12% Liquid 5 milliLiter(s) Oral Mucosa every 4 hours  furosemide    Tablet 20 milliGRAM(s) Oral every 8 hours  meperidine     Injectable 25 milliGRAM(s) IV Push once  metoprolol tartrate 25 milliGRAM(s) Oral every 8 hours  sodium chloride 0.9%. 1000 milliLiter(s) (10 mL/Hr) IV Continuous <Continuous>  vancomycin  IVPB 750 milliGRAM(s) IV Intermittent once      Allergies    penicillin (Hives)  shellfish (Hives)    Intolerances        FAMILY HISTORY:  Family history of colon cancer (Sibling)  Family history of ovarian cancer  Family history of cancer (Sibling)    Non-contributary for premature coronary disease or sudden cardiac death    SOCIAL HISTORY:    [X ] Non-smoker  [ ] Smoker  [ ] Alcohol        REVIEW OF SYSTEMS:  [ ]chest pain  [  ]shortness of breath  [  ]palpitations  [  ]syncope  [ ]near syncope [ ]upper extremity weakness   [ ] lower extremity weakness  [  ]diplopia  [  ]altered mental status   [  ]fevers  [ ]chills [ ]nausea  [ ]vomitting  [  ]dysphagia    [ ]abdominal pain  [ ]melena  [ ]BRBPR    [  ]epistaxis  [  ]rash    [ ]lower extremity edema        [X ] All others negative	  [ ] Unable to obtain      LABS:	 	    CARDIAC MARKERS:                              9.4    3.71  )-----------( 96       ( 26 Feb 2020 11:22 )             29.1     Hb Trend: 9.4<--    02-26    138  |  105  |  25<H>  ----------------------------<  104<H>  4.4   |  23  |  1.58<H>    Ca    9.5      26 Feb 2020 11:22    TPro  6.5  /  Alb  3.3  /  TBili  0.7  /  DBili  x   /  AST  34  /  ALT  23  /  AlkPhos  85  02-26    Creatinine Trend: 1.58<--, 1.92<--    Coags:  PT/INR - ( 26 Feb 2020 11:22 )   PT: 35.7 sec;   INR: 3.00 ratio         PTT - ( 26 Feb 2020 11:22 )  PTT:41.9 sec        PHYSICAL EXAM:  T(C): 33.9 (02-26-20 @ 11:00), Max: 36.5 (02-26-20 @ 06:20)  HR: 92 (02-26-20 @ 12:00) (78 - 97)  BP: 139/82 (02-26-20 @ 10:20) (135/90 - 139/82)  RR: 16 (02-26-20 @ 12:00) (12 - 18)  SpO2: 100% (02-26-20 @ 12:00) (98% - 100%)  Wt(kg): --   BMI (kg/m2): 18.5 (02-26-20 @ 06:49)  I&O's Summary    26 Feb 2020 07:01  -  26 Feb 2020 14:46  --------------------------------------------------------  IN: 20 mL / OUT: 0 mL / NET: 20 mL        Gen: Appears well in NAD  HEENT:  (-)icterus (-)pallor  CV: N S1 S2 1/6 CARL (+)2 Pulses B/l  Resp:  Clear to ausculatation B/L, normal effort  GI: (+) BS Soft, NT, ND  Lymph:  (-)Edema, (-)obvious lymphadenopathy  Skin: Warm to touch, Normal turgor  Psych: Appropriate mood and affect        TELEMETRY: 	  afib    ECG:  	Afib 101 BPM    RADIOLOGY:         CXR:   No infiltrate     ASSESSMENT/PLAN: 	89y Male PMH HTN, HLD, afib (on coumadin), moderate to severe MR s/p clipping 12/2018, s/p cath in 11/2018 (no obstructive), S/P  PE(11/2018 was on Eliquis/Warfarin/lovenox),CKD, hyponatremia, colon cancer (2001s/p chemo, in remission), prostate cancer (s/p RT in 200[address]), decreased appetite and worsening shortness of breath, 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    - restart anticoagulation when ok with structural heart.   - Resume antihypertensives  - Strict I+O's  - well compensated from CHF prospective  - Rate control    I once again thank you for allowing me to participate in the care of our mutual patient.  If you have any questions or concerns please do not hesitate to contact me.    Nabor Salazar MD, [facility]  BEEPER [phone]

## 2020-02-26 NOTE — PROVIDER CONTACT NOTE (CHANGE IN STATUS NOTIFICATION) - SITUATION
Pt s/p mitral clip. Day RN on report states patient has not voided since procedure at 1000. Pt states he still cannot void despite lasix and oral intake. Bladder scan revealed about 800cc in bladder.

## 2020-02-26 NOTE — PROVIDER CONTACT NOTE (CHANGE IN STATUS NOTIFICATION) - ACTION/TREATMENT ORDERED:
LIGIA Salazar made aware. Straight cath ordered x1. 800cc retrieved with 83cc on post cath scan. Will continue to monitor overnight

## 2020-02-27 DIAGNOSIS — R33.9 RETENTION OF URINE, UNSPECIFIED: ICD-10-CM

## 2020-02-27 DIAGNOSIS — I48.11 LONGSTANDING PERSISTENT ATRIAL FIBRILLATION: ICD-10-CM

## 2020-02-27 DIAGNOSIS — Z79.01 LONG TERM (CURRENT) USE OF ANTICOAGULANTS: ICD-10-CM

## 2020-02-27 DIAGNOSIS — Z98.890 OTHER SPECIFIED POSTPROCEDURAL STATES: ICD-10-CM

## 2020-02-27 LAB
ALBUMIN SERPL ELPH-MCNC: 3 G/DL — LOW (ref 3.3–5)
ALP SERPL-CCNC: 84 U/L — SIGNIFICANT CHANGE UP (ref 40–120)
ALT FLD-CCNC: 21 U/L — SIGNIFICANT CHANGE UP (ref 10–45)
ANION GAP SERPL CALC-SCNC: 10 MMOL/L — SIGNIFICANT CHANGE UP (ref 5–17)
ANION GAP SERPL CALC-SCNC: 14 MMOL/L — SIGNIFICANT CHANGE UP (ref 5–17)
APTT BLD: 39.2 SEC — HIGH (ref 27.5–36.3)
AST SERPL-CCNC: 28 U/L — SIGNIFICANT CHANGE UP (ref 10–40)
BASOPHILS # BLD AUTO: 0.04 K/UL — SIGNIFICANT CHANGE UP (ref 0–0.2)
BASOPHILS NFR BLD AUTO: 0.8 % — SIGNIFICANT CHANGE UP (ref 0–2)
BILIRUB SERPL-MCNC: 0.6 MG/DL — SIGNIFICANT CHANGE UP (ref 0.2–1.2)
BUN SERPL-MCNC: 26 MG/DL — HIGH (ref 7–23)
BUN SERPL-MCNC: 27 MG/DL — HIGH (ref 7–23)
CALCIUM SERPL-MCNC: 8.8 MG/DL — SIGNIFICANT CHANGE UP (ref 8.4–10.5)
CALCIUM SERPL-MCNC: 9.1 MG/DL — SIGNIFICANT CHANGE UP (ref 8.4–10.5)
CHLORIDE SERPL-SCNC: 103 MMOL/L — SIGNIFICANT CHANGE UP (ref 96–108)
CHLORIDE SERPL-SCNC: 106 MMOL/L — SIGNIFICANT CHANGE UP (ref 96–108)
CO2 SERPL-SCNC: 19 MMOL/L — LOW (ref 22–31)
CO2 SERPL-SCNC: 24 MMOL/L — SIGNIFICANT CHANGE UP (ref 22–31)
CREAT SERPL-MCNC: 1.61 MG/DL — HIGH (ref 0.5–1.3)
CREAT SERPL-MCNC: 1.73 MG/DL — HIGH (ref 0.5–1.3)
EOSINOPHIL # BLD AUTO: 0.29 K/UL — SIGNIFICANT CHANGE UP (ref 0–0.5)
EOSINOPHIL NFR BLD AUTO: 5.5 % — SIGNIFICANT CHANGE UP (ref 0–6)
GLUCOSE SERPL-MCNC: 104 MG/DL — HIGH (ref 70–99)
GLUCOSE SERPL-MCNC: 139 MG/DL — HIGH (ref 70–99)
HCT VFR BLD CALC: 27.5 % — LOW (ref 39–50)
HCT VFR BLD CALC: 31.6 % — LOW (ref 39–50)
HGB BLD-MCNC: 10 G/DL — LOW (ref 13–17)
HGB BLD-MCNC: 8.8 G/DL — LOW (ref 13–17)
IMM GRANULOCYTES NFR BLD AUTO: 0.4 % — SIGNIFICANT CHANGE UP (ref 0–1.5)
INR BLD: 2.62 RATIO — HIGH (ref 0.88–1.16)
INR BLD: 2.83 RATIO — HIGH (ref 0.88–1.16)
LYMPHOCYTES # BLD AUTO: 1.03 K/UL — SIGNIFICANT CHANGE UP (ref 1–3.3)
LYMPHOCYTES # BLD AUTO: 19.7 % — SIGNIFICANT CHANGE UP (ref 13–44)
MAGNESIUM SERPL-MCNC: 1.9 MG/DL — SIGNIFICANT CHANGE UP (ref 1.6–2.6)
MCHC RBC-ENTMCNC: 30.2 PG — SIGNIFICANT CHANGE UP (ref 27–34)
MCHC RBC-ENTMCNC: 30.3 PG — SIGNIFICANT CHANGE UP (ref 27–34)
MCHC RBC-ENTMCNC: 31.6 GM/DL — LOW (ref 32–36)
MCHC RBC-ENTMCNC: 32 GM/DL — SIGNIFICANT CHANGE UP (ref 32–36)
MCV RBC AUTO: 94.8 FL — SIGNIFICANT CHANGE UP (ref 80–100)
MCV RBC AUTO: 95.5 FL — SIGNIFICANT CHANGE UP (ref 80–100)
MONOCYTES # BLD AUTO: 0.43 K/UL — SIGNIFICANT CHANGE UP (ref 0–0.9)
MONOCYTES NFR BLD AUTO: 8.2 % — SIGNIFICANT CHANGE UP (ref 2–14)
NEUTROPHILS # BLD AUTO: 3.42 K/UL — SIGNIFICANT CHANGE UP (ref 1.8–7.4)
NEUTROPHILS NFR BLD AUTO: 65.4 % — SIGNIFICANT CHANGE UP (ref 43–77)
NRBC # BLD: 0 /100 WBCS — SIGNIFICANT CHANGE UP (ref 0–0)
NRBC # BLD: 0 /100 WBCS — SIGNIFICANT CHANGE UP (ref 0–0)
PHOSPHATE SERPL-MCNC: 3 MG/DL — SIGNIFICANT CHANGE UP (ref 2.5–4.5)
PLATELET # BLD AUTO: 112 K/UL — LOW (ref 150–400)
PLATELET # BLD AUTO: 98 K/UL — LOW (ref 150–400)
POTASSIUM SERPL-MCNC: 4.2 MMOL/L — SIGNIFICANT CHANGE UP (ref 3.5–5.3)
POTASSIUM SERPL-MCNC: 4.4 MMOL/L — SIGNIFICANT CHANGE UP (ref 3.5–5.3)
POTASSIUM SERPL-SCNC: 4.2 MMOL/L — SIGNIFICANT CHANGE UP (ref 3.5–5.3)
POTASSIUM SERPL-SCNC: 4.4 MMOL/L — SIGNIFICANT CHANGE UP (ref 3.5–5.3)
PROT SERPL-MCNC: 5.8 G/DL — LOW (ref 6–8.3)
PROTHROM AB SERPL-ACNC: 30.8 SEC — HIGH (ref 10–12.9)
PROTHROM AB SERPL-ACNC: 33.6 SEC — HIGH (ref 10–12.9)
RBC # BLD: 2.9 M/UL — LOW (ref 4.2–5.8)
RBC # BLD: 3.31 M/UL — LOW (ref 4.2–5.8)
RBC # FLD: 16.4 % — HIGH (ref 10.3–14.5)
RBC # FLD: 16.5 % — HIGH (ref 10.3–14.5)
SODIUM SERPL-SCNC: 136 MMOL/L — SIGNIFICANT CHANGE UP (ref 135–145)
SODIUM SERPL-SCNC: 140 MMOL/L — SIGNIFICANT CHANGE UP (ref 135–145)
WBC # BLD: 3.64 K/UL — LOW (ref 3.8–10.5)
WBC # BLD: 5.23 K/UL — SIGNIFICANT CHANGE UP (ref 3.8–10.5)
WBC # FLD AUTO: 3.64 K/UL — LOW (ref 3.8–10.5)
WBC # FLD AUTO: 5.23 K/UL — SIGNIFICANT CHANGE UP (ref 3.8–10.5)

## 2020-02-27 PROCEDURE — 71045 X-RAY EXAM CHEST 1 VIEW: CPT | Mod: 26

## 2020-02-27 PROCEDURE — 99232 SBSQ HOSP IP/OBS MODERATE 35: CPT

## 2020-02-27 PROCEDURE — 93306 TTE W/DOPPLER COMPLETE: CPT | Mod: 26

## 2020-02-27 PROCEDURE — 93010 ELECTROCARDIOGRAM REPORT: CPT

## 2020-02-27 RX ORDER — WARFARIN SODIUM 2.5 MG/1
1 TABLET ORAL ONCE
Refills: 0 | Status: COMPLETED | OUTPATIENT
Start: 2020-02-27 | End: 2020-02-27

## 2020-02-27 RX ORDER — TAMSULOSIN HYDROCHLORIDE 0.4 MG/1
0.4 CAPSULE ORAL AT BEDTIME
Refills: 0 | Status: DISCONTINUED | OUTPATIENT
Start: 2020-02-28 | End: 2020-02-29

## 2020-02-27 RX ORDER — OXYCODONE AND ACETAMINOPHEN 5; 325 MG/1; MG/1
1 TABLET ORAL EVERY 6 HOURS
Refills: 0 | Status: DISCONTINUED | OUTPATIENT
Start: 2020-02-27 | End: 2020-02-29

## 2020-02-27 RX ORDER — MAGNESIUM SULFATE 500 MG/ML
1 VIAL (ML) INJECTION ONCE
Refills: 0 | Status: COMPLETED | OUTPATIENT
Start: 2020-02-27 | End: 2020-02-27

## 2020-02-27 RX ORDER — ACETAMINOPHEN 500 MG
650 TABLET ORAL EVERY 6 HOURS
Refills: 0 | Status: DISCONTINUED | OUTPATIENT
Start: 2020-02-27 | End: 2020-02-29

## 2020-02-27 RX ORDER — SODIUM CHLORIDE 9 MG/ML
3 INJECTION INTRAMUSCULAR; INTRAVENOUS; SUBCUTANEOUS EVERY 8 HOURS
Refills: 0 | Status: DISCONTINUED | OUTPATIENT
Start: 2020-02-27 | End: 2020-02-29

## 2020-02-27 RX ORDER — TAMSULOSIN HYDROCHLORIDE 0.4 MG/1
0.4 CAPSULE ORAL ONCE
Refills: 0 | Status: COMPLETED | OUTPATIENT
Start: 2020-02-27 | End: 2020-02-27

## 2020-02-27 RX ORDER — PANTOPRAZOLE SODIUM 20 MG/1
40 TABLET, DELAYED RELEASE ORAL
Refills: 0 | Status: DISCONTINUED | OUTPATIENT
Start: 2020-02-27 | End: 2020-02-29

## 2020-02-27 RX ADMIN — Medication 25 MILLIGRAM(S): at 08:27

## 2020-02-27 RX ADMIN — SODIUM CHLORIDE 3 MILLILITER(S): 9 INJECTION INTRAMUSCULAR; INTRAVENOUS; SUBCUTANEOUS at 13:05

## 2020-02-27 RX ADMIN — Medication 20 MILLIGRAM(S): at 10:20

## 2020-02-27 RX ADMIN — Medication 81 MILLIGRAM(S): at 10:20

## 2020-02-27 RX ADMIN — Medication 25 MILLIGRAM(S): at 15:35

## 2020-02-27 RX ADMIN — SODIUM CHLORIDE 3 MILLILITER(S): 9 INJECTION INTRAMUSCULAR; INTRAVENOUS; SUBCUTANEOUS at 21:17

## 2020-02-27 RX ADMIN — ATORVASTATIN CALCIUM 40 MILLIGRAM(S): 80 TABLET, FILM COATED ORAL at 23:33

## 2020-02-27 RX ADMIN — TAMSULOSIN HYDROCHLORIDE 0.4 MILLIGRAM(S): 0.4 CAPSULE ORAL at 20:20

## 2020-02-27 RX ADMIN — Medication 20 MILLIGRAM(S): at 23:33

## 2020-02-27 RX ADMIN — Medication 25 MILLIGRAM(S): at 23:33

## 2020-02-27 RX ADMIN — PANTOPRAZOLE SODIUM 40 MILLIGRAM(S): 20 TABLET, DELAYED RELEASE ORAL at 10:20

## 2020-02-27 RX ADMIN — Medication 20 MILLIGRAM(S): at 17:19

## 2020-02-27 RX ADMIN — WARFARIN SODIUM 1 MILLIGRAM(S): 2.5 TABLET ORAL at 23:33

## 2020-02-27 RX ADMIN — Medication 100 MILLIGRAM(S): at 03:14

## 2020-02-27 RX ADMIN — Medication 100 GRAM(S): at 01:07

## 2020-02-27 NOTE — PROGRESS NOTE ADULT - PROBLEM SELECTOR PLAN 3
INR 2.8 in AM, repeat PM INR  Home dose 3mg  Daily INR in AM, no  coumadin tonight INR 2.8 in AM, repeat PM INR 2.6  will dose 1mg tonight  Home dose 3mg  Daily INR in AM

## 2020-02-27 NOTE — PROGRESS NOTE ADULT - SUBJECTIVE AND OBJECTIVE BOX
S: Denies chest pain or shortness of breath.   Review of systems otherwise (-)  	    MEDICATIONS  (STANDING):  aspirin enteric coated 81 milliGRAM(s) Oral daily  atorvastatin 40 milliGRAM(s) Oral at bedtime  furosemide    Tablet 20 milliGRAM(s) Oral every 8 hours  metoprolol tartrate 25 milliGRAM(s) Oral every 8 hours  pantoprazole    Tablet 40 milliGRAM(s) Oral before breakfast  sodium chloride 0.9% lock flush 3 milliLiter(s) IV Push every 8 hours      LABS:	 	                          8.8    3.64  )-----------( 98       ( 27 Feb 2020 00:11 )             27.5     Hemoglobin: 8.8 g/dL (02-27 @ 00:11)  Hemoglobin: 9.4 g/dL (02-26 @ 11:22)    02-27    140  |  106  |  26<H>  ----------------------------<  104<H>  4.4   |  24  |  1.73<H>    Ca    8.8      27 Feb 2020 00:11  Phos  3.0     02-27  Mg     1.9     02-27    TPro  5.8<L>  /  Alb  3.0<L>  /  TBili  0.6  /  DBili  x   /  AST  28  /  ALT  21  /  AlkPhos  84  02-27    Creatinine Trend: 1.73<--, 1.58<--, 1.92<--  COAGS:   PT/INR - ( 27 Feb 2020 00:11 )   PT: 33.6 sec;   INR: 2.83 ratio         PTT - ( 27 Feb 2020 00:11 )  PTT:39.2 sec    proBNP:   Lipid Profile:   HgA1c:   TSH:     PHYSICAL EXAM:  T(C): 36.5 (02-27-20 @ 09:31), Max: 36.9 (02-27-20 @ 08:00)  HR: 96 (02-27-20 @ 09:31) (78 - 101)  BP: 118/73 (02-27-20 @ 09:31) (86/52 - 128/79)  RR: 16 (02-27-20 @ 09:31) (8 - 21)  SpO2: 100% (02-27-20 @ 09:31) (97% - 100%)  Wt(kg): --  I&O's Summary    26 Feb 2020 07:01  -  27 Feb 2020 07:00  --------------------------------------------------------  IN: 1150 mL / OUT: 1150 mL / NET: 0 mL    27 Feb 2020 07:01  -  27 Feb 2020 13:36  --------------------------------------------------------  IN: 370 mL / OUT: 1035 mL / NET: -665 mL      Gen: Appears well in NAD  HEENT:  (-)icterus (-)pallor  CV: N S1 S2 1/6 CARL (+)2 Pulses B/l  Resp:  Clear to auscultation B/L, normal effort  GI: (+) BS Soft, NT, ND  Lymph:  (-)Edema, (-)obvious lymphadenopathy  Skin: Warm to touch, Normal turgor  Psych: Appropriate mood and affect      TELEMETRY: AF 	      ECG:  	Afib 101 BPM    RADIOLOGY:         CXR:   No infiltrate       ASSESSMENT/PLAN: 89y Male PMH HTN, HLD, afib (on coumadin), moderate to severe MR s/p clipping 12/2018, s/p cath in 11/2018 (no obstructive), S/P  PE(11/2018 was on Eliquis/Warfarin/lovenox),CKD, hyponatremia, colon cancer (2001s/p chemo, in remission), prostate cancer (s/p RT in 200[address]), decreased appetite and worsening shortness of breath, 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    - well compensated from CHF prospective  - keep net negative on PO lasix, Strict I+O's  - restart anticoagulation when ok with structural heart/CTS  - BP/Rate control with metoprolol  - Supportive care per CTS  - Patient to f/u with Dr. Scott after discharge    Cosme Torres PA-C  [facility]  Pager: [phone]

## 2020-02-27 NOTE — PROGRESS NOTE ADULT - ATTENDING COMMENTS
Patient seen and examined, agree with above assessment and plan as transcribed above.    - Progressing well  - D/C planning per CTS    Nabor Salazar MD, [facility]  BEEPER [phone]

## 2020-02-27 NOTE — PROGRESS NOTE ADULT - ASSESSMENT
89 year old male from home, lives with wife, PMH HTN, HLD, afib (on coumadin), moderate to severe MR s/p clipping 12/2018, s/p cath in 11/2018 (no obstructive), S/P  PE(11/2018 was on Eliquis/Warfarin/lovenox),CKD, hyponatremia, colon cancer (2001s/p chemo, in remission), prostate cancer (s/p RT in 200[address]), who presented with weight loss of 47 pounds over 1 year, decreased appetite and worsening shortness of breath.Patient reports symptoms started s/p mitral clip surgery in december 2018, with difficulty swallowing. He reports coughing every time he eats and tends to bring up clear phlegm, also reports episodes of sour tasting reflux after he Echo 01/15/2020 reveals severe MR & mitral clip prosthesis at the mitral position.     Hospital course:  2/26/20: Mitral clip c/b urinary retention req straight cath for 0800 at 2000  2/27/20: Urinary retention 580, flores inserted early AM, home meds restarted transferred to floor.   Per Edmar Hicks to be dc'd upon arrival to [address] [place] [address]. R groin stitch will be removed this evening, groins stable. ECHO completed-moderate mitral regurge, no effusion. EKG and labs in AM, likely discharge to home tomorrow. 89 year old male from home, lives with wife, PMH HTN, HLD, afib (on coumadin), moderate to severe MR s/p clipping 12/2018, s/p cath in 11/2018 (no obstructive), S/P  PE(11/2018 was on Eliquis/Warfarin/lovenox),CKD, hyponatremia, colon cancer (2001s/p chemo, in remission), prostate cancer (s/p RT in 200[address]), who presented with weight loss of 47 pounds over 1 year, decreased appetite and worsening shortness of breath.Patient reports symptoms started s/p mitral clip surgery in december 2018, with difficulty swallowing. He reports coughing every time he eats and tends to bring up clear phlegm, also reports episodes of sour tasting reflux after he Echo 01/15/2020 reveals severe MR & mitral clip prosthesis at the mitral position.     Hospital course:  2/26/20: Mitral clip c/b urinary retention req straight cath for 0800 at 2000  2/27/20: Urinary retention 580, flores inserted early AM, home meds restarted transferred to floor.   Per Edmar Hicks to be dc'd upon arrival to [address] [place] [address]. R groin stitch will be removed this evening, groins stable. ECHO completed-moderate mitral regurge, no effusion. PM INR 2.68, will low-dose Coumadin at 1mg this evening. EKG and labs in AM, likely discharge to home tomorrow.

## 2020-02-27 NOTE — PROGRESS NOTE ADULT - ASSESSMENT
89 year old male from home, lives with wife, PMH HTN, HLD, afib (on coumadin), moderate to severe MR s/p clipping 12/2018, s/p cath in 11/2018 (no obstructive), S/P  PE(11/2018 was on Eliquis/Warfarin/lovenox),CKD, hyponatremia, colon cancer (2001s/p chemo, in remission), prostate cancer (s/p RT in 200[address]), decreased appetite and worsening shortness of breath. History obtained from both patient and his spouse at bedside, who report decreased appetite since 12 months. Patient reports symptoms started s/p mitral clip surgery in december 2018, with difficulty swallowing. He reports coughing every time he eats and tends to bring up clear phlegm, also reports episodes of sour tasting reflux after he eats. He denies abdominal pain at rest but reports pain when he walks, has occasional constipation, and has lost over 47 lbs in a year. Echo 01/15/2020 reveals severe MR & mitral clip prosthesis at the mitral position , scheduled for mitral clip o repair on 02/26/2020.    Hospital course:  2/26/20: Mitral clip c/b urinary retention req straight cath for 0800 at 2000 2/27/20: Urinary retention 580, flores inserted home meds restarted transferred to floor 89 year old male from home, lives with wife, PMH HTN, HLD, afib (on coumadin), moderate to severe MR s/p clipping 12/2018, s/p cath in 11/2018 (no obstructive), S/P  PE(11/2018 was on Eliquis/Warfarin/lovenox),CKD, hyponatremia, colon cancer (2001s/p chemo, in remission), prostate cancer (s/p RT in 200[address]), who presented with weight loss of 47 pounds over 1 year, decreased appetite and worsening shortness of breath.Patient reports symptoms started s/p mitral clip surgery in december 2018, with difficulty swallowing. He reports coughing every time he eats and tends to bring up clear phlegm, also reports episodes of sour tasting reflux after he eEcho 01/15/2020 reveals severe MR & mitral clip prosthesis at the mitral position.     Hospital course:  2/26/20: Mitral clip c/b urinary retention req straight cath for 0800 at 2000  2/27/20: Urinary retention 580, flores inserted home meds restarted transferred to floor

## 2020-02-27 NOTE — PROGRESS NOTE ADULT - SUBJECTIVE AND OBJECTIVE BOX
Subjective:     CC:    VITAL SIGNS  T(F): 98.4  HR: 95  BP: 127/77  RR: 8  SpO2: 100%    02-26-20 @ 07:01  -  02-27-20 @ 07:00  --------------------------------------------------------  OUT: 1150 mL / NET: -1150 mL    02-27-20 @ 07:01  -  02-27-20 @ 09:22  --------------------------------------------------------  OUT: 125 mL / NET: -125 mL        LAB                        8.8    3.64  )-----------( 98       ( 27 Feb 2020 00:11 )             27.5   140  |  106  |  26<H>  ----------------------------<  104<H>  4.4   |  24  |  1.73<H>  PT/INR - ( 27 Feb 2020 00:11 )   PT: 33.6 sec;   INR: 2.83 ratio      PTT - ( 27 Feb 2020 00:11 )  PTT:39.2 sec    DIAGNOSTICS  TTE: pending results    MEDICATIONS  aspirin enteric coated 81 milliGRAM(s) Oral daily  atorvastatin 40 milliGRAM(s) Oral at bedtime  furosemide    Tablet 20 milliGRAM(s) Oral every 8 hours  metoprolol tartrate 25 milliGRAM(s) Oral every 8 hours  pantoprazole    Tablet 40 milliGRAM(s) Oral before breakfast  sodium chloride 0.9% lock flush 3 milliLiter(s) IV Push every 8 hours      PHYSICAL EXAM  GEN: No apparent distress, examined oob to chair  PSYCH: Appropriate, communicative, A+Ox3  NEURO: Non-focal,  A+Ox 3  CARDIAC: S1 S2 RRR without rub or murmur  Edema : none  Abdomen: Soft flat non-tender, non-distended bowel sounds rare x 4  : clear yellow urine in [place]   Skin:  warm dry intact  R groin with Suture old dried blood on dressing, left groin dry no hematoma  Vascular: +1 pedal pulses Subjective:     CC: " I am okay, just a little cold"     Interval events:  89 yo male with CHF EF 35%, AFIB on Coum, hx PE 11/18, colon ca in remission, prostate CA s/p RT who underwent mitral clip via R groin on 2/26/20 post recovery course complicated by urinary retention requiring reinsertion of flores.      ROS: 10 point systems reviewed, negative except as noted    VITAL SIGNS  T(F): 98.4  HR: 95  Tele: Afib   BP: 127/77  RR: 12  SpO2: 100%    02-26-20 @ 07:01  -  02-27-20 @ 07:00  --------------------------------------------------------  OUT: 1150 mL / NET: -1150 mL    02-27-20 @ 07:01  -  02-27-20 @ 09:22  --------------------------------------------------------  OUT: 125 mL / NET: -125 mL      LAB                        8.8    3.64  )-----------( 98       ( 27 Feb 2020 00:11 )             27.5   140  |  106  |  26<H>  ----------------------------<  104<H>  4.4   |  24  |  1.73<H>    PT/INR - ( 27 Feb 2020 00:11 )   PT: 33.6 sec;   INR: 2.83 ratio    PTT - ( 27 Feb 2020 00:11 )  PTT:39.2 sec    DIAGNOSTICS  TTE: pending results    MEDICATIONS  aspirin enteric coated 81 milliGRAM(s) Oral daily  atorvastatin 40 milliGRAM(s) Oral at bedtime  furosemide    Tablet 20 milliGRAM(s) Oral every 8 hours  metoprolol tartrate 25 milliGRAM(s) Oral every 8 hours  pantoprazole    Tablet 40 milliGRAM(s) Oral before breakfast  sodium chloride 0.9% lock flush 3 milliLiter(s) IV Push every 8 hours      PHYSICAL EXAM  GEN: No apparent distress, examined oob to chair  PSYCH: Appropriate, communicative, A+Ox3  NEURO: Non-focal,  A+Ox 3  CARDIAC: S1 S2 RRR without rub or murmur  Edema : none  Abdomen: Soft flat non-tender, non-distended bowel sounds rare x 4  : clear yellow urine in Flores   Skin:  warm dry intact  R groin with Suture old dried blood on dressing, left groin dry no hematoma  Vascular: +1 pedal pulses

## 2020-02-28 DIAGNOSIS — C61 MALIGNANT NEOPLASM OF PROSTATE: ICD-10-CM

## 2020-02-28 LAB
ANION GAP SERPL CALC-SCNC: 13 MMOL/L — SIGNIFICANT CHANGE UP (ref 5–17)
BUN SERPL-MCNC: 24 MG/DL — HIGH (ref 7–23)
CALCIUM SERPL-MCNC: 9.3 MG/DL — SIGNIFICANT CHANGE UP (ref 8.4–10.5)
CHLORIDE SERPL-SCNC: 101 MMOL/L — SIGNIFICANT CHANGE UP (ref 96–108)
CO2 SERPL-SCNC: 24 MMOL/L — SIGNIFICANT CHANGE UP (ref 22–31)
CREAT SERPL-MCNC: 1.56 MG/DL — HIGH (ref 0.5–1.3)
GLUCOSE SERPL-MCNC: 161 MG/DL — HIGH (ref 70–99)
HCT VFR BLD CALC: 32.2 % — LOW (ref 39–50)
HGB BLD-MCNC: 10.1 G/DL — LOW (ref 13–17)
INR BLD: 2.24 RATIO — HIGH (ref 0.88–1.16)
MCHC RBC-ENTMCNC: 29.8 PG — SIGNIFICANT CHANGE UP (ref 27–34)
MCHC RBC-ENTMCNC: 31.4 GM/DL — LOW (ref 32–36)
MCV RBC AUTO: 95 FL — SIGNIFICANT CHANGE UP (ref 80–100)
NRBC # BLD: 0 /100 WBCS — SIGNIFICANT CHANGE UP (ref 0–0)
PLATELET # BLD AUTO: 101 K/UL — LOW (ref 150–400)
POTASSIUM SERPL-MCNC: 3.7 MMOL/L — SIGNIFICANT CHANGE UP (ref 3.5–5.3)
POTASSIUM SERPL-SCNC: 3.7 MMOL/L — SIGNIFICANT CHANGE UP (ref 3.5–5.3)
PROTHROM AB SERPL-ACNC: 26.4 SEC — HIGH (ref 10–12.9)
RBC # BLD: 3.39 M/UL — LOW (ref 4.2–5.8)
RBC # FLD: 16.2 % — HIGH (ref 10.3–14.5)
SODIUM SERPL-SCNC: 138 MMOL/L — SIGNIFICANT CHANGE UP (ref 135–145)
WBC # BLD: 5.33 K/UL — SIGNIFICANT CHANGE UP (ref 3.8–10.5)
WBC # FLD AUTO: 5.33 K/UL — SIGNIFICANT CHANGE UP (ref 3.8–10.5)

## 2020-02-28 PROCEDURE — 71045 X-RAY EXAM CHEST 1 VIEW: CPT | Mod: 26

## 2020-02-28 PROCEDURE — 93010 ELECTROCARDIOGRAM REPORT: CPT

## 2020-02-28 RX ORDER — WARFARIN SODIUM 2.5 MG/1
1 TABLET ORAL ONCE
Refills: 0 | Status: COMPLETED | OUTPATIENT
Start: 2020-02-28 | End: 2020-02-28

## 2020-02-28 RX ORDER — TAMSULOSIN HYDROCHLORIDE 0.4 MG/1
0.4 CAPSULE ORAL AT BEDTIME
Refills: 0 | Status: DISCONTINUED | OUTPATIENT
Start: 2020-02-28 | End: 2020-02-28

## 2020-02-28 RX ORDER — METOPROLOL TARTRATE 50 MG
50 TABLET ORAL
Refills: 0 | Status: DISCONTINUED | OUTPATIENT
Start: 2020-02-28 | End: 2020-02-29

## 2020-02-28 RX ADMIN — Medication 25 MILLIGRAM(S): at 08:09

## 2020-02-28 RX ADMIN — PANTOPRAZOLE SODIUM 40 MILLIGRAM(S): 20 TABLET, DELAYED RELEASE ORAL at 06:11

## 2020-02-28 RX ADMIN — Medication 81 MILLIGRAM(S): at 08:09

## 2020-02-28 RX ADMIN — Medication 50 MILLIGRAM(S): at 17:03

## 2020-02-28 RX ADMIN — ATORVASTATIN CALCIUM 40 MILLIGRAM(S): 80 TABLET, FILM COATED ORAL at 21:12

## 2020-02-28 RX ADMIN — Medication 20 MILLIGRAM(S): at 16:01

## 2020-02-28 RX ADMIN — SODIUM CHLORIDE 3 MILLILITER(S): 9 INJECTION INTRAMUSCULAR; INTRAVENOUS; SUBCUTANEOUS at 13:05

## 2020-02-28 RX ADMIN — TAMSULOSIN HYDROCHLORIDE 0.4 MILLIGRAM(S): 0.4 CAPSULE ORAL at 21:12

## 2020-02-28 RX ADMIN — Medication 20 MILLIGRAM(S): at 08:09

## 2020-02-28 RX ADMIN — Medication 20 MILLIGRAM(S): at 23:22

## 2020-02-28 RX ADMIN — WARFARIN SODIUM 1 MILLIGRAM(S): 2.5 TABLET ORAL at 21:12

## 2020-02-28 RX ADMIN — SODIUM CHLORIDE 3 MILLILITER(S): 9 INJECTION INTRAMUSCULAR; INTRAVENOUS; SUBCUTANEOUS at 21:12

## 2020-02-28 RX ADMIN — SODIUM CHLORIDE 3 MILLILITER(S): 9 INJECTION INTRAMUSCULAR; INTRAVENOUS; SUBCUTANEOUS at 05:49

## 2020-02-28 NOTE — PROGRESS NOTE ADULT - ASSESSMENT
89 year old male from home, lives with wife, PMH HTN, HLD, afib (on coumadin), moderate to severe MR s/p clipping 12/2018, s/p cath in 11/2018 (no obstructive), S/P  PE(11/2018 was on Eliquis/Warfarin/lovenox),CKD, hyponatremia, colon cancer (2001s/p chemo, in remission), prostate cancer (s/p RT in 200[address]), who presented with weight loss of 47 pounds over 1 year, decreased appetite and worsening shortness of breath.Patient reports symptoms started s/p mitral clip surgery in december 2018, with difficulty swallowing. He reports coughing every time he eats and tends to bring up clear phlegm, also reports episodes of sour tasting reflux after he Echo 01/15/2020 reveals severe MR & mitral clip prosthesis at the mitral position.     Hospital course:  2/26/20: Mitral clip c/b urinary retention req straight cath for 0800 at 2000  2/27/20: Urinary retention 580, flores inserted early AM, home meds restarted transferred to floor.   Per Dr. Alford, Flores to be dc'd upon arrival to [address] [place] [address] R groin stitch will be removed this evening, groins stable. ECHO completed-moderate mitral regurge, no effusion. PM INR 2.68, will low-dose Coumadin at 1mg this evening. EKG and labs in AM,  2/28 Flores reinserted for urinary retention >400 cc. Flomax initiated and Urology consulted. Maintain flores placement. Anticipate discharge home with flores leg bag and follow up with Urology in 2 weeks

## 2020-02-28 NOTE — PROGRESS NOTE ADULT - ATTENDING COMMENTS
Patient seen and examined, agree with above assessment and plan as transcribed above.    -  change lopressor to 50 mg PO BID  - dose coumadin INR 2-3    Nabor Salazar MD, [facility]  BEEPER [phone]

## 2020-02-28 NOTE — CONSULT NOTE ADULT - SUBJECTIVE AND OBJECTIVE BOX
HPI  Patient with history of prostate cancer sp ebrt 2012 now admitted with mv regurg sp mv clipping with post op urinary retention.  He denies dysuria hematuria but difficulty voiding.  Prior to admission he had minimal voiding difficulty.  He is a 89 year old male from home, lives with wife, PMH HTN, HLD, afib (on coumadin), moderate to severe MR s/p clipping 12/2018, s/p cath in 11/2018 (no obstructive), S/P  PE(11/2018 was on Eliquis/Warfarin/lovenox),CKD, hyponatremia, colon cancer (2001s/p chemo, in remission), prostate cancer (s/p RT in 200[address]), decreased appetite and worsening shortness of breath. History obtained from both patient and his spouse at bedside, who report decreased appetite since 12 months. Patient reports symptoms started s/p mitral clip surgery in december 2018, with difficulty swallowing. He reports coughing every time he eats and tends to bring up clear phlegm, also reports episodes of sour tasting reflux after he eats. He denies abdominal pain at rest but reports pain when he walks, has occasional constipation, and has lost over 47 lbs in a year. Echo 01/15/2020 reveals severe MR & mitral clip prosthesis at the mitral position.    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  Pleural effusion, bilateral: as of latest chest xray  s/p latest hospitalization for CHF  CKD (chronic kidney disease)  Colon cancer: 2001, s/p chemo  Prostate cancer: 2006  Mitral regurgitation  Hyponatremia  HLD (hyperlipidemia)  HTN (hypertension)  Atrial fibrillation: On coumadin  Prostate CA  GERD (gastroesophageal reflux disease)  Afib  HLD (hyperlipidemia)  HTN (hypertension)  S/P mitral valve clip implantation: 12/2018  S/P coronary angiogram: 11/12/18  History of appendectomy  H/O colectomy  History of prostate cancer      MEDICATIONS  (STANDING):  aspirin enteric coated 81 milliGRAM(s) Oral daily  atorvastatin 40 milliGRAM(s) Oral at bedtime  furosemide    Tablet 20 milliGRAM(s) Oral every 8 hours  metoprolol tartrate 25 milliGRAM(s) Oral every 8 hours  pantoprazole    Tablet 40 milliGRAM(s) Oral before breakfast  sodium chloride 0.9% lock flush 3 milliLiter(s) IV Push every 8 hours  tamsulosin 0.4 milliGRAM(s) Oral at bedtime    MEDICATIONS  (PRN):  acetaminophen   Tablet .. 650 milliGRAM(s) Oral every 6 hours PRN Temp greater or equal to 38.5C (101.3F), Mild Pain (1 - 3)  oxycodone    5 mG/acetaminophen 325 mG 1 Tablet(s) Oral every 6 hours PRN Moderate Pain (4 - 6)      FAMILY HISTORY:  Family history of colon cancer (Sibling)  Family history of ovarian cancer  Family history of cancer (Sibling)      Allergies    penicillin (Hives)  shellfish (Hives)    SOCIAL HISTORY:  no tobacco etoh    REVIEW OF SYSTEMS: gen neg skin neg heent neg neck neg  cv per hpi resp neg gi neg   gu per hpi neuro neg psych neg  msk neg all neg endo neg    Physical Exam  Vital signs  T(C): 36.5 (02-28-20 @ 04:10), Max: 36.6 (02-27-20 @ 23:30)  HR: 98 (02-28-20 @ 04:10)  BP: 121/79 (02-28-20 @ 04:10)  SpO2: 96% (02-28-20 @ 04:10)    Gen: thin male in nad  heent ncat neck symm   cor reg  chest clear   abd soft nd nt no cvat    gu indwelling flores 1+  rt   ext no c.c.e  neuro non focal  psych neg    LABS:      02-27 @ 13:49    WBC 5.23  / Hct 31.6  / SCr 1.61     02-27 @ 00:11    WBC 3.64  / Hct 27.5  / SCr 1.73     02-27    136  |  103  |  27<H>  ----------------------------<  139<H>  4.2   |  19<L>  |  1.61<H>    Ca    9.1      27 Feb 2020 13:49  Phos  3.0     02-27  Mg     1.9     02-27    TPro  5.8<L>  /  Alb  3.0<L>  /  TBili  0.6  /  DBili  x   /  AST  28  /  ALT  21  /  AlkPhos  84  02-27    PT/INR - ( 27 Feb 2020 13:49 )   PT: 30.8 sec;   INR: 2.62 ratio         PTT - ( 27 Feb 2020 00:11 )  PTT:39.2 sec

## 2020-02-28 NOTE — PROGRESS NOTE ADULT - PROBLEM SELECTOR PLAN 1
Resume ASA  R groin suture to be removed
Resume ASA  R groin suture to be removed this evening
Resume ASA  R groin suture to be removed AM

## 2020-02-28 NOTE — CONSULT NOTE ADULT - ASSESSMENT
Patient with history of prostate cancer sp rt  now with post op retention  flomax 0.4 mg daily  flores to straight drain  discharge with flores with outpatient trial of void and fu of ca prostate.

## 2020-02-28 NOTE — PROGRESS NOTE ADULT - PROBLEM SELECTOR PLAN 4
No previous history of retention   TOV at noon
Urology consulted  Flomax initiated
No previous history of retention   TOV at noon

## 2020-02-28 NOTE — PROGRESS NOTE ADULT - PROBLEM SELECTOR PROBLEM 2
Longstanding persistent atrial fibrillation

## 2020-02-28 NOTE — PROGRESS NOTE ADULT - SUBJECTIVE AND OBJECTIVE BOX
S: Denies chest pain or shortness of breath.   Review of systems otherwise (-)  	    MEDICATIONS  (STANDING):  aspirin enteric coated 81 milliGRAM(s) Oral daily  atorvastatin 40 milliGRAM(s) Oral at bedtime  furosemide    Tablet 20 milliGRAM(s) Oral every 8 hours  metoprolol tartrate 25 milliGRAM(s) Oral every 8 hours  pantoprazole    Tablet 40 milliGRAM(s) Oral before breakfast  sodium chloride 0.9% lock flush 3 milliLiter(s) IV Push every 8 hours  tamsulosin 0.4 milliGRAM(s) Oral at bedtime    MEDICATIONS  (PRN):  acetaminophen   Tablet .. 650 milliGRAM(s) Oral every 6 hours PRN Temp greater or equal to 38.5C (101.3F), Mild Pain (1 - 3)  oxycodone    5 mG/acetaminophen 325 mG 1 Tablet(s) Oral every 6 hours PRN Moderate Pain (4 - 6)      LABS:                            10.1   5.33  )-----------( 101      ( 28 Feb 2020 10:11 )             32.2     Hemoglobin: 10.1 g/dL (02-28 @ 10:11)  Hemoglobin: 10.0 g/dL (02-27 @ 13:49)  Hemoglobin: 8.8 g/dL (02-27 @ 00:11)  Hemoglobin: 9.4 g/dL (02-26 @ 11:22)    02-28    138  |  101  |  24<H>  ----------------------------<  161<H>  3.7   |  24  |  1.56<H>    Ca    9.3      28 Feb 2020 10:11  Phos  3.0     02-27  Mg     1.9     02-27    TPro  5.8<L>  /  Alb  3.0<L>  /  TBili  0.6  /  DBili  x   /  AST  28  /  ALT  21  /  AlkPhos  84  02-27    Creatinine Trend: 1.56<--, 1.61<--, 1.73<--, 1.58<--, 1.92<--   PT/INR - ( 28 Feb 2020 10:11 )   PT: 26.4 sec;   INR: 2.24 ratio           02-27-20 @ 07:01  -  02-28-20 @ 07:00  --------------------------------------------------------  IN: 970 mL / OUT: 3235 mL / NET: -2265 mL    02-28-20 @ 07:01  -  02-28-20 @ 13:48  --------------------------------------------------------  IN: 360 mL / OUT: 0 mL / NET: 360 mL        PHYSICAL EXAM  Vital Signs Last 24 Hrs  T(C): 36.4 (28 Feb 2020 13:06), Max: 36.6 (27 Feb 2020 23:30)  T(F): 97.6 (28 Feb 2020 13:06), Max: 97.8 (27 Feb 2020 23:30)  HR: 97 (28 Feb 2020 13:06) (82 - 105)  BP: 98/61 (28 Feb 2020 13:06) (98/61 - 125/83)  BP(mean): --  RR: 18 (28 Feb 2020 13:06) (18 - 18)  SpO2: 97% (28 Feb 2020 13:06) (95% - 97%)      Gen: Appears well in NAD  HEENT:  (-)icterus (-)pallor  CV: N S1 S2 1/6 CARL (+)2 Pulses B/l  Resp:  Clear to auscultation B/L, normal effort  GI: (+) BS Soft, NT, ND  Lymph:  (-)Edema, (-)obvious lymphadenopathy  Skin: Warm to touch, Normal turgor  Psych: Appropriate mood and affect      TELEMETRY: AF     ECG:  	Afib 101 BPM    RADIOLOGY:         CXR:   No infiltrate       ASSESSMENT/PLAN: 89y Male PMH HTN, HLD, afib (on coumadin), moderate to severe MR s/p clipping 12/2018, s/p cath in 11/2018 (no obstructive), S/P  PE(11/2018 was on Eliquis/Warfarin/lovenox),CKD, hyponatremia, colon cancer (2001s/p chemo, in remission), prostate cancer (s/p RT in [address]), decreased appetite and worsening shortness of breath, 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    - well compensated from CHF prospective  - keep net negative on PO lasix, Strict I+O's  - Restarted on coumadin  - BP/Rate control with metoprolol  - Urology f/u noted  - D/C planning per CTS  - Patient to follow up in our [place] office with Dr. Scott on 3/11 at 2:30 PM ([address] [place] [address]. Phone #[phone])    Cosme Torres PA-C  [facility]  Pager: [phone]

## 2020-02-28 NOTE — PROGRESS NOTE ADULT - PROBLEM SELECTOR PROBLEM 1
S/P mitral valve clip implantation

## 2020-02-28 NOTE — PROGRESS NOTE ADULT - SUBJECTIVE AND OBJECTIVE BOX
VITAL SIGNS    Telemetry:  Afib  Vital Signs Last 24 Hrs  T(C): 36.4 (20 @ 13:06), Max: 36.6 (20 @ 23:30)  T(F): 97.6 (20 @ 13:06), Max: 97.8 (20 @ 23:30)  HR: 97 (20 @ 13:06) (82 - 105)  BP: 98/61 (20 @ 13:06) (98/61 - 125/83)  RR: 18 (20 @ 13:06) (18 - 18)  SpO2: 97% (20 @ 13:06) (95% - 97%)             @ 07:01  -   @ 07:00  --------------------------------------------------------  IN: 970 mL / OUT: 3235 mL / NET: -2265 mL     @ 07:01  -   @ 13:38  --------------------------------------------------------  IN: 360 mL / OUT: 0 mL / NET: 360 mL       Daily     Daily Weight in k.1 (2020 11:14)  Admit Wt: Drug Dosing Weight  Height (cm): 177.8 (2020 06:49)  Weight (kg): 58.4 (2020 06:49)  BMI (kg/m2): 18.5 (2020 06:49)  BSA (m2): 1.73 (2020 06:49)      CAPILLARY BLOOD GLUCOSE              MEDICATIONS  acetaminophen   Tablet .. 650 milliGRAM(s) Oral every 6 hours PRN  aspirin enteric coated 81 milliGRAM(s) Oral daily  atorvastatin 40 milliGRAM(s) Oral at bedtime  furosemide    Tablet 20 milliGRAM(s) Oral every 8 hours  metoprolol tartrate 25 milliGRAM(s) Oral every 8 hours  oxycodone    5 mG/acetaminophen 325 mG 1 Tablet(s) Oral every 6 hours PRN  pantoprazole    Tablet 40 milliGRAM(s) Oral before breakfast  sodium chloride 0.9% lock flush 3 milliLiter(s) IV Push every 8 hours  tamsulosin 0.4 milliGRAM(s) Oral at bedtime      >>> <<<  PHYSICAL EXAM  Subjective: nAD  Neurology: alert and oriented x 3, nonfocal, no gross deficits  CV : s1s2  Lungs: CTA b/l  Abdomen: soft, NT,ND, (+ )BM  :  florse  Extremities:   -c/c/e + pedal pulses b/l    LABS      138  |  101  |  24<H>  ----------------------------<  161<H>  3.7   |  24  |  1.56<H>    Ca    9.3      2020 10:11  Phos  3.0       Mg     1.9         TPro  5.8<L>  /  Alb  3.0<L>  /  TBili  0.6  /  DBili  x   /  AST  28  /  ALT  21  /  AlkPhos  84                                   10.1   5.33  )-----------( 101      ( 2020 10:11 )             32.2          PT/INR - ( 2020 10:11 )   PT: 26.4 sec;   INR: 2.24 ratio         PTT - ( 2020 00:11 )  PTT:39.2 sec       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

## 2020-02-29 ENCOUNTER — TRANSCRIPTION ENCOUNTER (OUTPATIENT)
Age: 85
End: 2020-02-29

## 2020-02-29 VITALS
OXYGEN SATURATION: 96 % | DIASTOLIC BLOOD PRESSURE: 79 MMHG | TEMPERATURE: 98 F | SYSTOLIC BLOOD PRESSURE: 139 MMHG | HEART RATE: 88 BPM | RESPIRATION RATE: 18 BRPM

## 2020-02-29 LAB
ANION GAP SERPL CALC-SCNC: 14 MMOL/L — SIGNIFICANT CHANGE UP (ref 5–17)
BASOPHILS # BLD AUTO: 0.03 K/UL — SIGNIFICANT CHANGE UP (ref 0–0.2)
BASOPHILS NFR BLD AUTO: 0.6 % — SIGNIFICANT CHANGE UP (ref 0–2)
BUN SERPL-MCNC: 25 MG/DL — HIGH (ref 7–23)
CALCIUM SERPL-MCNC: 9.6 MG/DL — SIGNIFICANT CHANGE UP (ref 8.4–10.5)
CHLORIDE SERPL-SCNC: 100 MMOL/L — SIGNIFICANT CHANGE UP (ref 96–108)
CO2 SERPL-SCNC: 22 MMOL/L — SIGNIFICANT CHANGE UP (ref 22–31)
CREAT SERPL-MCNC: 1.45 MG/DL — HIGH (ref 0.5–1.3)
EOSINOPHIL # BLD AUTO: 0.2 K/UL — SIGNIFICANT CHANGE UP (ref 0–0.5)
EOSINOPHIL NFR BLD AUTO: 4 % — SIGNIFICANT CHANGE UP (ref 0–6)
GLUCOSE SERPL-MCNC: 109 MG/DL — HIGH (ref 70–99)
HCT VFR BLD CALC: 37 % — LOW (ref 39–50)
HGB BLD-MCNC: 11.5 G/DL — LOW (ref 13–17)
IMM GRANULOCYTES NFR BLD AUTO: 0.2 % — SIGNIFICANT CHANGE UP (ref 0–1.5)
INR BLD: 1.75 RATIO — HIGH (ref 0.88–1.16)
LYMPHOCYTES # BLD AUTO: 1.28 K/UL — SIGNIFICANT CHANGE UP (ref 1–3.3)
LYMPHOCYTES # BLD AUTO: 25.3 % — SIGNIFICANT CHANGE UP (ref 13–44)
MCHC RBC-ENTMCNC: 30.7 PG — SIGNIFICANT CHANGE UP (ref 27–34)
MCHC RBC-ENTMCNC: 31.1 GM/DL — LOW (ref 32–36)
MCV RBC AUTO: 98.7 FL — SIGNIFICANT CHANGE UP (ref 80–100)
MONOCYTES # BLD AUTO: 0.39 K/UL — SIGNIFICANT CHANGE UP (ref 0–0.9)
MONOCYTES NFR BLD AUTO: 7.7 % — SIGNIFICANT CHANGE UP (ref 2–14)
NEUTROPHILS # BLD AUTO: 3.15 K/UL — SIGNIFICANT CHANGE UP (ref 1.8–7.4)
NEUTROPHILS NFR BLD AUTO: 62.2 % — SIGNIFICANT CHANGE UP (ref 43–77)
NRBC # BLD: 0 /100 WBCS — SIGNIFICANT CHANGE UP (ref 0–0)
PLATELET # BLD AUTO: 88 K/UL — LOW (ref 150–400)
POTASSIUM SERPL-MCNC: 4.2 MMOL/L — SIGNIFICANT CHANGE UP (ref 3.5–5.3)
POTASSIUM SERPL-SCNC: 4.2 MMOL/L — SIGNIFICANT CHANGE UP (ref 3.5–5.3)
PROTHROM AB SERPL-ACNC: 20.3 SEC — HIGH (ref 10–12.9)
RBC # BLD: 3.75 M/UL — LOW (ref 4.2–5.8)
RBC # FLD: 16.3 % — HIGH (ref 10.3–14.5)
SODIUM SERPL-SCNC: 136 MMOL/L — SIGNIFICANT CHANGE UP (ref 135–145)
WBC # BLD: 5.06 K/UL — SIGNIFICANT CHANGE UP (ref 3.8–10.5)
WBC # FLD AUTO: 5.06 K/UL — SIGNIFICANT CHANGE UP (ref 3.8–10.5)

## 2020-02-29 PROCEDURE — 84100 ASSAY OF PHOSPHORUS: CPT

## 2020-02-29 PROCEDURE — 85730 THROMBOPLASTIN TIME PARTIAL: CPT

## 2020-02-29 PROCEDURE — C1887: CPT

## 2020-02-29 PROCEDURE — 93005 ELECTROCARDIOGRAM TRACING: CPT

## 2020-02-29 PROCEDURE — C1769: CPT

## 2020-02-29 PROCEDURE — 85014 HEMATOCRIT: CPT

## 2020-02-29 PROCEDURE — 82435 ASSAY OF BLOOD CHLORIDE: CPT

## 2020-02-29 PROCEDURE — P9047: CPT

## 2020-02-29 PROCEDURE — 84132 ASSAY OF SERUM POTASSIUM: CPT

## 2020-02-29 PROCEDURE — 80053 COMPREHEN METABOLIC PANEL: CPT

## 2020-02-29 PROCEDURE — C1894: CPT

## 2020-02-29 PROCEDURE — 71045 X-RAY EXAM CHEST 1 VIEW: CPT | Mod: 26

## 2020-02-29 PROCEDURE — C1889: CPT

## 2020-02-29 PROCEDURE — 82330 ASSAY OF CALCIUM: CPT

## 2020-02-29 PROCEDURE — 93355 ECHO TRANSESOPHAGEAL (TEE): CPT

## 2020-02-29 PROCEDURE — 82947 ASSAY GLUCOSE BLOOD QUANT: CPT

## 2020-02-29 PROCEDURE — 76000 FLUOROSCOPY <1 HR PHYS/QHP: CPT

## 2020-02-29 PROCEDURE — 33418 REPAIR TCAT MITRAL VALVE: CPT | Mod: Q0

## 2020-02-29 PROCEDURE — 82962 GLUCOSE BLOOD TEST: CPT

## 2020-02-29 PROCEDURE — 71045 X-RAY EXAM CHEST 1 VIEW: CPT

## 2020-02-29 PROCEDURE — 84295 ASSAY OF SERUM SODIUM: CPT

## 2020-02-29 PROCEDURE — 85027 COMPLETE CBC AUTOMATED: CPT

## 2020-02-29 PROCEDURE — 82803 BLOOD GASES ANY COMBINATION: CPT

## 2020-02-29 PROCEDURE — 80048 BASIC METABOLIC PNL TOTAL CA: CPT

## 2020-02-29 PROCEDURE — 83735 ASSAY OF MAGNESIUM: CPT

## 2020-02-29 PROCEDURE — 83605 ASSAY OF LACTIC ACID: CPT

## 2020-02-29 PROCEDURE — 99024 POSTOP FOLLOW-UP VISIT: CPT

## 2020-02-29 PROCEDURE — 85610 PROTHROMBIN TIME: CPT

## 2020-02-29 PROCEDURE — 93306 TTE W/DOPPLER COMPLETE: CPT

## 2020-02-29 RX ORDER — FUROSEMIDE 40 MG
3 TABLET ORAL
Qty: 0 | Refills: 0 | DISCHARGE

## 2020-02-29 RX ORDER — ATORVASTATIN CALCIUM 80 MG/1
1 TABLET, FILM COATED ORAL
Qty: 30 | Refills: 0
Start: 2020-02-29 | End: 2020-03-29

## 2020-02-29 RX ORDER — PANTOPRAZOLE SODIUM 20 MG/1
1 TABLET, DELAYED RELEASE ORAL
Qty: 0 | Refills: 0 | DISCHARGE
Start: 2020-02-29

## 2020-02-29 RX ORDER — METOPROLOL TARTRATE 50 MG
1 TABLET ORAL
Qty: 60 | Refills: 0
Start: 2020-02-29 | End: 2020-03-29

## 2020-02-29 RX ORDER — ASPIRIN/CALCIUM CARB/MAGNESIUM 324 MG
1 TABLET ORAL
Qty: 30 | Refills: 0
Start: 2020-02-29 | End: 2020-03-29

## 2020-02-29 RX ORDER — ACETAMINOPHEN 500 MG
2 TABLET ORAL
Qty: 0 | Refills: 0 | DISCHARGE
Start: 2020-02-29

## 2020-02-29 RX ORDER — METOPROLOL TARTRATE 50 MG
1 TABLET ORAL
Qty: 0 | Refills: 0 | DISCHARGE

## 2020-02-29 RX ORDER — FUROSEMIDE 40 MG
1 TABLET ORAL
Qty: 90 | Refills: 0
Start: 2020-02-29 | End: 2020-03-29

## 2020-02-29 RX ORDER — TAMSULOSIN HYDROCHLORIDE 0.4 MG/1
1 CAPSULE ORAL
Qty: 30 | Refills: 0
Start: 2020-02-29 | End: 2020-03-29

## 2020-02-29 RX ORDER — WARFARIN SODIUM 2.5 MG/1
1 TABLET ORAL
Qty: 30 | Refills: 0
Start: 2020-02-29 | End: 2020-03-29

## 2020-02-29 RX ADMIN — Medication 50 MILLIGRAM(S): at 05:52

## 2020-02-29 RX ADMIN — PANTOPRAZOLE SODIUM 40 MILLIGRAM(S): 20 TABLET, DELAYED RELEASE ORAL at 05:52

## 2020-02-29 RX ADMIN — Medication 20 MILLIGRAM(S): at 07:57

## 2020-02-29 RX ADMIN — SODIUM CHLORIDE 3 MILLILITER(S): 9 INJECTION INTRAMUSCULAR; INTRAVENOUS; SUBCUTANEOUS at 05:46

## 2020-02-29 RX ADMIN — SODIUM CHLORIDE 3 MILLILITER(S): 9 INJECTION INTRAMUSCULAR; INTRAVENOUS; SUBCUTANEOUS at 07:57

## 2020-02-29 RX ADMIN — Medication 81 MILLIGRAM(S): at 07:58

## 2020-02-29 NOTE — DISCHARGE NOTE PROVIDER - NSDCMRMEDTOKEN_GEN_ALL_CORE_FT
acetaminophen 325 mg oral tablet: 2 tab(s) orally every 6 hours, As needed, Temp greater or equal to 38.5C (101.3F), Mild Pain (1 - 3)  aspirin 81 mg oral delayed release tablet: 1 tab(s) orally once a day  atorvastatin 40 mg oral tablet: 1 tab(s) orally once a day (at bedtime)  Coumadin 2 mg oral tablet: 1 tab(s) orally once a day - dose to be adjusted by Dr. Cindi Suarez  docusate sodium 100 mg oral capsule: 1 cap(s) orally 3 times a day  furosemide 20 mg oral tablet: 1 tab(s) orally every 8 hours  metoprolol tartrate 50 mg oral tablet: 1 tab(s) orally 2 times a day  oxycodone-acetaminophen 5 mg-325 mg oral tablet: 1 tab(s) orally every 6 hours, As needed, Moderate Pain (4 - 6) MDD:4  pantoprazole 40 mg oral delayed release tablet: 1 tab(s) orally once a day (before a meal)  senna oral tablet: 2 tab(s) orally once a day (at bedtime), As Needed -for constipation   stat PT/ INR levels : every monday and thursday starting monday 3/2   fax to Dr. Cindi Suarez   tamsulosin 0.4 mg oral capsule: 1 cap(s) orally once a day (at bedtime)

## 2020-02-29 NOTE — DISCHARGE NOTE NURSING/CASE MANAGEMENT/SOCIAL WORK - PATIENT PORTAL LINK FT
You can access the FollowMyHealth Patient Portal offered by [facility] by registering at the following website: [email or web address]. By joining [facility]’s FollowMyHealth portal, you will also be able to view your health information using other applications (apps) compatible with our system.

## 2020-02-29 NOTE — DISCHARGE NOTE PROVIDER - NSDCPNSUBOBJ_GEN_ALL_CORE
VSS    tele: afib 90's    audible S1 S2    lungs clear, RR easy unlabored    + bs nt nd + bm; neg n/v/d    LE: right groin cdi tiffanie- neg hematoma, PPP bilaterally, neg LE edema or calf tenderness    + flores w/ clear, yellow urine        Discharge pt home today 2/29 as per CTS rounds with flores - f/u  in 2 weeks

## 2020-02-29 NOTE — DISCHARGE NOTE PROVIDER - CARE PROVIDER_API CALL
Wendy Floyd)  Surgery; Thoracic and Cardiac Surgery  [address] [place] [address]  Phone: [phone]  Fax: [phone]  Follow Up Time:     Nabor Salazar)  Cardiovascular Disease  [address], [address]  [place] [address]  Phone: [phone]  Fax: [phone]  Follow Up Time:     Goldberg, Gary D D (MD)  Urology  [address] [place] [address], [address]  [place] [address]  Phone: [phone]  Fax: [phone]  Follow Up Time:

## 2020-02-29 NOTE — DISCHARGE NOTE PROVIDER - CARE PROVIDERS DIRECT ADDRESSES
[email or web address]

## 2020-02-29 NOTE — PROGRESS NOTE ADULT - SUBJECTIVE AND OBJECTIVE BOX
S: Denies chest pain or shortness of breath.   Review of systems otherwise (-)  	    MEDICATIONS  (STANDING):  aspirin enteric coated 81 milliGRAM(s) Oral daily  atorvastatin 40 milliGRAM(s) Oral at bedtime  furosemide    Tablet 20 milliGRAM(s) Oral every 8 hours  metoprolol tartrate 50 milliGRAM(s) Oral two times a day  pantoprazole    Tablet 40 milliGRAM(s) Oral before breakfast  sodium chloride 0.9% lock flush 3 milliLiter(s) IV Push every 8 hours  tamsulosin 0.4 milliGRAM(s) Oral at bedtime    MEDICATIONS  (PRN):  acetaminophen   Tablet .. 650 milliGRAM(s) Oral every 6 hours PRN Temp greater or equal to 38.5C (101.3F), Mild Pain (1 - 3)  oxycodone    5 mG/acetaminophen 325 mG 1 Tablet(s) Oral every 6 hours PRN Moderate Pain (4 - 6)      LABS:                        11.5   5.06  )-----------( 88       ( 29 Feb 2020 09:30 )             37.0     136  |  100  |  25<H>  ----------------------------<  109<H>  4.2   |  22  |  1.45<H>    Ca    9.6      29 Feb 2020 09:29    Creatinine Trend: 1.45<--, 1.56<--, 1.61<--, 1.73<--, 1.58<--, 1.92<--   PT/INR - ( 29 Feb 2020 09:30 )   PT: 20.3 sec;   INR: 1.75    PHYSICAL EXAM  Vital Signs Last 24 Hrs  T(C): 36.4 (29 Feb 2020 12:36), Max: 36.8 (29 Feb 2020 05:29)  T(F): 97.6 (29 Feb 2020 12:36), Max: 98.2 (29 Feb 2020 05:29)  HR: 88 (29 Feb 2020 12:36) (81 - 98)  BP: 139/79 (29 Feb 2020 12:36) (98/61 - 139/79)  RR: 18 (29 Feb 2020 12:36) (18 - 18)  SpO2: 96% (29 Feb 2020 12:36) (95% - 98%)    Gen: Appears well in NAD  HEENT:  (-)icterus (-)pallor  CV: N S1 S2 1/6 CARL (+)2 Pulses B/l  Resp:  Clear to auscultation B/L, normal effort  GI: (+) BS Soft, NT, ND  Lymph:  (-)Edema, (-)obvious lymphadenopathy  Skin: Warm to touch, Normal turgor  Psych: Appropriate mood and affect      TELEMETRY: AF     ECG:  	Afib 101 BPM    RADIOLOGY:         CXR:   No infiltrate       ASSESSMENT/PLAN: 89y Male PMH HTN, HLD, afib (on coumadin), moderate to severe MR s/p clipping 12/2018, s/p cath in 11/2018 (no obstructive), S/P  PE(11/2018 was on Eliquis/Warfarin/lovenox),CKD, hyponatremia, colon cancer (2001s/p chemo, in remission), prostate cancer (s/p RT in [address]), decreased appetite and worsening shortness of breath, 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    - well compensated from CHF prospective  - keep net negative on PO lasix, Strict I+O's  - Restarted on coumadin  - BP/Rate control with metoprolol  - Urology f/u noted  - D/C planning per CTS  - Patient to follow up in our [place] office with Dr. Scott on 3/11 at 2:30 PM ([address] [place] [address]. Phone #[phone])

## 2020-02-29 NOTE — DISCHARGE NOTE PROVIDER - NSDCFUADDINST_GEN_ALL_CORE_FT
shower daily   call Dr. Benoit for fever > 101  coumadin bloodwork ( INR levels) every monday and thursday with Dr. Daniela Puente starting monday 3/2/2020   flores care as instructed by RN prior to discharge shower daily   call Dr. Benoit for fever > 101  coumadin bloodwork ( INR levels) every monday and thursday with Dr. Daniela Puente starting monday 3/2/2020   flores care as instructed by RN prior to discharge  antibiotic prophylaxis prior to any invasive procedure

## 2020-02-29 NOTE — DISCHARGE NOTE PROVIDER - NSDCCPCAREPLAN_GEN_ALL_CORE_FT
PRINCIPAL DISCHARGE DIAGNOSIS  Diagnosis: S/P mitral valve clip implantation  Assessment and Plan of Treatment: shower daily  weigh yourself daily  continue current prescriptions as ordered  increase activity as tolerated   no added salt; low fat; low cholesterol, low salt diet.   follow up with Cardiologist, Dr. Salazar, in 1-2 weeks. Call to schedule appointment.  follow up with cardiac surgeon, Dr. Floyd in 7- 10 days- call to schedule appointment. [phone]  follow up with urologist, Dr. Gary Goldberg in 2 weeks. CAll to schedule appointment. PRINCIPAL DISCHARGE DIAGNOSIS  Diagnosis: S/P mitral valve clip implantation  Assessment and Plan of Treatment: shower daily  weigh yourself daily  continue current prescriptions as ordered  increase activity as tolerated   antibiotic prophylaxis prior to any invasive procedure   no added salt; low fat; low cholesterol, low salt diet.   follow up with Cardiologist, Dr. Salazar, in 1-2 weeks. Call to schedule appointment.  follow up with cardiac surgeon, Dr. Floyd in 7- 10 days- call to schedule appointment. [phone]  follow up with urologist, Dr. Gary Goldberg in 2 weeks. CAll to schedule appointment.

## 2020-02-29 NOTE — DISCHARGE NOTE PROVIDER - NSDCACTIVITY_GEN_ALL_CORE
Do not make important decisions/Sex allowed/Showering allowed/Walking - Outdoors allowed/Do not drive or operate machinery/Walking - Indoors allowed/No heavy lifting/straining/Stairs allowed

## 2020-02-29 NOTE — DISCHARGE NOTE PROVIDER - HOSPITAL COURSE
89 year old male from home, lives with wife, PMH HTN, HLD, afib (on coumadin), moderate to severe MR s/p clipping 12/2018, s/p cath in 11/2018 (no obstructive), S/P  PE(11/2018 was on Eliquis/Warfarin/lovenox),CKD, hyponatremia, colon cancer (2001s/p chemo, in remission), prostate cancer (s/p RT in 200[address]), who presented with weight loss of 47 pounds over 1 year, decreased appetite and worsening shortness of breath.Patient reports symptoms started s/p mitral clip surgery in december 2018, with difficulty swallowing. He reports coughing every time he eats and tends to bring up clear phlegm, also reports episodes of sour tasting reflux after he Echo 01/15/2020 reveals severe MR & mitral clip prosthesis at the mitral position.         Hospital course:    2/26/20: Mitral clip c/b urinary retention req straight cath for 0800 at 2000;     2/27/20: Urinary retention 580, flores inserted early AM, home meds restarted transferred to floor.     Per Dr. Alford, Flores to be dc'd upon arrival to [address] [place] [address]. R groin stitch will be removed this evening, groins stable. ECHO completed-moderate mitral regurge, no effusion. PM INR 2.68, will low-dose Coumadin at 1mg this evening. EKG and labs in AM,    2/28 Flores reinserted for urinary retention >400 cc. Flomax initiated and Urology consulted. Maintain flores placement. Anticipate discharge home with flores leg bag and follow up with Urology in 2 weeks     2/29 VSS afib 80's; plt 88 d/w DR. Rausch- discharge pt home today as per CTS rounds

## 2020-03-02 ENCOUNTER — APPOINTMENT (OUTPATIENT)
Dept: INTERNAL MEDICINE | Facility: CLINIC | Age: 85
End: 2020-03-02
Payer: MEDICARE

## 2020-03-02 VITALS — HEART RATE: 91 BPM | SYSTOLIC BLOOD PRESSURE: 96 MMHG | DIASTOLIC BLOOD PRESSURE: 57 MMHG

## 2020-03-02 VITALS
RESPIRATION RATE: 16 BRPM | HEART RATE: 117 BPM | SYSTOLIC BLOOD PRESSURE: 116 MMHG | HEIGHT: 73 IN | WEIGHT: 124 LBS | TEMPERATURE: 97.9 F | OXYGEN SATURATION: 100 % | DIASTOLIC BLOOD PRESSURE: 63 MMHG | BODY MASS INDEX: 16.44 KG/M2

## 2020-03-02 DIAGNOSIS — B35.1 TINEA UNGUIUM: ICD-10-CM

## 2020-03-02 PROCEDURE — 99495 TRANSJ CARE MGMT MOD F2F 14D: CPT

## 2020-03-02 RX ORDER — ASPIRIN 81 MG/1
81 TABLET, COATED ORAL
Qty: 30 | Refills: 0 | Status: ACTIVE | COMMUNITY
Start: 2020-02-29

## 2020-03-02 RX ORDER — METOPROLOL SUCCINATE 200 MG/1
200 TABLET, EXTENDED RELEASE ORAL DAILY
Qty: 90 | Refills: 1 | Status: DISCONTINUED | COMMUNITY
End: 2020-03-02

## 2020-03-02 RX ORDER — OXYCODONE AND ACETAMINOPHEN 5; 325 MG/1; MG/1
5-325 TABLET ORAL
Refills: 0 | Status: ACTIVE | COMMUNITY

## 2020-03-02 RX ORDER — ASPIRIN 81 MG
81 TABLET, DELAYED RELEASE (ENTERIC COATED) ORAL DAILY
Refills: 0 | Status: DISCONTINUED | COMMUNITY
End: 2020-03-02

## 2020-03-02 NOTE — REVIEW OF SYSTEMS
[Hearing Loss] : hearing loss [Palpitations] : palpitations [Dyspnea on Exertion] : dyspnea on exertion [Chills] : no chills [Fever] : no fever [Discharge] : no discharge [Fatigue] : no fatigue [Pain] : no pain [Earache] : no earache [Nasal Discharge] : no nasal discharge [Lower Ext Edema] : no lower extremity edema [Shortness Of Breath] : no shortness of breath [Orthopena] : no orthopnea [Chest Pain] : no chest pain [Abdominal Pain] : no abdominal pain [Cough] : no cough [Wheezing] : no wheezing [Nausea] : no nausea [Dysuria] : no dysuria [Incontinence] : no incontinence [Joint Pain] : no joint pain [FreeTextEntry4] : chronic  [FreeTextEntry6] : at baseline

## 2020-03-02 NOTE — PHYSICAL EXAM
[Normal Sclera/Conjunctiva] : normal sclera/conjunctiva [No Acute Distress] : no acute distress [PERRL] : pupils equal round and reactive to light [EOMI] : extraocular movements intact [Normal Outer Ear/Nose] : the outer ears and nose were normal in appearance [No Lymphadenopathy] : no lymphadenopathy [No JVD] : no jugular venous distention [Normal Oropharynx] : the oropharynx was normal [No Respiratory Distress] : no respiratory distress  [Supple] : supple [No Accessory Muscle Use] : no accessory muscle use [Clear to Auscultation] : lungs were clear to auscultation bilaterally [Irregularly Irregular] : irregularly irregular [Normal S1] : normal S1 [Normal S2] : normal S2 [No Edema] : there was no peripheral edema [Soft] : abdomen soft [Non Tender] : non-tender [Normal Bowel Sounds] : normal bowel sounds [Normal Posterior Cervical Nodes] : no posterior cervical lymphadenopathy [Non-distended] : non-distended [Coordination Grossly Intact] : coordination grossly intact [Normal Anterior Cervical Nodes] : no anterior cervical lymphadenopathy [No Focal Deficits] : no focal deficits [Alert and Oriented x3] : oriented to person, place, and time [Pedal Pulses Present] : the pedal pulses are present [de-identified] : no w/r/r [de-identified] : thickening and brittle of multiple finger nails

## 2020-03-02 NOTE — HEALTH RISK ASSESSMENT
[No] : No [0] : 1) Little interest or pleasure doing things: Not at all (0) [Two or more falls in past year] : Patient reported two or more falls in the past year [Patient reported colonoscopy was normal] : Patient reported colonoscopy was normal [Behavior] : difficulty with behavior [Reasoning] : difficulty with reasoning [Learning/Retaining New Information] : difficulty learning/retaining new information [Spatial Ability and Orientation] : difficulty with spatial ability and orientation [Alone] : lives alone [None] : None [Retired] : retired [] :  [Feels Safe at Home] : Feels safe at home [] : No [Change in mental status noted] : No change in mental status noted [Language] : denies difficulty with language [Handling Complex Tasks] : denies difficulty handling complex tasks [Sexually Active] : not sexually active [de-identified] : need help with ADLs [High Risk Behavior] : no high risk behavior

## 2020-03-02 NOTE — ASSESSMENT
[FreeTextEntry1] : FAST score at 6; PPS score =50; need help with all his ADLs and IADLs \par candidate for palliative care; discussed with pt and his wife \par refer to palliative care consult

## 2020-03-02 NOTE — HISTORY OF PRESENT ILLNESS
[Discharge Summary] : discharge summary [Pertinent Labs] : pertinent labs [Radiology Findings] : radiology findings [Discharge Med List] : discharge medication list [Med Reconciliation] : medication reconciliation has been completed [Patient Contacted By: ____] : and contacted by [unfilled] [Post-hospitalization from ___ Hospital] : Post-hospitalization from [unfilled] Hospital [Admitted on: ___] : The patient was admitted on [unfilled] [Discharged on ___] : discharged on [unfilled] [FreeTextEntry2] : 89 y.o M w/PMhx of HTN, HLD, Hyponatremia and Afib on (Coumadin), colon CA ( 2001 s/p Chemo), prostate CA( 2006 Tx with radiation), CKD,  MR with Aborted Mitraclip x2 (on 11/21/2018 and 2/2020) with residual severe MR, HFrEF with EF 30% -35% on TTE in 09/2019, chronic GERD comes in for post hospitalization follow up;\par \par Pt had scheduled mitral clip in 02/26/2020 and later was hospitalized to 02/29/2020; had Urinary retention and current on Hyatt; Platelets was 88 while discharge; \par \par Per Wife, pt is had episode of Hypotensive yesterday with BP 80/60. and this morning the BP is 75/56 per pt's wife; Pt stated that he felt a little fatigue; still has Hyatt in place; denies of any CP/ palpitation/worsening of sob; \par \par had PT/INR done today; results pending\par \par FAST score at 6; PPS score =50; need help with all his ADLs and IADLs

## 2020-03-04 PROBLEM — Z98.890 S/P MITRAL VALVE CLIP IMPLANTATION: Status: RESOLVED | Noted: 2019-01-07 | Resolved: 2020-03-04

## 2020-03-04 LAB
INR PPP: 3.17 RATIO
PT BLD: 37.5 SEC

## 2020-03-04 RX ORDER — WARFARIN 2 MG/1
2 TABLET ORAL
Qty: 30 | Refills: 0 | Status: COMPLETED | COMMUNITY
Start: 2020-02-29 | End: 2020-03-04

## 2020-03-06 ENCOUNTER — INPATIENT (INPATIENT)
Facility: HOSPITAL | Age: 85
LOS: 2 days | Discharge: HOME | DRG: 689 | End: 2020-03-09
Attending: THORACIC SURGERY (CARDIOTHORACIC VASCULAR SURGERY) | Admitting: THORACIC SURGERY (CARDIOTHORACIC VASCULAR SURGERY)
Payer: MEDICARE

## 2020-03-06 VITALS
HEART RATE: 77 BPM | DIASTOLIC BLOOD PRESSURE: 69 MMHG | OXYGEN SATURATION: 96 % | WEIGHT: 125 LBS | RESPIRATION RATE: 18 BRPM | SYSTOLIC BLOOD PRESSURE: 103 MMHG | HEIGHT: 73 IN | TEMPERATURE: 98 F

## 2020-03-06 DIAGNOSIS — R42 DIZZINESS AND GIDDINESS: ICD-10-CM

## 2020-03-06 DIAGNOSIS — Z98.890 OTHER SPECIFIED POSTPROCEDURAL STATES: Chronic | ICD-10-CM

## 2020-03-06 DIAGNOSIS — Z90.49 ACQUIRED ABSENCE OF OTHER SPECIFIED PARTS OF DIGESTIVE TRACT: Chronic | ICD-10-CM

## 2020-03-06 DIAGNOSIS — Z85.46 PERSONAL HISTORY OF MALIGNANT NEOPLASM OF PROSTATE: Chronic | ICD-10-CM

## 2020-03-06 DIAGNOSIS — N39.0 URINARY TRACT INFECTION, SITE NOT SPECIFIED: ICD-10-CM

## 2020-03-06 DIAGNOSIS — I34.0 NONRHEUMATIC MITRAL (VALVE) INSUFFICIENCY: ICD-10-CM

## 2020-03-06 LAB
ALBUMIN SERPL ELPH-MCNC: 3.2 G/DL — LOW (ref 3.3–5)
ALP SERPL-CCNC: 95 U/L — SIGNIFICANT CHANGE UP (ref 40–120)
ALT FLD-CCNC: 18 U/L — SIGNIFICANT CHANGE UP (ref 10–45)
ANION GAP SERPL CALC-SCNC: 13 MMOL/L — SIGNIFICANT CHANGE UP (ref 5–17)
APPEARANCE UR: ABNORMAL
APTT BLD: 30.4 SEC — SIGNIFICANT CHANGE UP (ref 27.5–36.3)
AST SERPL-CCNC: 29 U/L — SIGNIFICANT CHANGE UP (ref 10–40)
BACTERIA # UR AUTO: ABNORMAL
BASOPHILS # BLD AUTO: 0.02 K/UL — SIGNIFICANT CHANGE UP (ref 0–0.2)
BASOPHILS NFR BLD AUTO: 0.3 % — SIGNIFICANT CHANGE UP (ref 0–2)
BILIRUB SERPL-MCNC: 0.8 MG/DL — SIGNIFICANT CHANGE UP (ref 0.2–1.2)
BILIRUB UR-MCNC: NEGATIVE — SIGNIFICANT CHANGE UP
BUN SERPL-MCNC: 29 MG/DL — HIGH (ref 7–23)
CALCIUM SERPL-MCNC: 9.1 MG/DL — SIGNIFICANT CHANGE UP (ref 8.4–10.5)
CHLORIDE SERPL-SCNC: 103 MMOL/L — SIGNIFICANT CHANGE UP (ref 96–108)
CO2 SERPL-SCNC: 22 MMOL/L — SIGNIFICANT CHANGE UP (ref 22–31)
COLOR SPEC: YELLOW — SIGNIFICANT CHANGE UP
CREAT SERPL-MCNC: 1.62 MG/DL — HIGH (ref 0.5–1.3)
DIFF PNL FLD: ABNORMAL
EOSINOPHIL # BLD AUTO: 0.1 K/UL — SIGNIFICANT CHANGE UP (ref 0–0.5)
EOSINOPHIL NFR BLD AUTO: 1.3 % — SIGNIFICANT CHANGE UP (ref 0–6)
EPI CELLS # UR: 3 /HPF — SIGNIFICANT CHANGE UP
GAS PNL BLDV: SIGNIFICANT CHANGE UP
GLUCOSE SERPL-MCNC: 123 MG/DL — HIGH (ref 70–99)
GLUCOSE UR QL: NEGATIVE — SIGNIFICANT CHANGE UP
HCT VFR BLD CALC: 31.5 % — LOW (ref 39–50)
HGB BLD-MCNC: 10.1 G/DL — LOW (ref 13–17)
HYALINE CASTS # UR AUTO: 12 /LPF — HIGH (ref 0–2)
IMM GRANULOCYTES NFR BLD AUTO: 0.4 % — SIGNIFICANT CHANGE UP (ref 0–1.5)
INR BLD: 1.57 RATIO — HIGH (ref 0.88–1.16)
KETONES UR-MCNC: NEGATIVE — SIGNIFICANT CHANGE UP
LACTATE BLDV-MCNC: 2.4 MMOL/L — HIGH (ref 0.7–2)
LEUKOCYTE ESTERASE UR-ACNC: ABNORMAL
LYMPHOCYTES # BLD AUTO: 0.99 K/UL — LOW (ref 1–3.3)
LYMPHOCYTES # BLD AUTO: 13 % — SIGNIFICANT CHANGE UP (ref 13–44)
MCHC RBC-ENTMCNC: 30.7 PG — SIGNIFICANT CHANGE UP (ref 27–34)
MCHC RBC-ENTMCNC: 32.1 GM/DL — SIGNIFICANT CHANGE UP (ref 32–36)
MCV RBC AUTO: 95.7 FL — SIGNIFICANT CHANGE UP (ref 80–100)
MONOCYTES # BLD AUTO: 0.57 K/UL — SIGNIFICANT CHANGE UP (ref 0–0.9)
MONOCYTES NFR BLD AUTO: 7.5 % — SIGNIFICANT CHANGE UP (ref 2–14)
NEUTROPHILS # BLD AUTO: 5.9 K/UL — SIGNIFICANT CHANGE UP (ref 1.8–7.4)
NEUTROPHILS NFR BLD AUTO: 77.5 % — HIGH (ref 43–77)
NITRITE UR-MCNC: NEGATIVE — SIGNIFICANT CHANGE UP
NRBC # BLD: 0 /100 WBCS — SIGNIFICANT CHANGE UP (ref 0–0)
NT-PROBNP SERPL-SCNC: 8361 PG/ML — HIGH (ref 0–300)
PH UR: 7 — SIGNIFICANT CHANGE UP (ref 5–8)
PLATELET # BLD AUTO: 139 K/UL — LOW (ref 150–400)
POTASSIUM SERPL-MCNC: 4.6 MMOL/L — SIGNIFICANT CHANGE UP (ref 3.5–5.3)
POTASSIUM SERPL-SCNC: 4.6 MMOL/L — SIGNIFICANT CHANGE UP (ref 3.5–5.3)
PROT SERPL-MCNC: 7.4 G/DL — SIGNIFICANT CHANGE UP (ref 6–8.3)
PROT UR-MCNC: ABNORMAL
PROTHROM AB SERPL-ACNC: 18.3 SEC — HIGH (ref 10–12.9)
RBC # BLD: 3.29 M/UL — LOW (ref 4.2–5.8)
RBC # FLD: 16.4 % — HIGH (ref 10.3–14.5)
RBC CASTS # UR COMP ASSIST: 120 /HPF — HIGH (ref 0–4)
SODIUM SERPL-SCNC: 138 MMOL/L — SIGNIFICANT CHANGE UP (ref 135–145)
SP GR SPEC: 1.01 — SIGNIFICANT CHANGE UP (ref 1.01–1.02)
TROPONIN T, HIGH SENSITIVITY RESULT: 32 NG/L — SIGNIFICANT CHANGE UP (ref 0–51)
UROBILINOGEN FLD QL: NEGATIVE — SIGNIFICANT CHANGE UP
WBC # BLD: 7.61 K/UL — SIGNIFICANT CHANGE UP (ref 3.8–10.5)
WBC # FLD AUTO: 7.61 K/UL — SIGNIFICANT CHANGE UP (ref 3.8–10.5)
WBC UR QL: 220 /HPF — HIGH (ref 0–5)

## 2020-03-06 PROCEDURE — 70450 CT HEAD/BRAIN W/O DYE: CPT | Mod: 26

## 2020-03-06 PROCEDURE — 71045 X-RAY EXAM CHEST 1 VIEW: CPT | Mod: 26

## 2020-03-06 PROCEDURE — 99285 EMERGENCY DEPT VISIT HI MDM: CPT

## 2020-03-06 RX ORDER — ACETAMINOPHEN 500 MG
650 TABLET ORAL EVERY 6 HOURS
Refills: 0 | Status: DISCONTINUED | OUTPATIENT
Start: 2020-03-06 | End: 2020-03-09

## 2020-03-06 RX ORDER — ACETAMINOPHEN 500 MG
650 TABLET ORAL ONCE
Refills: 0 | Status: DISCONTINUED | OUTPATIENT
Start: 2020-03-06 | End: 2020-03-06

## 2020-03-06 RX ORDER — CEFTRIAXONE 500 MG/1
1000 INJECTION, POWDER, FOR SOLUTION INTRAMUSCULAR; INTRAVENOUS ONCE
Refills: 0 | Status: COMPLETED | OUTPATIENT
Start: 2020-03-06 | End: 2020-03-06

## 2020-03-06 RX ORDER — PANTOPRAZOLE SODIUM 20 MG/1
40 TABLET, DELAYED RELEASE ORAL
Refills: 0 | Status: DISCONTINUED | OUTPATIENT
Start: 2020-03-06 | End: 2020-03-09

## 2020-03-06 RX ORDER — ASPIRIN/CALCIUM CARB/MAGNESIUM 324 MG
81 TABLET ORAL DAILY
Refills: 0 | Status: DISCONTINUED | OUTPATIENT
Start: 2020-03-06 | End: 2020-03-09

## 2020-03-06 RX ORDER — ATORVASTATIN CALCIUM 80 MG/1
40 TABLET, FILM COATED ORAL AT BEDTIME
Refills: 0 | Status: DISCONTINUED | OUTPATIENT
Start: 2020-03-06 | End: 2020-03-09

## 2020-03-06 RX ORDER — SODIUM CHLORIDE 9 MG/ML
1000 INJECTION INTRAMUSCULAR; INTRAVENOUS; SUBCUTANEOUS ONCE
Refills: 0 | Status: COMPLETED | OUTPATIENT
Start: 2020-03-06 | End: 2020-03-06

## 2020-03-06 RX ORDER — ACETAMINOPHEN 500 MG
650 TABLET ORAL ONCE
Refills: 0 | Status: COMPLETED | OUTPATIENT
Start: 2020-03-06 | End: 2020-03-06

## 2020-03-06 RX ORDER — SODIUM CHLORIDE 9 MG/ML
3 INJECTION INTRAMUSCULAR; INTRAVENOUS; SUBCUTANEOUS EVERY 8 HOURS
Refills: 0 | Status: DISCONTINUED | OUTPATIENT
Start: 2020-03-06 | End: 2020-03-09

## 2020-03-06 RX ORDER — WARFARIN SODIUM 2.5 MG/1
5 TABLET ORAL ONCE
Refills: 0 | Status: COMPLETED | OUTPATIENT
Start: 2020-03-06 | End: 2020-03-06

## 2020-03-06 RX ORDER — TAMSULOSIN HYDROCHLORIDE 0.4 MG/1
0.4 CAPSULE ORAL AT BEDTIME
Refills: 0 | Status: DISCONTINUED | OUTPATIENT
Start: 2020-03-06 | End: 2020-03-09

## 2020-03-06 RX ORDER — CEFTRIAXONE 500 MG/1
1000 INJECTION, POWDER, FOR SOLUTION INTRAMUSCULAR; INTRAVENOUS EVERY 24 HOURS
Refills: 0 | Status: DISCONTINUED | OUTPATIENT
Start: 2020-03-07 | End: 2020-03-09

## 2020-03-06 RX ADMIN — Medication 650 MILLIGRAM(S): at 17:53

## 2020-03-06 RX ADMIN — CEFTRIAXONE 100 MILLIGRAM(S): 500 INJECTION, POWDER, FOR SOLUTION INTRAMUSCULAR; INTRAVENOUS at 13:37

## 2020-03-06 RX ADMIN — SODIUM CHLORIDE 3 MILLILITER(S): 9 INJECTION INTRAMUSCULAR; INTRAVENOUS; SUBCUTANEOUS at 21:11

## 2020-03-06 RX ADMIN — SODIUM CHLORIDE 1000 MILLILITER(S): 9 INJECTION INTRAMUSCULAR; INTRAVENOUS; SUBCUTANEOUS at 13:37

## 2020-03-06 RX ADMIN — WARFARIN SODIUM 5 MILLIGRAM(S): 2.5 TABLET ORAL at 21:10

## 2020-03-06 RX ADMIN — ATORVASTATIN CALCIUM 40 MILLIGRAM(S): 80 TABLET, FILM COATED ORAL at 21:10

## 2020-03-06 RX ADMIN — Medication 650 MILLIGRAM(S): at 13:37

## 2020-03-06 RX ADMIN — TAMSULOSIN HYDROCHLORIDE 0.4 MILLIGRAM(S): 0.4 CAPSULE ORAL at 21:10

## 2020-03-06 NOTE — PROGRESS NOTE ADULT - SUBJECTIVE AND OBJECTIVE BOX
HPI:    Mr. Mendez is an 89 year old male, presenting to the ED with altered mental status. 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 Suarez), CKD, colon cancer in  s/p chemo, prostate Ca in  s/p RT, and severe MR s/p mitraclip placement on 2018. He had recurrent episodes of CHF, repeat TTE revealed severe MR, and he underwent a second mitraclip placement on . Hospital course was complicated by urinary retention, he was evaluated by urology and discharged home with a flores catheter on  with plans for outpt urology follow up.   His wife reports that he has been hypotensive since Monday, they followed up with their primary physician Dr. Suarez. Per wife, patient woke up in the middle of the night last night disoriented, and was lethargic this morning. She called 911 when he was unable to walk to the kitchen and was verbally unresponsive for several minutes. Denies LOC. Pt. is now more alert, states he wasn't feeling himself for the past several days, very tired and dizzy. He is now oriented x3.     Rectal temp in .8, sepsis workup initiated.     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  Pleural effusion, bilateral: as of latest chest xray  s/p latest hospitalization for CHF  CKD (chronic kidney disease)  Colon cancer: , s/p chemo  Prostate cancer: 2006  Mitral regurgitation  Hyponatremia  HLD (hyperlipidemia)  HTN (hypertension)  Atrial fibrillation: On coumadin  Prostate CA  GERD (gastroesophageal reflux disease)  Afib  HLD (hyperlipidemia)  HTN (hypertension)  S/P mitral valve clip implantation: 2018  S/P coronary angiogram: 18  History of appendectomy  H/O colectomy  History of prostate cancer      REVIEW OF SYSTEMS:    CONSTITUTIONAL: (+) general malaise, no outpt fever or chills, appetite poor, wife notes he is coughing more frequently when he drinks   EYES/ENT: No visual changes;  No vertigo or throat pain   NECK: No pain or stiffness  RESPIRATORY: No cough, wheezing, hemoptysis; No shortness of breath at rest  CARDIOVASCULAR: No chest pain or palpitations, PND, orthopnea, or LE edema, (+) dizziness  GASTROINTESTINAL: No abdominal or epigastric pain. No nausea, vomiting, or hematemesis; No diarrhea or constipation. No melena or hematochezia.  GENITOURINARY: No dysuria, frequency or hematuria  NEUROLOGICAL: No numbness or weakness  SKIN: No itching, rashes      MEDICATIONS  (STANDING):    MEDICATIONS  (PRN):      Allergies    penicillin (Hives)  shellfish (Hives)    Intolerances        SOCIAL HISTORY: , lives with spouse     FAMILY HISTORY:  Family history of colon cancer (Sibling)  Family history of ovarian cancer  Family history of cancer (Sibling)      Vital Signs Last 24 Hrs  T(C): 38.2 (06 Mar 2020 12:46), Max: 38.2 (06 Mar 2020 12:46)  T(F): 100.8 (06 Mar 2020 12:46), Max: 100.8 (06 Mar 2020 12:46)  HR: 100 (06 Mar 2020 14:00) (77 - 108)  BP: 120/69 (06 Mar 2020 14:00) (96/52 - 120/69)  BP(mean): --  RR: 16 (06 Mar 2020 14:00) (16 - 19)  SpO2: 100% (06 Mar 2020 14:00) (96% - 100%)    Physical Exam  General: A/ox 3, No acute Distress  Neck: Supple, NO JVD  Cardiac: S1 S2, No M/R/G  Pulmonary: CTAB, Breathing unlabored, No Rhonchi/Rales/Wheezing  Abdomen: Soft, Non -tender, +BS x 4 quads  Extremities: No Rashes, No edema  Neuro: A/o x 3, No focal deficits    LABS:                        10.1   7.61  )-----------( 139      ( 06 Mar 2020 13:13 )             31.5     03-06    138  |  103  |  29<H>  ----------------------------<  123<H>  4.6   |  22  |  1.62<H>    Ca    9.1      06 Mar 2020 13:13    TPro  7.4  /  Alb  3.2<L>  /  TBili  0.8  /  DBili  x   /  AST  29  /  ALT  18  /  AlkPhos  95  03-06    PT/INR - ( 06 Mar 2020 13:13 )   PT: 18.3 sec;   INR: 1.57 ratio         PTT - ( 06 Mar 2020 13:13 )  PTT:30.4 sec  Urinalysis Basic - ( 06 Mar 2020 15:10 )    Color: Yellow / Appearance: Slightly Turbid / S.014 / pH: x  Gluc: x / Ketone: Negative  / Bili: Negative / Urobili: Negative   Blood: x / Protein: 30 mg/dL / Nitrite: Negative   Leuk Esterase: Large / RBC: 120 /hpf /  /HPF   Sq Epi: x / Non Sq Epi: 3 /hpf / Bacteria: Few        RADIOLOGY & ADDITIONAL STUDIES:  < from: Xray Chest 1 View- PORTABLE-Routine (20 @ 15:06) >  EXAM:  XR CHEST PORTABLE ROUTINE 1V                            PROCEDURE DATE:  2020            INTERPRETATION:    DATE OF STUDY: 3/6/2020    PRIOR:20    CLINICAL INDICATION: Weakness; assess for chest process.    TECHNIQUE: portable chest.    FINDINGS:   Thoracic aortic atheromatous changes and ectasia are stable  Redemonstration of Micra chips overlying left heart.  Heart is top normal in size.  No focal consolidations.  No pleural effusion or pneumothorax.  No acute bony finding.    IMPRESSION:   Stable chest.  Clear lungs.    < end of copied text >    < from: CT Head No Cont (20 @ 14:24) >  EXAM:  CT BRAIN                            PROCEDURE DATE:  2020            INTERPRETATION:    Clinical Indication: Altered mental status    5mm axial sections of the brain were obtained from base to vertex, without the intravenous administration of contrast material. Coronal and sagittal computer generated reconstructed views are available.    Comparison is made with the previous CT of 2019 and demonstrates no significant interval change.    The ventricles and sulci are prominent consistent age appropriate involutional changes. Small vessel white matter ischemic changes are noted. There is no hemorrhage, mass, or shift of midline structures. Bone window examination is unremarkable. There has been previous bilateral lens replacement surgery. Implant in the left superior orbit may represent a glaucoma pump.    IMPRESSION: Age-appropriate involutional and ischemic gliotic changes. No hemorrhage. No change from 2019.                    GONSALO ASHLEY M.D., ATTENDING RADIOLOGIST  This document has been electronically signed. Mar  6 2020  2:36PM          < end of copied text >

## 2020-03-06 NOTE — H&P ADULT - NSHPLABSRESULTS_GEN_ALL_CORE
LABS:                        10.1   7.61  )-----------( 139      ( 06 Mar 2020 13:13 )             31.5     03-06    138  |  103  |  29<H>  ----------------------------<  123<H>  4.6   |  22  |  1.62<H>    Ca    9.1      06 Mar 2020 13:13    TPro  7.4  /  Alb  3.2<L>  /  TBili  0.8  /  DBili  x   /  AST  29  /  ALT  18  /  AlkPhos  95  03-06    PT/INR - ( 06 Mar 2020 13:13 )   PT: 18.3 sec;   INR: 1.57 ratio         PTT - ( 06 Mar 2020 13:13 )  PTT:30.4 sec  Urinalysis Basic - ( 06 Mar 2020 15:10 )    Color: Yellow / Appearance: Slightly Turbid / S.014 / pH: x  Gluc: x / Ketone: Negative  / Bili: Negative / Urobili: Negative   Blood: x / Protein: 30 mg/dL / Nitrite: Negative   Leuk Esterase: Large / RBC: 120 /hpf /  /HPF   Sq Epi: x / Non Sq Epi: 3 /hpf / Bacteria: Few

## 2020-03-06 NOTE — ED PROVIDER NOTE - OBJECTIVE STATEMENT
89 yo M hx of HTN, HLD, afib (on coumadin), moderate to severe MR s/p clipping 12/2018, s/p cath in 11/2018 (no obstructive), S/P  PE(11/2018 was on Eliquis/Warfarin/lovenox),CKD, hyponatremia, colon cancer (2001s/p chemo, in remission), prostate cancer (s/p RT in [address]), who presented with weight loss of 47 pounds over 1 year, decreased appetite and worsening shortness of breath.Patient reports symptoms started s/p mitral clip surgery in december 2018, with difficulty swallowing. He reports coughing every time he eats and tends to bring up clear phlegm, also reports episodes of sour tasting reflux after he Echo 01/15/2020 reveals severe MR & mitral clip prosthesis at the mitral position. 89 yo M hx of HTN, HLD, afib (on coumadin),recent echo 01/15/2020 revealed severe MR s/p Mitraclip 2/26 discharged on 2/29 presents with weakness/lethargy. Wife at bedside reports he has been having on and off shortness of breath, dizziness since the procedure. Today was not responding for several minutes-wife thought he was not going to wake up. Reports he was not moving at all but no focal deficits. Pt himself says he feels very weak and lightheaded, denies chest pain at this time. Wife reports he has not been eating much. Hyatt was placed during last admission for urinary retention.

## 2020-03-06 NOTE — ED PROVIDER NOTE - ATTENDING CONTRIBUTION TO CARE
I, Mario Hyatt, performed a history and physical exam of the patient and discussed their management with the resident and /or advanced care provider. I reviewed the resident and /or ACP's note and agree with the documented findings and plan of care. I was present and available for all procedures.  Patient with episode of altered mental status that is improving.  Most likely etiology is UTI, however pt with recent CT surgery procedure with mitral valve repair.  Patient stable for admission to CT surgery for further evaluation and monitoring.

## 2020-03-06 NOTE — H&P ADULT - NSHPREVIEWOFSYSTEMS_GEN_ALL_CORE
Review of Systems  GENERAL:  Fevers[] chills[] sweats[] fatigue[X] weight loss[] weight gain []                                        NEURO:  parathesias[] seizures []  syncope []  confusion [X]                                                                                  EYES: glasses[]  blurry vision[]  discharge[] pain[] glaucoma []                                                                            ENMT:  difficulty hearing []  vertigo[]  dysphagia[] epistaxis[] recent dental work []                                      CV:  chest pain[] palpitations[] CAMACHO [] diaphoresis [] edema[]                                                                                             RESPIRATORY:  wheezing[] SOB[] cough [] sputum[] hemoptysis[]                                                                    GI:  nausea[]  vomiting []  diarrhea[] constipation [] melena []                                                                        : hematuria[ ]  dysuria[ ] urgency[] incontinence[]                                                                                              MUSCULOSKELETAL:  arthritis[ ]  joint swelling [ ] muscle weakness [ ]                                                                  SKIN/BREAST:  rash[ ] itching [ ]  hair loss[ ] masses[ ]                                                                                                PSYCH:  dementia [ ] depression [ ] anxiety[ ]                                                                                                                  HEME/LYMPH:  bruises easily[ ] enlarged lymph nodes[ ] tender lymph nodes[ ]                                                 ENDOCRINE:  cold intolerance[ ] heat intolerance[ ] polydipsia[ ]

## 2020-03-06 NOTE — ED PROVIDER NOTE - FAMILY HISTORY
Family history of ovarian cancer     Sibling  Still living? No  Family history of cancer, Age at diagnosis: Age Unknown     Sibling  Still living? Unknown  Family history of colon cancer, Age at diagnosis: Age Unknown

## 2020-03-06 NOTE — H&P ADULT - HISTORY OF PRESENT ILLNESS
History of Present Illness:  89y Male History of Present Illness:  89y Male known to our service presenting to the ED with altered mental status. He has a past medical history of HTN, HLD, AFib,  PE, RA thrombus 11/2018 treated with Warfarin (monitored outpt by Dr. Suarez), CKD, colon cancer in 2001 s/p chemo, prostate Ca in 2016 s/p RT, and severe MR s/p mitraclip placement on 12/26/2018. He had recurrent episodes of CHF, repeat TTE revealed severe MR, and he underwent a second mitraclip placement on February 26th. Hospital course was complicated by urinary retention, he was evaluated by urology and discharged home with a flores catheter on 2/29 with plans for outpt urology follow up.   His wife reports that he has been hypotensive since Monday, they followed up with their primary physician Dr. Suarez. Per wife, patient woke up in the middle of the night last night disoriented, and was lethargic this morning. She called 911 when he was unable to walk to the kitchen and was verbally unresponsive for several minutes. Denies LOC. Pt. is now more alert, states he wasn't feeling himself for the past several days, very tired and dizzy. He is now oriented x3. Readmitted for further management.

## 2020-03-06 NOTE — H&P ADULT - PROBLEM SELECTOR PLAN 1
Mitraclip 2/26, does not appear volume overloaded  Febrile in ED, received 1L IVF with no signs of CHF following   Mental status improved, he is now A&O x3  Sepsis workup in progress, UA, BC pending, flores changed  CXR clear  Will repeat TTE to evaluate MR    [id number] Mitraclip 2/26,  Admit to 2 [facility] telemetry floor    Febrile in ED, received 1L IVF with no signs of CHF following   Mental status improved, he is now A&O x3  Sepsis workup in progress, UA, BC pending, sandra changed  CXR clear  Repeat TTE to evaluate MR

## 2020-03-06 NOTE — ED ADULT NURSE REASSESSMENT NOTE - NS ED NURSE REASSESS COMMENT FT1
Hyatt catheter inserted using sterile technique. Second RN present to confirm sterility. Bedside drainage to gravity. Stat lock in place.

## 2020-03-06 NOTE — ED PROVIDER NOTE - PHYSICAL EXAMINATION
Gen: weak, chronically ill appearing, though NAD, conversational   Eyes: PERRLA, EOMI   HENT: Normocephalic, atraumatic. External ears normal, no rhinorrhea, moist mucous membranes.   CV: RRR, no M/R/G  Resp: CTAB, non-labored, speaking without difficulty on room air  Abd: soft, non tender, non rigid, no guarding or rebound tenderness  Skin: dry, wwp   Neuro: AOx3, speech is fluent and appropriate

## 2020-03-06 NOTE — ED ADULT NURSE REASSESSMENT NOTE - NS ED NURSE REASSESS COMMENT FT1
Pt in, NAD, resp nonlabored, resting comfortably in bed with family at bedside. Pt denies headache, dizziness, chest pain, palpitations, SOB, abd pain, n/v/d, urinary symptoms, fevers, chills, weakness at this time. Pt admitted awaiting bed . Safety maintained.

## 2020-03-06 NOTE — ED PROVIDER NOTE - NS ED ROS FT
General: +malaise, +generalized weakness, no fevers / chills  HENT: No head trauma, ear pain, runny nose, or sore throat  Eyes: No visual changes  CP: No chest pain, +palpitations, +lightheadedness, +near-syncope   Resp: +shortness of breath, no cough  GI: No abdominal pain, diarrhea, constipation, nausea, or vomiting  : No urinary fz, dysuria, or hematuria  Neuro: No numbness, tingling, or weakness

## 2020-03-06 NOTE — CONSULT NOTE ADULT - SUBJECTIVE AND OBJECTIVE BOX
HISTORY OF PRESENT ILLNESS: 89y Male PMH HTN, HLD,  AFib (on coumadin), moderate to severe MR s/p clipping 12/2018,  s/p cath in 11/2018 (no obstructive), with a history of severe LV dysfxn recently with improved LV funciton on TTE 2/20 with EF 50%, S/P  PE(11/2018 was on Eliquis/Warfarin/lovenox),CKD, hyponatremia, colon cancer (2001s/p chemo, in remission), prostate cancer (s/p RT in [address]), recently admitted with decreased appetite and worsening shortness of breath, cardiac cachexia  s/p second Lucy clip 2/26/20 now readmitted with an episode of unresponsiveness with associated decreased appetite and weakness.  The patient has a history of confusion which has worsened over the last few days per his wife at bedside so history is obtained from her though the patient states he feels comfortable and denies any palpitations, syncope  or near syncope.  The patient's wife states he has had limited PO intake over the last several days and today while sitting in a chair, while remaining sitting, he became  unresponsive with his eyes "rolling back in his head". She denies any syncope but states he was non communicative for several minutes.  She and her grandson were unable to move him from the chair thus EMS was called and he was brought to the ER for further evaluation.  He was found to have a rectal temperature of 100.8 in the ER.       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  Pleural effusion, bilateral: as of latest chest xray  s/p latest hospitalization for CHF  CKD (chronic kidney disease)  Colon cancer: 2001, s/p chemo  Prostate cancer: 2006  Mitral regurgitation  Hyponatremia  HLD (hyperlipidemia)  HTN (hypertension)  Atrial fibrillation: On coumadin  Prostate CA  GERD (gastroesophageal reflux disease)  Afib  HLD (hyperlipidemia)  HTN (hypertension)  S/P mitral valve clip implantation: 12/2018  S/P coronary angiogram: 11/12/18  History of appendectomy  H/O colectomy  History of prostate cancer          MEDICATIONS:  MEDICATIONS  (STANDING):      Allergies    penicillin (Hives)  shellfish (Hives)    Intolerances        FAMILY HISTORY:  Family history of colon cancer (Sibling)  Family history of ovarian cancer  Family history of cancer (Sibling)    Non-contributary for premature coronary disease or sudden cardiac death    SOCIAL HISTORY:    [x ] Non-smoker  [ ] Smoker  [ ] Alcohol    FLU VACCINE THIS YEAR STARTS IN AUGUST:  [ ] Yes    [x ] No    IF OVER 65 HAVE YOU EVER HAD A PNA VACCINE:  [ ] Yes    [x ] No       [ ] N/A      REVIEW OF SYSTEMS:  [ ]chest pain  [  ]shortness of breath  [  ]palpitations  [  ]syncope  [ ]near syncope [ ]upper extremity weakness   [ ] lower extremity weakness  [  ]diplopia  [  ]altered mental status [x]weakness   [  ]fevers  [ ]chills [ ]nausea  [ ]vomitting  [  ]dysphagia    [ ]abdominal pain  [ ]melena  [ ]BRBPR    [  ]epistaxis  [  ]rash    [ ]lower extremity edema        [ ] All others negative	  [ ] Unable to obtain      LABS:	 	    CARDIAC MARKERS:  36                            10.1   7.61  )-----------( 139      ( 06 Mar 2020 13:13 )             31.5     Hb Trend: 10.1<--    03-06    138  |  103  |  29<H>  ----------------------------<  123<H>  4.6   |  22  |  1.62<H>    Ca    9.1      06 Mar 2020 13:13    TPro  7.4  /  Alb  3.2<L>  /  TBili  0.8  /  DBili  x   /  AST  29  /  ALT  18  /  AlkPhos  95  03-06    Creatinine Trend: 1.62<--, 1.45<--, 1.56<--, 1.61<--, 1.73<--, 1.58<--    Coags:  PT/INR - ( 06 Mar 2020 13:13 )   PT: 18.3 sec;   INR: 1.57 ratio         PTT - ( 06 Mar 2020 13:13 )  PTT:30.4 sec    proBNP: Serum Pro-Brain Natriuretic Peptide: 8361 pg/mL (03-06 @ 13:13)    Lipid Profile:   HgA1c:   TSH:         PHYSICAL EXAM:  T(C): 38.2 (03-06-20 @ 12:46), Max: 38.2 (03-06-20 @ 12:46)  HR: 100 (03-06-20 @ 14:00) (77 - 108)  BP: 120/69 (03-06-20 @ 14:00) (96/52 - 120/69)  RR: 16 (03-06-20 @ 14:00) (16 - 19)  SpO2: 100% (03-06-20 @ 14:00) (96% - 100%)  Wt(kg): --   BMI (kg/m2): 16.5 (03-06-20 @ 12:41)  I&O's Summary      Gen: Appears well in NAD  HEENT:  (-)icterus (-)pallor  CV: N S1 S2 1/6 CARL (+)2 Pulses B/l  Resp:  Clear to ausculatation B/L, normal effort  GI: (+) BS Soft, NT, ND  Lymph:  (-)Edema, (-)obvious lymphadenopathy  Skin: Warm to touch, Normal turgor  Psych: Appropriate mood and affect        TELEMETRY: 	  AF 80s    ECG:  	AF  100 no acute ischemia    RADIOLOGY:    < from: Transthoracic Echocardiogram (02.27.20 @ 08:19) >  ------------------------------------------------------------------------  Conclusions:  1. Mitral annular calcification. Status-post Mitraclip  placement. Moderate mitral regurgitation. Mean transmitral  valve gradient equals 5 mm Hg. (HRabout 90s bpm)  2. Calcified trileaflet aortic valve with normal opening.  Mild aortic regurgitation.  3. Patient in atrial fibrillation; ejection fraction varies  with R-R interval. Borderline/mild global left ventricular  systolic dysfunction.  4. Mild right ventricular enlargement with normal right  ventricular systolic function.  5. Right pleural effusion.  *** Compared with echocardiogram of 2/26/2020, results are  similar to post-op images.  ------------------------------------------------------------------------  Confirmed on  2/27/2020 - 11:11:44 by Lucina Eastman M.D.    < end of copied text >        < from: Xray Chest 1 View- PORTABLE-Routine (03.06.20 @ 15:06) >  IMPRESSION:   Stable chest.  Clear lungs.    < end of copied text >      < from: CT Head No Cont (03.06.20 @ 14:24) >  IMPRESSION: Age-appropriate involutional and ischemic gliotic changes. No hemorrhage. No change from 9/26/2019.    < end of copied text >      ASSESSMENT/PLAN: 	: 89y Male PMH HTN, HLD,  afib (on coumadin), moderate to severe MR s/p clipping 12/2018, s/p cath in 11/2018 (no obstructive), with a history of severe LV dysfxn recently with improved LV funciton on TTE 2/20 with EF 50%,   PE(11/2018 was on Eliquis/Warfarin/lovenox), CKD, hyponatremia, colon cancer (2001s/p chemo, in remission), prostate cancer (s/p RT in [address]), recently admitted with decreased appetite and worsening shortness of breath, cardiac cachexia  s/p second Lucy clip 2/26/20 now readmitted with an episode of unresponsiveness with associated decreased appetite and weakness.  The patient has a history of confusion which has worsened over the last few days per his wife at bedside so history is obtained from her though the patient states he feels comfortable and denies any palpitations, syncope  or near syncope.  The patient's wife states he has had limited PO intake over the last several days and today while sitting in a chair, while remaining sitting, he became  unresponsive with his eyes "rolling back in his head". She denies any syncope but states he was non communicative for several minutes.  She and her grandson were unable to move him from the chair thus EMS was called and he was brought to the ER for further evaluation.   He was found to have a rectal temperature of 100.8 in the ER.     -- troponin indeterminate but given history of non obs CAD on cath in 2018, no acute ischemia on EKG and no anginal chest pain, with CKD, doubt ACS - will add CPK/CKMB  --Episode of Unresponsiveness with no clear syncope-possibly secondary to underlying febrile illness  - CT head unremarkable ; check orthostatics   -- AF- subtherapeutic INR -c/w Coumadin and would consider bridging with Heparin given elevated CHADS VASC for CVA prevention if no contraindications   --Infectious work up per primary team   -- will follow with you   -- patient follows with Dr Scott - for follow up 3/11 @230pm at our [facility] ([id number] [place] [address]) however if still admitted will move back appointment   -- HD stable no evidence decompensated CHF

## 2020-03-06 NOTE — ED PROVIDER NOTE - PMH
Afib    Atrial fibrillation  On coumadin  CKD (chronic kidney disease)    Colon cancer  2001, s/p chemo  GERD (gastroesophageal reflux disease)    HLD (hyperlipidemia)    HLD (hyperlipidemia)    HTN (hypertension)    HTN (hypertension)    Hyponatremia    Mitral regurgitation    Pleural effusion, bilateral  as of latest chest xray  s/p latest hospitalization for CHF  Prostate CA    Prostate cancer  2006  Thrombus in heart chamber

## 2020-03-06 NOTE — PROGRESS NOTE ADULT - ASSESSMENT
89 year old male with PMH HTN, HLD, CKD, colon cancer in 2001 s/p chemo, prostate Ca in 2016 s/p RT, AFib, PE, RA thrombus found during cath in November 2018 AC changed from NOAC to Warfarin, severe MR s/p mitraclip December 26th 2018, recurrent CHF with severe MR on TTE now s/p 2nd mitraclip placement on February 26th. Hospital course complicated by urinary retention requiring discharge with flores catheter on 2/29 and plans for outpt urology follow up. Presents to the ED today with several days of hypotension, altered mental status last night, and lethargy this morning with episode of unresponsiveness.

## 2020-03-06 NOTE — ED ADULT NURSE NOTE - OBJECTIVE STATEMENT
89 yr old male w/ pmhx of CHF, and 2 MV Clips (most recent on 2/29), GERD, HTN Colon CA, Prostate CA, And chronic flores. BIBEMS C/O AMS. per pts wife, this am pt was unresponsive for a few minutes,  by 10 am pt had AMS,  was drowsy, weak, sob, and dizzy. Per pts wife pt is usually A&Ox3. pts wife denies any focal deficits.  EMS states upon their arrival pt was A&OX3, upon assessment, pt is A&OX3, with some confusion. Pt is complaining of dizziness, and fatigue. Pt denies CP, N,V,D, Blurry vision, any recent falls or trauma. Pt assessed by MD, placed on CM, ekg obtained, family is at bedside, bed is lowered and locked with call bell within reach. will reassess

## 2020-03-06 NOTE — H&P ADULT - NSHPPHYSICALEXAM_GEN_ALL_CORE
PHYSICAL EXAM  Vital Signs Last 24 Hrs  T(C): 36.8 (06 Mar 2020 18:00), Max: 38.2 (06 Mar 2020 12:46)  T(F): 98.3 (06 Mar 2020 18:00), Max: 100.8 (06 Mar 2020 12:46)  HR: 105 (06 Mar 2020 18:00) (77 - 108)  BP: 115/74 (06 Mar 2020 18:00) (96/52 - 120/69)  BP(mean): --  RR: 18 (06 Mar 2020 18:00) (16 - 19)  SpO2: 100% (06 Mar 2020 18:00) (96% - 100%)    General: Well nourished, well developed, no acute distress.                                                         Neuro: Normal exam oriented to person/place & time with no focal motor or sensory  deficits.                    Eyes: Normal exam of conjunctiva & lids, pupils equally reactive.   ENT: Normal exam of nasal/oral mucosa with absence of cyanosis.   Neck: Normal exam of jugular veins, trachea & thyroid.   Chest: Normal lung exam with good air movement absence of wheezes, rales, or rhonchi:                                                                          CV:  Auscultation: normal [X ] S3[ ] S4[ ] Irregular [ ] Rub[ ] Clicks[ ]  Murmurs none:[ ]systolic [X]  diastolic [ ] holosystolic [ ]  Carotids: No Bruits[- ] Other____________ Abdominal Aorta: normal [X ] nonpalpable[X ]                                                                         GI: Normal exam of abdomen, liver & spleen with no noted masses or tenderness.  (+) BS X 4 Quadrants, Nontender / Non Distended.           Extremities: Normal no evidence of cyanosis or deformity Edema: none[X ]trace[ ]1+[ ]2+[ ]3+[ ]4+[ ]  Lower Extremity Pulses: Right[+2 ] Left[ +2]Varicosities[- ]  : Indwelling flores cath -->SD  SKIN : Normal exam to inspection & palpation. PHYSICAL EXAM  Vital Signs Last 24 Hrs  T(C): 36.8 (06 Mar 2020 18:00), Max: 38.2 (06 Mar 2020 12:46)  T(F): 98.3 (06 Mar 2020 18:00), Max: 100.8 (06 Mar 2020 12:46)  HR: 105 (06 Mar 2020 18:00) (77 - 108)  BP: 115/74 (06 Mar 2020 18:00) (96/52 - 120/69)  BP(mean): --  RR: 18 (06 Mar 2020 18:00) (16 - 19)  SpO2: 100% (06 Mar 2020 18:00) (96% - 100%)    General: Well nourished, well developed, no acute distress.                                                         Neuro: Normal exam oriented to person/place & time with no focal motor or sensory  deficits.                    Eyes: Normal exam of conjunctiva & lids, pupils equally reactive.   ENT: Normal exam of nasal/oral mucosa with absence of cyanosis.   Neck: Normal exam of jugular veins, trachea & thyroid.   Chest: Normal lung exam with good air movement absence of wheezes, rales, or rhonchi:                                                                          CV:  Auscultation: normal [X ] S3[ ] S4[ ] Irregular [X] Rub[ ] Clicks[ ]  Murmurs none:[ ]systolic [X]  diastolic [ ] holosystolic [ ]  Carotids: No Bruits[- ] Other____________ Abdominal Aorta: normal [X ] nonpalpable[X ]                                                                         GI: Normal exam of abdomen, liver & spleen with no noted masses or tenderness.  (+) BS X 4 Quadrants, Nontender / Non Distended.           Extremities: Normal no evidence of cyanosis or deformity Edema: none[X ]trace[ ]1+[ ]2+[ ]3+[ ]4+[ ]  Lower Extremity Pulses: Right[+2 ] Left[ +2]Varicosities[- ]  : Indwelling flores cath -->SD  SKIN : Normal exam to inspection & palpation.

## 2020-03-06 NOTE — H&P ADULT - NSHPSOCIALHISTORY_GEN_ALL_CORE
SOCIAL HISTORY:  Smoker: [ ] Yes  [X] No        PACK YEARS:                         WHEN QUIT?  ETOH use: [ ] Yes  [X] No              FREQUENCY / QUANTITY:  Ilicit Drug use:  [ ] Yes  [ X] No  Occupation: Retired   Live with: spouse

## 2020-03-06 NOTE — ED ADULT NURSE NOTE - NSIMPLEMENTINTERV_GEN_ALL_ED
Implemented All Fall with Harm Risk Interventions:  [place] to call system. Call bell, personal items and telephone within reach. Instruct patient to call for assistance. Room bathroom lighting operational. Non-slip footwear when patient is off stretcher. Physically safe environment: no spills, clutter or unnecessary equipment. Stretcher in lowest position, wheels locked, appropriate side rails in place. Provide visual cue, wrist band, yellow gown, etc. Monitor gait and stability. Monitor for mental status changes and reorient to person, place, and time. Review medications for side effects contributing to fall risk. Reinforce activity limits and safety measures with patient and family. Provide visual clues: red socks.

## 2020-03-06 NOTE — ED PROVIDER NOTE - PSH
H/O colectomy    History of appendectomy    History of prostate cancer    S/P coronary angiogram  11/12/18  S/P mitral valve clip implantation  12/2018

## 2020-03-06 NOTE — ED PROVIDER NOTE - CLINICAL SUMMARY MEDICAL DECISION MAKING FREE TEXT BOX
Pt p/w multiple medical complaints, recent mitral valve repair here, tired appearing, soft BPs, tachy to 110 but wife reports this to be his normal- rectal temp to 100.8, septic work initiated, possibly urinary tract infection-though pt also c/o lightheadedness and palpitations- CT surgery Gisenda spoken to regarding this patients hx and current findings, will be readmitted to CT surgery service for continued care, ECHO was ordered, labs pending, HD stable at this time, will check fluid status with bedside echo   Hailey Jack, PGY-3 EM

## 2020-03-06 NOTE — ED PROVIDER NOTE - PROGRESS NOTE DETAILS
spoke to CT surgery Caroline FARR, reports someone from their team will be down to evaluate patient with ECHO and determine further management  Hailey Jack, PGY-3 EM

## 2020-03-07 LAB
ANION GAP SERPL CALC-SCNC: 11 MMOL/L — SIGNIFICANT CHANGE UP (ref 5–17)
BUN SERPL-MCNC: 28 MG/DL — HIGH (ref 7–23)
CALCIUM SERPL-MCNC: 9.1 MG/DL — SIGNIFICANT CHANGE UP (ref 8.4–10.5)
CHLORIDE SERPL-SCNC: 105 MMOL/L — SIGNIFICANT CHANGE UP (ref 96–108)
CO2 SERPL-SCNC: 22 MMOL/L — SIGNIFICANT CHANGE UP (ref 22–31)
CREAT SERPL-MCNC: 1.54 MG/DL — HIGH (ref 0.5–1.3)
CULTURE RESULTS: SIGNIFICANT CHANGE UP
GLUCOSE SERPL-MCNC: 129 MG/DL — HIGH (ref 70–99)
HCT VFR BLD CALC: 28.9 % — LOW (ref 39–50)
HGB BLD-MCNC: 9.3 G/DL — LOW (ref 13–17)
INR BLD: 1.75 RATIO — HIGH (ref 0.88–1.16)
MCHC RBC-ENTMCNC: 30.8 PG — SIGNIFICANT CHANGE UP (ref 27–34)
MCHC RBC-ENTMCNC: 32.2 GM/DL — SIGNIFICANT CHANGE UP (ref 32–36)
MCV RBC AUTO: 95.7 FL — SIGNIFICANT CHANGE UP (ref 80–100)
NRBC # BLD: 0 /100 WBCS — SIGNIFICANT CHANGE UP (ref 0–0)
PLATELET # BLD AUTO: 121 K/UL — LOW (ref 150–400)
POTASSIUM SERPL-MCNC: 3.9 MMOL/L — SIGNIFICANT CHANGE UP (ref 3.5–5.3)
POTASSIUM SERPL-SCNC: 3.9 MMOL/L — SIGNIFICANT CHANGE UP (ref 3.5–5.3)
PROTHROM AB SERPL-ACNC: 20.3 SEC — HIGH (ref 10–12.9)
RBC # BLD: 3.02 M/UL — LOW (ref 4.2–5.8)
RBC # FLD: 16.5 % — HIGH (ref 10.3–14.5)
SODIUM SERPL-SCNC: 138 MMOL/L — SIGNIFICANT CHANGE UP (ref 135–145)
SPECIMEN SOURCE: SIGNIFICANT CHANGE UP
WBC # BLD: 6.41 K/UL — SIGNIFICANT CHANGE UP (ref 3.8–10.5)
WBC # FLD AUTO: 6.41 K/UL — SIGNIFICANT CHANGE UP (ref 3.8–10.5)

## 2020-03-07 PROCEDURE — 99223 1ST HOSP IP/OBS HIGH 75: CPT | Mod: GC

## 2020-03-07 PROCEDURE — 99024 POSTOP FOLLOW-UP VISIT: CPT

## 2020-03-07 PROCEDURE — 93306 TTE W/DOPPLER COMPLETE: CPT | Mod: 26

## 2020-03-07 RX ORDER — VANCOMYCIN HCL 1 G
1000 VIAL (EA) INTRAVENOUS ONCE
Refills: 0 | Status: COMPLETED | OUTPATIENT
Start: 2020-03-07 | End: 2020-03-07

## 2020-03-07 RX ORDER — METOPROLOL TARTRATE 50 MG
50 TABLET ORAL
Refills: 0 | Status: DISCONTINUED | OUTPATIENT
Start: 2020-03-07 | End: 2020-03-09

## 2020-03-07 RX ORDER — WARFARIN SODIUM 2.5 MG/1
2 TABLET ORAL ONCE
Refills: 0 | Status: COMPLETED | OUTPATIENT
Start: 2020-03-07 | End: 2020-03-07

## 2020-03-07 RX ADMIN — SODIUM CHLORIDE 3 MILLILITER(S): 9 INJECTION INTRAMUSCULAR; INTRAVENOUS; SUBCUTANEOUS at 15:54

## 2020-03-07 RX ADMIN — SODIUM CHLORIDE 3 MILLILITER(S): 9 INJECTION INTRAMUSCULAR; INTRAVENOUS; SUBCUTANEOUS at 22:29

## 2020-03-07 RX ADMIN — CEFTRIAXONE 100 MILLIGRAM(S): 500 INJECTION, POWDER, FOR SOLUTION INTRAMUSCULAR; INTRAVENOUS at 08:59

## 2020-03-07 RX ADMIN — ATORVASTATIN CALCIUM 40 MILLIGRAM(S): 80 TABLET, FILM COATED ORAL at 22:35

## 2020-03-07 RX ADMIN — TAMSULOSIN HYDROCHLORIDE 0.4 MILLIGRAM(S): 0.4 CAPSULE ORAL at 22:35

## 2020-03-07 RX ADMIN — Medication 250 MILLIGRAM(S): at 10:00

## 2020-03-07 RX ADMIN — WARFARIN SODIUM 2 MILLIGRAM(S): 2.5 TABLET ORAL at 22:34

## 2020-03-07 RX ADMIN — Medication 50 MILLIGRAM(S): at 17:04

## 2020-03-07 RX ADMIN — SODIUM CHLORIDE 3 MILLILITER(S): 9 INJECTION INTRAMUSCULAR; INTRAVENOUS; SUBCUTANEOUS at 06:09

## 2020-03-07 RX ADMIN — Medication 81 MILLIGRAM(S): at 10:00

## 2020-03-07 RX ADMIN — PANTOPRAZOLE SODIUM 40 MILLIGRAM(S): 20 TABLET, DELAYED RELEASE ORAL at 06:12

## 2020-03-07 NOTE — PROGRESS NOTE ADULT - SUBJECTIVE AND OBJECTIVE BOX
VITAL SIGNS    Telemetry:    Vital Signs Last 24 Hrs  T(C): 36.7 (03-07-20 @ 14:25), Max: 36.8 (03-06-20 @ 18:00)  T(F): 98 (03-07-20 @ 14:25), Max: 98.3 (03-06-20 @ 18:00)  HR: 103 (03-07-20 @ 14:25) (101 - 105)  BP: 121/77 (03-07-20 @ 14:25) (100/61 - 121/77)  RR: 18 (03-07-20 @ 14:25) (18 - 18)  SpO2: 99% (03-07-20 @ 14:25) (98% - 100%)            03-06 @ 07:01  -  03-07 @ 07:00  --------------------------------------------------------  IN: 0 mL / OUT: 350 mL / NET: -350 mL    03-07 @ 06:01  -  03-07 @ 15:16  --------------------------------------------------------  IN: 780 mL / OUT: 500 mL / NET: 280 mL       Daily     Daily   Admit Wt: Drug Dosing Weight  Height (cm): 185.42 (06 Mar 2020 12:41)  Weight (kg): 56.7 (06 Mar 2020 12:41)  BMI (kg/m2): 16.5 (06 Mar 2020 12:41)  BSA (m2): 1.76 (06 Mar 2020 12:41)     Daily     LABS  03-07    138  |  105  |  28<H>  ----------------------------<  129<H>  3.9   |  22  |  1.54<H>    Ca    9.1      07 Mar 2020 10:11    TPro  7.4  /  Alb  3.2<L>  /  TBili  0.8  /  DBili  x   /  AST  29  /  ALT  18  /  AlkPhos  95  03-06                                 9.3    6.41  )-----------( 121      ( 07 Mar 2020 10:11 )             28.9          PT/INR - ( 07 Mar 2020 10:11 )   PT: 20.3 sec;   INR: 1.75 ratio         PTT - ( 06 Mar 2020 13:13 )  PTT:30.4 sec          CAPILLARY BLOOD GLUCOSE              Drains:     MS       [  ]   [  ]            L Pleural [  ]            R Pleural  [  ]            EVERTON  [  ]           Hyatt  [  ]    Pacing Wires      [  ]   Settings:                                  Isolated  [  ]                    CXR:      MEDICATIONS  acetaminophen   Tablet .. 650 milliGRAM(s) Oral every 6 hours PRN  aspirin enteric coated 81 milliGRAM(s) Oral daily  atorvastatin 40 milliGRAM(s) Oral at bedtime  cefTRIAXone   IVPB 1000 milliGRAM(s) IV Intermittent every 24 hours  pantoprazole    Tablet 40 milliGRAM(s) Oral before breakfast  sodium chloride 0.9% lock flush 3 milliLiter(s) IV Push every 8 hours  tamsulosin 0.4 milliGRAM(s) Oral at bedtime      PHYSICAL EXAM      Neurology: alert and oriented x 3, nonfocal, no gross deficits  CV :S1S2  Sternal Wound :  CDI , Stable  Lungs:   Abdomen: soft, nontender, nondistended, positive bowel sounds, last bowel movement   :       Extremities:                  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  Pleural effusion, bilateral: as of latest chest xray  s/p latest hospitalization for CHF  CKD (chronic kidney disease)  Colon cancer: 2001, s/p chemo  Prostate cancer: 2006  Mitral regurgitation  Hyponatremia  HLD (hyperlipidemia)  HTN (hypertension)  Atrial fibrillation: On coumadin  Prostate CA  GERD (gastroesophageal reflux disease)  Afib  HLD (hyperlipidemia)  HTN (hypertension)  S/P mitral valve clip implantation: 12/2018  S/P coronary angiogram: 11/12/18  History of appendectomy  H/O colectomy  History of prostate cancer                 Discussed with Cardiothoracic Team at AM rounds. VITAL SIGNS    Telemetry:    Vital Signs Last 24 Hrs  T(C): 36.7 (03-07-20 @ 14:25), Max: 36.8 (03-06-20 @ 18:00)  T(F): 98 (03-07-20 @ 14:25), Max: 98.3 (03-06-20 @ 18:00)  HR: 103 (03-07-20 @ 14:25) (101 - 105)  BP: 121/77 (03-07-20 @ 14:25) (100/61 - 121/77)  RR: 18 (03-07-20 @ 14:25) (18 - 18)  SpO2: 99% (03-07-20 @ 14:25) (98% - 100%)            03-06 @ 07:01  -  03-07 @ 07:00  --------------------------------------------------------  IN: 0 mL / OUT: 350 mL / NET: -350 mL    03-07 @ 06:01  -  03-07 @ 15:16  --------------------------------------------------------  IN: 780 mL / OUT: 500 mL / NET: 280 mL       Daily     Daily   Admit Wt: Drug Dosing Weight  Height (cm): 185.42 (06 Mar 2020 12:41)  Weight (kg): 56.7 (06 Mar 2020 12:41)  BMI (kg/m2): 16.5 (06 Mar 2020 12:41)  BSA (m2): 1.76 (06 Mar 2020 12:41)     Daily     LABS  03-07    138  |  105  |  28<H>  ----------------------------<  129<H>  3.9   |  22  |  1.54<H>    Ca    9.1      07 Mar 2020 10:11    TPro  7.4  /  Alb  3.2<L>  /  TBili  0.8  /  DBili  x   /  AST  29  /  ALT  18  /  AlkPhos  95  03-06                                 9.3    6.41  )-----------( 121      ( 07 Mar 2020 10:11 )             28.9          PT/INR - ( 07 Mar 2020 10:11 )   PT: 20.3 sec;   INR: 1.75 ratio         PTT - ( 06 Mar 2020 13:13 )  PTT:30.4 sec          CAPILLARY BLOOD GLUCOSE              Drains:     MS       [  ]   [  ]            L Pleural [  ]            R Pleural  [  ]            EVERTON  [  ]           Hyatt  [  ]    Pacing Wires      [  ]   Settings:                                  Isolated  [  ]                    CXR:      MEDICATIONS  acetaminophen   Tablet .. 650 milliGRAM(s) Oral every 6 hours PRN  aspirin enteric coated 81 milliGRAM(s) Oral daily  atorvastatin 40 milliGRAM(s) Oral at bedtime  cefTRIAXone   IVPB 1000 milliGRAM(s) IV Intermittent every 24 hours  pantoprazole    Tablet 40 milliGRAM(s) Oral before breakfast  sodium chloride 0.9% lock flush 3 milliLiter(s) IV Push every 8 hours  tamsulosin 0.4 milliGRAM(s) Oral at bedtime      PHYSICAL EXAM      Neurology: alert and oriented x 3, nonfocal, no gross deficits  CV :S1S2  Sternal Wound :  CDI , Stable  Lungs: cta  Abdomen: soft, nontender, nondistended, positive bowel sounds, last bowel movement   :   voids    Extremities:   warm to touch               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  Pleural effusion, bilateral: as of latest chest xray  s/p latest hospitalization for CHF  CKD (chronic kidney disease)  Colon cancer: 2001, s/p chemo  Prostate cancer: 2006  Mitral regurgitation  Hyponatremia  HLD (hyperlipidemia)  HTN (hypertension)  Atrial fibrillation: On coumadin  Prostate CA  GERD (gastroesophageal reflux disease)  Afib  HLD (hyperlipidemia)  HTN (hypertension)  S/P mitral valve clip implantation: 12/2018  S/P coronary angiogram: 11/12/18  History of appendectomy  H/O colectomy  History of prostate cancer                 Discussed with Cardiothoracic Team at AM rounds.

## 2020-03-07 NOTE — CONSULT NOTE ADULT - SUBJECTIVE AND OBJECTIVE BOX
Patient is a 89y old  Male who presents with a chief complaint of AMS / Lightheadedness (06 Mar 2020 18:47)    HPI:  History of Present Illness:  89y Male known to our service presenting to the ED with altered mental status. 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 Suarez), CKD, colon cancer in  s/p chemo, prostate Ca in  s/p RT, and severe MR s/p mitraclip placement on 2018. He had recurrent episodes of CHF, repeat TTE revealed severe MR, and he underwent a second mitraclip placement on . Hospital course was complicated by urinary retention, he was evaluated by urology and discharged home with a flores catheter on  with plans for outpt urology follow up.   His wife reports that he has been hypotensive since Monday, they followed up with their primary physician Dr. Suarez. Per wife, patient woke up in the middle of the night last night disoriented, and was lethargic this morning. She called 911 when he was unable to walk to the kitchen and was verbally unresponsive for several minutes. Denies LOC. Pt. is now more alert, states he wasn't feeling himself for the past several days, very tired and dizzy. He is now oriented x3. Readmitted for further management. (06 Mar 2020 18:47)  ==========  - admitted  to  for weight loss, decreased appetite and worsening SOB. Underwent Application of a MitraClip NT via a percutaneous right common femoral venous, and transseptal approach. Hosp course c/b urinary retention, was discharged home with Flores  - now readmitted with an episode of unresponsiveness with associated decreased appetite and weakness. The patient has a history of confusion which has worsened over the last few days per his wife at bedside so history is obtained from her though the patient states he feels comfortable and denies any palpitations, syncope or near syncope. The patient's wife states he has had limited PO intake over the last several days and today while sitting in a chair, while remaining sitting, he became unresponsive with his eyes "rolling back in his head". She denies any syncope but states he was non communicative for several minutes. She and her grandson were unable to move him from the chair thus EMS was called and he was brought to the ER for further evaluation. He was found to have a rectal temperature of 100.8 in the ER.   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    Allergies  penicillin (Hives)  shellfish (Hives)    ANTIMICROBIALS (past 90 days)  MEDICATIONS  (STANDING):  cefTRIAXone   IVPB   100 mL/Hr IV Intermittent (20 @ 13:37)      ANTIMICROBIALS:    cefTRIAXone   IVPB 1000 every 24 hours    OTHER MEDS: MEDICATIONS  (STANDING):  acetaminophen   Tablet .. 650 every 6 hours PRN  aspirin enteric coated 81 daily  atorvastatin 40 at bedtime  pantoprazole    Tablet 40 before breakfast  tamsulosin 0.4 at bedtime    SOCIAL HISTORY:   hx smoking  non-smoker    FAMILY HISTORY:  Family history of colon cancer (Sibling)  Family history of ovarian cancer  Family history of cancer (Sibling)    REVIEW OF SYSTEMS  [  ] ROS unobtainable because:    [  ] All other systems negative except as noted below:	    Constitutional:  [ ] fever [ ] chills  [ ] weight loss  [ ] weakness  Skin:  [ ] rash [ ] phlebitis	  Eyes: [ ] icterus [ ] pain  [ ] discharge	  ENMT: [ ] sore throat  [ ] thrush [ ] ulcers [ ] exudates  Respiratory: [ ] dyspnea [ ] hemoptysis [ ] cough [ ] sputum	  Cardiovascular:  [ ] chest pain [ ] palpitations [ ] edema	  Gastrointestinal:  [ ] nausea [ ] vomiting [ ] diarrhea [ ] constipation [ ] pain	  Genitourinary:  [ ] dysuria [ ] frequency [ ] hematuria [ ] discharge [ ] flank pain  [ ] incontinence  Musculoskeletal:  [ ] myalgias [ ] arthralgias [ ] arthritis  [ ] back pain  Neurological:  [ ] headache [ ] seizures  [ ] confusion/altered mental status  Psychiatric:  [ ] anxiety [ ] depression	  Hematology/Lymphatics:  [ ] lymphadenopathy  Endocrine:  [ ] adrenal [ ] thyroid  Allergic/Immunologic:	 [ ] transplant [ ] seasonal    Vital Signs Last 24 Hrs  T(F): 97.9 (20 @ 05:09), Max: 100.8 (20 @ 12:46)  Vital Signs Last 24 Hrs  HR: 103 (20 @ 05:09) (77 - 108)  BP: 107/74 (20 @ 05:09) (96/52 - 120/69)  RR: 18 (20 @ 05:09)  SpO2: 100% (20 @ 05:09) (96% - 100%)  Wt(kg): --    EXAM:  Constitutional: Not in acute distress  Eyes: pupils bilaterally reactive to light. No icterus.  Oral cavity: Clear, no lesions  Neck: No neck vein distension noted  RS: Chest clear to auscultation bilaterally. No wheeze/rhonchi/crepitations.  CVS: S1, S2 heard. Regular rate and rhythm. No murmurs/rubs/gallops.  Abdomen: Soft. No guarding/rigidity/tenderness.  : No acute abnormalities  Extremities: Warm. No pedal edema  Skin: No lesions noted  Vascular: No evidence of phlebitis  Neuro: Alert, oriented to time/place/person                          10.1   7.61  )-----------( 139      ( 06 Mar 2020 13:13 )             31.5     03-06    138  |  103  |  29<H>  ----------------------------<  123<H>  4.6   |  22  |  1.62<H>    Ca    9.1      06 Mar 2020 13:13    TPro  7.4  /  Alb  3.2<L>  /  TBili  0.8  /  DBili  x   /  AST  29  /  ALT  18  /  AlkPhos  95  03-06    Urinalysis Basic - ( 06 Mar 2020 15:10 )    Color: Yellow / Appearance: Slightly Turbid / S.014 / pH: x  Gluc: x / Ketone: Negative  / Bili: Negative / Urobili: Negative   Blood: x / Protein: 30 mg/dL / Nitrite: Negative   Leuk Esterase: Large / RBC: 120 /hpf /  /HPF   Sq Epi: x / Non Sq Epi: 3 /hpf / Bacteria: Few    MICROBIOLOGY:                RADIOLOGY:  imaging below personally reviewed  < from: Xray Chest 1 View- PORTABLE-Routine (20 @ 15:06) >  IMPRESSION:   Stable chest.  Clear lungs.    < end of copied text >    < from: CT Head No Cont (20 @ 14:24) >  IMPRESSION: Age-appropriate involutional and ischemic gliotic changes. No hemorrhage. No change from 2019.  < end of copied text >        OTHER TESTS: Patient is a 89y old  Male who presents with a chief complaint of AMS / Lightheadedness (06 Mar 2020 18:47)    HPI:  History of Present Illness:  89y Male known to our service presenting to the ED with altered mental status. 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 Suarez), CKD, colon cancer in  s/p chemo, prostate Ca in  s/p RT, and severe MR s/p mitraclip placement on 2018. He had recurrent episodes of CHF, repeat TTE revealed severe MR, and he underwent a second mitraclip placement on . Hospital course was complicated by urinary retention, he was evaluated by urology and discharged home with a flores catheter on  with plans for outpt urology follow up.   His wife reports that he has been hypotensive since Monday, they followed up with their primary physician Dr. Suarez. Per wife, patient woke up in the middle of the night last night disoriented, and was lethargic this morning. She called 911 when he was unable to walk to the kitchen and was verbally unresponsive for several minutes. Denies LOC. Pt. is now more alert, states he wasn't feeling himself for the past several days, very tired and dizzy. He is now oriented x3. Readmitted for further management. (06 Mar 2020 18:47)  ==========  - admitted  to  for weight loss, decreased appetite and worsening SOB. Underwent Application of a MitraClip NT via a percutaneous right common femoral venous, and transseptal approach. Hosp course c/b urinary retention, was discharged home with Flores  - now readmitted with an episode of unresponsiveness with associated decreased appetite and weakness. The patient has a history of confusion which has worsened over the last few days per his wife at bedside so history is obtained from her though the patient states he feels comfortable and denies any palpitations, syncope or near syncope. The patient's wife states he has had limited PO intake over the last several days and today while sitting in a chair, while remaining sitting, he became unresponsive with his eyes "rolling back in his head". She denies any syncope but states he was non communicative for several minutes. She and her grandson were unable to move him from the chair thus EMS was called and he was brought to the ER for further evaluation. He was found to have a rectal temperature of 100.8 in the ER.   ===========  - ID consulted for recs  - pt assessed at bedside. 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    Allergies  penicillin (Hives)  shellfish (Hives)    ANTIMICROBIALS (past 90 days)  MEDICATIONS  (STANDING):  cefTRIAXone   IVPB   100 mL/Hr IV Intermittent (20 @ 13:37)      ANTIMICROBIALS:    cefTRIAXone   IVPB 1000 every 24 hours    OTHER MEDS: MEDICATIONS  (STANDING):  acetaminophen   Tablet .. 650 every 6 hours PRN  aspirin enteric coated 81 daily  atorvastatin 40 at bedtime  pantoprazole    Tablet 40 before breakfast  tamsulosin 0.4 at bedtime    SOCIAL HISTORY:  Lives with family    FAMILY HISTORY:  Family history of colon cancer (Sibling)  Family history of ovarian cancer  Family history of cancer (Sibling)    REVIEW OF SYSTEMS  [  ] ROS unobtainable because:    [x] All other systems negative except as noted below:	  Constitutional:  [x] fever [ ] chills  [ ] weight loss  [ ] weakness  Skin:  [ ] rash [ ] phlebitis	  Eyes: [ ] icterus [ ] pain  [ ] discharge	  ENMT: [ ] sore throat  [ ] thrush [ ] ulcers [ ] exudates  Respiratory: [ ] dyspnea [ ] hemoptysis [ ] cough [ ] sputum	  Cardiovascular:  [ ] chest pain [ ] palpitations [ ] edema	  Gastrointestinal:  [ ] nausea [ ] vomiting [ ] diarrhea [ ] constipation [ ] pain	  Genitourinary:  [ ] dysuria [ ] frequency [ ] hematuria [ ] discharge [ ] flank pain  [ ] incontinence  Musculoskeletal:  [ ] myalgias [ ] arthralgias [ ] arthritis  [ ] back pain  Neurological:  [ ] headache [ ] seizures  [x] confusion/altered mental status  Psychiatric:  [ ] anxiety [ ] depression	  Hematology/Lymphatics:  [ ] lymphadenopathy  Endocrine:  [ ] adrenal [ ] thyroid  Allergic/Immunologic:	 [ ] transplant [ ] seasonal    Vital Signs Last 24 Hrs  T(F): 97.9 (20 @ 05:09), Max: 100.8 (20 @ 12:46)  Vital Signs Last 24 Hrs  HR: 103 (20 @ 05:09) (77 - 108)  BP: 107/74 (20 @ 05:09) (96/52 - 120/69)  RR: 18 (20 @ 05:09)  SpO2: 100% (20 @ 05:09) (96% - 100%)  Wt(kg): --    EXAM:  Constitutional: Not in acute distress  Eyes: pupils bilaterally reactive to light. No icterus.  Oral cavity: Clear, no lesions  Neck: No neck vein distension noted  RS: Chest clear to auscultation bilaterally. No wheeze/rhonchi/crepitations.  CVS: S1, S2 heard. Regular rate and rhythm. No murmurs/rubs/gallops.  Abdomen: Soft. No guarding/rigidity/tenderness. No CVA tenderness  : Flores +  Extremities: Warm. No pedal edema  Skin: No lesions noted. Rt groin site examined - no swelling/tenderness, no bruit/thrill  Vascular: No evidence of phlebitis  Neuro: Alert, oriented to time/place/person                          10.1   7.61  )-----------( 139      ( 06 Mar 2020 13:13 )             31.5     03-06    138  |  103  |  29<H>  ----------------------------<  123<H>  4.6   |  22  |  1.62<H>    Ca    9.1      06 Mar 2020 13:13    TPro  7.4  /  Alb  3.2<L>  /  TBili  0.8  /  DBili  x   /  AST  29  /  ALT  18  /  AlkPhos  95  03-06    Urinalysis Basic - ( 06 Mar 2020 15:10 )    Color: Yellow / Appearance: Slightly Turbid / S.014 / pH: x  Gluc: x / Ketone: Negative  / Bili: Negative / Urobili: Negative   Blood: x / Protein: 30 mg/dL / Nitrite: Negative   Leuk Esterase: Large / RBC: 120 /hpf /  /HPF   Sq Epi: x / Non Sq Epi: 3 /hpf / Bacteria: Few    RADIOLOGY:  imaging below personally reviewed  < from: Xray Chest 1 View- PORTABLE-Routine (20 @ 15:06) >  IMPRESSION:   Stable chest.  Clear lungs.    < end of copied text >    < from: CT Head No Cont (20 @ 14:24) >  IMPRESSION: Age-appropriate involutional and ischemic gliotic changes. No hemorrhage. No change from 2019.  < end of copied text >        OTHER TESTS:

## 2020-03-07 NOTE — PROGRESS NOTE ADULT - ASSESSMENT
89 year old male with PMH HTN, HLD, CKD, colon cancer in 2001 s/p chemo, prostate Ca in 2016 s/p RT, AFib, PE, RA thrombus found during cath in November 2018 AC changed from NOAC to Warfarin, severe MR s/p mitraclip December 26th 2018, recurrent CHF with severe MR on TTE now s/p 2nd mitraclip placement on February 26th. Hospital course complicated by urinary retention requiring discharge with flores catheter on 2/29 and plans for outpt urology follow up. Presents to the ED today with several days of hypotension, altered mental status last night, and lethargy this morning with episode of unresponsiveness.   3/8: Check orthostatic BP. f/u bld cx and urine cx. Vanco times one dose given per ID

## 2020-03-07 NOTE — PROGRESS NOTE ADULT - SUBJECTIVE AND OBJECTIVE BOX
Patient denies chest pain or shortness of breath.   Review of systems otherwise limited.  	  MEDICATIONS  (STANDING):  aspirin enteric coated 81 milliGRAM(s) Oral daily  atorvastatin 40 milliGRAM(s) Oral at bedtime  cefTRIAXone   IVPB 1000 milliGRAM(s) IV Intermittent every 24 hours  pantoprazole    Tablet 40 milliGRAM(s) Oral before breakfast  sodium chloride 0.9% lock flush 3 milliLiter(s) IV Push every 8 hours  tamsulosin 0.4 milliGRAM(s) Oral at bedtime    LABS:	 	                      9.3    6.41  )-----------( 121      ( 07 Mar 2020 10:11 )             28.9   138  |  105  |  28<H>  ----------------------------<  129<H>  3.9   |  22  |  1.54<H>    Ca    9.1      07 Mar 2020 10:11    TPro  7.4  /  Alb  3.2<L>  /  TBili  0.8  /  DBili  x   /  AST  29  /  ALT  18  /  AlkPhos  95  03-06    COAGS:   PT/INR - ( 07 Mar 2020 10:11 )   PT: 20.3 sec;   INR: 1.75 ratio   proBNP: Serum Pro-Brain Natriuretic Peptide: 8361 pg/mL (03-06 @ 13:13)    PHYSICAL EXAM:  T(C): 36.6 (03-07-20 @ 05:09), Max: 38.2 (03-06-20 @ 12:46)  HR: 103 (03-07-20 @ 05:09) (77 - 108)  BP: 107/74 (03-07-20 @ 05:09) (96/52 - 120/69)  RR: 18 (03-07-20 @ 05:09) (16 - 19)  SpO2: 100% (03-07-20 @ 05:09) (96% - 100%)    Gen: Appears well in NAD  HEENT:  (-)icterus (-)pallor  CV: N S1 S2 1/6 CARL (+)2 Pulses B/l  Resp:  Clear to ausculatation B/L, normal effort  GI: (+) BS Soft, NT, ND  Lymph:  (-)Edema, (-)obvious lymphadenopathy  Skin: Warm to touch, Normal turgor  Psych: Appropriate mood and affect    TELEMETRY: 	-150      < from: CT Head No Cont (03.06.20 @ 14:24) >  IMPRESSION: Age-appropriate involutional and ischemic gliotic changes. No hemorrhage. No change from 9/26/2019.    < end of copied text >      ASSESSMENT/PLAN: 	    89y Male PMH HTN, HLD,  afib (on coumadin), moderate to severe MR s/p clipping 12/2018, s/p cath in 11/2018 (no obstructive), with a history of severe LV dysfxn recently with improved LV funciton on TTE 2/20 with EF 50%,   PE(11/2018 was on Eliquis/Warfarin/lovenox), CKD, hyponatremia, colon cancer (2001s/p chemo, in remission), prostate cancer (s/p RT in [address]), recently admitted with decreased appetite and worsening shortness of breath, cardiac cachexia  s/p second Lucy clip 2/26/20 now readmitted with an episode of unresponsiveness with associated decreased appetite and weakness.    The patient's wife states he has had limited PO intake over the last several days and today while sitting in a chair, while remaining sitting, he became  unresponsive with his eyes "rolling back in his head". She denies any syncope but states he was non communicative for several minutes.  He was found with rectal temp 100.8.    -- troponin indeterminate but given history of non obs CAD on cath in 2018, no acute ischemia on EKG and no anginal chest pain, with CKD, doubt ACS  --Episode of Unresponsiveness with no clear syncope-possibly secondary to underlying febrile illness/Urosepsis  --CT head unremarkable  -- AF- subtherapeutic INR -c/w Coumadin and would consider bridging with Heparin given elevated CHADS VASC for CVA prevention if no contraindications   --ID consult noted, await blood cx, urine cx, repeat TTE   -- patient follows with Dr Scott - for follow up 3/11 @230pm at our [place] Office ([id number] [place] [address]) however if still admitted will move back appointment   -- HD stable no evidence decompensated CHF

## 2020-03-07 NOTE — CONSULT NOTE ADULT - ATTENDING COMMENTS
89/M with PMH MR s/p mitral clipping X2 (Dec 2018, Feb 2020), CAD s/p cath Nov 2018, PE Nov 2018 s/p t/t, RA thrombus Nov 2018 treated with Warfarin, Afib, colon cancer in 2001 s/p chemo, prostate Ca in 2016 s/p RT, CKD.  - Admitted 2/26-2/29 for weight loss, decreased appetite and worsening SOB. Underwent Application of a MitraClip NT via a percutaneous right common femoral venous, and transseptal approach. Hosp course c/b urinary retention, was discharged home with Flores  - now readmitted 3/6/2020 with witnessed episode of unresponsiveness. H/o confusion, limited PO intake over the last several days  - febrile (rectal temp 100.8) in the ER. Flores changed in ER. Labs revealed mild MORGAN on CKD, BNP 8361, lactate 2.3. UA showed large LE, , RBC   flores changed in ED    likely urosepsis with metabolic encephalopathy, poor po intake, fever    RECOMMENDATIONS:  - continue Ceftriaxone 1g/d IV for now  - give Vancomycin 1g IV single dose for now  - consider renal ultrasound  - f/u ECHO, blood cx, urine cx  Recs conveyed to primary team

## 2020-03-07 NOTE — CONSULT NOTE ADULT - ASSESSMENT
ASSESSMENT:    RECOMMENDATIONS:    MAX Mueller, MD  Fellow, Infectious Diseases  Pager: [phone]  After 5pm and on Weekends: Call [phone] ASSESSMENT:  89/M with PMH MR s/p mitral clipping X2 (Dec 2018, Feb 2020), CAD s/p cath Nov 2018, PE Nov 2018 s/p t/t, RA thrombus Nov 2018 treated with Warfarin, Afib, colon cancer in 2001 s/p chemo, prostate Ca in 2016 s/p RT, CKD.  - Admitted 2/26-2/29 for weight loss, decreased appetite and worsening SOB. Underwent Application of a MitraClip NT via a percutaneous right common femoral venous, and transseptal approach. Hosp course c/b urinary retention, was discharged home with Hyatt  - now readmitted 3/6/2020 with witnessed episode of unresponsiveness. H/o confusion, limited PO intake over the last several days  - febrile (rectal temp 100.8) in the ER. Hyatt changed in ER. Labs revealed mild MORGAN on CKD, BNP 8361, lactate 2.3. UA showed large LE, , . ID consulted for recs  ===========  Fever, AMS, decreased PO intake, positive UA  - concern for possible UTI, given new indwelling catheter  - however, given recent valve clip procedure, will cover empirically with Vancomycin  - CT Head negative, CXR negative    RECOMMENDATIONS:  - continue Ceftriaxone 1g/d IV for now  - give Vancomycin 1g IV single dose for now  - consider RBUS  - f/u ECHO, blood cx, urine cx  Recs conveyed to primary team    MAX Mueller, MD  Fellow, Infectious Diseases  Pager: [phone]  After 5pm and on Weekends: Call [phone]

## 2020-03-08 LAB
ANION GAP SERPL CALC-SCNC: 12 MMOL/L — SIGNIFICANT CHANGE UP (ref 5–17)
BUN SERPL-MCNC: 27 MG/DL — HIGH (ref 7–23)
CALCIUM SERPL-MCNC: 9.7 MG/DL — SIGNIFICANT CHANGE UP (ref 8.4–10.5)
CHLORIDE SERPL-SCNC: 105 MMOL/L — SIGNIFICANT CHANGE UP (ref 96–108)
CO2 SERPL-SCNC: 22 MMOL/L — SIGNIFICANT CHANGE UP (ref 22–31)
CREAT SERPL-MCNC: 1.55 MG/DL — HIGH (ref 0.5–1.3)
GLUCOSE SERPL-MCNC: 98 MG/DL — SIGNIFICANT CHANGE UP (ref 70–99)
HCT VFR BLD CALC: 32.3 % — LOW (ref 39–50)
HGB BLD-MCNC: 10.1 G/DL — LOW (ref 13–17)
INR BLD: 1.63 RATIO — HIGH (ref 0.88–1.16)
MCHC RBC-ENTMCNC: 30.2 PG — SIGNIFICANT CHANGE UP (ref 27–34)
MCHC RBC-ENTMCNC: 31.3 GM/DL — LOW (ref 32–36)
MCV RBC AUTO: 96.7 FL — SIGNIFICANT CHANGE UP (ref 80–100)
NRBC # BLD: 0 /100 WBCS — SIGNIFICANT CHANGE UP (ref 0–0)
PLATELET # BLD AUTO: 150 K/UL — SIGNIFICANT CHANGE UP (ref 150–400)
POTASSIUM SERPL-MCNC: 4.2 MMOL/L — SIGNIFICANT CHANGE UP (ref 3.5–5.3)
POTASSIUM SERPL-SCNC: 4.2 MMOL/L — SIGNIFICANT CHANGE UP (ref 3.5–5.3)
PROTHROM AB SERPL-ACNC: 19 SEC — HIGH (ref 10–12.9)
RBC # BLD: 3.34 M/UL — LOW (ref 4.2–5.8)
RBC # FLD: 16.4 % — HIGH (ref 10.3–14.5)
SODIUM SERPL-SCNC: 139 MMOL/L — SIGNIFICANT CHANGE UP (ref 135–145)
WBC # BLD: 5.27 K/UL — SIGNIFICANT CHANGE UP (ref 3.8–10.5)
WBC # FLD AUTO: 5.27 K/UL — SIGNIFICANT CHANGE UP (ref 3.8–10.5)

## 2020-03-08 PROCEDURE — 99024 POSTOP FOLLOW-UP VISIT: CPT

## 2020-03-08 RX ORDER — WARFARIN SODIUM 2.5 MG/1
5 TABLET ORAL ONCE
Refills: 0 | Status: COMPLETED | OUTPATIENT
Start: 2020-03-08 | End: 2020-03-08

## 2020-03-08 RX ADMIN — SODIUM CHLORIDE 3 MILLILITER(S): 9 INJECTION INTRAMUSCULAR; INTRAVENOUS; SUBCUTANEOUS at 06:25

## 2020-03-08 RX ADMIN — TAMSULOSIN HYDROCHLORIDE 0.4 MILLIGRAM(S): 0.4 CAPSULE ORAL at 22:09

## 2020-03-08 RX ADMIN — PANTOPRAZOLE SODIUM 40 MILLIGRAM(S): 20 TABLET, DELAYED RELEASE ORAL at 06:27

## 2020-03-08 RX ADMIN — ATORVASTATIN CALCIUM 40 MILLIGRAM(S): 80 TABLET, FILM COATED ORAL at 22:09

## 2020-03-08 RX ADMIN — WARFARIN SODIUM 5 MILLIGRAM(S): 2.5 TABLET ORAL at 22:09

## 2020-03-08 RX ADMIN — Medication 81 MILLIGRAM(S): at 11:27

## 2020-03-08 RX ADMIN — CEFTRIAXONE 100 MILLIGRAM(S): 500 INJECTION, POWDER, FOR SOLUTION INTRAMUSCULAR; INTRAVENOUS at 08:47

## 2020-03-08 RX ADMIN — Medication 50 MILLIGRAM(S): at 18:05

## 2020-03-08 RX ADMIN — SODIUM CHLORIDE 3 MILLILITER(S): 9 INJECTION INTRAMUSCULAR; INTRAVENOUS; SUBCUTANEOUS at 14:05

## 2020-03-08 RX ADMIN — Medication 50 MILLIGRAM(S): at 06:26

## 2020-03-08 RX ADMIN — SODIUM CHLORIDE 3 MILLILITER(S): 9 INJECTION INTRAMUSCULAR; INTRAVENOUS; SUBCUTANEOUS at 22:09

## 2020-03-08 NOTE — PROGRESS NOTE ADULT - SUBJECTIVE AND OBJECTIVE BOX
VITAL SIGNS    Telemetry:    Vital Signs Last 24 Hrs  T(C): 36.4 (20 @ 04:31), Max: 36.8 (20 @ 19:28)  T(F): 97.6 (20 @ 04:31), Max: 98.2 (20 @ 19:28)  HR: 98 (20 04:31) (98 - 114)  BP: 103/63 (20 @ 04:31) (100/67 - 121/77)  RR: 18 (20 04:31) (18 - 18)  SpO2: 100% (20 @ 04:31) (98% - 100%)             @ 06:01  -   @ 07:00  --------------------------------------------------------  IN: 1140 mL / OUT: 1250 mL / NET: -110 mL     07:01  -   @ 11:29  --------------------------------------------------------  IN: 0 mL / OUT: 125 mL / NET: -125 mL       Daily     Daily Weight in k.6 (08 Mar 2020 08:15)  Admit Wt: Drug Dosing Weight  Height (cm): 185.42 (06 Mar 2020 12:41)  Weight (kg): 56.7 (06 Mar 2020 12:41)  BMI (kg/m2): 16.5 (06 Mar 2020 12:41)  BSA (m2): 1.76 (06 Mar 2020 12:41)     Daily Weight in k.6 (20 @ 08:15)    [facility]      139  |  105  |  27<H>  ----------------------------<  98  4.2   |  22  |  1.55<H>    Ca    9.7      08 Mar 2020 09:08    TPro  7.4  /  Alb  3.2<L>  /  TBili  0.8  /  DBili  x   /  AST  29  /  ALT  18  /  AlkPhos  95  03-06                                 10.1   5.27  )-----------( 150      ( 08 Mar 2020 09:08 )             32.3          PT/INR - ( 08 Mar 2020 09:08 )   PT: 19.0 sec;   INR: 1.63 ratio         PTT - ( 06 Mar 2020 13:13 )  PTT:30.4 sec          CAPILLARY BLOOD GLUCOSE              Drains:     MS       [  ]   [  ]            L Pleural [  ]            R Pleural  [  ]            EV[place]ON  [  ]           Hyatt  [  ]    Pacing Wires      [  ]   Settings:                                  Isolated  [  ]                    CXR:      MEDICATIONS  acetaminophen   Tablet .. 650 milliGRAM(s) Oral every 6 hours PRN  aspirin enteric coated 81 milliGRAM(s) Oral daily  atorvastatin 40 milliGRAM(s) Oral at bedtime  cefTRIAXone   IVPB 1000 milliGRAM(s) IV Intermittent every 24 hours  metoprolol tartrate 50 milliGRAM(s) Oral two times a day  pantoprazole    Tablet 40 milliGRAM(s) Oral before breakfast  sodium chloride 0.9% lock flush 3 milliLiter(s) IV Push every 8 hours  tamsulosin 0.4 milliGRAM(s) Oral at bedtime  warfarin 5 milliGRAM(s) Oral once      PHYSICAL EXAM      Neurology: alert and oriented x 3, nonfocal, no gross deficits  CV :S1S2  Sternal Wound :  CDI , Stable  Lungs: cta  Abdomen: soft, nontender, nondistended, positive bowel sounds, last bowel movement   :   voids    Extremities:   warm to touch               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

## 2020-03-08 NOTE — PROGRESS NOTE ADULT - SUBJECTIVE AND OBJECTIVE BOX
pt seen and examined, no complaints, ROS - .          acetaminophen   Tablet .. 650 milliGRAM(s) Oral every 6 hours PRN  aspirin enteric coated 81 milliGRAM(s) Oral daily  atorvastatin 40 milliGRAM(s) Oral at bedtime  cefTRIAXone   IVPB 1000 milliGRAM(s) IV Intermittent every 24 hours  metoprolol tartrate 50 milliGRAM(s) Oral two times a day  pantoprazole    Tablet 40 milliGRAM(s) Oral before breakfast  sodium chloride 0.9% lock flush 3 milliLiter(s) IV Push every 8 hours  tamsulosin 0.4 milliGRAM(s) Oral at bedtime                            9.3    6.41  )-----------( 121      ( 07 Mar 2020 10:11 )             28.9       Hemoglobin: 9.3 g/dL (03-07 @ 10:11)  Hemoglobin: 10.1 g/dL (03-06 @ 13:13)      03-07    138  |  105  |  28<H>  ----------------------------<  129<H>  3.9   |  22  |  1.54<H>    Ca    9.1      07 Mar 2020 10:11    TPro  7.4  /  Alb  3.2<L>  /  TBili  0.8  /  DBili  x   /  AST  29  /  ALT  18  /  AlkPhos  95  03-06    Creatinine Trend: 1.54<--, 1.62<--, 1.45<--, 1.56<--, 1.61<--, 1.73<--    COAGS:           T(C): 36.4 (03-08-20 @ 04:31), Max: 36.8 (03-07-20 @ 19:28)  HR: 98 (03-08-20 @ 04:31) (98 - 114)  BP: 103/63 (03-08-20 @ 04:31) (100/67 - 121/77)  RR: 18 (03-08-20 @ 04:31) (18 - 18)  SpO2: 100% (03-08-20 @ 04:31) (98% - 100%)  Wt(kg): --    I&O's Summary    07 Mar 2020 06:01  -  08 Mar 2020 07:00  --------------------------------------------------------  IN: 1140 mL / OUT: 1250 mL / NET: -110 mL      Gen: Appears well in NAD  HEENT:  (-)icterus (-)pallor  CV: N S1 S2 1/6 CARL (+)2 Pulses B/l  Resp:  Clear to ausculatation B/L, normal effort  GI: (+) BS Soft, NT, ND  Lymph:  (-)Edema, (-)obvious lymphadenopathy  Skin: Warm to touch, Normal turgor  Psych: Appropriate mood and affect    TELEMETRY: 	-150      < from: CT Head No Cont (03.06.20 @ 14:24) >  IMPRESSION: Age-appropriate involutional and ischemic gliotic changes. No hemorrhage. No change from 9/26/2019.    < end of copied text >      ASSESSMENT/PLAN: 	    89y Male PMH HTN, HLD,  afib (on coumadin), moderate to severe MR s/p clipping 12/2018, s/p cath in 11/2018 (no obstructive), with a history of severe LV dysfxn recently with improved LV funciton on TTE 2/20 with EF 50%,   PE(11/2018 was on Eliquis/Warfarin/lovenox), CKD, hyponatremia, colon cancer (2001s/p chemo, in remission), prostate cancer (s/p RT in 200[address]), recently admitted with decreased appetite and worsening shortness of breath, cardiac cachexia  s/p second Lucy clip 2/26/20 now readmitted with an episode of unresponsiveness with associated decreased appetite and weakness.       -- hold Diuretics at present   -- ASA, statin   -- tolerating BB , HR stable   -- ABX per CTs   -- troponin indeterminate but given history of non obs CAD on cath in 2018, no acute ischemia on EKG and no anginal chest pain, with CKD, doubt ACS  -- AF- subtherapeutic INR -c/w Coumadin and would consider bridging with Heparin given elevated CHADS VASC for CVA prevention if no contraindications   -- patient follows with Dr Scott - for follow up 3/11 @230pm at our [place] Office ([address] [place] [address]) however if still admitted will move back appointment   -- HD stable no evidence decompensated CHF

## 2020-03-08 NOTE — PROGRESS NOTE ADULT - PROBLEM SELECTOR PLAN 2
Appreciate ID input.   Vanco times one dose given  Continue on Ceftriaxone 1000 mg IV QD   Follow up urine culture
Appreciate ID input.   Vanco times one dose given  Continue on Ceftriaxone 1000 mg IV QD   urine culture--contamination

## 2020-03-08 NOTE — PROGRESS NOTE ADULT - PROBLEM SELECTOR PLAN 1
Mitraclip 2/26,  Echo relatively unchanged from Prior   Lopressor restarted. Continue to hold Lasix for now
Mitraclip 2/26, does not appear volume overloaded  Febrile in ED, received 1L IVF with no signs of CHF following   Mental status improved, he is now A&O x3  Sepsis workup in progress, UA, BC pending, flores changed  CXR clear  Will repeat TTE to evaluate MR    [id number]
Mitraclip 2/26,  Echo relatively unchanged from Prior   Lopressor restarted. Continue to hold Lasix for now

## 2020-03-08 NOTE — PROGRESS NOTE ADULT - SUBJECTIVE AND OBJECTIVE BOX
Follow Up: AMS, fever, concern for UTI    Interval History/ROS:Patient is a 89y old  Male who presents with a chief complaint of AMS / Lightheadedness (08 Mar 2020 07:36)  - no acute events reported overnight  - patient assessed at bedside. Denied any acute complaints      Allergies    penicillin (Hives)  shellfish (Hives)    Intolerances        ANTIMICROBIALS:  cefTRIAXone   IVPB 1000 every 24 hours      OTHER MEDS:  acetaminophen   Tablet .. 650 milliGRAM(s) Oral every 6 hours PRN  aspirin enteric coated 81 milliGRAM(s) Oral daily  atorvastatin 40 milliGRAM(s) Oral at bedtime  metoprolol tartrate 50 milliGRAM(s) Oral two times a day  pantoprazole    Tablet 40 milliGRAM(s) Oral before breakfast  sodium chloride 0.9% lock flush 3 milliLiter(s) IV Push every 8 hours  tamsulosin 0.4 milliGRAM(s) Oral at bedtime      Vital Signs Last 24 Hrs  T(C): 36.4 (08 Mar 2020 04:31), Max: 36.8 (07 Mar 2020 19:28)  T(F): 97.6 (08 Mar 2020 04:31), Max: 98.2 (07 Mar 2020 19:28)  HR: 98 (08 Mar 2020 04:31) (98 - 114)  BP: 103/63 (08 Mar 2020 04:31) (100/67 - 121/77)  BP(mean): --  RR: 18 (08 Mar 2020 04:31) (18 - 18)  SpO2: 100% (08 Mar 2020 04:31) (98% - 100%)    EXAM:  Constitutional: Not in acute distress  Eyes: No icterus. Pupils b/l reactive to light.  Oral cavity: Clear, no lesions  Neck: No neck vein distension noted  RS: Chest clear to auscultation bilaterally. No wheeze/rhonchi/crepitations.  CVS: S1, S2 heard. Regular rate and rhythm. No murmurs/rubs/gallops.  Abdomen: Soft. No guarding/rigidity/tenderness. No CVA tenderness  : Hyatt present  Extremities: Warm. No pedal edema  Skin: No lesions noted  Vascular: No evidence of phlebitis  Neuro: Alert, oriented to time/place/person                        9.3    6.41  )-----------( 121      ( 07 Mar 2020 10:11 )             28.9       03-07    138  |  105  |  28<H>  ----------------------------<  129<H>  3.9   |  22  |  1.54<H>    Ca    9.1      07 Mar 2020 10:11    TPro  7.4  /  Alb  3.2<L>  /  TBili  0.8  /  DBili  x   /  AST  29  /  ALT  18  /  AlkPhos  95        Urinalysis Basic - ( 06 Mar 2020 15:10 )    Color: Yellow / Appearance: Slightly Turbid / S.014 / pH: x  Gluc: x / Ketone: Negative  / Bili: Negative / Urobili: Negative   Blood: x / Protein: 30 mg/dL / Nitrite: Negative   Leuk Esterase: Large / RBC: 120 /hpf /  /HPF   Sq Epi: x / Non Sq Epi: 3 /hpf / Bacteria: Few        MICROBIOLOGY:Culture Results:   >=3 organisms. Probable collection contamination. ( @ 19:01)  Culture Results:   No growth to date. ( @ 17:40)  Culture Results:   No growth to date. ( @ 15:09)    OTHER:  < from: Transthoracic Echocardiogram (20 @ 07:39) >  Conclusions:  1. Patient status-post Mitraclip placement. Moderatemitral  regurgitation. Mean transmitral valve gradient  approximately 7 mm Hg. (HRabout 110s bpm)  2. Calcified trileaflet aortic valve with mildly decreased  opening. Mild aortic regurgitation.  3. Severely dilated left atrium.  LA volume index = 57  cc/m2.  4. Patient in atrial fibrillation; ejection fraction varies  with R-R interval. Endocardium not well visualized; grossly  mild to moderate global left ventricular systolic  dysfunction.  5. Moderate right atrial enlargement. A linear, mobile  echodensity seen in the RA may represent prominent Chiari  network/Eustachian valve. The echodensity appears to  contact the tricuspid annulus on subcostal views. YENNY could  be performed for better visualization if clinically  indicated.  6. Right ventricular enlargement with decreased right  ventricular systolic function.  7. Trace pericardial effusion.  8. Right pleural effusion.  *** Compared with echocardiogram of 2020, results are  similar on today's study. Mean transmitral gradient is  higher with higher heart rate. Linear structure in the RA  was seen on prior TTE but not well visualized on prior  TEEs.    < end of copied text >

## 2020-03-08 NOTE — PROGRESS NOTE ADULT - ASSESSMENT
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  - Admitted 2/26-2/29 for weight loss, decreased appetite and worsening SOB. Underwent Application of a MitraClip NT via a percutaneous right common femoral venous, and transseptal approach. Hosp course c/b urinary retention, was discharged home with Hyatt  - now readmitted 3/6/2020 with witnessed episode of unresponsiveness. H/o confusion, limited PO intake over the last several days  - febrile (rectal temp 100.8) in the ER. Hyatt changed in ER. Labs revealed mild MORGAN on CKD, BNP 8361, lactate 2.3. UA showed large LE, , . ID consulted for recs  ===========  Fever, AMS, decreased PO intake, positive UA  - concern for possible UTI, given new indwelling catheter  - however, given recent valve clip procedure, will cover empirically with Vancomycin  - CT Head negative, CXR negative  - urine cx showing >3 organisms    RECOMMENDATIONS:  #PENDING RECS. 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  - Admitted 2/26-2/29 for weight loss, decreased appetite and worsening SOB. Underwent Application of a MitraClip NT via a percutaneous right common femoral venous, and transseptal approach. Hosp course c/b urinary retention, was discharged home with Hyatt  - now readmitted 3/6/2020 with witnessed episode of unresponsiveness. H/o confusion, limited PO intake over the last several days  - febrile (rectal temp 100.8) in the ER. Hyatt changed in ER. Labs revealed mild MORGAN on CKD, BNP 8361, lactate 2.3. UA showed large LE, , . ID consulted for recs  ===========  Fever, AMS, decreased PO intake, positive UA  - concern for possible UTI, given new indwelling catheter  - however, given recent valve clip procedure, will cover empirically with Vancomycin  - CT Head negative, CXR negative  - urine cx showing >3 organisms    RECOMMENDATIONS:  - continue Ceftriaxone 1g/d IV for now  - discontinue Vancomycin  - consider RBUS  - f/u blood cx  - check repeat urine cx    MAX Mueller, MD  Fellow, Infectious Diseases  Pager: [phone]  After 5pm and on Weekends: Call [phone]

## 2020-03-09 ENCOUNTER — APPOINTMENT (OUTPATIENT)
Dept: CARDIOTHORACIC SURGERY | Facility: CLINIC | Age: 85
End: 2020-03-09

## 2020-03-09 ENCOUNTER — TRANSCRIPTION ENCOUNTER (OUTPATIENT)
Age: 85
End: 2020-03-09

## 2020-03-09 VITALS
RESPIRATION RATE: 18 BRPM | OXYGEN SATURATION: 100 % | DIASTOLIC BLOOD PRESSURE: 72 MMHG | HEART RATE: 97 BPM | TEMPERATURE: 98 F | SYSTOLIC BLOOD PRESSURE: 115 MMHG

## 2020-03-09 DIAGNOSIS — Z95.818 OTHER SPECIFIED POSTPROCEDURAL STATES: ICD-10-CM

## 2020-03-09 DIAGNOSIS — Z98.890 OTHER SPECIFIED POSTPROCEDURAL STATES: ICD-10-CM

## 2020-03-09 LAB
ANION GAP SERPL CALC-SCNC: 10 MMOL/L — SIGNIFICANT CHANGE UP (ref 5–17)
BUN SERPL-MCNC: 25 MG/DL — HIGH (ref 7–23)
CALCIUM SERPL-MCNC: 9.2 MG/DL — SIGNIFICANT CHANGE UP (ref 8.4–10.5)
CHLORIDE SERPL-SCNC: 106 MMOL/L — SIGNIFICANT CHANGE UP (ref 96–108)
CO2 SERPL-SCNC: 23 MMOL/L — SIGNIFICANT CHANGE UP (ref 22–31)
CREAT SERPL-MCNC: 1.69 MG/DL — HIGH (ref 0.5–1.3)
CULTURE RESULTS: NO GROWTH — SIGNIFICANT CHANGE UP
GLUCOSE SERPL-MCNC: 92 MG/DL — SIGNIFICANT CHANGE UP (ref 70–99)
HCT VFR BLD CALC: 26.9 % — LOW (ref 39–50)
HGB BLD-MCNC: 8.5 G/DL — LOW (ref 13–17)
INR BLD: 1.72 RATIO — HIGH (ref 0.88–1.16)
MCHC RBC-ENTMCNC: 30.1 PG — SIGNIFICANT CHANGE UP (ref 27–34)
MCHC RBC-ENTMCNC: 31.6 GM/DL — LOW (ref 32–36)
MCV RBC AUTO: 95.4 FL — SIGNIFICANT CHANGE UP (ref 80–100)
NRBC # BLD: 0 /100 WBCS — SIGNIFICANT CHANGE UP (ref 0–0)
PLATELET # BLD AUTO: 141 K/UL — LOW (ref 150–400)
POTASSIUM SERPL-MCNC: 4.7 MMOL/L — SIGNIFICANT CHANGE UP (ref 3.5–5.3)
POTASSIUM SERPL-SCNC: 4.7 MMOL/L — SIGNIFICANT CHANGE UP (ref 3.5–5.3)
PROTHROM AB SERPL-ACNC: 20.1 SEC — HIGH (ref 10–12.9)
RBC # BLD: 2.82 M/UL — LOW (ref 4.2–5.8)
RBC # FLD: 16.3 % — HIGH (ref 10.3–14.5)
SODIUM SERPL-SCNC: 139 MMOL/L — SIGNIFICANT CHANGE UP (ref 135–145)
SPECIMEN SOURCE: SIGNIFICANT CHANGE UP
WBC # BLD: 4.43 K/UL — SIGNIFICANT CHANGE UP (ref 3.8–10.5)
WBC # FLD AUTO: 4.43 K/UL — SIGNIFICANT CHANGE UP (ref 3.8–10.5)

## 2020-03-09 PROCEDURE — 84132 ASSAY OF SERUM POTASSIUM: CPT

## 2020-03-09 PROCEDURE — 80048 BASIC METABOLIC PNL TOTAL CA: CPT

## 2020-03-09 PROCEDURE — 82803 BLOOD GASES ANY COMBINATION: CPT

## 2020-03-09 PROCEDURE — 83605 ASSAY OF LACTIC ACID: CPT

## 2020-03-09 PROCEDURE — 87086 URINE CULTURE/COLONY COUNT: CPT

## 2020-03-09 PROCEDURE — 83880 ASSAY OF NATRIURETIC PEPTIDE: CPT

## 2020-03-09 PROCEDURE — 93005 ELECTROCARDIOGRAM TRACING: CPT | Mod: XU

## 2020-03-09 PROCEDURE — 84295 ASSAY OF SERUM SODIUM: CPT

## 2020-03-09 PROCEDURE — 85014 HEMATOCRIT: CPT

## 2020-03-09 PROCEDURE — 85027 COMPLETE CBC AUTOMATED: CPT

## 2020-03-09 PROCEDURE — 99024 POSTOP FOLLOW-UP VISIT: CPT

## 2020-03-09 PROCEDURE — 70450 CT HEAD/BRAIN W/O DYE: CPT

## 2020-03-09 PROCEDURE — 93306 TTE W/DOPPLER COMPLETE: CPT

## 2020-03-09 PROCEDURE — 85730 THROMBOPLASTIN TIME PARTIAL: CPT

## 2020-03-09 PROCEDURE — 71045 X-RAY EXAM CHEST 1 VIEW: CPT

## 2020-03-09 PROCEDURE — 80053 COMPREHEN METABOLIC PANEL: CPT

## 2020-03-09 PROCEDURE — 84484 ASSAY OF TROPONIN QUANT: CPT

## 2020-03-09 PROCEDURE — 82947 ASSAY GLUCOSE BLOOD QUANT: CPT

## 2020-03-09 PROCEDURE — 99285 EMERGENCY DEPT VISIT HI MDM: CPT | Mod: 25

## 2020-03-09 PROCEDURE — 85610 PROTHROMBIN TIME: CPT

## 2020-03-09 PROCEDURE — 82962 GLUCOSE BLOOD TEST: CPT

## 2020-03-09 PROCEDURE — 87040 BLOOD CULTURE FOR BACTERIA: CPT

## 2020-03-09 PROCEDURE — 82435 ASSAY OF BLOOD CHLORIDE: CPT

## 2020-03-09 PROCEDURE — 81001 URINALYSIS AUTO W/SCOPE: CPT

## 2020-03-09 PROCEDURE — 82330 ASSAY OF CALCIUM: CPT

## 2020-03-09 PROCEDURE — 51702 INSERT TEMP BLADDER CATH: CPT

## 2020-03-09 PROCEDURE — 96374 THER/PROPH/DIAG INJ IV PUSH: CPT | Mod: XU

## 2020-03-09 RX ORDER — ACETAMINOPHEN 500 MG
2 TABLET ORAL
Qty: 0 | Refills: 0 | DISCHARGE
Start: 2020-03-09

## 2020-03-09 RX ORDER — TAMSULOSIN HYDROCHLORIDE 0.4 MG/1
1 CAPSULE ORAL
Qty: 0 | Refills: 0 | DISCHARGE
Start: 2020-03-09

## 2020-03-09 RX ORDER — FUROSEMIDE 40 MG
1 TABLET ORAL
Qty: 30 | Refills: 0
Start: 2020-03-09 | End: 2020-04-07

## 2020-03-09 RX ORDER — PANTOPRAZOLE SODIUM 20 MG/1
1 TABLET, DELAYED RELEASE ORAL
Qty: 0 | Refills: 0 | DISCHARGE
Start: 2020-03-09

## 2020-03-09 RX ORDER — METOPROLOL TARTRATE 50 MG
1 TABLET ORAL
Qty: 0 | Refills: 0 | DISCHARGE
Start: 2020-03-09

## 2020-03-09 RX ORDER — ATORVASTATIN CALCIUM 80 MG/1
1 TABLET, FILM COATED ORAL
Qty: 0 | Refills: 0 | DISCHARGE
Start: 2020-03-09

## 2020-03-09 RX ORDER — ASPIRIN/CALCIUM CARB/MAGNESIUM 324 MG
1 TABLET ORAL
Qty: 0 | Refills: 0 | DISCHARGE
Start: 2020-03-09

## 2020-03-09 RX ADMIN — PANTOPRAZOLE SODIUM 40 MILLIGRAM(S): 20 TABLET, DELAYED RELEASE ORAL at 05:43

## 2020-03-09 RX ADMIN — Medication 81 MILLIGRAM(S): at 11:45

## 2020-03-09 RX ADMIN — Medication 50 MILLIGRAM(S): at 05:42

## 2020-03-09 RX ADMIN — CEFTRIAXONE 100 MILLIGRAM(S): 500 INJECTION, POWDER, FOR SOLUTION INTRAMUSCULAR; INTRAVENOUS at 08:20

## 2020-03-09 RX ADMIN — SODIUM CHLORIDE 3 MILLILITER(S): 9 INJECTION INTRAMUSCULAR; INTRAVENOUS; SUBCUTANEOUS at 05:40

## 2020-03-09 NOTE — DISCHARGE NOTE PROVIDER - CARE PROVIDERS DIRECT ADDRESSES
[email or web address]

## 2020-03-09 NOTE — DISCHARGE NOTE PROVIDER - HOSPITAL COURSE
89 year old male with PMH HTN, HLD, CKD, colon cancer in 2001 s/p chemo, prostate Ca in 2016 s/p RT, AFib, PE, RA thrombus found during cath in November 2018 AC changed from NOAC to Warfarin, severe MR s/p mitraclip December 26th 2018, recurrent CHF with severe MR on TTE now s/p 2nd mitraclip placement on February 26th. Hospital course complicated by urinary retention requiring discharge with flores catheter on 2/29 and plans for outpt urology follow up. Presents to the ED today with several days of hypotension, altered mental status last night, and lethargy this morning with episode of unresponsiveness.     3/8: Check orthostatic BP. f/u bld cx and urine cx. Vanco times one dose given per ID    3/9 VSS; no further abx as per Id; Discharge pt home as per Dr. Floyd

## 2020-03-09 NOTE — DISCHARGE NOTE PROVIDER - CARE PROVIDER_API CALL
Wendy Floyd)  Surgery; Thoracic and Cardiac Surgery  [address] [place] [address]  Phone: [phone]  Fax: [phone]  Follow Up Time:     Ethan Solorzano)  Cardiovascular Disease; Internal Medicine; Interventional Cardiology  [address] [place] [address]  Phone: [phone]  Fax: [phone]  Follow Up Time:     Goldberg, Gary D D (MD)  Urology  [address] [place] [address] [address]  [place] [address]  Phone: [phone]  Fax: [phone]  Follow Up Time:

## 2020-03-09 NOTE — DISCHARGE NOTE PROVIDER - NSDCPNSUBOBJ_GEN_ALL_CORE
VSS    tele:  afib      audible S1 S2    lungs clear, RR easy unlabored    +bs nt nd + bm; neg n/v/d    LE: neg calf tenderness, neg LE edema, ppp bilaterally, right groin site cdi tiffanie- neg hematoma    Hyatt- clear, Yellow urine        Discharge pt home today as per Dr. Floyd and off abx as per ID

## 2020-03-09 NOTE — DISCHARGE NOTE PROVIDER - NSDCMRMEDTOKEN_GEN_ALL_CORE_FT
acetaminophen 325 mg oral tablet: 2 tab(s) orally every 6 hours, As needed, Temp greater or equal to 38.5C (101.3F)  aspirin 81 mg oral delayed release tablet: 1 tab(s) orally once a day  atorvastatin 40 mg oral tablet: 1 tab(s) orally once a day (at bedtime)  Coumadin 2 mg oral tablet: 1 tab(s) orally once a day - dose to be adjusted by Dr. Cindi Suarez  Lasix 20 mg oral tablet: 1 tab(s) orally once a day   metoprolol tartrate 50 mg oral tablet: 1 tab(s) orally 2 times a day  pantoprazole 40 mg oral delayed release tablet: 1 tab(s) orally once a day (before a meal)  senna oral tablet: 2 tab(s) orally once a day (at bedtime), As Needed -for constipation   stat PT/ INR levels : application by intracoronary injection once a day   tamsulosin 0.4 mg oral capsule: 1 cap(s) orally once a day (at bedtime) acetaminophen 325 mg oral tablet: 2 tab(s) orally every 6 hours, As needed, Temp greater or equal to 38.5C (101.3F)  aspirin 81 mg oral delayed release tablet: 1 tab(s) orally once a day  atorvastatin 40 mg oral tablet: 1 tab(s) orally once a day (at bedtime)  Coumadin 2 mg oral tablet: 1 tab(s) orally once a day - dose to be adjusted by Dr. Cindi Suarez  Lasix 20 mg oral tablet: 1 tab(s) orally once a day   metoprolol tartrate 50 mg oral tablet: 1 tab(s) orally 2 times a day  pantoprazole 40 mg oral delayed release tablet: 1 tab(s) orally once a day (before a meal)  senna oral tablet: 2 tab(s) orally once a day (at bedtime), As Needed -for constipation   stat pt/inr levels : every monday and thursday starting thursday 3/12  Fax to Dr. Suarez   tamsulosin 0.4 mg oral capsule: 1 cap(s) orally once a day (at bedtime)

## 2020-03-09 NOTE — PROGRESS NOTE ADULT - REASON FOR ADMISSION
AMS / Lightheadedness
altered mental status
AMS / Lightheadedness

## 2020-03-09 NOTE — PROGRESS NOTE ADULT - SUBJECTIVE AND OBJECTIVE BOX
S: Denies chest pain or SOB. Review of systems otherwise (-)      MEDICATIONS  (STANDING):  aspirin enteric coated 81 milliGRAM(s) Oral daily  atorvastatin 40 milliGRAM(s) Oral at bedtime  cefTRIAXone   IVPB 1000 milliGRAM(s) IV Intermittent every 24 hours  metoprolol tartrate 50 milliGRAM(s) Oral two times a day  pantoprazole    Tablet 40 milliGRAM(s) Oral before breakfast  sodium chloride 0.9% lock flush 3 milliLiter(s) IV Push every 8 hours  tamsulosin 0.4 milliGRAM(s) Oral at bedtime    MEDICATIONS  (PRN):  acetaminophen   Tablet .. 650 milliGRAM(s) Oral every 6 hours PRN Temp greater or equal to 38.5C (101.3F)      LABS:                            8.5    4.43  )-----------( 141      ( 09 Mar 2020 07:20 )             26.9     Hemoglobin: 8.5 g/dL (03-09 @ 07:20)  Hemoglobin: 10.1 g/dL (03-08 @ 09:08)  Hemoglobin: 9.3 g/dL (03-07 @ 10:11)  Hemoglobin: 10.1 g/dL (03-06 @ 13:13)    03-09    139  |  106  |  25<H>  ----------------------------<  92  4.7   |  23  |  1.69<H>    Ca    9.2      09 Mar 2020 07:19      Creatinine Trend: 1.69<--, 1.55<--, 1.54<--, 1.62<--, 1.45<--, 1.56<--   PT/INR - ( 09 Mar 2020 07:19 )   PT: 20.1 sec;   INR: 1.72 ratio                   03-08-20 @ 07:01  -  03-09-20 @ 07:00  --------------------------------------------------------  IN: 290 mL / OUT: 1385 mL / NET: -1095 mL    03-09-20 @ 07:01  -  03-09-20 @ 11:08  --------------------------------------------------------  IN: 360 mL / OUT: 0 mL / NET: 360 mL        PHYSICAL EXAM  Vital Signs Last 24 Hrs  T(C): 36.6 (09 Mar 2020 04:38), Max: 36.8 (08 Mar 2020 14:25)  T(F): 97.8 (09 Mar 2020 04:38), Max: 98.2 (08 Mar 2020 14:25)  HR: 97 (09 Mar 2020 04:38) (97 - 115)  BP: 115/72 (09 Mar 2020 04:38) (102/63 - 115/72)  BP(mean): --  RR: 18 (09 Mar 2020 04:38) (18 - 18)  SpO2: 100% (09 Mar 2020 04:38) (97% - 100%)      Gen: Appears well in NAD  HEENT:  (-)icterus (-)pallor  CV: N S1 S2 1/6 CARL (+)2 Pulses B/l  Resp:  Clear to auscultation B/L, normal effort  GI: (+) BS Soft, NT, ND  Lymph:  (-)Edema, (-)obvious lymphadenopathy  Skin: Warm to touch, Normal turgor  Psych: Appropriate mood and affect    TELEMETRY: AF     < from: CT Head No Cont (03.06.20 @ 14:24) >  IMPRESSION: Age-appropriate involutional and ischemic gliotic changes. No hemorrhage. No change from 9/26/2019.    < end of copied text >      ASSESSMENT/PLAN: 	    89y Male PMH HTN, HLD,  afib (on coumadin), moderate to severe MR s/p clipping 12/2018, s/p cath in 11/2018 (no obstructive), with a history of severe LV dysfxn recently with improved LV funciton on TTE 2/20 with EF 50%, PE (11/2018), CKD, hyponatremia, colon cancer (2001s/p chemo, in remission), prostate cancer (s/p RT in [address]), recently admitted with decreased appetite and worsening shortness of breath, cardiac cachexia  s/p second Lucy clip 2/26/20 now readmitted with an episode of unresponsiveness with associated decreased appetite and weakness.       - No evidence of clinical HF or anginal symptoms  - Diuretics on hold at present, trend creatinine  - Continue ASA, statin   - tolerating BB , HR stable   - ABX per ID  - AF- subtherapeutic INR -c/w Coumadin and would consider bridging with Heparin given elevated CHADS VASC for CVA prevention if no contraindications   - patient follows with Dr Scott - for follow up 3/11 @230pm at our [facility] ([id number] [place] [address]) however if still admitted will move back appointment     Cosme Torres PA-C  [facility]  Pager: [phone]

## 2020-03-09 NOTE — DISCHARGE NOTE PROVIDER - NSDCCPCAREPLAN_GEN_ALL_CORE_FT
PRINCIPAL DISCHARGE DIAGNOSIS  Diagnosis: UTI (urinary tract infection)  Assessment and Plan of Treatment: shower daily  weigh yourself daily  continue current prescriptions as ordered  increase activity as tolerated   no added salt; low fat; low cholesterol, low salt diet.   antibiotic prophylaxis prior to any invasive procedure   follow up with Cardiologist, Dr. Salazar,  in 1-2 weeks. Call to schedule appointment.  follow up with cardiac surgeon, Dr. Floyd, On March 16th at 11:15 am. Call to confirm appointment. [phone]  follow up with Dr. Gary Goldberg, urologist, in 1 week. Call to schedule appointment.   follow up with medical doctor, Dr. Suarez, for coumadin bloodwork  (INR levels)  every monday and thurdsday staring thursday 3/12.

## 2020-03-09 NOTE — DISCHARGE NOTE PROVIDER - PROVIDER TOKENS
PROVIDER:[TOKEN:[[id number]]],PROVIDER:[TOKEN:[[id number]]],PROVIDER:[TOKEN:[[id number]]]

## 2020-03-09 NOTE — DISCHARGE NOTE PROVIDER - NSDCHHNEEDSERVICE_GEN_ALL_CORE
Teaching and training/Medication teaching and assessment/Other, specify.../Observation and assessment

## 2020-03-09 NOTE — DISCHARGE NOTE NURSING/CASE MANAGEMENT/SOCIAL WORK - PATIENT PORTAL LINK FT
You can access the FollowMyHealth Patient Portal offered by [facility] by registering at the following website: [email or web address]. By joining [facility]’s FollowMyHealth portal, you will also be able to view your health information using other applications (apps) compatible with our system.

## 2020-03-09 NOTE — DISCHARGE NOTE PROVIDER - NSDCACTIVITY_GEN_ALL_CORE
No heavy lifting/straining/Walking - Indoors allowed/Stairs allowed/Sex allowed/Do not make important decisions/Showering allowed/Do not drive or operate machinery/Walking - Outdoors allowed

## 2020-03-10 ENCOUNTER — APPOINTMENT (OUTPATIENT)
Dept: UROLOGY | Facility: CLINIC | Age: 85
End: 2020-03-10

## 2020-03-10 ENCOUNTER — NON-APPOINTMENT (OUTPATIENT)
Age: 85
End: 2020-03-10

## 2020-03-11 LAB
CULTURE RESULTS: SIGNIFICANT CHANGE UP
CULTURE RESULTS: SIGNIFICANT CHANGE UP
SPECIMEN SOURCE: SIGNIFICANT CHANGE UP
SPECIMEN SOURCE: SIGNIFICANT CHANGE UP

## 2020-03-13 PROBLEM — Z09 POSTOP CHECK: Status: ACTIVE | Noted: 2020-03-04

## 2020-03-16 ENCOUNTER — APPOINTMENT (OUTPATIENT)
Dept: CARDIOTHORACIC SURGERY | Facility: CLINIC | Age: 85
End: 2020-03-16

## 2020-03-16 DIAGNOSIS — Z09 ENCOUNTER FOR FOLLOW-UP EXAMINATION AFTER COMPLETED TREATMENT FOR CONDITIONS OTHER THAN MALIGNANT NEOPLASM: ICD-10-CM

## 2020-03-17 ENCOUNTER — APPOINTMENT (OUTPATIENT)
Dept: UROLOGY | Facility: CLINIC | Age: 85
End: 2020-03-17
Payer: MEDICARE

## 2020-03-17 VITALS
DIASTOLIC BLOOD PRESSURE: 57 MMHG | HEIGHT: 73 IN | SYSTOLIC BLOOD PRESSURE: 89 MMHG | WEIGHT: 130 LBS | HEART RATE: 84 BPM | BODY MASS INDEX: 17.23 KG/M2

## 2020-03-17 PROCEDURE — 51702 INSERT TEMP BLADDER CATH: CPT

## 2020-03-17 PROCEDURE — 99204 OFFICE O/P NEW MOD 45 MIN: CPT | Mod: 25

## 2020-03-17 RX ORDER — FUROSEMIDE 80 MG/1
TABLET ORAL
Refills: 0 | Status: ACTIVE | COMMUNITY

## 2020-03-17 NOTE — REVIEW OF SYSTEMS
[Urine Infection (bladder/kidney)] : bladder/kidney infection [Pain during urination] : pain during urination [Urine retention] : urine retention [Strong urge to urinate] : strong urge to urinate [Strain or push to urinate] : strain or push to urinate [Slow urine stream] : slow urine stream [Interrupted urine stream] : interrupted urine stream [Bladder fullness after urinating] : bladder fullness after urinating [Leakage of urine with straining, coughing, laughing] : leakage of urine with straining, coughing, laughing [Negative] : Heme/Lymph

## 2020-03-17 NOTE — ASSESSMENT
[FreeTextEntry1] : Very pleasant 89-year-old gentleman who presents for evaluation of BPH with urinary obstruction and urinary retention\par -Continue Flomax\par -High likely performed in the office today, however after he will slowly, he was unable to fully empty his bladder\par - cc\par -Hyatt catheter replaced\par -Follow up in one month for Hyatt catheter change

## 2020-03-17 NOTE — HISTORY OF PRESENT ILLNESS
[FreeTextEntry1] : Is 89-year-old gentleman who presents for evaluation of urinary retention. Patient reports that a Hyatt catheter was placed while he was in the hospital when he was unable to urinate. He reports no problems with catheter. He denies dysuria. No hematuria no flank pain or suprapubic 300 vomiting. No other complaints. A specific timing to his symptoms. He has been taking tamsulosin as advised. [Urinary Retention] : urinary retention [Urinary Urgency] : no urinary urgency [Urinary Frequency] : no urinary frequency [Nocturia] : nocturia [Straining] : no straining [Weak Stream] : weak stream [Intermittency] : no intermittency [Dysuria] : no dysuria [Hematuria - Gross] : no gross hematuria [Bladder Spasm] : no bladder spasm [Abdominal Pain] : no abdominal pain [Flank Pain] : no flank pain [Fever] : no fever [Fatigue] : no fatigue [Nausea] : no nausea [Anorexia] : no anorexia

## 2020-03-19 ENCOUNTER — INPATIENT (INPATIENT)
Facility: HOSPITAL | Age: 85
LOS: 1 days | Discharge: HOME | DRG: 291 | End: 2020-03-21
Attending: INTERNAL MEDICINE | Admitting: INTERNAL MEDICINE
Payer: MEDICARE

## 2020-03-19 VITALS
SYSTOLIC BLOOD PRESSURE: 101 MMHG | HEART RATE: 72 BPM | TEMPERATURE: 98 F | RESPIRATION RATE: 18 BRPM | WEIGHT: 134.92 LBS | OXYGEN SATURATION: 97 % | DIASTOLIC BLOOD PRESSURE: 67 MMHG | HEIGHT: 72 IN

## 2020-03-19 DIAGNOSIS — J90 PLEURAL EFFUSION, NOT ELSEWHERE CLASSIFIED: ICD-10-CM

## 2020-03-19 DIAGNOSIS — I50.9 HEART FAILURE, UNSPECIFIED: ICD-10-CM

## 2020-03-19 DIAGNOSIS — I48.91 UNSPECIFIED ATRIAL FIBRILLATION: ICD-10-CM

## 2020-03-19 DIAGNOSIS — Z98.890 OTHER SPECIFIED POSTPROCEDURAL STATES: Chronic | ICD-10-CM

## 2020-03-19 DIAGNOSIS — N18.3 CHRONIC KIDNEY DISEASE, STAGE 3 (MODERATE): ICD-10-CM

## 2020-03-19 DIAGNOSIS — I10 ESSENTIAL (PRIMARY) HYPERTENSION: ICD-10-CM

## 2020-03-19 DIAGNOSIS — Z90.49 ACQUIRED ABSENCE OF OTHER SPECIFIED PARTS OF DIGESTIVE TRACT: Chronic | ICD-10-CM

## 2020-03-19 DIAGNOSIS — Z85.46 PERSONAL HISTORY OF MALIGNANT NEOPLASM OF PROSTATE: Chronic | ICD-10-CM

## 2020-03-19 DIAGNOSIS — Z29.9 ENCOUNTER FOR PROPHYLACTIC MEASURES, UNSPECIFIED: ICD-10-CM

## 2020-03-19 DIAGNOSIS — E78.5 HYPERLIPIDEMIA, UNSPECIFIED: ICD-10-CM

## 2020-03-19 DIAGNOSIS — I50.23 ACUTE ON CHRONIC SYSTOLIC (CONGESTIVE) HEART FAILURE: ICD-10-CM

## 2020-03-19 LAB
ALBUMIN SERPL ELPH-MCNC: 2.6 G/DL — LOW (ref 3.5–5)
ALP SERPL-CCNC: 99 U/L — SIGNIFICANT CHANGE UP (ref 40–120)
ALT FLD-CCNC: 23 U/L DA — SIGNIFICANT CHANGE UP (ref 10–60)
ANION GAP SERPL CALC-SCNC: 8 MMOL/L — SIGNIFICANT CHANGE UP (ref 5–17)
APPEARANCE UR: CLEAR — SIGNIFICANT CHANGE UP
APTT BLD: 37.9 SEC — HIGH (ref 27.5–36.3)
AST SERPL-CCNC: 29 U/L — SIGNIFICANT CHANGE UP (ref 10–40)
BASOPHILS # BLD AUTO: 0.03 K/UL — SIGNIFICANT CHANGE UP (ref 0–0.2)
BASOPHILS NFR BLD AUTO: 0.8 % — SIGNIFICANT CHANGE UP (ref 0–2)
BILIRUB SERPL-MCNC: 0.7 MG/DL — SIGNIFICANT CHANGE UP (ref 0.2–1.2)
BILIRUB UR-MCNC: NEGATIVE — SIGNIFICANT CHANGE UP
BUN SERPL-MCNC: 19 MG/DL — HIGH (ref 7–18)
CALCIUM SERPL-MCNC: 8.7 MG/DL — SIGNIFICANT CHANGE UP (ref 8.4–10.5)
CHLORIDE SERPL-SCNC: 107 MMOL/L — SIGNIFICANT CHANGE UP (ref 96–108)
CO2 SERPL-SCNC: 24 MMOL/L — SIGNIFICANT CHANGE UP (ref 22–31)
COLOR SPEC: YELLOW — SIGNIFICANT CHANGE UP
CREAT SERPL-MCNC: 1.52 MG/DL — HIGH (ref 0.5–1.3)
DIFF PNL FLD: NEGATIVE — SIGNIFICANT CHANGE UP
EOSINOPHIL # BLD AUTO: 0.11 K/UL — SIGNIFICANT CHANGE UP (ref 0–0.5)
EOSINOPHIL NFR BLD AUTO: 2.8 % — SIGNIFICANT CHANGE UP (ref 0–6)
FLU A RESULT: SIGNIFICANT CHANGE UP
FLU A RESULT: SIGNIFICANT CHANGE UP
FLUAV AG NPH QL: SIGNIFICANT CHANGE UP
FLUBV AG NPH QL: SIGNIFICANT CHANGE UP
GLUCOSE SERPL-MCNC: 93 MG/DL — SIGNIFICANT CHANGE UP (ref 70–99)
GLUCOSE UR QL: NEGATIVE — SIGNIFICANT CHANGE UP
HCT VFR BLD CALC: 28.1 % — LOW (ref 39–50)
HGB BLD-MCNC: 9.1 G/DL — LOW (ref 13–17)
IMM GRANULOCYTES NFR BLD AUTO: 0.3 % — SIGNIFICANT CHANGE UP (ref 0–1.5)
INR BLD: 2.31 RATIO — HIGH (ref 0.88–1.16)
KETONES UR-MCNC: NEGATIVE — SIGNIFICANT CHANGE UP
LACTATE SERPL-SCNC: 1.4 MMOL/L — SIGNIFICANT CHANGE UP (ref 0.7–2)
LEUKOCYTE ESTERASE UR-ACNC: NEGATIVE — SIGNIFICANT CHANGE UP
LYMPHOCYTES # BLD AUTO: 0.84 K/UL — LOW (ref 1–3.3)
LYMPHOCYTES # BLD AUTO: 21.1 % — SIGNIFICANT CHANGE UP (ref 13–44)
MCHC RBC-ENTMCNC: 31.1 PG — SIGNIFICANT CHANGE UP (ref 27–34)
MCHC RBC-ENTMCNC: 32.4 GM/DL — SIGNIFICANT CHANGE UP (ref 32–36)
MCV RBC AUTO: 95.9 FL — SIGNIFICANT CHANGE UP (ref 80–100)
MONOCYTES # BLD AUTO: 0.29 K/UL — SIGNIFICANT CHANGE UP (ref 0–0.9)
MONOCYTES NFR BLD AUTO: 7.3 % — SIGNIFICANT CHANGE UP (ref 2–14)
NEUTROPHILS # BLD AUTO: 2.7 K/UL — SIGNIFICANT CHANGE UP (ref 1.8–7.4)
NEUTROPHILS NFR BLD AUTO: 67.7 % — SIGNIFICANT CHANGE UP (ref 43–77)
NITRITE UR-MCNC: NEGATIVE — SIGNIFICANT CHANGE UP
NRBC # BLD: 0 /100 WBCS — SIGNIFICANT CHANGE UP (ref 0–0)
NT-PROBNP SERPL-SCNC: 6919 PG/ML — HIGH (ref 0–450)
PH UR: 6.5 — SIGNIFICANT CHANGE UP (ref 5–8)
PLATELET # BLD AUTO: 151 K/UL — SIGNIFICANT CHANGE UP (ref 150–400)
POTASSIUM SERPL-MCNC: 4.3 MMOL/L — SIGNIFICANT CHANGE UP (ref 3.5–5.3)
POTASSIUM SERPL-SCNC: 4.3 MMOL/L — SIGNIFICANT CHANGE UP (ref 3.5–5.3)
PROT SERPL-MCNC: 6.7 G/DL — SIGNIFICANT CHANGE UP (ref 6–8.3)
PROT UR-MCNC: NEGATIVE — SIGNIFICANT CHANGE UP
PROTHROM AB SERPL-ACNC: 26.8 SEC — HIGH (ref 10–12.9)
RBC # BLD: 2.93 M/UL — LOW (ref 4.2–5.8)
RBC # FLD: 17.2 % — HIGH (ref 10.3–14.5)
RSV RESULT: DETECTED
RSV RNA RESP QL NAA+PROBE: DETECTED
SODIUM SERPL-SCNC: 139 MMOL/L — SIGNIFICANT CHANGE UP (ref 135–145)
SP GR SPEC: 1 — LOW (ref 1.01–1.02)
TROPONIN I SERPL-MCNC: <0.015 NG/ML — SIGNIFICANT CHANGE UP (ref 0–0.04)
UROBILINOGEN FLD QL: NEGATIVE — SIGNIFICANT CHANGE UP
WBC # BLD: 3.98 K/UL — SIGNIFICANT CHANGE UP (ref 3.8–10.5)
WBC # FLD AUTO: 3.98 K/UL — SIGNIFICANT CHANGE UP (ref 3.8–10.5)

## 2020-03-19 PROCEDURE — 71045 X-RAY EXAM CHEST 1 VIEW: CPT | Mod: 26

## 2020-03-19 PROCEDURE — 74176 CT ABD & PELVIS W/O CONTRAST: CPT | Mod: 26

## 2020-03-19 PROCEDURE — 99285 EMERGENCY DEPT VISIT HI MDM: CPT

## 2020-03-19 PROCEDURE — 99223 1ST HOSP IP/OBS HIGH 75: CPT | Mod: GC

## 2020-03-19 RX ORDER — FUROSEMIDE 40 MG
40 TABLET ORAL ONCE
Refills: 0 | Status: COMPLETED | OUTPATIENT
Start: 2020-03-19 | End: 2020-03-19

## 2020-03-19 RX ORDER — ASPIRIN/CALCIUM CARB/MAGNESIUM 324 MG
81 TABLET ORAL DAILY
Refills: 0 | Status: DISCONTINUED | OUTPATIENT
Start: 2020-03-19 | End: 2020-03-21

## 2020-03-19 RX ORDER — PANTOPRAZOLE SODIUM 20 MG/1
40 TABLET, DELAYED RELEASE ORAL
Refills: 0 | Status: DISCONTINUED | OUTPATIENT
Start: 2020-03-19 | End: 2020-03-21

## 2020-03-19 RX ORDER — ATORVASTATIN CALCIUM 80 MG/1
40 TABLET, FILM COATED ORAL AT BEDTIME
Refills: 0 | Status: DISCONTINUED | OUTPATIENT
Start: 2020-03-19 | End: 2020-03-21

## 2020-03-19 RX ORDER — TAMSULOSIN HYDROCHLORIDE 0.4 MG/1
0.4 CAPSULE ORAL AT BEDTIME
Refills: 0 | Status: DISCONTINUED | OUTPATIENT
Start: 2020-03-19 | End: 2020-03-21

## 2020-03-19 RX ORDER — WARFARIN SODIUM 2.5 MG/1
2 TABLET ORAL DAILY
Refills: 0 | Status: DISCONTINUED | OUTPATIENT
Start: 2020-03-19 | End: 2020-03-21

## 2020-03-19 RX ORDER — SENNA PLUS 8.6 MG/1
2 TABLET ORAL AT BEDTIME
Refills: 0 | Status: DISCONTINUED | OUTPATIENT
Start: 2020-03-19 | End: 2020-03-21

## 2020-03-19 RX ORDER — FUROSEMIDE 40 MG
20 TABLET ORAL DAILY
Refills: 0 | Status: DISCONTINUED | OUTPATIENT
Start: 2020-03-20 | End: 2020-03-20

## 2020-03-19 RX ORDER — METOPROLOL TARTRATE 50 MG
12.5 TABLET ORAL
Refills: 0 | Status: DISCONTINUED | OUTPATIENT
Start: 2020-03-19 | End: 2020-03-20

## 2020-03-19 RX ORDER — METOPROLOL TARTRATE 50 MG
50 TABLET ORAL
Refills: 0 | Status: DISCONTINUED | OUTPATIENT
Start: 2020-03-19 | End: 2020-03-19

## 2020-03-19 RX ADMIN — TAMSULOSIN HYDROCHLORIDE 0.4 MILLIGRAM(S): 0.4 CAPSULE ORAL at 21:49

## 2020-03-19 RX ADMIN — SENNA PLUS 2 TABLET(S): 8.6 TABLET ORAL at 21:49

## 2020-03-19 RX ADMIN — ATORVASTATIN CALCIUM 40 MILLIGRAM(S): 80 TABLET, FILM COATED ORAL at 21:49

## 2020-03-19 RX ADMIN — Medication 40 MILLIGRAM(S): at 16:42

## 2020-03-19 RX ADMIN — WARFARIN SODIUM 2 MILLIGRAM(S): 2.5 TABLET ORAL at 21:49

## 2020-03-19 NOTE — H&P ADULT - PROBLEM SELECTOR PLAN 4
pt has hx of CKD bs cr 1.5 - 1.6   at baseline pt has hx of CKD bs cr 1.5 - 1.6   at baseline  fu am BMP

## 2020-03-19 NOTE — H&P ADULT - ASSESSMENT
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  Pt admitted for acute on chronic systolic HF exacerbation     ED vitals temp 98.4 HR 72, bp 101/67 spo2 97 %  CXR RLL infiltrates   CT abdomen Small bilateral pleural effusions with associated atelectasis. Mild presacral edema and trace free fluid in the pelvis, nonspecific

## 2020-03-19 NOTE — H&P ADULT - NSHPPHYSICALEXAM_GEN_ALL_CORE
Vital Signs Last 24 Hrs  T(C): 36.9 (19 Mar 2020 16:43), Max: 37.1 (19 Mar 2020 13:37)  T(F): 98.5 (19 Mar 2020 16:43), Max: 98.8 (19 Mar 2020 13:37)  HR: 92 (19 Mar 2020 16:43) (72 - 92)  BP: 125/87 (19 Mar 2020 16:43) (101/67 - 129/79)  BP(mean): --  RR: 17 (19 Mar 2020 16:43) (17 - 18)  SpO2: 100% (19 Mar 2020 16:43) (97% - 100%)    GENERAL: NAD, lying in bed comfortably  HEAD:  Atraumatic, Normocephalic  EYES: EOMI, PERRLA, conjunctiva and sclera clear  NECK: Supple, No JVD  CHEST/LUNG: decreased breath sounds at the base, crackles heard on bl lower lung lobes   HEART: Regular rate and rhythm; S1+ S2+  ABDOMEN: Bowel sounds present; Soft, Nontender, Nondistended. No hepatomegally  EXTREMITIES:  2+ Peripheral Pulses, brisk capillary refill. No clubbing, cyanosis, or edema  NERVOUS SYSTEM:  Alert & Oriented X1, speech clear. No deficits   MSK: FROM all 4 extremities, full and equal strength  SKIN: No rashes or lesions

## 2020-03-19 NOTE — H&P ADULT - PROBLEM SELECTOR PLAN 3
pt does not endorse fevers, chills   fu procalcitonin   CT abdomen Small bilateral pleural effusions with associated atelectasis. Mild presacral edema and trace free fluid in the pelvis  will send Flu and RSV - fu results pt does not endorse fevers, chills   fu procalcitonin   CT abdomen Small bilateral pleural effusions with associated atelectasis. Mild presacral edema and trace free fluid in the pelvis  will send Flu and RSV - fu results  cw isolation

## 2020-03-19 NOTE — H&P ADULT - PROBLEM SELECTOR PLAN 6
IMPROVE VTE Individual Risk Assessment  RISK                                                         Points  [  ] Previous VTE                                      3  [  ] Thrombophilia                                   2  [  ] Lower limb paralysis                         2 (unable to hold up >15 seconds)    [  ] Current Cancer                                  2       (within 6 months)  [ x ] Immobilization > 24 hrs                    1  [  ] ICU/CCU stay > 24 hrs                         1  [ x ] Age > 60                                              1

## 2020-03-19 NOTE — ED PROVIDER NOTE - CLINICAL SUMMARY MEDICAL DECISION MAKING FREE TEXT BOX
89 yo male with multiple medical problems presents here for shortness of breath, chest pain, and non productive cough. Concern for PNA, ACS, viral illness among other causes. Will do labs, UA, CXR, cardiac enzymes, and will reassess.

## 2020-03-19 NOTE — H&P ADULT - NSICDXPASTMEDICALHX_GEN_ALL_CORE_FT
PAST MEDICAL HISTORY:  Atrial fibrillation On coumadin    CKD (chronic kidney disease)     Colon cancer 2001, s/p chemo    GERD (gastroesophageal reflux disease)     HLD (hyperlipidemia)     HTN (hypertension)     Hyponatremia     Mitral regurgitation     Pleural effusion, bilateral as of latest chest xray  s/p latest hospitalization for CHF    Prostate cancer 2006    Thrombus in heart chamber

## 2020-03-19 NOTE — ED PROVIDER NOTE - OBJECTIVE STATEMENT
88 yo male with PMHx of Afib on coumadin, CKD, HTN, HLD, Colon cancer, Prostate cancer, presents for intermittent shortness of breath, productive cough, and chest pressure. Patient reports trouble breathing at times when he is laying down at night. Patient was recently discharged from [place] after being found with urinary retention. Patient denies any lower extremity swelling, headache, nausea, or dizziness.

## 2020-03-19 NOTE — H&P ADULT - PROBLEM SELECTOR PLAN 1
pt has hx of Systolic CHF   pw SOB, worsening productive cough   ED vitals temp 98.4 HR 72, bp 101/67 spo2 97 %  CXR RLL infiltrates   CT abdomen Small bilateral pleural effusions with associated atelectasis. Mild presacral edema and trace free fluid in the pelvis,  hx of severe MR on TTE s/p 2nd mitraclip placement on 2/20  last echo in march 2020 shows EF - 40-45%  at home on lasix 20mg oral   s/p in 40mg IV lasix--> will start on IV 20 mg qd from tomorrow

## 2020-03-19 NOTE — H&P ADULT - HISTORY OF PRESENT ILLNESS
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 Pt denies any fever, chills, chest pain associated with cough, recent travels or visits from anyone with international travel. Pt states SOB is chronic is worse with sitting and improves with laying flat. Pt is non compliant with dietary salt intake. Pt has nasal congestion and stuffy nose. Pt had one episode of emesis containing phelgm today in ED. Denies any other acute complaints 89 year old male from home lives with wife walks with walker has PMH HTN, HLD, CKD, colon cancer in 2001 s/p chemo, prostate Ca in 2016 s/p RT, AFib ( on coumadin), RA thrombus found during cath in November 2018 AC changed from NOAC to Warfarin, severe MR s/p mitraclip December 26th 2018, recurrent systolic CHF with severe MR on TTE s/p 2nd mitraclip placement on February 2020 presents to the ED with 1 week history of productive cough associated with SOB, fatigue, headache. Pt denies any fever, chills, chest pain associated with cough, recent travels or visits from anyone with international travel. Pt states SOB is chronic is worse with sitting and improves with laying flat. Pt is non compliant with dietary salt intake. Pt has nasal congestion and stuffy nose. Pt had one episode of emesis containing phelgm today in ED. Denies any other acute complaints   GOC: DNR/ DNI

## 2020-03-19 NOTE — H&P ADULT - PROBLEM SELECTOR PLAN 2
pt has hx of afib   EKG showed Afib   at home on lopressor 50 bid   will start on 12.5 mg bid in the setting of borderline BP   titrate up as needed pt has hx of afib at home on lopressor 50 bid and coumadin   EKG showed Afib   INR 2.3   cw Coumadin 2 mg daily    will start on 12.5 mg bid in the setting of borderline BP   titrate up as needed  fu INR daily

## 2020-03-19 NOTE — H&P ADULT - NSICDXPASTSURGICALHX_GEN_ALL_CORE_FT
PAST SURGICAL HISTORY:  H/O colectomy     History of appendectomy     History of prostate cancer     S/P coronary angiogram 11/12/18    S/P mitral valve clip implantation 12/2018 and 2020

## 2020-03-20 ENCOUNTER — TRANSCRIPTION ENCOUNTER (OUTPATIENT)
Age: 85
End: 2020-03-20

## 2020-03-20 LAB
ALBUMIN SERPL ELPH-MCNC: 2.6 G/DL — LOW (ref 3.5–5)
ALP SERPL-CCNC: 106 U/L — SIGNIFICANT CHANGE UP (ref 40–120)
ALT FLD-CCNC: 24 U/L DA — SIGNIFICANT CHANGE UP (ref 10–60)
ANION GAP SERPL CALC-SCNC: 9 MMOL/L — SIGNIFICANT CHANGE UP (ref 5–17)
AST SERPL-CCNC: 31 U/L — SIGNIFICANT CHANGE UP (ref 10–40)
BASOPHILS # BLD AUTO: 0.03 K/UL — SIGNIFICANT CHANGE UP (ref 0–0.2)
BASOPHILS NFR BLD AUTO: 0.7 % — SIGNIFICANT CHANGE UP (ref 0–2)
BILIRUB SERPL-MCNC: 0.8 MG/DL — SIGNIFICANT CHANGE UP (ref 0.2–1.2)
BUN SERPL-MCNC: 20 MG/DL — HIGH (ref 7–18)
CALCIUM SERPL-MCNC: 8.5 MG/DL — SIGNIFICANT CHANGE UP (ref 8.4–10.5)
CHLORIDE SERPL-SCNC: 107 MMOL/L — SIGNIFICANT CHANGE UP (ref 96–108)
CHOLEST SERPL-MCNC: 112 MG/DL — SIGNIFICANT CHANGE UP (ref 10–199)
CO2 SERPL-SCNC: 23 MMOL/L — SIGNIFICANT CHANGE UP (ref 22–31)
CREAT SERPL-MCNC: 1.3 MG/DL — SIGNIFICANT CHANGE UP (ref 0.5–1.3)
CULTURE RESULTS: SIGNIFICANT CHANGE UP
EOSINOPHIL # BLD AUTO: 0.08 K/UL — SIGNIFICANT CHANGE UP (ref 0–0.5)
EOSINOPHIL NFR BLD AUTO: 1.9 % — SIGNIFICANT CHANGE UP (ref 0–6)
GLUCOSE SERPL-MCNC: 84 MG/DL — SIGNIFICANT CHANGE UP (ref 70–99)
HBA1C BLD-MCNC: 5.8 % — HIGH (ref 4–5.6)
HCT VFR BLD CALC: 29.1 % — LOW (ref 39–50)
HDLC SERPL-MCNC: 38 MG/DL — LOW
HGB BLD-MCNC: 9.5 G/DL — LOW (ref 13–17)
IMM GRANULOCYTES NFR BLD AUTO: 0.2 % — SIGNIFICANT CHANGE UP (ref 0–1.5)
INR BLD: 2.04 RATIO — HIGH (ref 0.88–1.16)
LIPID PNL WITH DIRECT LDL SERPL: 58 MG/DL — SIGNIFICANT CHANGE UP
LYMPHOCYTES # BLD AUTO: 0.82 K/UL — LOW (ref 1–3.3)
LYMPHOCYTES # BLD AUTO: 19.3 % — SIGNIFICANT CHANGE UP (ref 13–44)
MAGNESIUM SERPL-MCNC: 1.9 MG/DL — SIGNIFICANT CHANGE UP (ref 1.6–2.6)
MCHC RBC-ENTMCNC: 30.7 PG — SIGNIFICANT CHANGE UP (ref 27–34)
MCHC RBC-ENTMCNC: 32.6 GM/DL — SIGNIFICANT CHANGE UP (ref 32–36)
MCV RBC AUTO: 94.2 FL — SIGNIFICANT CHANGE UP (ref 80–100)
MONOCYTES # BLD AUTO: 0.3 K/UL — SIGNIFICANT CHANGE UP (ref 0–0.9)
MONOCYTES NFR BLD AUTO: 7.1 % — SIGNIFICANT CHANGE UP (ref 2–14)
NEUTROPHILS # BLD AUTO: 3 K/UL — SIGNIFICANT CHANGE UP (ref 1.8–7.4)
NEUTROPHILS NFR BLD AUTO: 70.8 % — SIGNIFICANT CHANGE UP (ref 43–77)
NRBC # BLD: 0 /100 WBCS — SIGNIFICANT CHANGE UP (ref 0–0)
PHOSPHATE SERPL-MCNC: 2.8 MG/DL — SIGNIFICANT CHANGE UP (ref 2.5–4.5)
PLATELET # BLD AUTO: 159 K/UL — SIGNIFICANT CHANGE UP (ref 150–400)
POTASSIUM SERPL-MCNC: 3.9 MMOL/L — SIGNIFICANT CHANGE UP (ref 3.5–5.3)
POTASSIUM SERPL-SCNC: 3.9 MMOL/L — SIGNIFICANT CHANGE UP (ref 3.5–5.3)
PROCALCITONIN SERPL-MCNC: 0.17 NG/ML — HIGH (ref 0.02–0.1)
PROT SERPL-MCNC: 6.9 G/DL — SIGNIFICANT CHANGE UP (ref 6–8.3)
PROTHROM AB SERPL-ACNC: 23.6 SEC — HIGH (ref 10–12.9)
RBC # BLD: 3.09 M/UL — LOW (ref 4.2–5.8)
RBC # FLD: 17.1 % — HIGH (ref 10.3–14.5)
SODIUM SERPL-SCNC: 139 MMOL/L — SIGNIFICANT CHANGE UP (ref 135–145)
SPECIMEN SOURCE: SIGNIFICANT CHANGE UP
TOTAL CHOLESTEROL/HDL RATIO MEASUREMENT: 2.9 RATIO — LOW (ref 3.4–9.6)
TRIGL SERPL-MCNC: 80 MG/DL — SIGNIFICANT CHANGE UP (ref 10–149)
TSH SERPL-MCNC: 3.23 UU/ML — SIGNIFICANT CHANGE UP (ref 0.34–4.82)
WBC # BLD: 4.24 K/UL — SIGNIFICANT CHANGE UP (ref 3.8–10.5)
WBC # FLD AUTO: 4.24 K/UL — SIGNIFICANT CHANGE UP (ref 3.8–10.5)

## 2020-03-20 PROCEDURE — 99233 SBSQ HOSP IP/OBS HIGH 50: CPT | Mod: GC

## 2020-03-20 PROCEDURE — 71250 CT THORAX DX C-: CPT | Mod: 26

## 2020-03-20 RX ORDER — METOPROLOL TARTRATE 50 MG
50 TABLET ORAL
Refills: 0 | Status: DISCONTINUED | OUTPATIENT
Start: 2020-03-20 | End: 2020-03-20

## 2020-03-20 RX ORDER — METOPROLOL TARTRATE 50 MG
50 TABLET ORAL
Refills: 0 | Status: DISCONTINUED | OUTPATIENT
Start: 2020-03-20 | End: 2020-03-21

## 2020-03-20 RX ORDER — FUROSEMIDE 40 MG
20 TABLET ORAL DAILY
Refills: 0 | Status: DISCONTINUED | OUTPATIENT
Start: 2020-03-21 | End: 2020-03-21

## 2020-03-20 RX ADMIN — WARFARIN SODIUM 2 MILLIGRAM(S): 2.5 TABLET ORAL at 22:09

## 2020-03-20 RX ADMIN — ATORVASTATIN CALCIUM 40 MILLIGRAM(S): 80 TABLET, FILM COATED ORAL at 22:09

## 2020-03-20 RX ADMIN — PANTOPRAZOLE SODIUM 40 MILLIGRAM(S): 20 TABLET, DELAYED RELEASE ORAL at 06:53

## 2020-03-20 RX ADMIN — SENNA PLUS 2 TABLET(S): 8.6 TABLET ORAL at 22:09

## 2020-03-20 RX ADMIN — Medication 81 MILLIGRAM(S): at 11:44

## 2020-03-20 RX ADMIN — Medication 50 MILLIGRAM(S): at 17:21

## 2020-03-20 RX ADMIN — TAMSULOSIN HYDROCHLORIDE 0.4 MILLIGRAM(S): 0.4 CAPSULE ORAL at 22:09

## 2020-03-20 NOTE — PROGRESS NOTE ADULT - SUBJECTIVE AND OBJECTIVE BOX
Patient is a 89y old  Male who presents with a chief complaint of Shortness of Breath (20 Mar 2020 16:10)      INTERVAL HPI/OVERNIGHT EVENTS: no new complaints    LATE ENTRY NOTE    Pt reports intermittent cough, though improved from the past several days. Informed of RSV findings  CT chest findings discussed w/ ID, tree in bud pattern on CT chest consistent w/ viral process. Procalcitonin level at 0.17 not supportive of bacterial infection. Discussed plan for discharge tomorrow with home service to which the patient agreed    MEDICATIONS  (STANDING):  aspirin enteric coated 81 milliGRAM(s) Oral daily  atorvastatin 40 milliGRAM(s) Oral at bedtime  furosemide    Tablet 20 milliGRAM(s) Oral daily  metoprolol tartrate 50 milliGRAM(s) Oral two times a day  pantoprazole    Tablet 40 milliGRAM(s) Oral before breakfast  senna 2 Tablet(s) Oral at bedtime  tamsulosin 0.4 milliGRAM(s) Oral at bedtime  warfarin 2 milliGRAM(s) Oral daily    MEDICATIONS  (PRN):      __________________________________________________  REVIEW OF SYSTEMS:    CONSTITUTIONAL: No fever,   RESPIRATORY:  + cough (improved)  CARDIOVASCULAR: No chest pain, no palpitations  GASTROINTESTINAL: No pain. No nausea or vomiting; No diarrhea   NEUROLOGICAL: No headache or numbness, no tremors  MUSCULOSKELETAL: No joint pain, no muscle pain    ALL OTHER  ROS negative        Vital Signs Last 24 Hrs  T(C): 36.2 (21 Mar 2020 09:21), Max: 37.5 (21 Mar 2020 00:39)  T(F): 97.1 (21 Mar 2020 09:21), Max: 99.5 (21 Mar 2020 00:39)  HR: 98 (21 Mar 2020 09:21) (82 - 110)  BP: 139/92 (21 Mar 2020 09:21) (107/60 - 139/92)  BP(mean): --  RR: 17 (21 Mar 2020 09:21) (17 - 17)  SpO2: 100% (21 Mar 2020 09:21) (100% - 100%)    ________________________________________________  PHYSICAL EXAM:  GENERAL: NAD in good spirits  HEENT: Normocephalic;  conjunctivae and sclerae clear; moist mucous membranes;   NECK : supple  CHEST/LUNG: Clear to auscultation bilaterally with good air entry   HEART: S1 S2  regular; no murmurs, gallops or rubs  ABDOMEN: Soft, Nontender, Nondistended; Bowel sounds present  EXTREMITIES: no cyanosis; no edema; no calf tenderness  SKIN: warm and dry; no rash  NERVOUS SYSTEM:  Awake and alert; Oriented  to place, person and time ; no new deficits    _________________________________________________  LABS:                        9.1    4.49  )-----------( 136      ( 21 Mar 2020 07:26 )             28.1     03-21    140  |  109<H>  |  21<H>  ----------------------------<  81  3.9   |  22  |  1.12    Ca    8.6      21 Mar 2020 07:26  Phos  2.8     03-20  Mg     1.9     03-20    TPro  6.9  /  Alb  2.6<L>  /  TBili  0.8  /  DBili  x   /  AST  31  /  ALT  24  /  AlkPhos  106  03-20    PT/INR - ( 21 Mar 2020 07:26 )   PT: 23.4 sec;   INR: 2.03 ratio             CAPILLARY BLOOD GLUCOSE            RADIOLOGY & ADDITIONAL TESTS:    Imaging Personally Reviewed:  YES/NO    Consultant(s) Notes Reviewed:   YES/ No    Care Discussed with Consultants :     Plan of care was discussed with patient and /or primary care giver; all questions and concerns were addressed and care was aligned with patient's wishes.

## 2020-03-20 NOTE — PROGRESS NOTE ADULT - PROBLEM SELECTOR PLAN 3
pleural effusion likely in setting of chronic CHF  pt does not endorse fevers, chills   CT abdomen Small bilateral pleural effusions with associated atelectasis. Mild presacral edema and trace free fluid in the pelvis

## 2020-03-20 NOTE — PROGRESS NOTE ADULT - PROBLEM SELECTOR PLAN 2
RSV infection  likely cause of acute cough  No jonathan pneumonia on dedicated CT chest. Tree in bud pattern consistent w/ RSV  supportive management, antitussive  continue with isolation for RSV

## 2020-03-20 NOTE — PROGRESS NOTE ADULT - PROBLEM SELECTOR PLAN 6
pt has hx of afib at home on lopressor 50 bid and coumadin   EKG showed Afib   INR 2.3   cw Coumadin 2 mg daily    will start on 12.5 mg bid in the setting of borderline BP   titrate up as needed  fu INR daily

## 2020-03-20 NOTE — DISCHARGE NOTE PROVIDER - NSDCMRMEDTOKEN_GEN_ALL_CORE_FT
acetaminophen 325 mg oral tablet: 2 tab(s) orally every 6 hours, As needed, Temp greater or equal to 38.5C (101.3F)  aspirin 81 mg oral delayed release tablet: 1 tab(s) orally once a day  atorvastatin 40 mg oral tablet: 1 tab(s) orally once a day (at bedtime)  Coumadin 2 mg oral tablet: 1 tab(s) orally once a day - dose to be adjusted by Dr. Cindi Suarez  Lasix 20 mg oral tablet: 1 tab(s) orally once a day   metoprolol tartrate 50 mg oral tablet: 1 tab(s) orally 2 times a day  pantoprazole 40 mg oral delayed release tablet: 1 tab(s) orally once a day (before a meal)  senna oral tablet: 2 tab(s) orally once a day (at bedtime), As Needed -for constipation   stat pt/inr levels : every monday and thursday starting thursday 3/12  Fax to Dr. Suarez   tamsulosin 0.4 mg oral capsule: 1 cap(s) orally once a day (at bedtime) aspirin 81 mg oral delayed release tablet: 1 tab(s) orally once a day  atorvastatin 40 mg oral tablet: 1 tab(s) orally once a day (at bedtime)  Coumadin 2 mg oral tablet: 1 tab(s) orally once a day - dose to be adjusted by Dr. Cindi Suarez  Lasix 20 mg oral tablet: 1 tab(s) orally once a day   metoprolol tartrate 50 mg oral tablet: 1 tab(s) orally 2 times a day  pantoprazole 40 mg oral delayed release tablet: 1 tab(s) orally once a day (before a meal)  senna oral tablet: 2 tab(s) orally once a day (at bedtime), As Needed -for constipation   stat pt/inr levels : every monday and thursday starting thursday 3/12  Fax to Dr. Suarez   tamsulosin 0.4 mg oral capsule: 1 cap(s) orally once a day (at bedtime)

## 2020-03-20 NOTE — DISCHARGE NOTE NURSING/CASE MANAGEMENT/SOCIAL WORK - PATIENT PORTAL LINK FT
You can access the FollowMyHealth Patient Portal offered by [facility] by registering at the following website: [email or web address]. By joining [email or web address]’s FollowMyHealth portal, you will also be able to view your health information using other applications (apps) compatible with our system.

## 2020-03-20 NOTE — DISCHARGE NOTE PROVIDER - CARE PROVIDER_API CALL
Dr. Hailey Jeffery, PMD  Phone: [phone]  Fax: (   )    -  Follow Up Time: 1 week Dr. Hailey Jeffery, [place]N  Phone: [phone]  Fax: (   )    -  Follow Up Time: 1 week    YOLANDA Canela  Phone: (   )    -  Fax: (   )    -  Follow Up Time:

## 2020-03-20 NOTE — CONSULT NOTE ADULT - SUBJECTIVE AND OBJECTIVE BOX
HISTORY OF PRESENT ILLNESS: HPI:  89 year old male PMH HTN, HLD, CKD, colon cancer in 2001 s/p chemo, prostate Ca in 2016 s/p RT, AFib ( on coumadin), RA thrombus (PE in transit) found during YENNY in November 2018 AC changed from NOAC to Warfarin, severe MR s/p mitraclip December 26th 2018, recurrent systolic CHF with severe MR on TTE s/p 2nd mitraclip placement on February 2020 presents to the ED with 1 week history of productive cough associated with SOB, fatigue, headache. Pt denies any fever, chills, chest pain associated with cough, recent travels or visits from anyone with international travel. Pt states SOB is chronic is worse with sitting and improves with laying flat. Pt is non compliant with dietary salt intake. Pt has nasal congestion and stuffy nose. Pt had one episode of emesis containing phelgm today in ED. 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  Pleural effusion, bilateral: as of latest chest xray  s/p latest hospitalization for CHF  CKD (chronic kidney disease)  Colon cancer: 2001, s/p chemo  Prostate cancer: 2006  Mitral regurgitation  Hyponatremia  HLD (hyperlipidemia)  HTN (hypertension)  Atrial fibrillation: On coumadin  GERD (gastroesophageal reflux disease)  S/P mitral valve clip implantation: 12/2018 and 2020  S/P coronary angiogram: 11/12/18  History of appendectomy  H/O colectomy  History of prostate cancer          MEDICATIONS:  MEDICATIONS  (STANDING):  aspirin enteric coated 81 milliGRAM(s) Oral daily  atorvastatin 40 milliGRAM(s) Oral at bedtime  furosemide   Injectable 20 milliGRAM(s) IV Push daily  metoprolol tartrate 12.5 milliGRAM(s) Oral two times a day  pantoprazole    Tablet 40 milliGRAM(s) Oral before breakfast  senna 2 Tablet(s) Oral at bedtime  tamsulosin 0.4 milliGRAM(s) Oral at bedtime  warfarin 2 milliGRAM(s) Oral daily      Allergies    penicillin (Hives)  shellfish (Hives)    Intolerances        FAMILY HISTORY:  Family history of colon cancer (Sibling)  Family history of ovarian cancer  Family history of cancer (Sibling)    Non-contributary for premature coronary disease or sudden cardiac death    SOCIAL HISTORY:    [X ] Non-smoker  [ ] Smoker  [ ] Alcohol        REVIEW OF SYSTEMS:  [ ]chest pain  [X  ]shortness of breath  [  ]palpitations  [  ]syncope  [ ]near syncope [ ]upper extremity weakness   [ ] lower extremity weakness  [  ]diplopia  [  ]altered mental status   [  ]fevers  [ ]chills [ ]nausea  [ ]vomitting  [  ]dysphagia    [ ]abdominal pain  [ ]melena  [ ]BRBPR    [  ]epistaxis  [  ]rash    [ ]lower extremity edema        [X ] All others negative	  [ ] Unable to obtain      LABS:	 	    CARDIAC MARKERS:  CARDIAC MARKERS ( 19 Mar 2020 10:50 )  <0.015 ng/mL / x     / x     / x     / x                                  9.5    4.24  )-----------( 159      ( 20 Mar 2020 07:48 )             29.1     Hb Trend: 9.5<--, 9.1<--    03-20    139  |  107  |  20<H>  ----------------------------<  84  3.9   |  23  |  1.30    Ca    8.5      20 Mar 2020 07:48  Phos  2.8     03-20  Mg     1.9     03-20    TPro  6.9  /  Alb  2.6<L>  /  TBili  0.8  /  DBili  x   /  AST  31  /  ALT  24  /  AlkPhos  106  03-20    Creatinine Trend: 1.30<--, 1.52<--, 1.69<--, 1.55<--, 1.54<--, 1.62<--    Coags:  PT/INR - ( 20 Mar 2020 07:48 )   PT: 23.6 sec;   INR: 2.04 ratio             proBNP: Serum Pro-Brain Natriuretic Peptide: 6919 pg/mL (03-19 @ 10:50)    Lipid Profile:   HgA1c: Hemoglobin A1C, Whole Blood: 5.8 % (03-20 @ 08:53)    TSH: Thyroid Stimulating Hormone, Serum: 3.23 uU/mL (03-20 @ 07:48)          PHYSICAL EXAM:  T(C): 36.7 (03-20-20 @ 08:19), Max: 37.6 (03-20-20 @ 01:17)  HR: 97 (03-20-20 @ 08:19) (60 - 100)  BP: 110/60 (03-20-20 @ 08:19) (98/61 - 138/96)  RR: 17 (03-20-20 @ 08:19) (17 - 18)  SpO2: 100% (03-20-20 @ 08:19) (97% - 100%)  Wt(kg): --   BMI (kg/m2): 18.3 (03-19-20 @ 09:33)  I&O's Summary    19 Mar 2020 07:01  -  20 Mar 2020 07:00  --------------------------------------------------------  IN: 0 mL / OUT: 1000 mL / NET: -1000 mL        Gen: Appears well in NAD  HEENT:  (-)icterus (-)pallor  CV: N S1 S2 1/6 CARL (+)2 Pulses B/l  Resp:  Clear to ausculatation B/L, normal effort  GI: (+) BS Soft, NT, ND  Lymph:  (-)Edema, (-)obvious lymphadenopathy  Skin: Warm to touch, Normal turgor  Psych: Appropriate mood and affect      ECG:  	Afib 99 BPM, Low voltage QRS    RADIOLOGY:         CXR:    < from: Xray Chest 1 View-PORTABLE IMMEDIATE (03.19.20 @ 14:53) >  New right base infiltrate.    < end of copied text >      ASSESSMENT/PLAN: 	89y Male PMH HTN, HLD, CKD, colon cancer in 2001 s/p chemo, prostate Ca in 2016 s/p RT, AFib ( on coumadin), RA thrombus (PE in transit) found during YENNY in November 2018 AC changed from NOAC to Warfarin, severe MR s/p mitraclip December 26th 2018, recurrent systolic CHF with severe MR on TTE s/p 2nd mitraclip placement on February 2020 now with moderate MR and mild to moderate LV systolic dysfunction presents to the ED with 1 week history of productive cough associated with SOB.    - Consider CT chest given Right base infiltrate ? superimposed PNA  - Not grossly fluid overloaded however the presence of pleural effusions and presacral edema is concerning.  Check albumin and prealbumin r/o third spacing given his chronically ill appearance.  - Increase lopressor back to home dose of 50 BID  - COnt coumadin INR 2-3   - Nutritional support    I once again thank you for allowing me to participate in the care of our mutual patient.  If you have any questions or concerns please do not hesitate to contact me.    Nabor Salazar MD, [facility]  BEEPER [phone]

## 2020-03-20 NOTE — PROGRESS NOTE ADULT - ASSESSMENT
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  Pt admitted for acute on chronic systolic HF exacerbation     ED vitals temp 98.4 HR 72, bp 101/67 spo2 97 %  CXR RLL infiltrates   CT abdomen Small bilateral pleural effusions with associated atelectasis. Mild presacral edema and trace free fluid in the pelvis, nonspecific

## 2020-03-20 NOTE — PROGRESS NOTE ADULT - PROBLEM SELECTOR PLAN 1
Mild symptoms which have improved  pt has hx of Systolic CHF   pw SOB, worsening productive cough   ED vitals temp 98.4 HR 72, bp 101/67 spo2 97 %  CXR RLL infiltrates   CT abdomen Small bilateral pleural effusions with associated atelectasis. Right lung base was captured on CT abd/pelvis revealing effusion, no jonathan infiltrate

## 2020-03-20 NOTE — DISCHARGE NOTE PROVIDER - NSDCFUSCHEDAPPT_GEN_ALL_CORE_FT
SHAREE SERNA ; 03/25/2020 ; NP Med [address] 25 [address] Bld  SHAREE SERNA ; 04/21/2020 ; [facility] [address] 25 [address] SHAREE Zavala ; 04/21/2020 ; [facility] ; 03/25/2020 ; NP Med [address] 25 [address] Bld  SHAREE SERNA ; 04/21/2020 ; [facility]"" Urology 95 25 Qns SHAREE Zavala ; 04/21/2020 ; \[facility]\"" [facility] ; 03/25/2020 ; NP Med [address] 25 Q[address] Bld  SHAREE SERNA ; 04/21/2020 ; [facility]rology [address] 25 Qns SHAREE Zavala ; 04/21/2020 ; [facility] ; 03/25/2020 ; NP Med [address] 25 [address] Bld  SHAREE SERNA ; 04/21/2020 ; [facility] 95 25 Qns SHAREE Zavala ; 04/21/2020 ; [facility] ; 03/25/2020 ; NP Med [address] 25 [address]  SHAREE SERNA ; 04/21/2020 ; [facility] [address] 25 Qns SHAREE Zavala ; 04/21/2020 ; [facility] ; 03/25/2020 ; NP Med [address] 25 Q[address] Bld  SHAREE SERNA ; 04/21/2020 ; [facility] 95 25 Qns SHAREE Zavala ; 04/21/2020 ; [facility] ; 03/25/2020 ; NP Med [address] 25 Q[address] Bld  SHAREE SERNA ; 04/21/2020 ; [facility] [address] 25 Qns SHAREE Zavala ; 04/21/2020 ; [facility] ; 04/21/2020 ; [facility] [address]  SHAREE SERNA ; 04/21/2020 ; [facility] [address] 25 Q[address] BlSHAREE Del Real ; 04/22/2020 ; [facility] ; 04/21/2020 ; [facility] 95 25 QSHAREE Torrez ; 04/21/2020 ; [facility] 95 25 QSHAREE Torrez ; 04/22/2020 ; [facility] Med [address] 25 Q[address] Bld

## 2020-03-20 NOTE — DISCHARGE NOTE PROVIDER - NSDCCPCAREPLAN_GEN_ALL_CORE_FT
PRINCIPAL DISCHARGE DIAGNOSIS  Diagnosis: CHF exacerbation  Assessment and Plan of Treatment: If you gain 3lbs in 3 days, or 5lbs in a week call your Health Care Provider.  Do not eat or drink foods containing more than 2000mg of salt (sodium) in your diet every day.  Call your Health Care Provider if you have any swelling or increased swelling in your feet, ankles, and/or stomach.  Take all of your medication as directed.  If you become dizzy call your Health Care Provider.        SECONDARY DISCHARGE DIAGNOSES  Diagnosis: Respiratory syncytial virus (RSV)  Assessment and Plan of Treatment: Maintain adequate hydration, nutrition, rest, and activity as tolerated.  Maintain contact precautions for total of 5 days (thru March 23).  Avoid sick contacts.  Continue with covering your cough and good hand washing practices.  Follow-up with your primary care physician within 1 week. Call for appointment.      Diagnosis: HTN (hypertension)  Assessment and Plan of Treatment: Low salt diet  Activity as tolerated.  Take all medication as prescribed.  Follow up with your medical doctor for routine blood pressure monitoring at your next visit.  Notify your doctor if you have any of the following symptoms:   Dizziness, Lightheadedness, Blurry vision, Headache, Chest pain, Shortness of breath      Diagnosis: HLD (hyperlipidemia)  Assessment and Plan of Treatment: Continue with your cholesterol medications. Eat a heart healthy diet that is low in saturated fats and salt, and includes whole grains, fruits, vegetables and lean protein  Ffollow with your primary physician or cardiologist.      Diagnosis: Atrial fibrillation  Assessment and Plan of Treatment: Atrial fibrillation is the most common heart rhythm problem & has the risk of stroke & heart attack  It helps if you control your blood pressure, not drink more than 1-2 alcohol drinks per day, cut down on caffeine, getting treatment for over active thyroid gland, & getting exercise  Call your doctor if you feel your heart racing or beating unusually, chest tightness or pain, lightheaded, faint, shortness of breath especially with exercise  It is important to take your heart medication as prescribed  You may be on anticoagulation which is very important to take as directed - you may need blood work to monitor drug levels      Diagnosis: CKD (chronic kidney disease) stage 3, GFR 30-59 ml/min  Assessment and Plan of Treatment: Avoid taking (NSAIDs) - (ex: Ibuprofen, Advil, Celebrex, Naprosyn)  Avoid taking any nephrotoxic agents (can harm kidneys) - Intravenous contrast for diagnostic testing, combination cold medications.  Have all medications adjusted for your renal function by your Health Care Provider.  Blood pressure control is important.  Take all medication as prescribed. PRINCIPAL DISCHARGE DIAGNOSIS  Diagnosis: CHF exacerbation  Assessment and Plan of Treatment: If you gain 3lbs in 3 days, or 5lbs in a week call your Health Care Provider.  Do not eat or drink foods containing more than 2000mg of salt (sodium) in your diet every day.  Call your Health Care Provider if you have any swelling or increased swelling in your feet, ankles, and/or stomach.  Take all of your medication as directed.  If you become dizzy call your Health Care Provider.        SECONDARY DISCHARGE DIAGNOSES  Diagnosis: Respiratory syncytial virus (RSV)  Assessment and Plan of Treatment: Maintain adequate hydration, nutrition, rest, and activity as tolerated.  Maintain contact precautions for total of 5 days (thru March 23).  Avoid sick contacts.  Continue with covering your cough and good hand washing practices.  Follow-up with your primary care physician within 1 week. Call for appointment.      Diagnosis: HTN (hypertension)  Assessment and Plan of Treatment: Low salt diet  Activity as tolerated.  Take all medication as prescribed.  Follow up with your medical doctor for routine blood pressure monitoring at your next visit.  Notify your doctor if you have any of the following symptoms:   Dizziness, Lightheadedness, Blurry vision, Headache, Chest pain, Shortness of breath      Diagnosis: Atrial fibrillation  Assessment and Plan of Treatment: Atrial fibrillation is the most common heart rhythm problem & has the risk of stroke & heart attack  It helps if you control your blood pressure, not drink more than 1-2 alcohol drinks per day, cut down on caffeine, getting treatment for over active thyroid gland, & getting exercise  Call your doctor if you feel your heart racing or beating unusually, chest tightness or pain, lightheaded, faint, shortness of breath especially with exercise  It is important to take your heart medication as prescribed  You may be on anticoagulation which is very important to take as directed - you may need blood work to monitor drug levels      Diagnosis: CKD (chronic kidney disease) stage 3, GFR 30-59 ml/min  Assessment and Plan of Treatment: Avoid taking (NSAIDs) - (ex: Ibuprofen, Advil, Celebrex, Naprosyn)  Avoid taking any nephrotoxic agents (can harm kidneys) - Intravenous contrast for diagnostic testing, combination cold medications.  Have all medications adjusted for your renal function by your Health Care Provider.  Blood pressure control is important.  Take all medication as prescribed.      Diagnosis: Chronic indwelling Flores catheter  Assessment and Plan of Treatment: due to urinary retention in setting of history of Prostate cancer .  Continue with medication as prescribed by your doctor.   Continue with indwelling flores catheter care .   Follow-up with Urologist Dr. Triplett for continued management of your chronic indwelling flores catheter.    Diagnosis: HLD (hyperlipidemia)  Assessment and Plan of Treatment: Continue with your cholesterol medications. Eat a heart healthy diet that is low in saturated fats and salt, and includes whole grains, fruits, vegetables and lean protein  Ffollow with your primary physician or cardiologist.

## 2020-03-20 NOTE — DISCHARGE NOTE PROVIDER - HOSPITAL COURSE
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    Pt admitted for acute on chronic systolic HF exacerbation , RSV (+). CT chest shows tree-in-bud opacity in right upper and lower . 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 Pt admitted for acute on chronic systolic HF exacerbation .CT abd/pelv shows small bilat pelural effusions with associated atelectasis. Mild presacral edema and trace free fluid in the pelvis.  Pt afebrile, no leukocytosis. Flu AB not detected. RSV (+). CXR shows right base infiltrate for which CT chest was performed to evaluate for superimposed pneumonia. CT chest shows     Mild centrilobular emphysema. Tree-in-bud parenchymal opacities in the right upper and right lower lobe consistent with an inflammatory/infectious bronchiolitis. Small bilateral pleural effusions right larger than left. Pt received IV lasix. Coumadin daily per INR, goal 2-3, Lopressor adjusted and increased to home dose 50mg BID with hold parameters. Pt seen by Cardiology.     Pt reports feeling improved, less SOB. Evaluated by PT and endorses home PT, coordinated by CM.         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 Pt admitted for acute on chronic systolic HF exacerbation .CT abd/pelv shows small bilat pelural effusions with associated atelectasis. Mild presacral edema and trace free fluid in the pelvis.  Pt afebrile, no leukocytosis. Flu AB not detected. RSV (+). CXR shows right base infiltrate for which CT chest was performed to evaluate for superimposed pneumonia. CT chest shows     Mild centrilobular emphysema. Tree-in-bud parenchymal opacities in the right upper and right lower lobe consistent with an inflammatory/infectious bronchiolitis. Small bilateral pleural effusions right larger than left. BC NGTD, UC  with normal barrera.        Pt  improved with  IV lasix. Coumadin daily per INR, goal 2-3, Lopressor adjusted and increased to home dose 50mg BID with hold parameters. Pt seen by Cardiology.     Pt reports feeling improved, less SOB., oxygenating 100% on RA.  Evaluated by PT and endorses home PT, coordinated by CM. Discussed with pt wife.         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 Pt admitted for acute on chronic systolic HF exacerbation .CT abd/pelv shows small bilat pelural effusions with associated atelectasis. Mild presacral edema and trace free fluid in the pelvis.  Pt afebrile, no leukocytosis. Flu AB not detected. RSV (+). CXR shows right base infiltrate for which CT chest was performed to evaluate for superimposed pneumonia. CT chest shows     Mild centrilobular emphysema. Tree-in-bud parenchymal opacities in the right upper and right lower lobe consistent with an inflammatory/infectious bronchiolitis. Small bilateral pleural effusions right larger than left. BC NGTD, UC  with normal barrera.        Pt  improved with  IV lasix. Coumadin daily per INR, goal 2-3, Lopressor adjusted and increased to home dose 50mg BID with hold parameters. Pt seen by Cardiology. Pt is to continue taking his home dose of coumadin and get his PT/INR checked every Monday & Thursday with the results faxed to Dr. Suarez.     Evaluated by PT and endorses home PT, coordinated by CM. Discussed with pt wife. Pt reports feeling improved, less SOB., oxygenating 100% on RA.   Pt is stable for discharge. 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 Pt admitted for acute on chronic systolic HF exacerbation .CT abd/pelv shows small bilat pelural effusions with associated atelectasis. Mild presacral edema and trace free fluid in the pelvis.  Pt afebrile, no leukocytosis. Flu AB not detected. RSV (+). CXR shows right base infiltrate for which CT chest was performed to evaluate for superimposed pneumonia. CT chest shows     Mild centrilobular emphysema. Tree-in-bud parenchymal opacities in the right upper and right lower lobe consistent with an inflammatory/infectious bronchiolitis. Small bilateral pleural effusions right larger than left. BC NGTD, UC  with normal barrera.        Pt  improved with  IV lasix. Coumadin daily per INR, goal 2-3, Lopressor adjusted and increased to home dose 50mg BID with hold parameters. Pt seen by Cardiology. Pt is to continue taking his home dose of coumadin and get his PT/INR checked every Monday & Thursday with the results faxed to Dr. Suarez.     Evaluated by PT and endorses home PT, coordinated by CM. Discussed with pt wife. Pt reports feeling improved, less SOB., oxygenating 100% on RA.   Pt has chronic Hyatt & will be returning home with his Hyatt. Pt is deemed stable for discharge. 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 Pt admitted for acute on chronic systolic HF exacerbation .CT abd/pelv shows small bilat pleural effusions with associated atelectasis. Mild presacral edema and trace free fluid in the pelvis.  Pt afebrile, no leukocytosis. Flu AB not detected. RSV (+). CXR shows right base infiltrate for which CT chest was performed to evaluate for superimposed pneumonia. CT chest shows     Mild centrilobular emphysema. Tree-in-bud parenchymal opacities in the right upper and right lower lobe consistent with an inflammatory/infectious bronchiolitis. Small bilateral pleural effusions right larger than left. BC NGTD, UC  with normal barrera.        Pt  improved with  IV lasix. Coumadin daily per INR, goal 2-3, Lopressor adjusted and increased to home dose 50mg BID with hold parameters. Pt seen by Cardiology. Pt is to continue taking his home dose of coumadin and get his PT/INR checked every Monday & Thursday with the results faxed to Dr. Suarez.     Evaluated by PT and endorses home PT, coordinated by CM. Discussed with pt wife. Pt reports feeling improved, less SOB., oxygenating 100% on RA.   Pt has chronic Hyatt & will be returning home with his Hyatt. Pt is deemed stable for discharge.        Pt seen and examined. Agree with above.     Dr Sandoval

## 2020-03-20 NOTE — DISCHARGE NOTE PROVIDER - PROVIDER TOKENS
FREE:[LAST:[Dr. Hailey Jeffery],FIRST:[PMD],PHONE:[phone]],FAX:[(   )    -],FOLLOWUP:[1 week]] FREE:[LAST:[Dr. Hailey Jeffery],FIRST:[PMD],PHONE:[phone]],FAX:[(   )    -],FOLLOWUP:[1 week]],FREE:[LAST:[Dr. Triplett],FIRST:[],PHONE:[(   )    -],FAX:[(   )    -]]

## 2020-03-20 NOTE — CHART NOTE - NSCHARTNOTEFT_GEN_A_CORE
EVENT:      88 y/o male from home, ambulates with walker with PMH HTN, HLD, CKD, colon cancer in 2001, s/p chemo, prostate Ca, in 2016 s/p RT, AFib ( on coumadin), RA, recurrent systolic CHF. Patient test for RSV was positive (+).  OBJECTIVE:  Vital Signs Last 24 Hrs  T(C): 36.9 (19 Mar 2020 16:43), Max: 37.1 (19 Mar 2020 13:37)  T(F): 98.5 (19 Mar 2020 16:43), Max: 98.8 (19 Mar 2020 13:37)  HR: 92 (19 Mar 2020 16:43) (72 - 92)  BP: 125/87 (19 Mar 2020 16:43) (101/67 - 129/79)  BP(mean): --  RR: 17 (19 Mar 2020 16:43) (17 - 18)  SpO2: 100% (19 Mar 2020 16:43) (97% - 100%)    FOCUSED PHYSICAL EXAM:    LABS:                        9.1    3.98  )-----------( 151      ( 19 Mar 2020 10:50 )             28.1   CARDIAC MARKERS ( 19 Mar 2020 10:50 )  <0.015 ng/mL / x     / x     / x     / x        03-19    139  |  107  |  19<H>  ----------------------------<  93  4.3   |  24  |  1.52<H>    Ca    8.7      19 Mar 2020 10:50    TPro  6.7  /  Alb  2.6<L>  /  TBili  0.7  /  DBili  x   /  AST  29  /  ALT  23  /  AlkPhos  99  03-19      EKG:   PLAN: Isolation precaution - contact for RSV - 3/19/20.    FOLLOW UP / RESULT:

## 2020-03-21 VITALS — DIASTOLIC BLOOD PRESSURE: 78 MMHG | HEART RATE: 99 BPM | OXYGEN SATURATION: 99 % | SYSTOLIC BLOOD PRESSURE: 132 MMHG

## 2020-03-21 DIAGNOSIS — N17.9 ACUTE KIDNEY FAILURE, UNSPECIFIED: ICD-10-CM

## 2020-03-21 DIAGNOSIS — B34.9 VIRAL INFECTION, UNSPECIFIED: ICD-10-CM

## 2020-03-21 LAB
ANION GAP SERPL CALC-SCNC: 9 MMOL/L — SIGNIFICANT CHANGE UP (ref 5–17)
BUN SERPL-MCNC: 21 MG/DL — HIGH (ref 7–18)
CALCIUM SERPL-MCNC: 8.6 MG/DL — SIGNIFICANT CHANGE UP (ref 8.4–10.5)
CHLORIDE SERPL-SCNC: 109 MMOL/L — HIGH (ref 96–108)
CO2 SERPL-SCNC: 22 MMOL/L — SIGNIFICANT CHANGE UP (ref 22–31)
CREAT SERPL-MCNC: 1.12 MG/DL — SIGNIFICANT CHANGE UP (ref 0.5–1.3)
GLUCOSE SERPL-MCNC: 81 MG/DL — SIGNIFICANT CHANGE UP (ref 70–99)
HCT VFR BLD CALC: 28.1 % — LOW (ref 39–50)
HGB BLD-MCNC: 9.1 G/DL — LOW (ref 13–17)
INR BLD: 2.03 RATIO — HIGH (ref 0.88–1.16)
MCHC RBC-ENTMCNC: 30.2 PG — SIGNIFICANT CHANGE UP (ref 27–34)
MCHC RBC-ENTMCNC: 32.4 GM/DL — SIGNIFICANT CHANGE UP (ref 32–36)
MCV RBC AUTO: 93.4 FL — SIGNIFICANT CHANGE UP (ref 80–100)
NRBC # BLD: 0 /100 WBCS — SIGNIFICANT CHANGE UP (ref 0–0)
PLATELET # BLD AUTO: 136 K/UL — LOW (ref 150–400)
POTASSIUM SERPL-MCNC: 3.9 MMOL/L — SIGNIFICANT CHANGE UP (ref 3.5–5.3)
POTASSIUM SERPL-SCNC: 3.9 MMOL/L — SIGNIFICANT CHANGE UP (ref 3.5–5.3)
PROTHROM AB SERPL-ACNC: 23.4 SEC — HIGH (ref 10–12.9)
RBC # BLD: 3.01 M/UL — LOW (ref 4.2–5.8)
RBC # FLD: 17.1 % — HIGH (ref 10.3–14.5)
SODIUM SERPL-SCNC: 140 MMOL/L — SIGNIFICANT CHANGE UP (ref 135–145)
WBC # BLD: 4.49 K/UL — SIGNIFICANT CHANGE UP (ref 3.8–10.5)
WBC # FLD AUTO: 4.49 K/UL — SIGNIFICANT CHANGE UP (ref 3.8–10.5)

## 2020-03-21 PROCEDURE — 96374 THER/PROPH/DIAG INJ IV PUSH: CPT

## 2020-03-21 PROCEDURE — 84443 ASSAY THYROID STIM HORMONE: CPT

## 2020-03-21 PROCEDURE — 87086 URINE CULTURE/COLONY COUNT: CPT

## 2020-03-21 PROCEDURE — 71045 X-RAY EXAM CHEST 1 VIEW: CPT

## 2020-03-21 PROCEDURE — 83605 ASSAY OF LACTIC ACID: CPT

## 2020-03-21 PROCEDURE — 87631 RESP VIRUS 3-5 TARGETS: CPT

## 2020-03-21 PROCEDURE — 81003 URINALYSIS AUTO W/O SCOPE: CPT

## 2020-03-21 PROCEDURE — 84484 ASSAY OF TROPONIN QUANT: CPT

## 2020-03-21 PROCEDURE — 83036 HEMOGLOBIN GLYCOSYLATED A1C: CPT

## 2020-03-21 PROCEDURE — 36415 COLL VENOUS BLD VENIPUNCTURE: CPT

## 2020-03-21 PROCEDURE — 83735 ASSAY OF MAGNESIUM: CPT

## 2020-03-21 PROCEDURE — 74176 CT ABD & PELVIS W/O CONTRAST: CPT

## 2020-03-21 PROCEDURE — 84100 ASSAY OF PHOSPHORUS: CPT

## 2020-03-21 PROCEDURE — 71250 CT THORAX DX C-: CPT

## 2020-03-21 PROCEDURE — 87040 BLOOD CULTURE FOR BACTERIA: CPT

## 2020-03-21 PROCEDURE — 85730 THROMBOPLASTIN TIME PARTIAL: CPT

## 2020-03-21 PROCEDURE — 84145 PROCALCITONIN (PCT): CPT

## 2020-03-21 PROCEDURE — 99238 HOSP IP/OBS DSCHRG MGMT 30/<: CPT | Mod: GC

## 2020-03-21 PROCEDURE — 80048 BASIC METABOLIC PNL TOTAL CA: CPT

## 2020-03-21 PROCEDURE — 80061 LIPID PANEL: CPT

## 2020-03-21 PROCEDURE — 83880 ASSAY OF NATRIURETIC PEPTIDE: CPT

## 2020-03-21 PROCEDURE — 99285 EMERGENCY DEPT VISIT HI MDM: CPT | Mod: 25

## 2020-03-21 PROCEDURE — 85610 PROTHROMBIN TIME: CPT

## 2020-03-21 PROCEDURE — 93005 ELECTROCARDIOGRAM TRACING: CPT

## 2020-03-21 PROCEDURE — 85027 COMPLETE CBC AUTOMATED: CPT

## 2020-03-21 PROCEDURE — 80053 COMPREHEN METABOLIC PANEL: CPT

## 2020-03-21 RX ADMIN — Medication 81 MILLIGRAM(S): at 12:28

## 2020-03-21 RX ADMIN — Medication 20 MILLIGRAM(S): at 06:34

## 2020-03-21 RX ADMIN — PANTOPRAZOLE SODIUM 40 MILLIGRAM(S): 20 TABLET, DELAYED RELEASE ORAL at 06:34

## 2020-03-21 RX ADMIN — Medication 50 MILLIGRAM(S): at 06:34

## 2020-03-21 NOTE — PROGRESS NOTE ADULT - ATTENDING COMMENTS
CARDIOLOGY ATTENDING    Agree with above. Continue beta blockers and anticoagulation for AF. Management of PNA as per medicine. No further inpatient cardiac workup expected.

## 2020-03-21 NOTE — PROGRESS NOTE ADULT - SUBJECTIVE AND OBJECTIVE BOX
Subjective: pt seen and examined, no complaints, ROS - .          aspirin enteric coated 81 milliGRAM(s) Oral daily  atorvastatin 40 milliGRAM(s) Oral at bedtime  furosemide    Tablet 20 milliGRAM(s) Oral daily  metoprolol tartrate 50 milliGRAM(s) Oral two times a day  pantoprazole    Tablet 40 milliGRAM(s) Oral before breakfast  senna 2 Tablet(s) Oral at bedtime  tamsulosin 0.4 milliGRAM(s) Oral at bedtime  warfarin 2 milliGRAM(s) Oral daily                            9.5    4.24  )-----------( 159      ( 20 Mar 2020 07:48 )             29.1       Hemoglobin: 9.5 g/dL (03-20 @ 07:48)  Hemoglobin: 9.1 g/dL (03-19 @ 10:50)      03-20    139  |  107  |  20<H>  ----------------------------<  84  3.9   |  23  |  1.30    Ca    8.5      20 Mar 2020 07:48  Phos  2.8     03-20  Mg     1.9     03-20    TPro  6.9  /  Alb  2.6<L>  /  TBili  0.8  /  DBili  x   /  AST  31  /  ALT  24  /  AlkPhos  106  03-20    Creatinine Trend: 1.30<--, 1.52<--, 1.69<--, 1.55<--, 1.54<--, 1.62<--    COAGS: PT/INR - ( 21 Mar 2020 07:26 )   PT: 23.4 sec;   INR: 2.03 ratio             CARDIAC MARKERS ( 19 Mar 2020 10:50 )  <0.015 ng/mL / x     / x     / x     / x            T(C): 36.7 (03-21-20 @ 06:01), Max: 37.5 (03-21-20 @ 00:39)  HR: 82 (03-21-20 @ 06:37) (82 - 110)  BP: 121/77 (03-21-20 @ 06:37) (107/60 - 128/95)  RR: 17 (03-21-20 @ 00:39) (17 - 17)  SpO2: 100% (03-21-20 @ 00:39) (100% - 100%)  Wt(kg): --    I&O's Summary    20 Mar 2020 07:01  -  21 Mar 2020 07:00  --------------------------------------------------------  IN: 0 mL / OUT: 150 mL / NET: -150 mL       Gen: Appears well in NAD  HEENT:  (-)icterus (-)pallor  CV: N S1 S2 1/6 CARL (+)2 Pulses B/l  Resp:  Clear to ausculatation B/L, normal effort  GI: (+) BS Soft, NT, ND  Lymph:  (-)Edema, (-)obvious lymphadenopathy  Skin: Warm to touch, Normal turgor  Psych: Appropriate mood and affect      ECG:  	Afib 99 BPM, Low voltage QRS    RADIOLOGY:         CXR:    < from: Xray Chest 1 View-PORTABLE IMMEDIATE (03.19.20 @ 14:53) >  New right base infiltrate.    < end of copied text >    CT chest :, < from: CT Chest No Cont (03.20.20 @ 14:22) >    IMPRESSION:     Mild centrilobular emphysema.  Tree-in-bud parenchymal opacities in the right upper and right lower lobe consistent with an inflammatory/infectious bronchiolitis.  Small bilateral pleural effusions right larger than left.  Additional findings as discussed    < end of copied text >      ASSESSMENT/PLAN: 	89y Male PMH HTN, HLD, CKD, colon cancer in 2001 s/p chemo, prostate Ca in 2016 s/p RT, AFib ( on coumadin), RA thrombus (PE in transit) found during YENNY in November 2018 AC changed from NOAC to Warfarin, severe MR s/p mitraclip December 26th 2018, recurrent systolic CHF with severe MR on TTE s/p 2nd mitraclip placement on February 2020 now with moderate MR and mild to moderate LV systolic dysfunction presents to the ED with 1 week history of productive cough associated with SOB.     - tolerating BB ( home dose )   - keep net neg with lasix at present   - CT chest noted , + effusion   - Cont coumadin INR 2-3   - Nutritional support

## 2020-03-25 DIAGNOSIS — R05 COUGH: ICD-10-CM

## 2020-04-09 ENCOUNTER — RX RENEWAL (OUTPATIENT)
Age: 85
End: 2020-04-09

## 2020-04-24 ENCOUNTER — LABORATORY RESULT (OUTPATIENT)
Age: 85
End: 2020-04-24

## 2020-04-28 ENCOUNTER — APPOINTMENT (OUTPATIENT)
Dept: UROLOGY | Facility: CLINIC | Age: 85
End: 2020-04-28
Payer: MEDICARE

## 2020-04-28 VITALS
RESPIRATION RATE: 13 BRPM | DIASTOLIC BLOOD PRESSURE: 67 MMHG | SYSTOLIC BLOOD PRESSURE: 109 MMHG | HEART RATE: 44 BPM | TEMPERATURE: 97.1 F

## 2020-04-28 PROCEDURE — 51702 INSERT TEMP BLADDER CATH: CPT

## 2020-04-28 PROCEDURE — 99214 OFFICE O/P EST MOD 30 MIN: CPT | Mod: 25

## 2020-04-28 NOTE — ASSESSMENT
[FreeTextEntry1] : Very pleasant 89-year-old gentleman who presents for followup of urinary retention\par -After a thorough discussion of the risks and benefits, patient is requesting a repeat trial of void in the office today\par -Fill and pull trial of void performed in the office today\par -Patient was able to urinate intermittently with a PVR of approximately 180 cc. Hyatt catheter was replaced with return of approximately 140 cc of urine. Given the low amounts of urine remaining his bladder, his wife is in agreement to take the catheter out tonight and he will followup tomorrow morning for a trial void.

## 2020-04-28 NOTE — PHYSICAL EXAM
[General Appearance - Well Developed] : well developed [General Appearance - Well Nourished] : well nourished [Normal Appearance] : normal appearance [Well Groomed] : well groomed [General Appearance - In No Acute Distress] : no acute distress [Abdomen Soft] : soft [Abdomen Tenderness] : non-tender [Costovertebral Angle Tenderness] : no ~M costovertebral angle tenderness [Urethral Meatus] : meatus normal [Urinary Bladder Findings] : the bladder was normal on palpation [Scrotum] : the scrotum was normal [Testes Mass (___cm)] : there were no testicular masses [] : no respiratory distress [Edema] : no peripheral edema [Respiration, Rhythm And Depth] : normal respiratory rhythm and effort [Exaggerated Use Of Accessory Muscles For Inspiration] : no accessory muscle use [Oriented To Time, Place, And Person] : oriented to person, place, and time [Affect] : the affect was normal [Mood] : the mood was normal [Not Anxious] : not anxious [Normal Station and Gait] : the gait and station were normal for the patient's age [No Focal Deficits] : no focal deficits [No Palpable Adenopathy] : no palpable adenopathy [FreeTextEntry1] : flores with clear yellow urine

## 2020-04-28 NOTE — HISTORY OF PRESENT ILLNESS
[Urinary Retention] : urinary retention [Nocturia] : nocturia [Weak Stream] : weak stream [FreeTextEntry1] : Very pleasant 89-year-old gentleman who presents for follow up of urinary retention. Patient previously had Hyatt catheter placed while he was in the hospital when he was unable to urinate. He reports no problems with catheter. He underwent trial of void last month and was unable to urinate and catheter was replaced. He denies dysuria. No hematuria. No flank pain or suprapubic pain. No other complaints. He has been taking tamsulosin as advised. He was scheduled to undergo a Hyatt catheter change today, however he is requesting a trial of void. He understands the risks of a trial of void. [Urinary Urgency] : no urinary urgency [Urinary Frequency] : no urinary frequency [Straining] : no straining [Intermittency] : no intermittency [Dysuria] : no dysuria [Hematuria - Gross] : no gross hematuria [Bladder Spasm] : no bladder spasm [Abdominal Pain] : no abdominal pain [Flank Pain] : no flank pain [Fever] : no fever [Fatigue] : no fatigue [Nausea] : no nausea [Anorexia] : no anorexia

## 2020-04-29 ENCOUNTER — APPOINTMENT (OUTPATIENT)
Dept: UROLOGY | Facility: CLINIC | Age: 85
End: 2020-04-29
Payer: MEDICARE

## 2020-04-29 PROCEDURE — 99213 OFFICE O/P EST LOW 20 MIN: CPT

## 2020-04-29 NOTE — HISTORY OF PRESENT ILLNESS
[FreeTextEntry1] : Pleasant 89-year-old gentleman who presents for followup of urinary retention. Patient's wife removed his catheter last night and she reports he urinated 3 times since then. He feels like he is urinating well. He denies dysuria. No hematuria. He feels like he is emptying his bladder. No other complaints. [Urinary Retention] : urinary retention [Urinary Urgency] : no urinary urgency [Straining] : no straining [Weak Stream] : weak stream [Nocturia] : nocturia [Urinary Frequency] : no urinary frequency [Intermittency] : no intermittency [Dysuria] : no dysuria [Hematuria - Gross] : no gross hematuria [Bladder Spasm] : no bladder spasm [Abdominal Pain] : no abdominal pain [Flank Pain] : no flank pain [Fever] : no fever [Fatigue] : no fatigue [Nausea] : no nausea [Anorexia] : no anorexia

## 2020-04-29 NOTE — ASSESSMENT
[FreeTextEntry1] : Very pleasant 89-year-old gentleman who presents for followup of urinary retention\par -Patient reports that he is urinating since having Hyatt catheter removed\par -\par -Followup in approximately one week for repeat PVR\par -Patient will call the office or come in immediately if he experiences symptoms of urinary retention\par -We had an extensive discussion regarding the risks and symptoms of urinary retention

## 2020-04-29 NOTE — PHYSICAL EXAM
[General Appearance - Well Developed] : well developed [Normal Appearance] : normal appearance [General Appearance - Well Nourished] : well nourished [Well Groomed] : well groomed [General Appearance - In No Acute Distress] : no acute distress [Abdomen Soft] : soft [Abdomen Tenderness] : non-tender [Costovertebral Angle Tenderness] : no ~M costovertebral angle tenderness [Urethral Meatus] : meatus normal [Urinary Bladder Findings] : the bladder was normal on palpation [Scrotum] : the scrotum was normal [Testes Mass (___cm)] : there were no testicular masses [FreeTextEntry1] :  [] : no respiratory distress [Edema] : no peripheral edema [Respiration, Rhythm And Depth] : normal respiratory rhythm and effort [Oriented To Time, Place, And Person] : oriented to person, place, and time [Affect] : the affect was normal [Exaggerated Use Of Accessory Muscles For Inspiration] : no accessory muscle use [Not Anxious] : not anxious [Mood] : the mood was normal [Normal Station and Gait] : the gait and station were normal for the patient's age [No Focal Deficits] : no focal deficits [No Palpable Adenopathy] : no palpable adenopathy

## 2020-05-01 ENCOUNTER — LABORATORY RESULT (OUTPATIENT)
Age: 85
End: 2020-05-01

## 2020-05-08 ENCOUNTER — LABORATORY RESULT (OUTPATIENT)
Age: 85
End: 2020-05-08

## 2020-05-12 ENCOUNTER — APPOINTMENT (OUTPATIENT)
Dept: UROLOGY | Facility: CLINIC | Age: 85
End: 2020-05-12
Payer: MEDICARE

## 2020-05-12 VITALS — TEMPERATURE: 96.9 F | HEART RATE: 85 BPM | SYSTOLIC BLOOD PRESSURE: 83 MMHG | DIASTOLIC BLOOD PRESSURE: 52 MMHG

## 2020-05-12 DIAGNOSIS — R33.9 RETENTION OF URINE, UNSPECIFIED: ICD-10-CM

## 2020-05-12 PROCEDURE — 99213 OFFICE O/P EST LOW 20 MIN: CPT

## 2020-05-12 NOTE — HISTORY OF PRESENT ILLNESS
[FreeTextEntry1] : Very pleasant 89-year-old gentleman who presents for followup of BPH with urinary retention. Patient underwent a trial of void approximately 2 weeks ago. He was able to urinate after this. In the last 2 weeks he reports that he continues to urinate well. He denies dysuria. No hematuria. No feeling of incomplete bladder emptying. No other complaints. [Urinary Retention] : no urinary retention [Urinary Urgency] : no urinary urgency [Urinary Frequency] : no urinary frequency [Nocturia] : nocturia [Straining] : no straining [Weak Stream] : weak stream [Intermittency] : no intermittency [Dysuria] : no dysuria [Hematuria - Gross] : no gross hematuria [Bladder Spasm] : no bladder spasm [Flank Pain] : no flank pain [Abdominal Pain] : no abdominal pain [Fatigue] : no fatigue [Fever] : no fever [Nausea] : no nausea [Anorexia] : no anorexia

## 2020-05-12 NOTE — PHYSICAL EXAM
[General Appearance - Well Developed] : well developed [General Appearance - Well Nourished] : well nourished [Normal Appearance] : normal appearance [Well Groomed] : well groomed [General Appearance - In No Acute Distress] : no acute distress [Abdomen Soft] : soft [Abdomen Tenderness] : non-tender [Costovertebral Angle Tenderness] : no ~M costovertebral angle tenderness [Urethral Meatus] : meatus normal [Urinary Bladder Findings] : the bladder was normal on palpation [Scrotum] : the scrotum was normal [Testes Mass (___cm)] : there were no testicular masses [FreeTextEntry1] :  [] : no respiratory distress [Edema] : no peripheral edema [Respiration, Rhythm And Depth] : normal respiratory rhythm and effort [Exaggerated Use Of Accessory Muscles For Inspiration] : no accessory muscle use [Oriented To Time, Place, And Person] : oriented to person, place, and time [Affect] : the affect was normal [Mood] : the mood was normal [Not Anxious] : not anxious [Normal Station and Gait] : the gait and station were normal for the patient's age [No Focal Deficits] : no focal deficits [No Palpable Adenopathy] : no palpable adenopathy

## 2020-05-12 NOTE — ASSESSMENT
[FreeTextEntry1] : Very pleasant 89-year-old gentleman who presents for followup of BPH, urinary retention\par -PVR today 259\par -We again discussed a normal postvoid residual volume\par -We discussed risks of incomplete bladder emptying\par -We discussed options for management moving forward, including placement of a Hyatt catheter, suprapubic tube placement, spontaneous voiding\par -After thorough discussion of the risks and benefits, he would like to continue to void spontaneously without a catheter\par -continue tamsulosin\par -Follow up in 3 months

## 2020-05-18 LAB
INR PPP: 2.03 RATIO
PT BLD: 23.6 SEC

## 2020-05-28 LAB
INR PPP: 2.02 RATIO
PT BLD: 23.3 SEC

## 2020-06-01 ENCOUNTER — APPOINTMENT (OUTPATIENT)
Dept: INTERNAL MEDICINE | Facility: CLINIC | Age: 85
End: 2020-06-01
Payer: MEDICARE

## 2020-06-01 ENCOUNTER — APPOINTMENT (OUTPATIENT)
Dept: INTERNAL MEDICINE | Facility: CLINIC | Age: 85
End: 2020-06-01

## 2020-06-01 LAB
INR PPP: 2.07 RATIO
PT BLD: 23.9 SEC

## 2020-06-01 PROCEDURE — 99442: CPT | Mod: 95

## 2020-06-05 ENCOUNTER — LABORATORY RESULT (OUTPATIENT)
Age: 85
End: 2020-06-05

## 2020-06-11 ENCOUNTER — LABORATORY RESULT (OUTPATIENT)
Age: 85
End: 2020-06-11

## 2020-06-14 LAB
ALBUMIN SERPL ELPH-MCNC: 3.8 G/DL
ALP BLD-CCNC: 105 U/L
ALT SERPL-CCNC: 42 U/L
ANION GAP SERPL CALC-SCNC: 14 MMOL/L
AST SERPL-CCNC: 57 U/L
BASOPHILS # BLD AUTO: 0.06 K/UL
BASOPHILS NFR BLD AUTO: 1.7 %
BILIRUB SERPL-MCNC: 0.6 MG/DL
BUN SERPL-MCNC: 27 MG/DL
CALCIUM SERPL-MCNC: 9.4 MG/DL
CHLORIDE SERPL-SCNC: 107 MMOL/L
CHOLEST SERPL-MCNC: 127 MG/DL
CHOLEST/HDLC SERPL: 2.6 RATIO
CO2 SERPL-SCNC: 23 MMOL/L
CREAT SERPL-MCNC: 1.48 MG/DL
EOSINOPHIL # BLD AUTO: 0.35 K/UL
EOSINOPHIL NFR BLD AUTO: 9.9 %
GLUCOSE SERPL-MCNC: 55 MG/DL
HCT VFR BLD CALC: 32.9 %
HDLC SERPL-MCNC: 49 MG/DL
HGB BLD-MCNC: 10.1 G/DL
IMM GRANULOCYTES NFR BLD AUTO: 0.3 %
LDLC SERPL CALC-MCNC: 67 MG/DL
LYMPHOCYTES # BLD AUTO: 1.2 K/UL
LYMPHOCYTES NFR BLD AUTO: 33.8 %
MAN DIFF?: NORMAL
MCHC RBC-ENTMCNC: 30.7 GM/DL
MCHC RBC-ENTMCNC: 31.1 PG
MCV RBC AUTO: 101.2 FL
MONOCYTES # BLD AUTO: 0.18 K/UL
MONOCYTES NFR BLD AUTO: 5.1 %
NEUTROPHILS # BLD AUTO: 1.75 K/UL
NEUTROPHILS NFR BLD AUTO: 49.2 %
PLATELET # BLD AUTO: 83 K/UL
POTASSIUM SERPL-SCNC: 4.4 MMOL/L
PROT SERPL-MCNC: 6.8 G/DL
RBC # BLD: 3.25 M/UL
RBC # FLD: 17.9 %
SODIUM SERPL-SCNC: 144 MMOL/L
TRIGL SERPL-MCNC: 54 MG/DL
WBC # FLD AUTO: 3.55 K/UL

## 2020-06-18 ENCOUNTER — LABORATORY RESULT (OUTPATIENT)
Age: 85
End: 2020-06-18

## 2020-06-22 LAB
ANION GAP SERPL CALC-SCNC: 12 MMOL/L
BASOPHILS # BLD AUTO: 0.04 K/UL
BASOPHILS NFR BLD AUTO: 0.9 %
BUN SERPL-MCNC: 21 MG/DL
CALCIUM SERPL-MCNC: 9.1 MG/DL
CHLORIDE SERPL-SCNC: 104 MMOL/L
CO2 SERPL-SCNC: 24 MMOL/L
CREAT SERPL-MCNC: 1.41 MG/DL
EOSINOPHIL # BLD AUTO: 0.29 K/UL
EOSINOPHIL NFR BLD AUTO: 6.6 %
FOLATE SERPL-MCNC: 13.4 NG/ML
GLUCOSE SERPL-MCNC: 105 MG/DL
HCT VFR BLD CALC: 29.6 %
HCYS SERPL-MCNC: 16.5 UMOL/L
HGB BLD-MCNC: 9.2 G/DL
IMM GRANULOCYTES NFR BLD AUTO: 0.2 %
LYMPHOCYTES # BLD AUTO: 1.07 K/UL
LYMPHOCYTES NFR BLD AUTO: 24.2 %
MAN DIFF?: NORMAL
MCHC RBC-ENTMCNC: 30.8 PG
MCHC RBC-ENTMCNC: 31.1 GM/DL
MCV RBC AUTO: 99 FL
METHYLMALONATE SERPL-SCNC: 225 NMOL/L
MONOCYTES # BLD AUTO: 0.28 K/UL
MONOCYTES NFR BLD AUTO: 6.3 %
NEUTROPHILS # BLD AUTO: 2.73 K/UL
NEUTROPHILS NFR BLD AUTO: 61.8 %
PLATELET # BLD AUTO: 88 K/UL
POTASSIUM SERPL-SCNC: 4.2 MMOL/L
RBC # BLD: 2.99 M/UL
RBC # FLD: 17.1 %
SODIUM SERPL-SCNC: 141 MMOL/L
VIT B12 SERPL-MCNC: 675 PG/ML
WBC # FLD AUTO: 4.42 K/UL

## 2020-06-25 ENCOUNTER — LABORATORY RESULT (OUTPATIENT)
Age: 85
End: 2020-06-25

## 2020-07-02 ENCOUNTER — LABORATORY RESULT (OUTPATIENT)
Age: 85
End: 2020-07-02

## 2020-07-05 ENCOUNTER — RX RENEWAL (OUTPATIENT)
Age: 85
End: 2020-07-05

## 2020-07-08 ENCOUNTER — APPOINTMENT (OUTPATIENT)
Dept: INTERNAL MEDICINE | Facility: CLINIC | Age: 85
End: 2020-07-08
Payer: MEDICARE

## 2020-07-08 PROCEDURE — 99442: CPT | Mod: GV

## 2020-07-08 NOTE — HEALTH RISK ASSESSMENT
[No] : In the past 12 months have you used drugs other than those required for medical reasons? No [Two or more falls in past year] : Patient reported two or more falls in the past year [0] : 2) Feeling down, depressed, or hopeless: Not at all (0) [Patient reported colonoscopy was normal] : Patient reported colonoscopy was normal [Behavior] : difficulty with behavior [Learning/Retaining New Information] : difficulty learning/retaining new information [Reasoning] : difficulty with reasoning [Spatial Ability and Orientation] : difficulty with spatial ability and orientation [None] : None [Alone] : lives alone [Retired] : retired [] :  [Feels Safe at Home] : Feels safe at home [] : No [Change in mental status noted] : No change in mental status noted [Language] : denies difficulty with language [Handling Complex Tasks] : denies difficulty handling complex tasks [High Risk Behavior] : no high risk behavior [Sexually Active] : not sexually active [de-identified] : need help with ADLs

## 2020-07-08 NOTE — REVIEW OF SYSTEMS
[Fever] : no fever [Chills] : no chills [Wheezing] : no wheezing [Shortness Of Breath] : no shortness of breath [Abdominal Pain] : no abdominal pain [Cough] : no cough [Nausea] : no nausea [Vomiting] : no vomiting [Constipation] : no constipation [Melena] : no melena [Heartburn] : no heartburn [Dysuria] : no dysuria [Hematuria] : no hematuria

## 2020-07-08 NOTE — HEALTH RISK ASSESSMENT
[No] : In the past 12 months have you used drugs other than those required for medical reasons? No [Two or more falls in past year] : Patient reported two or more falls in the past year [0] : 1) Little interest or pleasure doing things: Not at all (0) [Patient reported colonoscopy was normal] : Patient reported colonoscopy was normal [Behavior] : difficulty with behavior [Learning/Retaining New Information] : difficulty learning/retaining new information [Reasoning] : difficulty with reasoning [Spatial Ability and Orientation] : difficulty with spatial ability and orientation [Alone] : lives alone [Retired] : retired [] :  [Feels Safe at Home] : Feels safe at home [Access to Medications] : access to medications [Transportation] : transportation [] : No [Change in mental status noted] : No change in mental status noted [Handling Complex Tasks] : denies difficulty handling complex tasks [Language] : denies difficulty with language [Sexually Active] : not sexually active [High Risk Behavior] : no high risk behavior [de-identified] : need help with ADLs

## 2020-07-08 NOTE — HISTORY OF PRESENT ILLNESS
[Other: _____] : [unfilled] [de-identified] : This visit was provided via telephone. The patient, SHAREE SERNA , was located at home,[address]\[place] [address] , at the time of the visit. \par The provider,SARA NAGEL , was located at his medical office located in  at the time of the visit. The patient, and Provider participated in the telehealth encounter. 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 pt is current followed by palliative team from his insurance and has visiting RN visit;\par \par has not follow up with cardio

## 2020-07-08 NOTE — REVIEW OF SYSTEMS
[Fever] : no fever [Chills] : no chills [Wheezing] : no wheezing [Shortness Of Breath] : no shortness of breath [Cough] : no cough [Abdominal Pain] : no abdominal pain [Nausea] : no nausea [Constipation] : no constipation [Vomiting] : no vomiting [Heartburn] : no heartburn [Melena] : no melena [Dysuria] : no dysuria

## 2020-07-08 NOTE — HISTORY OF PRESENT ILLNESS
[Other: _____] : [unfilled] [de-identified] : This visit was provided via telephone. The patient, SHAREE SERNA , was located at home,[address]\[place] [address] , at the time of the visit. \par The provider,SARA NAGEL , was located at his medical office located in  at the time of the visit. The patient, and Provider participated in the telephone encounter. 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 pt is current followed by palliative team from his insurance and has visiting RN visit; RN visit every week and per wife; his vital is stable; \par \par pt recently saw cardio in 07/2020 and per wife, no further aggressive measurement for now; and will follow up with cardio in 3 months in 10/2020\par \par has not follow up with Hematologist yet

## 2020-07-09 ENCOUNTER — LABORATORY RESULT (OUTPATIENT)
Age: 85
End: 2020-07-09

## 2020-07-11 LAB
INR PPP: 1.84 RATIO
PT BLD: 21.1 SEC

## 2020-07-13 LAB
ALBUMIN SERPL ELPH-MCNC: 3.6 G/DL
ALP BLD-CCNC: 98 U/L
ALT SERPL-CCNC: 41 U/L
ANION GAP SERPL CALC-SCNC: 16 MMOL/L
AST SERPL-CCNC: 53 U/L
BASOPHILS # BLD AUTO: 0.04 K/UL
BASOPHILS NFR BLD AUTO: 1.4 %
BILIRUB SERPL-MCNC: 0.5 MG/DL
BUN SERPL-MCNC: 28 MG/DL
CALCIUM SERPL-MCNC: 9.1 MG/DL
CHLORIDE SERPL-SCNC: 103 MMOL/L
CO2 SERPL-SCNC: 20 MMOL/L
CREAT SERPL-MCNC: 1.55 MG/DL
EOSINOPHIL # BLD AUTO: 0.35 K/UL
EOSINOPHIL NFR BLD AUTO: 12.2 %
GLUCOSE SERPL-MCNC: 131 MG/DL
HCT VFR BLD CALC: 29.8 %
HGB BLD-MCNC: 9.6 G/DL
IMM GRANULOCYTES NFR BLD AUTO: 0 %
INR PPP: 1.9 RATIO
LYMPHOCYTES # BLD AUTO: 1.01 K/UL
LYMPHOCYTES NFR BLD AUTO: 35.3 %
MAN DIFF?: NORMAL
MCHC RBC-ENTMCNC: 31.4 PG
MCHC RBC-ENTMCNC: 32.2 GM/DL
MCV RBC AUTO: 97.4 FL
MONOCYTES # BLD AUTO: 0.14 K/UL
MONOCYTES NFR BLD AUTO: 4.9 %
NEUTROPHILS # BLD AUTO: 1.32 K/UL
NEUTROPHILS NFR BLD AUTO: 46.2 %
PLATELET # BLD AUTO: NORMAL K/UL
POTASSIUM SERPL-SCNC: 4.4 MMOL/L
PROT SERPL-MCNC: 6.6 G/DL
PT BLD: 21.9 SEC
RBC # BLD: 3.06 M/UL
RBC # FLD: 16.7 %
SODIUM SERPL-SCNC: 139 MMOL/L
WBC # FLD AUTO: 2.86 K/UL

## 2020-07-22 NOTE — PATIENT PROFILE ADULT - HARM RISK FACTORS
1100:   Cardiac Cath Lab Procedure Area Arrival Note:    Merry Rowe arrived to Cardiac Cath Lab, Procedure Area. Patient identifiers verified with NAME and DATE OF BIRTH. Procedure verified with patient. Consent forms verified. Allergies verified. Patient informed of procedure and plan of care. Questions answered with review. Patient voiced understanding of procedure and plan of care. Patient on cardiac monitor, non-invasive blood pressure, SPO2 monitor. On RA. IV of NS on pump at 10 ml/hr. Patient status doing well without problems. Patient is A&Ox 4. Patient reports no pain. Patient medicated during procedure with orders obtained and verified by Dr. Matias Vivar. Refer to patients Cardiac Cath Lab PROCEDURE REPORT for vital signs, assessment, status, and response during procedure, printed at end of case. Printed report on chart or scanned into chart.
Cardiac Cath Lab Recovery Arrival Note:      Merry Virk Current arrived to Cardiac Cath Lab, Recovery Area. Staff introduced to patient. Patient identifiers verified with NAME and DATE OF BIRTH. Procedure verified with patient. Consent forms reviewed and signed by patient or authorized representative and verified. Allergies verified. Patient and family oriented to department. Patient and family informed of procedure and plan of care. Questions answered with review. Patient prepped for procedure, per orders from physician, prior to arrival.    Patient on cardiac monitor, non-invasive blood pressure, SPO2 monitor. On RA. Patient is A&Ox 4. Patient reports no c/o's. Patient in stretcher, in low position, with side rails up, call bell within reach, patient instructed to call if assistance as needed. Patient prep in: [address], [place] [address]. Patient family has pager # N/A  Family in: N/A. Prep by: D. Lyanne Phoenix
Pt d/c'ed home with . Discharge instructions given and understanding verbalized of follow up. Pt c/o's of dizziness upon discharge. Pt ambulated without difficulty upon discharge.
TRANSFER - IN REPORT:    Verbal report received from Brian on [employer]  being received from procedural area for routine progression of care. Report consisted of patients Situation, Background, Assessment and Recommendations(SBAR). Information from the following report(s) Procedure Summary, MAR and Recent Results was reviewed with the receiving clinician. Opportunity for questions and clarification was provided. Assessment completed upon patients arrival to [address] [place] [address] and care assumed. Cardiac Cath Lab Recovery Arrival Note:    Merry Gill arrived to [facility] recovery area. Patient procedure= Tilt table. Patient on cardiac monitor, non-invasive blood pressure, SPO2 monitor. On  or O2 @ 2 lpm via nc. IV  of NS on pump at 100 ml/hr. Patient status doing well without problems. Patient is A&Ox 4. Patient reports no c/o's. David Liz
yes

## 2020-07-23 ENCOUNTER — LABORATORY RESULT (OUTPATIENT)
Age: 85
End: 2020-07-23

## 2020-07-30 ENCOUNTER — LABORATORY RESULT (OUTPATIENT)
Age: 85
End: 2020-07-30

## 2020-08-06 ENCOUNTER — LABORATORY RESULT (OUTPATIENT)
Age: 85
End: 2020-08-06

## 2020-08-12 ENCOUNTER — APPOINTMENT (OUTPATIENT)
Dept: UROLOGY | Facility: CLINIC | Age: 85
End: 2020-08-12
Payer: MEDICARE

## 2020-08-12 DIAGNOSIS — H91.93 UNSPECIFIED HEARING LOSS, BILATERAL: ICD-10-CM

## 2020-08-12 PROCEDURE — 99213 OFFICE O/P EST LOW 20 MIN: CPT | Mod: GV,95

## 2020-08-12 NOTE — REASON FOR VISIT
[Spouse] : spouse [Verbal consent obtained from patient] : the patient, [unfilled] [Follow-up Visit ___] : a follow-up visit  for [unfilled]

## 2020-08-12 NOTE — HISTORY OF PRESENT ILLNESS
[FreeTextEntry1] : The patient-doctor relationship has been established in a face to face fashion via real time video/audio HIPAA compliant communication using telemedicine software.  The patient's identity has been confirmed.  The patient was previously emailed a copy of the telemedicine consent.  He has had the opportunity to review this information and has now given verbal consent and has requested care to be assessed and treated through telemedicine. He understands that there maybe limitations in this process and that he may need further follow up care in the office and/or hospital setting.\par \par Very pleasant 89-year-old gentleman who presents for follow-up of BPH with urinary obstruction and urinary retention.  He and his wife report that he is urinating well.  He denies dysuria.  No hematuria.  No flank pain or suprapubic pain.  He reports no feeling of incomplete bladder emptying.  No other complaints. [Urinary Urgency] : no urinary urgency [Urinary Retention] : no urinary retention [Nocturia] : nocturia [Urinary Frequency] : no urinary frequency [Straining] : no straining [Weak Stream] : weak stream [Dysuria] : no dysuria [Intermittency] : no intermittency [Hematuria - Gross] : no gross hematuria [Bladder Spasm] : no bladder spasm [Abdominal Pain] : no abdominal pain [Flank Pain] : no flank pain [Fever] : no fever [Fatigue] : no fatigue [Nausea] : no nausea [Anorexia] : no anorexia

## 2020-08-12 NOTE — ASSESSMENT
[FreeTextEntry1] : Very pleasant 89-year-old gentleman who presents for follow-up of BPH with urinary obstruction, urinary retention\par -Continue Flomax-refill sent to the pharmacy\par -Follow-up in 6 months with P[facility]

## 2020-08-12 NOTE — PHYSICAL EXAM
[General Appearance - Well Developed] : well developed [General Appearance - Well Nourished] : well nourished [Well Groomed] : well groomed [Normal Appearance] : normal appearance [General Appearance - In No Acute Distress] : no acute distress [] : no respiratory distress [Exaggerated Use Of Accessory Muscles For Inspiration] : no accessory muscle use [Respiration, Rhythm And Depth] : normal respiratory rhythm and effort [Affect] : the affect was normal [Mood] : the mood was normal [Oriented To Time, Place, And Person] : oriented to person, place, and time [Not Anxious] : not anxious

## 2020-08-13 LAB
INR PPP: 2.36 RATIO
PT BLD: 26.7 SEC

## 2020-08-20 ENCOUNTER — LABORATORY RESULT (OUTPATIENT)
Age: 85
End: 2020-08-20

## 2020-08-27 ENCOUNTER — LABORATORY RESULT (OUTPATIENT)
Age: 85
End: 2020-08-27

## 2020-09-03 ENCOUNTER — LABORATORY RESULT (OUTPATIENT)
Age: 85
End: 2020-09-03

## 2020-09-24 ENCOUNTER — LABORATORY RESULT (OUTPATIENT)
Age: 85
End: 2020-09-24

## 2020-10-01 ENCOUNTER — LABORATORY RESULT (OUTPATIENT)
Age: 85
End: 2020-10-01

## 2020-10-08 ENCOUNTER — LABORATORY RESULT (OUTPATIENT)
Age: 85
End: 2020-10-08

## 2020-10-14 ENCOUNTER — RX RENEWAL (OUTPATIENT)
Age: 85
End: 2020-10-14

## 2020-10-14 ENCOUNTER — LABORATORY RESULT (OUTPATIENT)
Age: 85
End: 2020-10-14

## 2020-10-14 ENCOUNTER — APPOINTMENT (OUTPATIENT)
Dept: INTERNAL MEDICINE | Facility: CLINIC | Age: 85
End: 2020-10-14
Payer: OTHER MISCELLANEOUS

## 2020-10-14 DIAGNOSIS — K59.09 OTHER CONSTIPATION: ICD-10-CM

## 2020-10-14 DIAGNOSIS — R29.6 REPEATED FALLS: ICD-10-CM

## 2020-10-14 PROCEDURE — 99442: CPT | Mod: GV

## 2020-10-14 NOTE — REVIEW OF SYSTEMS
[Unsteady Walk] : ataxia [Fever] : no fever [Chills] : no chills [Shortness Of Breath] : no shortness of breath [Wheezing] : no wheezing [Cough] : no cough [Abdominal Pain] : no abdominal pain [Nausea] : no nausea [Constipation] : no constipation [Vomiting] : no vomiting [Heartburn] : no heartburn [Melena] : no melena [Dysuria] : no dysuria [Hematuria] : no hematuria

## 2020-10-14 NOTE — HEALTH RISK ASSESSMENT
[] : No [Change in mental status noted] : No change in mental status noted [Language] : denies difficulty with language [Handling Complex Tasks] : denies difficulty handling complex tasks [High Risk Behavior] : no high risk behavior [Sexually Active] : not sexually active [de-identified] : need help with ADLs

## 2020-10-14 NOTE — HISTORY OF PRESENT ILLNESS
[de-identified] : This visit was provided via telehealth using real-time 2-way audio visual technology. The patient, SHAREE SERNA , was located at home,[address]\[place] [address] , at the time of the visit. \par The provider,SARA NAGEL , was located at his medical office located in  at the time of the visit. The patient, and Provider participated in the telehealth encounter. 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 pt is current followed by palliative team from his insurance and has visiting RN visit; RN visit every week and per wife; his vital is stable; \par \par pt recently saw cardio in 07/2020 and per wife, no further aggressive measurement for now; and will follow up with cardio in 3 months in 10/2020\par \par has not follow up with Hematologist yet\par \par \par interval Hx 10/14/2020: \par \par \par spoke with pt and his wife \par pt had a fall in last Sunday; pt stated that he fell on his head; frequent fall; denies of LOC; no focal deficit; no blurry vision; cognitive at base line per wife; has been check from hospice care team RN who visit pt once a week; also had evaluation by hospice care physician remotely;  \par \par now use commode\par \par has not follow up with cardio yet\par

## 2020-10-19 RX ORDER — WARFARIN 3 MG/1
3 TABLET ORAL
Qty: 90 | Refills: 1 | Status: DISCONTINUED | COMMUNITY
Start: 2018-12-31 | End: 2020-10-19

## 2020-10-21 ENCOUNTER — RX RENEWAL (OUTPATIENT)
Age: 85
End: 2020-10-21

## 2020-10-22 ENCOUNTER — LABORATORY RESULT (OUTPATIENT)
Age: 85
End: 2020-10-22

## 2020-10-22 ENCOUNTER — RX RENEWAL (OUTPATIENT)
Age: 85
End: 2020-10-22

## 2020-11-02 NOTE — PRE-OP CHECKLIST - TEMPERATURE IN FAHRENHEIT (DEGREES F)
97.7 PT Daily Note-Current


Subjective


Pt. agrees to Rx.  States she is surprised at how much stronger she is now.





Pain





   Location:  No Pain Reported





Appearance


foot drop left foot, left great toe flexion,





Mental Status


Patient Orientation:  Normal For Age


Attachments:  Other-See Comments (telemetry, mask while out of room, )





Transfers


SCALE: Activities may be completed with or without assistive devices.





6-Indepedent-patient completes the activity by him/herself with no assistance 

from a helper.


5-Set-up or Clean-up Assistance-helper sets up or cleans up; patient completes 

activity. [place] assists only prior to or  


    following the activity.


4-Supervision or Touching Assistance-helper provides verbal cues and/or 

touching/steadying and/or contact guard assistance as patient completes 

activity. Assistance may be provided   


    throughout the activity or intermittently.


3-Partial/Moderate Assistance-helper does LESS THAN HALF the effort. [place] 

lifts, holds or supports trunk or limbs, but provides less than half the effort.


2-Substantial/Maximal Assistance-helper does MORE THAN HALF the effort. [place] 

lifts or holds trunk or limbs and provides more than half the effort.


4-Goisirxso-tbxbby does ALL the effort. Patient does none of the effort to 

complete the activity. Or, the assistance of 2 or more helpers is required for 

the patient to complete the  


    activity.


If activity was not attempted, code reason:


7-Patient Refused.


9-Not Applicable-not attempted and the patient did not perform the activity 

before the current illness, exacerbation or injury.


10-Not Attempted due to Environmental Limitations-(lack of equipment, weather 

restraints, etc.).


88-Not Attempted due to Medical Conditions or Safety Concerns.


Roll Left & Right (QC):  6


Sit to Lying (QC):  6


Lying to Sitting/Side of Bed(Q:  6


Sit to Stand (QC):  6


Chair/Bed-to-Chair Xfer(QC):  6





Weight Bearing


Right Lower Extremity:  Right


Full Weight Bearing


Left Lower Extremity:  Left


Full Weight Bearing





Gait Training


Does the Patient Walk?:  Yes


Walk 10 feet (QC):  4


Walk 50 ft with 2 Turns(QC):  4


Gait Persons Needed:  1


Gait Assistive Device:  FWW


121via4 CGA no LOB, equal step length good jae





Exercises


Supine Ex:  Bridging, Ankle pumps, Quad Set, Rolling, Glut sets, Heel Slides, 

Short Arc Quads, Scooting (up in supine bed indep), Straight leg raise, Hip 

abd/add


Supine Reps:  15


Seated Therapy Exercises:  Ankle pumps, Sit to stand, Long arc quads, Hip 

flexion, Hip abd/add


Seated Reps:  15


NuStep Minutes:  8


 NuStep Workload:  1





Treatments


pt. fatigued with activity and needed rest breaks, HC stretches L 4 x 20 sec 

with improved ROM but foot drop noted





Assessment


Current Status:  Good Progress





PT Short Term Goals


Short Term Goals


Time Frame:  2020


Roll Left & Right:  6


Sit to lyin


Lying to sitting on side of be:  6


Sit to stand:  6


Chair/bed-to-chair transfer:  5


4 steps:  4


Picking up objects:  4





PT Long Term Goals


Long Term Goals


PT Long Term Goals Time Frame:  2020


Roll Left & Right (QC):  6


Sit to Lying (QC):  6


Lying-Sitting on Side/Bed(QC):  6


Sit to Stand (QC):  6


Chair/Bed-to-Chair Xfer(QC):  6


Toilet Transfer (QC):  6


Car Transfer (QC):  6


Does the Patient Walk:  Yes


Walk 10 feet (QC):  6


Walk 50ft with 2 Turns (QC):  6


Walk 150 ft (QC):  6


Walking 10ft on Uneven Surface:  6


1 Step (curb) (QC):  6


4 Steps (QC):  6


12 Steps (QC):  6


Picking up an Object (QC):  6


Does the Pt use WC or Scooter?:  No


Wheel 50 feet with 2 turns (QC:  9


Wheel 150 feet:  9





PT Plan


Treatment/Plan


Treatment Plan:  Continue Plan of Care


Treatment Plan:  Bed Mobility, Education, Functional Activity Cherise, Functional 

Strength, Group Therapy, Gait, Safety, Therapeutic Exercise, Transfers


Treatment Duration:  2020


Frequency:  At least 5 of 7 days/Wk (IRF)


Estimated Hrs Per Day:  1.5 hours per day


Patient and/or Family Agrees t:  Yes





Safety Risks/Education


Patient Education:  Gait Training, Transfer Techniques, Correct Positioning, 

Disease Process, Safety Issues


Teaching Recipient:  Patient, Significant Other


Teaching Methods:  Demonstration


Response to Teaching:  Verbalize Understanding, Return Demonstration, 

Reinforcement Needed





Time/GCodes


Time In:  1100


Time Out:  1200


Total Billed Treatment Time:  60


Total Billed Treatment


1,GT25m,EX35m











CAESAR EDWARD PTA              2020 11:55

## 2020-11-05 ENCOUNTER — LABORATORY RESULT (OUTPATIENT)
Age: 85
End: 2020-11-05

## 2020-11-11 ENCOUNTER — LABORATORY RESULT (OUTPATIENT)
Age: 85
End: 2020-11-11

## 2020-11-15 LAB
INR PPP: 2.23 RATIO
PT BLD: 25.5 SEC

## 2020-11-19 ENCOUNTER — LABORATORY RESULT (OUTPATIENT)
Age: 85
End: 2020-11-19

## 2020-11-25 ENCOUNTER — LABORATORY RESULT (OUTPATIENT)
Age: 85
End: 2020-11-25

## 2020-12-03 ENCOUNTER — LABORATORY RESULT (OUTPATIENT)
Age: 85
End: 2020-12-03

## 2020-12-09 ENCOUNTER — LABORATORY RESULT (OUTPATIENT)
Age: 85
End: 2020-12-09

## 2020-12-18 ENCOUNTER — LABORATORY RESULT (OUTPATIENT)
Age: 85
End: 2020-12-18

## 2020-12-23 ENCOUNTER — LABORATORY RESULT (OUTPATIENT)
Age: 85
End: 2020-12-23

## 2021-01-01 ENCOUNTER — LABORATORY RESULT (OUTPATIENT)
Age: 86
End: 2021-01-01

## 2021-01-01 ENCOUNTER — APPOINTMENT (OUTPATIENT)
Dept: INTERNAL MEDICINE | Facility: CLINIC | Age: 86
End: 2021-01-01
Payer: MEDICARE

## 2021-01-01 ENCOUNTER — RX RENEWAL (OUTPATIENT)
Age: 86
End: 2021-01-01

## 2021-01-01 ENCOUNTER — NON-APPOINTMENT (OUTPATIENT)
Age: 86
End: 2021-01-01

## 2021-01-01 ENCOUNTER — APPOINTMENT (OUTPATIENT)
Dept: CT IMAGING | Facility: HOSPITAL | Age: 86
End: 2021-01-01

## 2021-01-01 ENCOUNTER — OUTPATIENT (OUTPATIENT)
Dept: OUTPATIENT SERVICES | Facility: HOSPITAL | Age: 86
LOS: 1 days | End: 2021-01-01
Payer: MEDICARE

## 2021-01-01 ENCOUNTER — APPOINTMENT (OUTPATIENT)
Dept: CT IMAGING | Facility: HOSPITAL | Age: 86
End: 2021-01-01
Payer: OTHER MISCELLANEOUS

## 2021-01-01 ENCOUNTER — APPOINTMENT (OUTPATIENT)
Dept: HYPERBARIC MEDICINE | Facility: HOSPITAL | Age: 86
End: 2021-01-01

## 2021-01-01 ENCOUNTER — APPOINTMENT (OUTPATIENT)
Dept: INTERNAL MEDICINE | Facility: CLINIC | Age: 86
End: 2021-01-01

## 2021-01-01 VITALS
TEMPERATURE: 97.3 F | DIASTOLIC BLOOD PRESSURE: 63 MMHG | BODY MASS INDEX: 15.51 KG/M2 | SYSTOLIC BLOOD PRESSURE: 104 MMHG | WEIGHT: 117 LBS | OXYGEN SATURATION: 96 % | HEIGHT: 73 IN | RESPIRATION RATE: 16 BRPM | HEART RATE: 88 BPM

## 2021-01-01 DIAGNOSIS — I48.91 UNSPECIFIED ATRIAL FIBRILLATION: ICD-10-CM

## 2021-01-01 DIAGNOSIS — C61 MALIGNANT NEOPLASM OF PROSTATE: ICD-10-CM

## 2021-01-01 DIAGNOSIS — Z98.890 OTHER SPECIFIED POSTPROCEDURAL STATES: Chronic | ICD-10-CM

## 2021-01-01 DIAGNOSIS — Z98.890 OTHER SPECIFIED POSTPROCEDURAL STATES: ICD-10-CM

## 2021-01-01 DIAGNOSIS — Z79.01 LONG TERM (CURRENT) USE OF ANTICOAGULANTS: ICD-10-CM

## 2021-01-01 DIAGNOSIS — Z85.46 PERSONAL HISTORY OF MALIGNANT NEOPLASM OF PROSTATE: Chronic | ICD-10-CM

## 2021-01-01 DIAGNOSIS — K64.9 UNSPECIFIED HEMORRHOIDS: ICD-10-CM

## 2021-01-01 DIAGNOSIS — I50.9 HEART FAILURE, UNSPECIFIED: ICD-10-CM

## 2021-01-01 DIAGNOSIS — Z95.818 OTHER SPECIFIED POSTPROCEDURAL STATES: ICD-10-CM

## 2021-01-01 DIAGNOSIS — Z90.49 ACQUIRED ABSENCE OF OTHER SPECIFIED PARTS OF DIGESTIVE TRACT: Chronic | ICD-10-CM

## 2021-01-01 DIAGNOSIS — E78.5 HYPERLIPIDEMIA, UNSPECIFIED: ICD-10-CM

## 2021-01-01 DIAGNOSIS — D69.6 THROMBOCYTOPENIA, UNSPECIFIED: ICD-10-CM

## 2021-01-01 DIAGNOSIS — R79.89 OTHER SPECIFIED ABNORMAL FINDINGS OF BLOOD CHEMISTRY: ICD-10-CM

## 2021-01-01 DIAGNOSIS — R13.10 DYSPHAGIA, UNSPECIFIED: ICD-10-CM
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
## 2021-01-01 DIAGNOSIS — D53.9 NUTRITIONAL ANEMIA, UNSPECIFIED: ICD-10-CM

## 2021-01-01 DIAGNOSIS — N40.1 BENIGN PROSTATIC HYPERPLASIA WITH LOWER URINARY TRACT SYMPMS: ICD-10-CM

## 2021-01-01 DIAGNOSIS — R26.81 UNSTEADINESS ON FEET: ICD-10-CM

## 2021-01-01 DIAGNOSIS — I10 ESSENTIAL (PRIMARY) HYPERTENSION: ICD-10-CM

## 2021-01-01 DIAGNOSIS — N18.9 CHRONIC KIDNEY DISEASE, UNSPECIFIED: ICD-10-CM

## 2021-01-01 DIAGNOSIS — N13.8 BENIGN PROSTATIC HYPERPLASIA WITH LOWER URINARY TRACT SYMPMS: ICD-10-CM

## 2021-01-01 DIAGNOSIS — Z13.820 ENCOUNTER FOR SCREENING FOR OSTEOPOROSIS: ICD-10-CM

## 2021-01-01 DIAGNOSIS — K21.9 GASTRO-ESOPHAGEAL REFLUX DISEASE W/OUT ESOPHAGITIS: ICD-10-CM

## 2021-01-01 LAB
ALBUMIN SERPL ELPH-MCNC: 3 G/DL
ALP BLD-CCNC: 99 U/L
ALT SERPL-CCNC: 30 U/L
ANION GAP SERPL CALC-SCNC: 8 MMOL/L
APPEARANCE: CLEAR
AST SERPL-CCNC: 36 U/L
BASOPHILS # BLD AUTO: 0.04 K/UL
BASOPHILS NFR BLD AUTO: 1.2 %
BILIRUB SERPL-MCNC: 0.5 MG/DL
BILIRUBIN URINE: NEGATIVE
BLOOD URINE: NEGATIVE
BUN SERPL-MCNC: 27 MG/DL
CALCIUM SERPL-MCNC: 8.9 MG/DL
CHLORIDE SERPL-SCNC: 109 MMOL/L
CHOLEST SERPL-MCNC: 105 MG/DL
CO2 SERPL-SCNC: 23 MMOL/L
COLOR: YELLOW
CREAT SERPL-MCNC: 1.13 MG/DL
EOSINOPHIL # BLD AUTO: 0.29 K/UL
EOSINOPHIL NFR BLD AUTO: 8.4 %
ESTIMATED AVERAGE GLUCOSE: 123 MG/DL
FERRITIN SERPL-MCNC: 375 NG/ML
GLUCOSE QUALITATIVE U: NEGATIVE
GLUCOSE SERPL-MCNC: 86 MG/DL
HBA1C MFR BLD HPLC: 5.9 %
HCT VFR BLD CALC: 30 %
HDLC SERPL-MCNC: 40 MG/DL
HGB BLD-MCNC: 9.9 G/DL
IMM GRANULOCYTES NFR BLD AUTO: 0.3 %
INR PPP: 1.29 RATIO
INR PPP: 1.63 RATIO
INR PPP: 2.25 RATIO
INR PPP: 2.56 RATIO
INR PPP: 2.58 RATIO
INR PPP: 2.83 RATIO
IRON SATN MFR SERPL: 24 %
IRON SERPL-MCNC: 50 UG/DL
KETONES URINE: NEGATIVE
LDLC SERPL CALC-MCNC: 57 MG/DL
LEUKOCYTE ESTERASE URINE: NEGATIVE
LYMPHOCYTES # BLD AUTO: 1.23 K/UL
LYMPHOCYTES NFR BLD AUTO: 35.7 %
MAN DIFF?: NORMAL
MCHC RBC-ENTMCNC: 31.2 PG
MCHC RBC-ENTMCNC: 33 GM/DL
MCV RBC AUTO: 94.6 FL
MONOCYTES # BLD AUTO: 0.24 K/UL
MONOCYTES NFR BLD AUTO: 7 %
NEUTROPHILS # BLD AUTO: 1.64 K/UL
NEUTROPHILS NFR BLD AUTO: 47.4 %
NITRITE URINE: NEGATIVE
NONHDLC SERPL-MCNC: 65 MG/DL
PH URINE: 6
PLATELET # BLD AUTO: 87 K/UL
POTASSIUM SERPL-SCNC: 5.3 MMOL/L
PROT SERPL-MCNC: 6.2 G/DL
PROTEIN URINE: NEGATIVE
PSA FREE FLD-MCNC: 36 %
PSA FREE SERPL-MCNC: 0.32 NG/ML
PSA SERPL-MCNC: 0.89 NG/ML
PT BLD: 15.2 SEC
PT BLD: 18.8 SEC
PT BLD: 25.8 SEC
PT BLD: 28.9 SEC
PT BLD: 29.4 SEC
PT BLD: 31.8 SEC
RBC # BLD: 3.17 M/UL
RBC # FLD: 16.1 %
SODIUM SERPL-SCNC: 139 MMOL/L
SPECIFIC GRAVITY URINE: 1.01
TIBC SERPL-MCNC: 205 UG/DL
TRANSFERRIN SERPL-MCNC: 141 MG/DL
TRIGL SERPL-MCNC: 41 MG/DL
TSH SERPL-ACNC: 4.05 UIU/ML
UIBC SERPL-MCNC: 155 UG/DL
UROBILINOGEN URINE: NORMAL
WBC # FLD AUTO: 3.45 K/UL

## 2021-01-01 PROCEDURE — 70450 CT HEAD/BRAIN W/O DYE: CPT

## 2021-01-01 PROCEDURE — 99441: CPT

## 2021-01-01 PROCEDURE — 99214 OFFICE O/P EST MOD 30 MIN: CPT

## 2021-01-01 PROCEDURE — 70450 CT HEAD/BRAIN W/O DYE: CPT | Mod: 26

## 2021-01-01 RX ORDER — HYDROCORTISONE 25 MG/G
2.5 CREAM TOPICAL TWICE DAILY
Qty: 1 | Refills: 1 | Status: ACTIVE | COMMUNITY
Start: 2021-01-01 | End: 1900-01-01

## 2021-01-01 RX ORDER — WARFARIN 2 MG/1
2 TABLET ORAL
Qty: 90 | Refills: 1 | Status: ACTIVE | COMMUNITY
Start: 1900-01-01 | End: 1900-01-01

## 2021-01-01 RX ORDER — ATORVASTATIN CALCIUM 40 MG/1
40 TABLET, FILM COATED ORAL
Qty: 90 | Refills: 1 | Status: ACTIVE | COMMUNITY
Start: 2021-01-01 | End: 1900-01-01

## 2021-01-01 RX ORDER — METOPROLOL TARTRATE 50 MG/1
50 TABLET, FILM COATED ORAL TWICE DAILY
Qty: 180 | Refills: 1 | Status: ACTIVE | COMMUNITY
Start: 1900-01-01 | End: 1900-01-01

## 2021-01-01 RX ORDER — PANTOPRAZOLE 40 MG/1
40 TABLET, DELAYED RELEASE ORAL
Qty: 90 | Refills: 1 | Status: ACTIVE | COMMUNITY
Start: 2019-12-18 | End: 1900-01-01

## 2021-01-01 RX ORDER — FUROSEMIDE 20 MG/1
20 TABLET ORAL
Qty: 90 | Refills: 0 | Status: ACTIVE | COMMUNITY
Start: 2021-01-01 | End: 1900-01-01

## 2021-01-01 RX ORDER — TAMSULOSIN HYDROCHLORIDE 0.4 MG/1
0.4 CAPSULE ORAL
Qty: 90 | Refills: 1 | Status: ACTIVE | COMMUNITY
Start: 1900-01-01 | End: 1900-01-01

## 2021-01-25 DIAGNOSIS — G47.00 INSOMNIA, UNSPECIFIED: ICD-10-CM

## 2021-01-25 LAB
INR PPP: 1.84 RATIO
PT BLD: 21.3 SEC

## 2021-01-25 RX ORDER — MELATONIN 3 MG
3 CAPSULE ORAL
Qty: 30 | Refills: 2 | Status: ACTIVE | COMMUNITY
Start: 2021-01-25 | End: 1900-01-01

## 2021-01-29 NOTE — H&P PST ADULT - GEN GEN HX ROS MEA POS PC
Patient ID: Brittany is a 70 year old female.    Chief Complaint   Patient presents with   • Office Visit   • Other     Hypercalcemia     Accompanied by daughter Loren      C/w Hypercalcemia discovered almost a year ago   Had an atrial fibrillation in Nov was in the hospital    hx of breast cancer s/p rt partial mastectomy s/p radiation in 2018, currently on Letrozole   Has MARIA EUGENIA not using cpap as she should   OA, Obesity  Hx of PE in 2019 after the breast cancer s/p TPA      reports Poluria but she is on furosemide   Tried to balance the water , drinks 64 oz a day almost 8 glasses     Previous fractures: ? Back fracture after a fall   Kidney stones: no   Previous/current steroids: no   Change in height: no     Weight loss: lost 40 lbs intentional along w lasix as well and water loss over this past year     Calcium supplements:  No   MVI  no  Vit D supplements: was on Vit D stopped in Nov   HCTZ use: no   Tums: no   Milk: used to drinlk a lot of milk 2 cups a day     Alcohol use:social   Smoking: no     Oncologist: Dr Granado   BMD ordered but not performed yet  Has been having memory prob worse over this past year       Fam hx:  Fractures: Father had leg amputation from Dm , No fractures   Kidney stones: no      Data:   X Ray: July 2019:  noted is a wedge-shaped loss of height of a mid thoracic vertebra likely T6, of  approximately 20% as seen on the prior study from earlier today, but not present on the prior chest x-ray from 3/6/2018. No acute fracture lines are identified. Anatomic alignment is maintained.    Lab Results   Component Value Date    CREATININE 0.74 11/25/2020     Lab Results   Component Value Date    AST 14 11/21/2020     Lab Results   Component Value Date    GPT 16 11/21/2020     Lab Results   Component Value Date    TSH 0.976 11/25/2020     Lab Results   Component Value Date    CALCIUM 10.5 (H) 11/25/2020     Lab Results   Component Value Date    ALBUMIN 2.9 (L) 11/21/2020     No results found  for: ALKPH  Lab Results   Component Value Date    ALKPT 54 2020     No results found for: ALBONP  No results found for: ALKBF  Lab Results   Component Value Date    PHOS 2.3 (L) 2020     Lab Results   Component Value Date    MG 1.8 2020     Lab Results   Component Value Date    VITD25 26.2 (L) 2020     Lab Results   Component Value Date    V125D 35.9 2020     Lab Results   Component Value Date    INTAC 116 (H) 2020     No results found for: PTHP  No results found for: ELECTROPHO    ROS  All other 12 ROS reviewed and are negative otherwise except for what is reported in HPI     Review  Past medical history, problem list, family medical history, surgical history and social history reviewed.      Past Medical History:   Diagnosis Date   • Arthritis    • Breast cancer (CMS/HCC)    • VTE (venous thromboembolism)      Past Surgical History:   Procedure Laterality Date   • Breast surgery     •  section, low transverse     • Eye surgery     • Hernia repair       Family History   Problem Relation Age of Onset   • Diabetes Other    • Hypertension Other    • Heart disease Mother    • Diabetes Father      Social History     Socioeconomic History   • Marital status: Single     Spouse name: Not on file   • Number of children: Not on file   • Years of education: Not on file   • Highest education level: Not on file   Occupational History   • Not on file   Social Needs   • Financial resource strain: Not on file   • Food insecurity     Worry: Not on file     Inability: Not on file   • Transportation needs     Medical: Not on file     Non-medical: Not on file   Tobacco Use   • Smoking status: Former Smoker   • Smokeless tobacco: Never Used   • Tobacco comment: stopped 40 years ago   Substance and Sexual Activity   • Alcohol use: Yes     Frequency: Never     Comment: wine occasionally   • Drug use: No   • Sexual activity: Not on file   Lifestyle   • Physical activity     Days per week: 4 days      Minutes per session: 20 min   • Stress: Not on file   Relationships   • Social connections     Talks on phone: Not on file     Gets together: Not on file     Attends Cheondoism service: Not on file     Active member of club or organization: Not on file     Attends meetings of clubs or organizations: Not on file     Relationship status: Not on file   • Intimate partner violence     Fear of current or ex partner: Not on file     Emotionally abused: Not on file     Physically abused: Not on file     Forced sexual activity: Not on file   Other Topics Concern   • Not on file   Social History Narrative   • Not on file     ALLERGIES:  No Known Allergies  Current Outpatient Medications   Medication Sig Dispense Refill   • furosemide (LASIX) 40 MG tablet Take 1 tablet by mouth daily. 90 tablet 3   • letrozole (FEMARA) 2.5 MG tablet Take 1 tablet by mouth daily. 30 tablet 5   • metoPROLOL succinate (TOPROL-XL) 100 MG 24 hr tablet Take 2 tablets by mouth daily. 180 tablet 0   • rivaroxaban (Xarelto) 20 MG Tab Take 1 tablet by mouth daily (with dinner). 30 tablet 11   • traMADol (ULTRAM) 50 MG tablet Take 1 tablet by mouth 2 times daily. 60 tablet 2     No current facility-administered medications for this visit.          Physical exam:  Visit Vitals  BP (!) 146/84 (BP Location: RUE - Right upper extremity)   Pulse 78   Temp 98.8 °F (37.1 °C)       GA: NAD, gen obesity   Head and Neck: Normocephalic atraumatic, good ROM of neck   ENT: EOM intact, no diplopia, no scleral icterus  + corneal calcifications bilat   Thyroid not enlarged  No cervical LN  Lungs:  Non labored breathing, no wheezing, No resp distress   NO CVA tenderness   No spine tenderness   Neuro: Alert, awake, conversive, ambulating ok, speech ok  Psych: appropriate mood and affect    Assessment       Hypercalcemia for almost  A year, elevated PTH suggests primary hyperparathyroidism vs less likely FBHH w componet of secondary hyperparathyroidism in view of low  vitD:   Pt reports breast cancer is in remission, the chronicity of hypercalcemia goes against hypercalcemia of malignany  Pt on diuretics which may affect 24 U collection for Ca/ Cr  Pt with multiple other medical conditions as above, in view of medical conditions and after discussion w pt and daughter will aim for medical therapy as needed  Cr ok  Pt has a BMD ordered will make sure it is 3 sites BMD order adjusted   Get updaed renal panel, 25 OHD, PTH, Mg,TSH   PTH rp ordered but lab not able to obtain today, will reorder down ths line depending on test results  Chart reviewed  Labs reviewed and d/w pt   Imaging results reviewed and d/w pt     Total visit was 40 minutes    RTC in 3 months     Thank you Dr Combs  for the opportunity to care for this pleasant patient. Please do not hesitate to contact with me with any questions      Keke Arambula MD, DIANA, FACE   of Clinical Medicine  Endocrinology Division, Internal Medicine Department   [facility]     47 pounds in a year/weight loss

## 2021-02-03 ENCOUNTER — LABORATORY RESULT (OUTPATIENT)
Age: 86
End: 2021-02-03

## 2021-02-17 ENCOUNTER — LABORATORY RESULT (OUTPATIENT)
Age: 86
End: 2021-02-17

## 2021-03-10 NOTE — PHYSICAL EXAM
[General Appearance - Well Developed] : well developed [General Appearance - Well Nourished] : well nourished [Normal Appearance] : normal appearance [Well Groomed] : well groomed [General Appearance - In No Acute Distress] : no acute distress [Edema] : no peripheral edema [Respiration, Rhythm And Depth] : normal respiratory rhythm and effort [Exaggerated Use Of Accessory Muscles For Inspiration] : no accessory muscle use [Abdomen Soft] : soft [Abdomen Tenderness] : non-tender Lab: [id number] [Costovertebral Angle Tenderness] : no ~M costovertebral angle tenderness Lab: [id number] [Urethral Meatus] : meatus normal [Urinary Bladder Findings] : the bladder was normal on palpation [Scrotum] : the scrotum was normal [Testes Mass (___cm)] : there were no testicular masses [FreeTextEntry1] : Hyatt with clear yellow urine [] : no rash [No Focal Deficits] : no focal deficits [Oriented To Time, Place, And Person] : oriented to person, place, and time [Affect] : the affect was normal [Mood] : the mood was normal [Not Anxious] : not anxious [No Palpable Adenopathy] : no palpable adenopathy

## 2021-09-16 NOTE — ED ADULT NURSE NOTE - NS ED NURSE RECORD ANOTHER VITAL SIGN
· Multivessel CAD status post CABG in 2011  · Continue aspirin, ranexa, imdur, plavix, crestor equivalent  · Follows with [facility] cardiology Yes

## 2021-10-01 PROBLEM — R26.81 UNSTEADY GAIT: Status: ACTIVE | Noted: 2020-01-09

## 2021-10-01 PROBLEM — I50.9 CHRONIC CONGESTIVE HEART FAILURE, UNSPECIFIED HEART FAILURE TYPE: Status: ACTIVE | Noted: 2019-12-18

## 2021-10-01 PROBLEM — R79.89 ELEVATED SERUM CREATININE: Status: ACTIVE | Noted: 2019-12-20

## 2021-10-01 PROBLEM — K21.9 GERD (GASTROESOPHAGEAL REFLUX DISEASE): Status: ACTIVE | Noted: 2018-10-15

## 2021-10-01 PROBLEM — D53.9 MACROCYTIC ANEMIA: Status: ACTIVE | Noted: 2020-06-14

## 2021-10-01 PROBLEM — I10 HYPERTENSION: Status: ACTIVE | Noted: 2018-10-15

## 2021-10-01 PROBLEM — N40.1 BPH WITH URINARY OBSTRUCTION: Status: ACTIVE | Noted: 2020-03-17

## 2021-10-01 PROBLEM — Z98.890 S/P MITRAL VALVE CLIP IMPLANTATION: Status: ACTIVE | Noted: 2020-03-04

## 2021-10-01 PROBLEM — R79.89 ELEVATED LFTS: Status: ACTIVE | Noted: 2020-07-08

## 2021-10-01 PROBLEM — C61 MALIGNANT NEOPLASM OF PROSTATE: Status: ACTIVE | Noted: 2018-10-15

## 2021-10-01 PROBLEM — N18.9 CKD (CHRONIC KIDNEY DISEASE): Status: ACTIVE | Noted: 2018-10-15

## 2021-10-01 PROBLEM — K64.9 HEMORRHOID: Status: ACTIVE | Noted: 2021-01-01

## 2021-10-01 PROBLEM — E78.5 HYPERLIPIDEMIA: Status: ACTIVE | Noted: 2018-10-15

## 2021-10-01 PROBLEM — Z79.01 ON ANTICOAGULANT THERAPY: Status: ACTIVE | Noted: 2018-10-15

## 2021-10-01 PROBLEM — D69.6 THROMBOCYTOPENIA: Status: ACTIVE | Noted: 2020-03-02

## 2021-10-01 PROBLEM — R13.10 DYSPHAGIA, UNSPECIFIED TYPE: Status: ACTIVE | Noted: 2021-01-01

## 2021-10-01 PROBLEM — Z13.820 SCREENING FOR OSTEOPOROSIS: Status: ACTIVE | Noted: 2021-01-01

## 2021-10-01 NOTE — ASSESSMENT
[FreeTextEntry1] : last covid19 shot Pfizer in \par advised boost shot in 6 months\par decline flu shot\par \par unable to perform EKG sicne the medical staff left and only manerger cover which is not eligible to do EKG;\par pt today is asymptomatic and advised to see cardio\par \par will check inr once a month \par \par \par labs as ordered and task moa for home blood draw with labs ordered today and INR check once a month for home draw;\par \par sent for home physical therapy; task moa to set up \par \par advised to see cardio  and GI and uro \par \par hemorrhoid:  rectal exam showed no fissure or fistular or abscess; external hemorrhoid with no thrombosis;  cream as ordered  \par \par dexa \par \par dysphagia; had barium esophagram in 2019 showed esophagus spasm; \par \par 2 weeks follow up on telephone

## 2021-10-01 NOTE — HEALTH RISK ASSESSMENT
[No] : In the past 12 months have you used drugs other than those required for medical reasons? No [Two or more falls in past year] : Patient reported two or more falls in the past year [0] : 2) Feeling down, depressed, or hopeless: Not at all (0) [Patient reported colonoscopy was normal] : Patient reported colonoscopy was normal [Behavior] : difficulty with behavior [Learning/Retaining New Information] : difficulty learning/retaining new information [Reasoning] : difficulty with reasoning [Spatial Ability and Orientation] : difficulty with spatial ability and orientation [Access to Medications] : access to medications [Transportation] : transportation [Alone] : lives alone [Retired] : retired [] :  [Feels Safe at Home] : Feels safe at home [] : No [Change in mental status noted] : No change in mental status noted [Language] : denies difficulty with language [Handling Complex Tasks] : denies difficulty handling complex tasks [Sexually Active] : not sexually active [High Risk Behavior] : no high risk behavior [de-identified] : need help with ADLs

## 2021-10-01 NOTE — HISTORY OF PRESENT ILLNESS
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 pt is current followed by palliative team from his insurance and has visiting RN visit; RN visit every week and per wife; his vital is stable; \par \par pt recently saw cardio in 07/2020 and per wife, no further aggressive measurement for now; and will follow up with cardio in 3 months in 10/2020\par \par has not follow up with Hematologist yet\par \par \par interval Hx 10/14/2020: \par \par \par spoke with pt and his wife \par pt had a fall in last Sunday; pt stated that he fell on his head; frequent fall; denies of LOC; no focal deficit; no blurry vision; cognitive at base line per wife; has been check from hospice care team RN who visit pt once a week; also had evaluation by hospice care physician remotely;  \par \par now use commode\par \par has not follow up with cardio yet\par
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
## 2021-10-25 NOTE — HISTORY OF PRESENT ILLNESS
[Other: _____] : [unfilled] [de-identified] : This visit was provided via TELEPHONE. The patient, SHAREE SERNA , was located at home,[address]\[place] [address] , at the time of the visit. \par The provider,SARA NAGEL , was located at his medical office located in  at the time of the visit. 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 pt is current followed by palliative team from his insurance and has visiting RN visit; RN visit every week and per wife; his vital is stable; \par \par pt recently saw cardio in 07/2020 and per wife, no further aggressive measurement for now; and will follow up with cardio in 3 months in 10/2020\par \par has not follow up with Hematologist yet\par \par \par interval Hx 10/14/2020: \par \par \par spoke with pt and his wife \par pt had a fall in last Sunday; pt stated that he fell on his head; frequent fall; denies of LOC; no focal deficit; no blurry vision; cognitive at base line per wife; has been check from hospice care team RN who visit pt once a week; also had evaluation by hospice care physician remotely;  \par \par now use commode\par \par has not follow up with cardio yet\par \par \par interval hx 10/25/2021\par \par now doing well at baseline \par

## 2021-10-25 NOTE — HEALTH RISK ASSESSMENT
[] : No [No] : In the past 12 months have you used drugs other than those required for medical reasons? No [Two or more falls in past year] : Patient reported two or more falls in the past year [0] : 2) Feeling down, depressed, or hopeless: Not at all (0) [Patient reported colonoscopy was normal] : Patient reported colonoscopy was normal [Change in mental status noted] : No change in mental status noted [Language] : denies difficulty with language [Behavior] : difficulty with behavior [Learning/Retaining New Information] : difficulty learning/retaining new information [Handling Complex Tasks] : denies difficulty handling complex tasks [Reasoning] : difficulty with reasoning [Spatial Ability and Orientation] : difficulty with spatial ability and orientation [Access to Medications] : access to medications [Transportation] : transportation [Alone] : lives alone [Retired] : retired [] :  [Sexually Active] : not sexually active [High Risk Behavior] : no high risk behavior [Feels Safe at Home] : Feels safe at home [de-identified] : need help with ADLs

## 2021-10-25 NOTE — ASSESSMENT
[FreeTextEntry1] : last covid19 shot Pfizer in \par advised boost shot in 6 months\par decline flu shot\par \par unable to perform EKG sicne the medical staff left and only manerger cover which is not eligible to do EKG;\par pt today is asymptomatic and advised to see cardio\par \par \par INR not at goal sicne hold meds for 1 week\par advised to increase 1 tab Coumadin for 1 day; then continue with current dose for the rest of the week \par will check inr next week , task staff for home INR draw\par \par \par labs as ordered and task moa for home blood draw with labs ordered today and INR check once a month for home draw;\par \par sent for home physical therapy; task moa to set up \par \par advised to see cardio  and GI and uro \par \par hemorrhoid:  rectal exam showed no fissure or fistular or abscess; external hemorrhoid with no thrombosis;  cream as ordered  \par \par dexa \par \par dysphagia; had barium esophagram in 2019 showed esophagus spasm; \par \par 1 weeks follow up on telephone \par \par \par I spend a total of 10 minutes on the date of the encounter evaluating and treating patient\par

## 2021-11-01 PROBLEM — I48.91 ATRIAL FIBRILLATION: Status: ACTIVE | Noted: 2018-10-15

## 2021-11-01 NOTE — ASSESSMENT
[FreeTextEntry1] : last covid19 shot Pfizer in \par advised boost shot in 6 months\par decline flu shot\par \par unable to perform EKG sicne the medical staff left and only manerger cover which is not eligible to do EKG;\par pt today is asymptomatic and advised to see cardio\par \par \par INR not at goal since hold meds for 1 week\par advised to increase 1 tab Coumadin for 1 day; then continue with current dose for the rest of the week \par will check inr next week , task staff for home INR draw\par \par \par labs as ordered and task moa for home blood draw with labs ordered today and INR check once a month for home draw;\par \par sent for home physical therapy; task moa to set up \par \par advised to see cardio  and GI and uro \par \par hemorrhoid:  rectal exam showed no fissure or fistular or abscess; external hemorrhoid with no thrombosis;  cream as ordered  \par \par dexa \par \par dysphagia; had barium esophagram in 2019 showed esophagus spasm; \par \par 1 weeks follow up on telephone \par \par \par I spend a total of 10 minutes on the date of the encounter evaluating and treating patient\par

## 2021-11-01 NOTE — HEALTH RISK ASSESSMENT
[] : No [No] : In the past 12 months have you used drugs other than those required for medical reasons? No [Two or more falls in past year] : Patient reported two or more falls in the past year [0] : 2) Feeling down, depressed, or hopeless: Not at all (0) [Patient reported colonoscopy was normal] : Patient reported colonoscopy was normal [Change in mental status noted] : No change in mental status noted [Language] : denies difficulty with language [Behavior] : difficulty with behavior [Learning/Retaining New Information] : difficulty learning/retaining new information [Handling Complex Tasks] : denies difficulty handling complex tasks [Reasoning] : difficulty with reasoning [Spatial Ability and Orientation] : difficulty with spatial ability and orientation [Access to Medications] : access to medications [Transportation] : transportation [Alone] : lives alone [Retired] : retired [] :  [Sexually Active] : not sexually active [High Risk Behavior] : no high risk behavior [Feels Safe at Home] : Feels safe at home [de-identified] : need help with ADLs

## 2022-01-01 ENCOUNTER — LABORATORY RESULT (OUTPATIENT)
Age: 87
End: 2022-01-01

## 2022-02-23 ENCOUNTER — NON-APPOINTMENT (OUTPATIENT)
Age: 87
End: 2022-02-23

## 2022-06-23 NOTE — PATIENT PROFILE ADULT - FALL HARM RISK
age(85 years old or older) Mart-1 - Positive Histology Text: MART-1 staining demonstrates areas of higher density and clustering of melanocytes with Pagetoid spread upwards within the epidermis. The surgical margins are positive for tumor cells.

## 2022-08-10 NOTE — DISCHARGE NOTE ADULT - NS AS DC FU [facility] LV FUNCTION ASSESSMENT
[Yes] : Yes [Monthly or less (1 pt)] : Monthly or less (1 point) [1 or 2 (0 pts)] : 1 or 2 (0 points) [Never (0 pts)] : Never (0 points) [No] : In the past 12 months have you used drugs other than those required for medical reasons? No [No falls in past year] : Patient reported no falls in the past year [0] : 2) Feeling down, depressed, or hopeless: Not at all (0) [PHQ-2 Negative - No further assessment needed] : PHQ-2 Negative - No further assessment needed [Audit-CScore] : 1 [[id number]] : 0 no

## 2023-01-20 NOTE — ED PROVIDER NOTE - INTERNATIONAL TRAVEL
Cong No (MD)  Plastic Surgery; Surgery of the Hand  [address] [facility] [facility] [address]  [place] [address]  Phone: [phone]  Fax: [phone]  Follow Up Time:    No

## 2023-09-11 NOTE — PHYSICAL THERAPY INITIAL EVALUATION ADULT - ORIENTATION, REHAB EVAL
well developed, well nourished , in no acute distress ,  ambulating without difficulty,  normal communication ability , 
oriented to person, place, time and situation

## 2023-11-09 NOTE — H&P ADULT - PROBLEM SELECTOR PLAN 8
Vascular Nurse Navigator Post Op Education    Met with patient at bedside to introduce myself as Vascular Nurse Navigator and explained my role. Patient is appropriate and accepting to education. Patient was educated with Review of written materials provided, Teachback, Explanation, Demonstration, and Question & Answer on expectations of post op care and recovery on R CFEA and b/l iliac shockwave lithotripsy. Patient is a smoker  (1/2 ppd x 50 yrs), as such Smoking effects on the lungs, tobacco triggers, and Smoking cessation was reviewed. Education provided to patient on infection prevention, activity limitations, when to call the office, importance of follow up, and incisional care. Discharge instruction handout provided to patient to review. Provided patient with a pack of disposable washcloths, a pack of guaze, and a bottle of chlorhexidine for incision care upon discharge.
c/w metoprolol  monitor bp

## 2023-12-15 NOTE — SWALLOW BEDSIDE ASSESSMENT ADULT - SWALLOW EVAL: ORAL MUSCULATURE
Patient with very severe obstructive sleep apnea AHI 88, doing well on therapy, clinically not experiencing a leak that is bothersome although he still has some degree of leak which may be mouth leak on download but otherwise well-controlled. It has been several years since he has tried a new mask and we discussed oronasal masks for a trial and I asked [facility] to show him some different masks order sent in for refill. We will see him back in 1 year, sooner if needed. His wife is also a patient here.
generally intact

## 2024-05-04 NOTE — H&P ADULT - PROBLEM SELECTOR PLAN 7
No cyanosis, no pallor, no jaundice, no rash IMPROVE VTE Individual Risk Assessment  RISK                                                         Points  [  ] Previous VTE                                      3  [  ] Thrombophilia                                   2  [  ] Lower limb paralysis                         2 (unable to hold up >15 seconds)    [  ] Current Cancer                                  2       (within 6 months)  [ x ] Immobilization > 24 hrs                    1  [  ] ICU/CCU stay > 24 hrs                         1  [ x ] Age > 60                                              1

## 2024-08-16 NOTE — H&P PST ADULT - RS GEN PE MLT RESP DETAILS PC
Temples.../Clavicles.../Shoulders.../Dorsal hand...
breath sounds equal/no rales/no wheezes/no rhonchi/clear to auscultation bilaterally

## 2024-08-26 NOTE — PRE-OP CHECKLIST - PATIENT PROBLEMS/NEEDS
Patient expressed no known problems or needs
PAST MEDICAL HISTORY:  Anxiety and depression     Arthritis     COVID-19 vaccine series completed     History of atrial fibrillation     History of cardiac radiofrequency ablation 2021    Hypercholesterolemia     Hypertension     Legally blind Bilaterally - notes L< 20% vision    Muscle cramps     Optic nerve drusen, bilateral     Osteoarthritis of left hip     Primary osteoarthritis of right hip     Status post placement of implantable loop recorder

## 2024-09-16 NOTE — ED PROVIDER NOTE - NS ED MD DISPO DIVISION
LOV 8-  NOV 9-    Refill request for     Disp Refills Start End    methIMAzole (TAPAZOLE) 5 MG tablet 90 tablet 1 7/3/2024 --    Sig - Route: Take 3 tablets by mouth daily        
[facility]

## 2025-03-21 NOTE — H&P PST ADULT - CHARACTER OF SYSTOLIC MURMUR
Bed in lowest position, wheels locked, appropriate side rails in place/Call bell, personal items and telephone in reach/Instruct patient to call for assistance before getting out of bed or chair/Non-slip footwear when patient is out of bed/[place] to call system/Physically safe environment - no spills, clutter or unnecessary equipment/Purposeful Proactive Rounding/Room/bathroom lighting operational, light cord in reach murmur loudness: III/VI/apex/crescendo-decrescendo/lower lsb